# Patient Record
Sex: FEMALE | Race: WHITE | NOT HISPANIC OR LATINO | Employment: OTHER | URBAN - METROPOLITAN AREA
[De-identification: names, ages, dates, MRNs, and addresses within clinical notes are randomized per-mention and may not be internally consistent; named-entity substitution may affect disease eponyms.]

---

## 2017-04-19 ENCOUNTER — ALLSCRIPTS OFFICE VISIT (OUTPATIENT)
Dept: OTHER | Facility: OTHER | Age: 47
End: 2017-04-19

## 2017-04-24 ENCOUNTER — ALLSCRIPTS OFFICE VISIT (OUTPATIENT)
Dept: OTHER | Facility: OTHER | Age: 47
End: 2017-04-24

## 2017-04-24 DIAGNOSIS — I73.00 RAYNAUD'S SYNDROME WITHOUT GANGRENE: ICD-10-CM

## 2017-04-24 DIAGNOSIS — R55 SYNCOPE AND COLLAPSE: ICD-10-CM

## 2017-04-24 DIAGNOSIS — G70.00 MYASTHENIA GRAVIS WITHOUT (ACUTE) EXACERBATION (HCC): ICD-10-CM

## 2017-04-24 DIAGNOSIS — K58.0 IRRITABLE BOWEL SYNDROME WITH DIARRHEA: ICD-10-CM

## 2017-04-25 ENCOUNTER — ALLSCRIPTS OFFICE VISIT (OUTPATIENT)
Dept: OTHER | Facility: OTHER | Age: 47
End: 2017-04-25

## 2017-04-28 ENCOUNTER — APPOINTMENT (OUTPATIENT)
Dept: LAB | Facility: CLINIC | Age: 47
End: 2017-04-28
Payer: OTHER GOVERNMENT

## 2017-04-28 ENCOUNTER — TRANSCRIBE ORDERS (OUTPATIENT)
Dept: MRI IMAGING | Facility: CLINIC | Age: 47
End: 2017-04-28

## 2017-04-28 ENCOUNTER — GENERIC CONVERSION - ENCOUNTER (OUTPATIENT)
Dept: OTHER | Facility: OTHER | Age: 47
End: 2017-04-28

## 2017-04-28 DIAGNOSIS — I73.00 RAYNAUD'S SYNDROME WITHOUT GANGRENE: ICD-10-CM

## 2017-04-28 DIAGNOSIS — I73.00 RAYNAUD'S DISEASE WITHOUT GANGRENE: ICD-10-CM

## 2017-04-28 DIAGNOSIS — K58.0 IRRITABLE BOWEL SYNDROME WITH DIARRHEA: ICD-10-CM

## 2017-04-28 DIAGNOSIS — K58.0 IRRITABLE BOWEL SYNDROME WITH DIARRHEA: Primary | ICD-10-CM

## 2017-04-28 LAB — CORTIS AM PEAK SERPL-MCNC: 9.1 UG/ML (ref 4.2–22.4)

## 2017-04-28 PROCEDURE — 82626 DEHYDROEPIANDROSTERONE: CPT

## 2017-04-28 PROCEDURE — 82024 ASSAY OF ACTH: CPT

## 2017-04-28 PROCEDURE — 82533 TOTAL CORTISOL: CPT

## 2017-04-28 PROCEDURE — 36415 COLL VENOUS BLD VENIPUNCTURE: CPT

## 2017-05-02 LAB
ACTH PLAS-MCNC: 24.5 PG/ML (ref 7.2–63.3)
DHEA SERPL-MCNC: 287 NG/DL (ref 31–701)

## 2017-05-03 ENCOUNTER — TRANSCRIBE ORDERS (OUTPATIENT)
Dept: NON INVASIVE DIAGNOSTICS | Facility: CLINIC | Age: 47
End: 2017-05-03

## 2017-05-03 ENCOUNTER — HOSPITAL ENCOUNTER (OUTPATIENT)
Dept: NON INVASIVE DIAGNOSTICS | Facility: CLINIC | Age: 47
Discharge: HOME | End: 2017-05-03
Payer: OTHER GOVERNMENT

## 2017-05-03 DIAGNOSIS — R55 SYNCOPE AND COLLAPSE: ICD-10-CM

## 2017-05-03 DIAGNOSIS — I73.00 RAYNAUD'S DISEASE: ICD-10-CM

## 2017-05-03 DIAGNOSIS — G70.00 MYASTHENIA GRAVIS (HCC): Primary | ICD-10-CM

## 2017-05-04 ENCOUNTER — GENERIC CONVERSION - ENCOUNTER (OUTPATIENT)
Dept: OTHER | Facility: OTHER | Age: 47
End: 2017-05-04

## 2017-05-18 ENCOUNTER — HOSPITAL ENCOUNTER (OUTPATIENT)
Dept: INFUSION CENTER | Facility: CLINIC | Age: 47
Discharge: HOME | End: 2017-05-18
Payer: OTHER GOVERNMENT

## 2017-05-18 VITALS
HEART RATE: 68 BPM | DIASTOLIC BLOOD PRESSURE: 78 MMHG | SYSTOLIC BLOOD PRESSURE: 128 MMHG | HEIGHT: 65 IN | WEIGHT: 165 LBS | BODY MASS INDEX: 27.49 KG/M2 | RESPIRATION RATE: 20 BRPM | TEMPERATURE: 98 F | OXYGEN SATURATION: 99 %

## 2017-05-19 ENCOUNTER — HOSPITAL ENCOUNTER (OUTPATIENT)
Dept: NON INVASIVE DIAGNOSTICS | Facility: CLINIC | Age: 47
Discharge: HOME | End: 2017-05-19
Payer: OTHER GOVERNMENT

## 2017-05-19 DIAGNOSIS — R55 SYNCOPE AND COLLAPSE: ICD-10-CM

## 2017-05-19 PROCEDURE — 93350 STRESS TTE ONLY: CPT

## 2017-05-21 ENCOUNTER — GENERIC CONVERSION - ENCOUNTER (OUTPATIENT)
Dept: OTHER | Facility: OTHER | Age: 47
End: 2017-05-21

## 2017-05-22 LAB
ARRHY DURING EX: NORMAL
CHEST PAIN STATEMENT: NORMAL
MAX DIASTOLIC BP: 76 MMHG
MAX HEART RATE: 151 BPM
MAX PREDICTED HEART RATE: 172 BPM
MAX. SYSTOLIC BP: 158 MMHG
PROTOCOL NAME: NORMAL
REASON FOR TERMINATION: NORMAL
TARGET HR FORMULA: NORMAL
TIME IN EXERCISE PHASE: 420 S

## 2017-05-25 ENCOUNTER — ALLSCRIPTS OFFICE VISIT (OUTPATIENT)
Dept: OTHER | Facility: OTHER | Age: 47
End: 2017-05-25

## 2017-05-25 DIAGNOSIS — R42 DIZZINESS AND GIDDINESS: ICD-10-CM

## 2017-06-14 ENCOUNTER — APPOINTMENT (OUTPATIENT)
Dept: LAB | Facility: CLINIC | Age: 47
End: 2017-06-14
Payer: OTHER GOVERNMENT

## 2017-06-14 ENCOUNTER — TRANSCRIBE ORDERS (OUTPATIENT)
Dept: MRI IMAGING | Facility: CLINIC | Age: 47
End: 2017-06-14

## 2017-06-14 DIAGNOSIS — M35.00 SICCA SYNDROME (HCC): ICD-10-CM

## 2017-06-14 DIAGNOSIS — M35.00 SICCA SYNDROME (HCC): Primary | ICD-10-CM

## 2017-06-14 LAB
ALBUMIN SERPL BCP-MCNC: 3.7 G/DL (ref 3.5–5)
ALP SERPL-CCNC: 61 U/L (ref 46–116)
ALT SERPL W P-5'-P-CCNC: 32 U/L (ref 12–78)
ANION GAP SERPL CALCULATED.3IONS-SCNC: 5 MMOL/L (ref 4–13)
AST SERPL W P-5'-P-CCNC: 23 U/L (ref 5–45)
BASOPHILS # BLD AUTO: 0.02 THOUSANDS/ΜL (ref 0–0.1)
BASOPHILS NFR BLD AUTO: 1 % (ref 0–1)
BILIRUB SERPL-MCNC: 0.73 MG/DL (ref 0.2–1)
BUN SERPL-MCNC: 13 MG/DL (ref 5–25)
CALCIUM SERPL-MCNC: 8.8 MG/DL (ref 8.3–10.1)
CHLORIDE SERPL-SCNC: 106 MMOL/L (ref 100–108)
CO2 SERPL-SCNC: 29 MMOL/L (ref 21–32)
CREAT SERPL-MCNC: 0.72 MG/DL (ref 0.6–1.3)
CRP SERPL QL: <3 MG/L
EOSINOPHIL # BLD AUTO: 0.15 THOUSAND/ΜL (ref 0–0.61)
EOSINOPHIL NFR BLD AUTO: 4 % (ref 0–6)
ERYTHROCYTE [DISTWIDTH] IN BLOOD BY AUTOMATED COUNT: 14.8 % (ref 11.6–15.1)
ERYTHROCYTE [SEDIMENTATION RATE] IN BLOOD: 5 MM/HOUR (ref 0–20)
GFR SERPL CREATININE-BSD FRML MDRD: >60 ML/MIN/1.73SQ M
GLUCOSE P FAST SERPL-MCNC: 76 MG/DL (ref 65–99)
HCT VFR BLD AUTO: 39 % (ref 34.8–46.1)
HGB BLD-MCNC: 12.9 G/DL (ref 11.5–15.4)
LYMPHOCYTES # BLD AUTO: 1.44 THOUSANDS/ΜL (ref 0.6–4.47)
LYMPHOCYTES NFR BLD AUTO: 39 % (ref 14–44)
MCH RBC QN AUTO: 29.3 PG (ref 26.8–34.3)
MCHC RBC AUTO-ENTMCNC: 33.1 G/DL (ref 31.4–37.4)
MCV RBC AUTO: 89 FL (ref 82–98)
MONOCYTES # BLD AUTO: 0.31 THOUSAND/ΜL (ref 0.17–1.22)
MONOCYTES NFR BLD AUTO: 8 % (ref 4–12)
NEUTROPHILS # BLD AUTO: 1.75 THOUSANDS/ΜL (ref 1.85–7.62)
NEUTS SEG NFR BLD AUTO: 48 % (ref 43–75)
NRBC BLD AUTO-RTO: 0 /100 WBCS
PLATELET # BLD AUTO: 196 THOUSANDS/UL (ref 149–390)
PMV BLD AUTO: 9 FL (ref 8.9–12.7)
POTASSIUM SERPL-SCNC: 4.4 MMOL/L (ref 3.5–5.3)
PROT SERPL-MCNC: 6.8 G/DL (ref 6.4–8.2)
RBC # BLD AUTO: 4.4 MILLION/UL (ref 3.81–5.12)
SODIUM SERPL-SCNC: 140 MMOL/L (ref 136–145)
WBC # BLD AUTO: 3.68 THOUSAND/UL (ref 4.31–10.16)

## 2017-06-14 PROCEDURE — 84165 PROTEIN E-PHORESIS SERUM: CPT

## 2017-06-14 PROCEDURE — 36415 COLL VENOUS BLD VENIPUNCTURE: CPT

## 2017-06-14 PROCEDURE — 86140 C-REACTIVE PROTEIN: CPT

## 2017-06-14 PROCEDURE — 86235 NUCLEAR ANTIGEN ANTIBODY: CPT

## 2017-06-14 PROCEDURE — 85025 COMPLETE CBC W/AUTO DIFF WBC: CPT

## 2017-06-14 PROCEDURE — 82232 ASSAY OF BETA-2 PROTEIN: CPT

## 2017-06-14 PROCEDURE — 80053 COMPREHEN METABOLIC PANEL: CPT

## 2017-06-14 PROCEDURE — 85652 RBC SED RATE AUTOMATED: CPT

## 2017-06-15 LAB
B2 MICROGLOB SERPL-MCNC: 2.5 MG/L (ref 0.6–2.4)
ENA SS-A AB SER-ACNC: 3.8 AI (ref 0–0.9)
ENA SS-B AB SER-ACNC: <0.2 AI (ref 0–0.9)

## 2017-06-16 LAB
ALBUMIN SERPL ELPH-MCNC: 4.11 G/DL (ref 3.5–5)
ALBUMIN SERPL ELPH-MCNC: 61.3 % (ref 52–65)
ALPHA1 GLOB SERPL ELPH-MCNC: 0.25 G/DL (ref 0.1–0.4)
ALPHA1 GLOB SERPL ELPH-MCNC: 3.8 % (ref 2.5–5)
ALPHA2 GLOB SERPL ELPH-MCNC: 0.62 G/DL (ref 0.4–1.2)
ALPHA2 GLOB SERPL ELPH-MCNC: 9.3 % (ref 7–13)
BETA GLOB ABNORMAL SERPL ELPH-MCNC: 0.48 G/DL (ref 0.4–0.8)
BETA1 GLOB SERPL ELPH-MCNC: 7.1 % (ref 5–13)
BETA2 GLOB SERPL ELPH-MCNC: 4 % (ref 2–8)
BETA2+GAMMA GLOB SERPL ELPH-MCNC: 0.27 G/DL (ref 0.2–0.5)
GAMMA GLOB ABNORMAL SERPL ELPH-MCNC: 0.97 G/DL (ref 0.5–1.6)
GAMMA GLOB SERPL ELPH-MCNC: 14.5 % (ref 12–22)
IGG/ALB SER: 1.58 {RATIO} (ref 1.1–1.8)
PROT PATTERN SERPL ELPH-IMP: NORMAL
PROT SERPL-MCNC: 6.7 G/DL (ref 6.4–8.2)

## 2017-06-20 ENCOUNTER — HOSPITAL ENCOUNTER (OUTPATIENT)
Dept: NON INVASIVE DIAGNOSTICS | Facility: CLINIC | Age: 47
Discharge: HOME | End: 2017-06-20
Payer: OTHER GOVERNMENT

## 2017-06-20 DIAGNOSIS — R55 SYNCOPE AND COLLAPSE: ICD-10-CM

## 2017-06-20 DIAGNOSIS — R42 DIZZINESS AND GIDDINESS: ICD-10-CM

## 2017-06-20 PROCEDURE — 93880 EXTRACRANIAL BILAT STUDY: CPT

## 2017-06-26 ENCOUNTER — GENERIC CONVERSION - ENCOUNTER (OUTPATIENT)
Dept: OTHER | Facility: OTHER | Age: 47
End: 2017-06-26

## 2017-06-28 ENCOUNTER — ALLSCRIPTS OFFICE VISIT (OUTPATIENT)
Dept: OTHER | Facility: OTHER | Age: 47
End: 2017-06-28

## 2017-06-28 DIAGNOSIS — I65.29 OCCLUSION AND STENOSIS OF UNSPECIFIED CAROTID ARTERY: ICD-10-CM

## 2017-07-06 ENCOUNTER — TRANSCRIBE ORDERS (OUTPATIENT)
Dept: ADMINISTRATIVE | Facility: HOSPITAL | Age: 47
End: 2017-07-06

## 2017-07-06 DIAGNOSIS — R55 SYNCOPE AND COLLAPSE: Primary | ICD-10-CM

## 2017-08-09 ENCOUNTER — HOSPITAL ENCOUNTER (OUTPATIENT)
Dept: NEUROLOGY | Facility: HOSPITAL | Age: 47
Discharge: HOME | End: 2017-08-09
Attending: PSYCHIATRY & NEUROLOGY
Payer: OTHER GOVERNMENT

## 2017-08-09 DIAGNOSIS — R55 SYNCOPE AND COLLAPSE: ICD-10-CM

## 2017-08-09 PROCEDURE — 95819 EEG AWAKE AND ASLEEP: CPT

## 2017-10-18 ENCOUNTER — TRANSCRIBE ORDERS (OUTPATIENT)
Dept: RADIOLOGY | Facility: CLINIC | Age: 47
End: 2017-10-18

## 2017-10-18 ENCOUNTER — APPOINTMENT (OUTPATIENT)
Dept: LAB | Facility: CLINIC | Age: 47
End: 2017-10-18
Payer: OTHER GOVERNMENT

## 2017-10-18 DIAGNOSIS — M35.00 SICCA SYNDROME (HCC): ICD-10-CM

## 2017-10-18 DIAGNOSIS — M35.00 SICCA SYNDROME (HCC): Primary | ICD-10-CM

## 2017-10-18 LAB
ALBUMIN SERPL BCP-MCNC: 3.9 G/DL (ref 3.5–5)
ALP SERPL-CCNC: 58 U/L (ref 46–116)
ALT SERPL W P-5'-P-CCNC: 28 U/L (ref 12–78)
ANION GAP SERPL CALCULATED.3IONS-SCNC: 4 MMOL/L (ref 4–13)
AST SERPL W P-5'-P-CCNC: 21 U/L (ref 5–45)
BASOPHILS # BLD AUTO: 0.02 THOUSANDS/ΜL (ref 0–0.1)
BASOPHILS NFR BLD AUTO: 1 % (ref 0–1)
BILIRUB SERPL-MCNC: 0.56 MG/DL (ref 0.2–1)
BUN SERPL-MCNC: 11 MG/DL (ref 5–25)
CALCIUM SERPL-MCNC: 8.8 MG/DL (ref 8.3–10.1)
CHLORIDE SERPL-SCNC: 109 MMOL/L (ref 100–108)
CO2 SERPL-SCNC: 27 MMOL/L (ref 21–32)
CREAT SERPL-MCNC: 0.84 MG/DL (ref 0.6–1.3)
CRP SERPL QL: <3 MG/L
EOSINOPHIL # BLD AUTO: 0.1 THOUSAND/ΜL (ref 0–0.61)
EOSINOPHIL NFR BLD AUTO: 3 % (ref 0–6)
ERYTHROCYTE [DISTWIDTH] IN BLOOD BY AUTOMATED COUNT: 13.6 % (ref 11.6–15.1)
ERYTHROCYTE [SEDIMENTATION RATE] IN BLOOD: 7 MM/HOUR (ref 0–20)
GFR SERPL CREATININE-BSD FRML MDRD: 82 ML/MIN/1.73SQ M
GLUCOSE SERPL-MCNC: 83 MG/DL (ref 65–140)
HCT VFR BLD AUTO: 39.3 % (ref 34.8–46.1)
HGB BLD-MCNC: 13.2 G/DL (ref 11.5–15.4)
LYMPHOCYTES # BLD AUTO: 1.02 THOUSANDS/ΜL (ref 0.6–4.47)
LYMPHOCYTES NFR BLD AUTO: 29 % (ref 14–44)
MCH RBC QN AUTO: 29.1 PG (ref 26.8–34.3)
MCHC RBC AUTO-ENTMCNC: 33.6 G/DL (ref 31.4–37.4)
MCV RBC AUTO: 87 FL (ref 82–98)
MONOCYTES # BLD AUTO: 0.29 THOUSAND/ΜL (ref 0.17–1.22)
MONOCYTES NFR BLD AUTO: 8 % (ref 4–12)
NEUTROPHILS # BLD AUTO: 2.12 THOUSANDS/ΜL (ref 1.85–7.62)
NEUTS SEG NFR BLD AUTO: 59 % (ref 43–75)
NRBC BLD AUTO-RTO: 0 /100 WBCS
PLATELET # BLD AUTO: 252 THOUSANDS/UL (ref 149–390)
PMV BLD AUTO: 9.2 FL (ref 8.9–12.7)
POTASSIUM SERPL-SCNC: 4.1 MMOL/L (ref 3.5–5.3)
PROT SERPL-MCNC: 7.4 G/DL (ref 6.4–8.2)
RBC # BLD AUTO: 4.54 MILLION/UL (ref 3.81–5.12)
SODIUM SERPL-SCNC: 140 MMOL/L (ref 136–145)
WBC # BLD AUTO: 3.56 THOUSAND/UL (ref 4.31–10.16)

## 2017-10-18 PROCEDURE — 80053 COMPREHEN METABOLIC PANEL: CPT

## 2017-10-18 PROCEDURE — 36415 COLL VENOUS BLD VENIPUNCTURE: CPT

## 2017-10-18 PROCEDURE — 85652 RBC SED RATE AUTOMATED: CPT

## 2017-10-18 PROCEDURE — 86140 C-REACTIVE PROTEIN: CPT

## 2017-10-18 PROCEDURE — 85025 COMPLETE CBC W/AUTO DIFF WBC: CPT

## 2017-10-26 ENCOUNTER — ALLSCRIPTS OFFICE VISIT (OUTPATIENT)
Dept: OTHER | Facility: OTHER | Age: 47
End: 2017-10-26

## 2017-10-27 NOTE — PROGRESS NOTES
Assessment  1  Myasthenia gravis (358 00) (G70 00)   2  Sjoegren syndrome (710 2) (M35 00)    Plan  Sjoegren syndrome    · Continue with our present treatment plan ; Status:Complete;   Done: 12OWK5191   Ordered; For:Sjoegren syndrome; Ordered By:Govind Jaramillo;   · Follow-up visit in 6 months Evaluation and Treatment  Follow-up  Status: Complete   Done: 26Oct2017   Ordered; For: Sjoegren syndrome; Ordered By: La Nena Ceballos Performed:  Due: 54XES2870; Last Updated By: Edgardo Batista; 10/26/2017 9:11:19 AM    Discussion/Summary  Discussion Summary:   Patient with a history of myasthenia gravis, is advised to continue Mestinon 60 mg every 4 hours  She is considering a prednisone taper, we will also discuss with her rheumatologist, and is advised to reduce the dose of prednisone to 2 5 mg every 2 days and if she does well within the next 1 month she can discontinue the medication  She will return back to see me in 6 months  Chief Complaint  Chief Complaint Free Text Note Form: The Patient returns today following up on her history of Myasthenia Gravis and Sjogren syndrome  History of Present Illness  HPI: Patient is here for a follow-up visit with a history of myasthenia gravis, under control with the help of prednisone which she has now reduced to 5 mg every 2 days, and Mestinon 60 mg every 4 hours  She has been doing well, and recently also had suffered a syncopal event was experiencing lightheadedness, flashes, saw her gynecologist and has been started on hormonal therapy with significant improvement of symptoms  Patient denies any new neurological symptoms  Review of Systems  Neurological ROS:   Constitutional: fatigue  HEENT: no eye pain,-- no tinnitus-- and-- no dysphagia  Cardiovascular: swelling in the arms or legs-- and-- swelling in her hands, but-- no chest pain or pressure,-- no palpitations present-- and-- the heart rate was not rapid or irregular     Respiratory: no shortness of breath with or without exertion  Gastrointestinal: diarrhea, but-- no nausea  Genitourinary:  no incontinence, no feelings of urinary urgency, no increase in frequency, no urinary hesitancy, no dysuria, no hematuria  Musculoskeletal: arthralgias-- and-- hand and knee pain, but-- no head/neck/back pain  Integumentary no rash:-- and-- no skin lesions  Psychiatric: sleep problems  Endocrine no excessive thirst-- and-- no hair loss or gain  Hematologic/Lymphatic: no unusual bleeding  Neurological General: trouble falling asleep, but-- no headache,-- no lightheadedness-- and-- no awakening at night  Neurological Mental Status: no mood swings-- and-- no memory problems  Neurological Cranial Nerves: no blurry or double vision,-- no facial numbness or weakness-- and-- no vertigo or dizziness  Neurological Motor findings include: cramping(pre/post exercise)  Neurological Coordination: no vertigo or dizziness  Neurological Sensory: numbness-- and-- tingling  Neurological Gait: no difficulty walking-- and-- has not had falls  ROS Reviewed:   ROS reviewed  Active Problems  1  Abdominal pain, generalized (789 07) (R10 84)   2  Carotid stenosis, asymptomatic (433 10) (I65 29)   3  Colon cancer screening (V76 51) (Z12 11)   4  Dizziness (780 4) (R42)   5  Irritable bowel syndrome with diarrhea (564 1) (K58 0)   6  Myasthenia gravis (358 00) (G70 00)   7  Numbness (782 0) (R20 0)   8  Polyneuropathy (356 9) (G62 9)   9  Sjoegren syndrome (710 2) (M35 00)   10  Syncope and collapse (780 2) (R55)    Past Medical History  1  History of Diplopia (368 2) (H53 2)   2  History of Sjogren's disease (V13 59) (Z87 39)   3  Myasthenia gravis (358 00) (G70 00)   4  History of Raynauds disease (443 0) (I73 00)    Surgical History  1  History of Cholecystectomy   2  History of Thymectomy    Family History  Mother    1  Family history of Arthritis (716 90) (M19 90)  Father    2   Family history of Diabetes (250 00) (E11 9)   3  Family history of Hypertension (401 9) (I10)  Family History    4  Family history of CAD (coronary artery disease)    Social History   · Denied: Drug use (305 90) (F19 90)   · Former smoker (W37 62) (B83 320)   · Full-time employment   ·    · No alcohol use  Social History Reviewed: The social history was reviewed and updated today  Current Meds   1  Estradiol 1 MG Oral Tablet; TAKE TABLET  Estradiol-Norethindrone 1 mg/0 5 mg once a   day; Therapy: (Recorded:26Oct2017) to Recorded   2  Folic Acid 1 MG Oral Tablet; Take 1 tablet daily; Therapy: (Recorded:68Wkh8133) to Recorded   3  Ibuprofen 600 MG Oral Tablet; TAKE 1 TABLET Twice daily PRN; Therapy: (Recorded:12Umx7679) to Recorded   4  Iron 65 MG TABS; TAKE 1 TABLET Weekly; Therapy: (Recorded:26Oct2017) to Recorded   5  Methotrexate 2 5 MG Oral Tablet; TAKE 6 TABLET Weekly; Therapy: (Recorded:26Oct2017) to Recorded   6  Omeprazole 20 MG Oral Capsule Delayed Release; TAKE ONE CAPSULE BY MOUTH   EVERY DAY; Therapy: 35AGK3242 to (Evaluate:50Jvf8214)  Requested for: 09FBS0507; Last   Rx:17Nov2016 Ordered   7  PredniSONE 5 MG Oral Tablet; TAKE TABLET  1 tab every two days; Therapy: (Recorded:26Oct2017) to Recorded   8  Pyridostigmine Bromide 60 MG Oral Tablet; Take 1 tablet 4 times daily; Therapy: 15YVN3711 to ([phone])  Requested for: 81ZML9587 Recorded   9  Vitamin C 500 MG Oral Tablet; TAKE 1 TABLET DAILY; Therapy: (Recorded:19Oct2016) to Recorded   10  Vitamin D3 2000 UNIT Oral Tablet; Take 1 tablet daily; Therapy: (Recorded:19Oct2016) to Recorded   11  Zolpidem Tartrate 10 MG Oral Tablet; TAKE 1 TABLET DAILY AT BEDTIME; Therapy: (Recorded:13Jan2016) to Recorded  Medication List Reviewed: The medication list was reviewed and updated today  Allergies  1  No Known Drug Allergies  2  No Known Environmental Allergies   3   No Known Food Allergies    Vitals  Signs   Recorded: 28CHD9115 08:59AM   Heart Rate: 66, L Radial  Pulse Quality: Regular, L Radial  Respiration: 16  Systolic: 998, RUE, Sitting  Diastolic: 82, RUE, Sitting  Height: 5 ft 4 5 in  Weight: 166 lb   BMI Calculated: 28 05  BSA Calculated: 1 82    Physical Exam    Constitutional   General appearance: No acute distress, well appearing and well nourished  Musculoskeletal   Gait and station: Normal gait, stance and balance  Muscle strength: Normal strength throughout  Muscle tone: No atrophy, abnormal movements, flaccidity, cogwheeling or spasticity  Neurologic   Orientation to person, place, and time: Normal     Language: Names objects, able to repeat phrases and speaks spontaneously  2nd cranial nerve: Normal     3rd, 4th, and 6th cranial nerve: Abnormal  -- Extraocular movements are intact, there is no evidence of any ptosis  7th cranial nerve: Normal     Sensation: Normal     Reflexes: Normal     Coordination: Normal        Signatures   Electronically signed by :  Deanna Chang MD; Oct 26 2017  9:12AM EST                       (Author)

## 2018-01-09 NOTE — PROCEDURES
Results/Data    Procedure: Electromyogram and Nerve Conduction Study  Indication: Left Lower Extremity   Referred by Dr Malorie Davenport  The procedure's were discussed with the patient  Written consent was obtained prior to the procedure and is detailed in the patient's record  Prior to the start of the procedure a time out was taken and the identity of the patient was confirmed via name and date of birth with the patient  The correct site and the procedure to be performed were confirmed  The correct side was confirmed if applicable  The positioning of the patient was verified  The availability of the correct equipment was verified  Procedure Start Time: 9:00    Technique: A sterile concentric needle electrode was used  The patient tolerated the procedure well  There were no complications  Results EMG LEFT LOWER EXTREMITY    Motor and sensory conduction studies were performed on the left peroneal, tibial and sural nerves  The distal motor latencies were normal  The motor action potential amplitudes were normal  Conduction velocities were normal including conduction of the peroneal nerve across the fibular head  Left peroneal and tibial F waves were normal     The left sural distal sensory latency was normal with normal sensory action potential amplitudes  H  reflexes were not obtainable    Concentric needle EMG was performed in various distal and proximal muscles of the left lower extremity including EDB, tibialis anterior, gastrocnemius medius, vastus lateralis, biceps femoralis short head and the low lumbar paraspinal region    There is no evidence of spontaneous activity seen  The compound motor unit potentials were of normal configuration and interference patterns were full or full for effort  IMPRESSION: This is a normal EMG of the left lower extremity  A small fiber peripheral neuropathy cannot be excluded  LISA Gabriel        Signatures   Electronically signed by : Chico Craft MD; Jul 21 2016  9:59AM EST                       (Author)    Electronically signed by : Chico Craft MD; Jul 21 2016 10:00AM EST                       (Author)

## 2018-01-12 VITALS
WEIGHT: 167.25 LBS | BODY MASS INDEX: 27.86 KG/M2 | HEIGHT: 65 IN | DIASTOLIC BLOOD PRESSURE: 80 MMHG | HEART RATE: 76 BPM | SYSTOLIC BLOOD PRESSURE: 130 MMHG

## 2018-01-13 VITALS
HEART RATE: 74 BPM | HEIGHT: 65 IN | DIASTOLIC BLOOD PRESSURE: 82 MMHG | BODY MASS INDEX: 26.99 KG/M2 | WEIGHT: 162 LBS | SYSTOLIC BLOOD PRESSURE: 124 MMHG

## 2018-01-13 VITALS
SYSTOLIC BLOOD PRESSURE: 134 MMHG | DIASTOLIC BLOOD PRESSURE: 72 MMHG | WEIGHT: 165.25 LBS | HEART RATE: 60 BPM | HEIGHT: 65 IN | BODY MASS INDEX: 27.53 KG/M2

## 2018-01-13 VITALS
HEIGHT: 65 IN | DIASTOLIC BLOOD PRESSURE: 74 MMHG | HEART RATE: 68 BPM | WEIGHT: 169.25 LBS | BODY MASS INDEX: 28.2 KG/M2 | SYSTOLIC BLOOD PRESSURE: 108 MMHG | OXYGEN SATURATION: 100 %

## 2018-01-14 VITALS
RESPIRATION RATE: 16 BRPM | SYSTOLIC BLOOD PRESSURE: 120 MMHG | WEIGHT: 166 LBS | DIASTOLIC BLOOD PRESSURE: 82 MMHG | HEIGHT: 65 IN | BODY MASS INDEX: 27.66 KG/M2 | HEART RATE: 66 BPM

## 2018-01-14 VITALS
WEIGHT: 167 LBS | HEIGHT: 65 IN | BODY MASS INDEX: 27.82 KG/M2 | HEART RATE: 64 BPM | DIASTOLIC BLOOD PRESSURE: 82 MMHG | OXYGEN SATURATION: 99 % | SYSTOLIC BLOOD PRESSURE: 132 MMHG

## 2018-01-14 NOTE — RESULT NOTES
Verified Results  VAS CAROTID COMPLETE STUDY 20Jun2017 02:55PM Pamela Perkins Order Number: [id number]    - Patient Instructions: To schedule this appointment, please contact Central Scheduling at [phone]  Test Name Result Flag Reference   VAS CAROTID COMPLETE STUDY (Report)     [facility] REPORT   CLINICAL:   Indications: Syncope [R55]  Patient c/o of a single syncopal episode x 3 months ago accompanied with   abdominal pain  Reports feeling intermittent dizziness after episode  Risk Factors   The patient has history of previous smoking (quit >10yrs ago)  Clinical   Right Pressure: 118/ mm Hg, Left Pressure: 122/82 mm Hg  FINDINGS:      Right    Impression PSV EDV (cm/s) Direction of Flow Ratio    Dist  ICA         85     35           1 14    Mid  ICA         81     37           1 10    Prox  ICA  Normal    97     30           1 31    Dist CCA         67     23                 Mid CCA          74     21           0 82    Prox CCA         90     19           0 75    Ext Carotid        83     16           1 12    Prox Vert         56     21 Antegrade            Subclavian        102     14                 Innominate        120     23                    Left     Impression PSV EDV (cm/s) Direction of Flow Ratio    Dist  ICA         71     32           0 83    Mid  ICA         96     39           1 11    Prox  ICA  1 - 49%   76     29           0 89    Dist CCA         74     23                 Mid CCA          86     23           0 93    Prox CCA         92     21                 Ext Carotid        71     16           0 83    Prox Vert         47     21 Antegrade            Subclavian        116     12                          CONCLUSION:   Impression   RIGHT:   There is no evidence of significant stenosis noted  Vertebral artery flow is antegrade  There is no significant subclavian artery   disease  LEFT:   There is <50% stenosis noted in the internal carotid artery  Plaque is   heterogenous and smooth  Vertebral artery flow is antegrade  There is no significant subclavian artery   disease  Internal carotid artery stenosis determination by consensus criteria from:   Ramírez Jensen et al  Carotid Artery Stenosis: Gray-Scale and Doppler US Diagnosis   - Society of Radiologists in [address] [place] [address], Radiology 2003;   [phone]        SIGNATURE:   Electronically Signed Lionel Del Castillo on 2017-06-22 10:48:28 PM

## 2018-01-14 NOTE — MISCELLANEOUS
Message  Discussed with patient's oral surgeon, patient is seeing him for wisdom tooth removal, given her history of myasthenia probably patient would need to be on some stress dose of Steroids, he does not feel that this is an emergency to have the tooth removed, she is going to call Dr Ayo Talavera next week and discuss with him about this prior to her tooth removal  Oral surgeon is okay with the plan        Signatures   Electronically signed by : Gianfranco Luque MD; Stu  3 2016  9:28AM EST                       (Author)

## 2018-01-16 NOTE — RESULT NOTES
Verified Results  ECHO STRESS TEST W CONTRAST IF INDICATED [id number] 03:23PM Olita Stairs     Test Name Result Flag Reference   ECHO STRESS TEST W CONTRAST IF INDICATED (Report)     [address]   [phone]     Exercise Stress Echocardiography     Study date: 19-May-2017     Patient: Allyson Aguila   MR number: [id number]   Account number: [de-identified]   : 1970   Age: 48 years   Gender: Female   Study date: 19-May-2017   Status: Outpatient   Location: [facility]   Height: 64 in   Weight: 167 lb   BP: 130// 80 mmHg     Indications: SYNCOPE, MYASTHENIA GRAVIS     Diagnosis: R55  - Syncope and collapse     Sonographer: Alcazar RCS   Interpreting Physician: Daniel Maharaj MD   Primary Physician: Rocael Tejeda   Referring Physician: Daniel Maharaj MD   Group: [facility]   Other: Lacey Zaragoza MS, CCT     IMPRESSIONS:   Normal study  SUMMARY     STRESS RESULTS:   Duration of exercise was 7 min  Maximal work rate was 7 8 METs  Maximal heart rate during stress was 146 bpm ( 87 % of maximal predicted heart rate)  Target heart rate was achieved  There was no chest pain during stress  BASELINE:   There were no regional wall motion abnormalities  Estimated left ventricular ejection fraction was 55 %   HISTORY: The patient is a 48year old  female  Chest pain status: no chest pain  Other symptoms: syncope with collapse  Coronary artery disease risk factors: family history of coronary artery disease  Cardiovascular history: none   significant  Medications: no cardiac drugs  REST ECG: Normal sinus rhythm  PROCEDURE: The study was performed in the stress lab  The study was performed in the [address] [place] [address]  Treadmill exercise testing was performed, using the Dionisio protocol   Stress and rest echocardiographic evaluation was   performed from multiple acoustic windows for evaluation of ventricular function  ARGENIS PROTOCOL:   HR bpm SBP mmHg DBP mmHg Symptoms Rhythm/conduct   Baseline 71 130 80 none --   Stage 1 131 130 82 -- --   Stage 2 141 152 78 -- occasional PAC's   Stage 3 146 -- -- mild dyspnea, mild fatigue --   Immediate 150 -- -- -- SVT   Recovery 1 102 -- -- -- occasional PAC's   Recovery 2 81 158 76 -- --   Recovery 3 75 -- -- -- --   Recovery 5 82 130 78 -- --     MEDICATIONS GIVEN: No medications or fluids given  STRESS RESULTS: Duration of exercise was 7 min  The patient exercised to protocol stage 3  Maximal work rate was 7 8 METs  Functional capacity was normal  Maximal heart rate during stress was 146 bpm ( 87 % of maximal predicted heart   rate)  Target heart rate was achieved  The heart rate response to stress was normal  Maximal systolic blood pressure during stress was 152 mmHg  There was normal resting blood pressure with an appropriate response to stress  The   rate-pressure product for the peak heart rate and blood pressure was 71563  There was no chest pain during stress  The stress test was terminated due to achievement of target heart rate, mild dyspnea, and mild fatigue  ECG CONCLUSIONS: There were no stress arrhythmias or conduction abnormalities  STRESS 2D ECHO RESULTS:     BASELINE: There were no regional wall motion abnormalities  Left ventricular size was normal  Overall left ventricular systolic function was normal  Estimated left ventricular ejection fraction was 55 %   PEAK STRESS: There was an appropriate reduction in left ventricular size  There was an appropriate augmentation in LV function  The image quality was good       Prepared and electronically signed by     Jocelyn Guerrero MD   Signed [id number] 17:05:23

## 2018-01-17 NOTE — RESULT NOTES
Verified Results  (1) CORTISOL AM SPECIMEN 22Fti2003 08:31AM Mandy Hernandez Order Number: [id number]     Test Name Result Flag Reference   CORTISO AM SPEC 9 1 ug/mL  4 2-22 4   Reference ranges established for specimens drawn between 7 and 9 am  Results may be inaccurate if timing is not correct

## 2018-02-13 ENCOUNTER — OFFICE VISIT (OUTPATIENT)
Dept: URGENT CARE | Facility: CLINIC | Age: 48
End: 2018-02-13
Payer: OTHER GOVERNMENT

## 2018-02-13 VITALS
TEMPERATURE: 98.2 F | WEIGHT: 164 LBS | OXYGEN SATURATION: 98 % | HEIGHT: 66 IN | BODY MASS INDEX: 26.36 KG/M2 | DIASTOLIC BLOOD PRESSURE: 81 MMHG | HEART RATE: 62 BPM | SYSTOLIC BLOOD PRESSURE: 148 MMHG | RESPIRATION RATE: 18 BRPM

## 2018-02-13 DIAGNOSIS — R59.1 LYMPHADENOPATHY: ICD-10-CM

## 2018-02-13 DIAGNOSIS — M26.609 TMJ (TEMPOROMANDIBULAR JOINT DISORDER): Primary | ICD-10-CM

## 2018-02-13 LAB — S PYO AG THROAT QL: NEGATIVE

## 2018-02-13 PROCEDURE — 87070 CULTURE OTHR SPECIMN AEROBIC: CPT | Performed by: PHYSICIAN ASSISTANT

## 2018-02-13 PROCEDURE — G0382 LEV 3 HOSP TYPE B ED VISIT: HCPCS | Performed by: PHYSICIAN ASSISTANT

## 2018-02-13 NOTE — PROGRESS NOTES
[facility]        NAME: Renee Ramsey is a 50 y o  female  : 1970    MRN: [id number]  DATE: 2018  TIME: 6:39 PM    Assessment and Plan   TMJ (temporomandibular joint disorder) [M26 609]  1  TMJ (temporomandibular joint disorder)     2  Lymphadenopathy  POCT rapid strepA    Throat culture         Patient Instructions       Continue NSAIDs for TM joint  This may be viral pharyngitis with reactive lymphadenopathy or strep  We will check a strep culture and the rapid strep was negative here in the office  She may benefit from some blood work with Rheumatology her family physician  Continue   May also benefit from Medical dentistry  Follow up with PCP in 3-5 days  Proceed to  ER if symptoms worsen  Chief Complaint     Chief Complaint   Patient presents with    Earache     x2 wks bliateral    Fatigue    Generalized Body Aches         History of Present Illness   Renee Ramsey presents to the clinic c/o      41-year-old female presents complaining of bilateral ear pain for 2 weeks  She says she has had this problem off and on for 2 years  She was seen an ENT doctor who told her was TMJ and that was not  She has been treated with prednisone and antibiotics with minimal help  She says she now has some sore throat on both sides worse on the right and a swollen lymph node on the right side  No fever or chills  No cough runny nose  No sinus pain or pressure  She does have pain when she swallows          Review of Systems   Review of Systems      Current Medications     Long-Term Prescriptions   Medication Sig Dispense Refill    fluticasone (FLONASE) 50 mcg/act nasal spray 2 sprays into each nostril daily for 30 days 1 Bottle 0    ibuprofen (MOTRIN) 600 mg tablet Take by mouth      meclizine (ANTIVERT) 25 mg tablet Take 1 tablet by mouth 3 (three) times a day as needed for dizziness for up to 30 days 30 tablet 0    methotrexate (RHEUMATREX) 2 5 MG tablet Take by mouth      omeprazole (PriLOSEC) 20 mg delayed release capsule Take by mouth      pyridostigmine (MESTINON) 60 mg tablet Take by mouth      zolpidem (AMBIEN) 10 mg tablet Take by mouth         Current Allergies     Allergies as of 02/13/2018    (No Known Allergies)            The following portions of the patient's history were reviewed and updated as appropriate: allergies, current medications, past family history, past medical history, past social history, past surgical history and problem list     Objective   /81   Pulse 62   Temp 98 2 °F (36 8 °C) (Tympanic)   Resp 18   Ht 5' 6" (1 676 m)   Wt 74 4 kg (164 lb)   SpO2 98%   BMI 26 47 kg/m²        Physical Exam     Physical Exam   Constitutional: She appears well-developed and well-nourished  No distress  HENT:   Right Ear: Tympanic membrane, external ear and ear canal normal    Left Ear: Tympanic membrane, external ear and ear canal normal    Nose: Nose normal  Right sinus exhibits no maxillary sinus tenderness and no frontal sinus tenderness  Left sinus exhibits no maxillary sinus tenderness and no frontal sinus tenderness  Mouth/Throat: Oropharynx is clear and moist  No oropharyngeal exudate or posterior oropharyngeal erythema  Eyes: Conjunctivae and EOM are normal  Pupils are equal, round, and reactive to light  No scleral icterus  Neck: Normal range of motion  Neck supple  Right anterior cervical lymphadenopathy tender mobile rubbery  Cardiovascular: Normal rate, regular rhythm and normal heart sounds  Pulmonary/Chest: Effort normal and breath sounds normal  No respiratory distress  She has no wheezes  She has no rales  Abdominal: Soft  Bowel sounds are normal  She exhibits no distension and no mass  There is no tenderness  There is no rebound and no guarding  Musculoskeletal:     Bilateral TM joints tender and subluxation palpable withdrawal range of motion  Lymphadenopathy:     She has cervical adenopathy     Skin: Skin is warm and dry  No rash noted

## 2018-02-13 NOTE — PATIENT INSTRUCTIONS
Continue NSAIDs for TM joint  This may be viral pharyngitis with reactive lymphadenopathy or strep  We will check a strep culture and the rapid strep was negative here in the office  She may benefit from some blood work with Rheumatology her family physician  Continue   May also benefit from Medical dentistry  Follow up with PCP in 3-5 days  Proceed to  ER if symptoms worsen

## 2018-02-16 LAB — BACTERIA THROAT CULT: NORMAL

## 2018-05-08 ENCOUNTER — APPOINTMENT (OUTPATIENT)
Dept: LAB | Facility: CLINIC | Age: 48
End: 2018-05-08
Payer: OTHER GOVERNMENT

## 2018-05-08 ENCOUNTER — TRANSCRIBE ORDERS (OUTPATIENT)
Dept: RADIOLOGY | Facility: CLINIC | Age: 48
End: 2018-05-08

## 2018-05-08 DIAGNOSIS — M35.00 SICCA SYNDROME (HCC): ICD-10-CM

## 2018-05-08 DIAGNOSIS — M35.00 SICCA SYNDROME (HCC): Primary | ICD-10-CM

## 2018-05-08 LAB
ALBUMIN SERPL BCP-MCNC: 4.2 G/DL (ref 3.5–5)
ALP SERPL-CCNC: 54 U/L (ref 46–116)
ALT SERPL W P-5'-P-CCNC: 25 U/L (ref 12–78)
ANION GAP SERPL CALCULATED.3IONS-SCNC: 6 MMOL/L (ref 4–13)
AST SERPL W P-5'-P-CCNC: 21 U/L (ref 5–45)
BASOPHILS # BLD AUTO: 0.02 THOUSANDS/ΜL (ref 0–0.1)
BASOPHILS NFR BLD AUTO: 1 % (ref 0–1)
BILIRUB SERPL-MCNC: 0.58 MG/DL (ref 0.2–1)
BILIRUB UR QL STRIP: NEGATIVE
BUN SERPL-MCNC: 13 MG/DL (ref 5–25)
CALCIUM SERPL-MCNC: 8.9 MG/DL (ref 8.3–10.1)
CHLORIDE SERPL-SCNC: 109 MMOL/L (ref 100–108)
CLARITY UR: CLEAR
CO2 SERPL-SCNC: 26 MMOL/L (ref 21–32)
COLOR UR: YELLOW
CREAT SERPL-MCNC: 0.86 MG/DL (ref 0.6–1.3)
CRP SERPL QL: <3 MG/L
EOSINOPHIL # BLD AUTO: 0.07 THOUSAND/ΜL (ref 0–0.61)
EOSINOPHIL NFR BLD AUTO: 2 % (ref 0–6)
ERYTHROCYTE [DISTWIDTH] IN BLOOD BY AUTOMATED COUNT: 13.7 % (ref 11.6–15.1)
ERYTHROCYTE [SEDIMENTATION RATE] IN BLOOD: 10 MM/HOUR (ref 0–20)
GFR SERPL CREATININE-BSD FRML MDRD: 80 ML/MIN/1.73SQ M
GLUCOSE P FAST SERPL-MCNC: 85 MG/DL (ref 65–99)
GLUCOSE UR STRIP-MCNC: NEGATIVE MG/DL
HCT VFR BLD AUTO: 42.2 % (ref 34.8–46.1)
HGB BLD-MCNC: 14.2 G/DL (ref 11.5–15.4)
HGB UR QL STRIP.AUTO: NEGATIVE
KETONES UR STRIP-MCNC: NEGATIVE MG/DL
LEUKOCYTE ESTERASE UR QL STRIP: NEGATIVE
LYMPHOCYTES # BLD AUTO: 1.34 THOUSANDS/ΜL (ref 0.6–4.47)
LYMPHOCYTES NFR BLD AUTO: 40 % (ref 14–44)
MCH RBC QN AUTO: 29.8 PG (ref 26.8–34.3)
MCHC RBC AUTO-ENTMCNC: 33.6 G/DL (ref 31.4–37.4)
MCV RBC AUTO: 89 FL (ref 82–98)
MONOCYTES # BLD AUTO: 0.31 THOUSAND/ΜL (ref 0.17–1.22)
MONOCYTES NFR BLD AUTO: 9 % (ref 4–12)
NEUTROPHILS # BLD AUTO: 1.6 THOUSANDS/ΜL (ref 1.85–7.62)
NEUTS SEG NFR BLD AUTO: 48 % (ref 43–75)
NITRITE UR QL STRIP: NEGATIVE
NRBC BLD AUTO-RTO: 0 /100 WBCS
PH UR STRIP.AUTO: 6.5 [PH] (ref 4.5–8)
PLATELET # BLD AUTO: 228 THOUSANDS/UL (ref 149–390)
PMV BLD AUTO: 9.2 FL (ref 8.9–12.7)
POTASSIUM SERPL-SCNC: 3.7 MMOL/L (ref 3.5–5.3)
PROT SERPL-MCNC: 8 G/DL (ref 6.4–8.2)
PROT UR STRIP-MCNC: NEGATIVE MG/DL
RBC # BLD AUTO: 4.77 MILLION/UL (ref 3.81–5.12)
SODIUM SERPL-SCNC: 141 MMOL/L (ref 136–145)
SP GR UR STRIP.AUTO: 1.01 (ref 1–1.03)
UROBILINOGEN UR QL STRIP.AUTO: 0.2 E.U./DL
WBC # BLD AUTO: 3.34 THOUSAND/UL (ref 4.31–10.16)

## 2018-05-08 PROCEDURE — 84165 PROTEIN E-PHORESIS SERUM: CPT | Performed by: PATHOLOGY

## 2018-05-08 PROCEDURE — 85025 COMPLETE CBC W/AUTO DIFF WBC: CPT

## 2018-05-08 PROCEDURE — 36415 COLL VENOUS BLD VENIPUNCTURE: CPT

## 2018-05-08 PROCEDURE — 86140 C-REACTIVE PROTEIN: CPT

## 2018-05-08 PROCEDURE — 81003 URINALYSIS AUTO W/O SCOPE: CPT | Performed by: INTERNAL MEDICINE

## 2018-05-08 PROCEDURE — 85652 RBC SED RATE AUTOMATED: CPT

## 2018-05-08 PROCEDURE — 80053 COMPREHEN METABOLIC PANEL: CPT

## 2018-05-08 PROCEDURE — 84165 PROTEIN E-PHORESIS SERUM: CPT

## 2018-05-09 LAB
ALBUMIN SERPL ELPH-MCNC: 4.8 G/DL (ref 3.5–5)
ALBUMIN SERPL ELPH-MCNC: 61.6 % (ref 52–65)
ALPHA1 GLOB SERPL ELPH-MCNC: 0.32 G/DL (ref 0.1–0.4)
ALPHA1 GLOB SERPL ELPH-MCNC: 4.1 % (ref 2.5–5)
ALPHA2 GLOB SERPL ELPH-MCNC: 0.7 G/DL (ref 0.4–1.2)
ALPHA2 GLOB SERPL ELPH-MCNC: 9 % (ref 7–13)
BETA GLOB ABNORMAL SERPL ELPH-MCNC: 0.49 G/DL (ref 0.4–0.8)
BETA1 GLOB SERPL ELPH-MCNC: 6.3 % (ref 5–13)
BETA2 GLOB SERPL ELPH-MCNC: 4.1 % (ref 2–8)
BETA2+GAMMA GLOB SERPL ELPH-MCNC: 0.32 G/DL (ref 0.2–0.5)
GAMMA GLOB ABNORMAL SERPL ELPH-MCNC: 1.16 G/DL (ref 0.5–1.6)
GAMMA GLOB SERPL ELPH-MCNC: 14.9 % (ref 12–22)
IGG/ALB SER: 1.6 {RATIO} (ref 1.1–1.8)
PROT PATTERN SERPL ELPH-IMP: NORMAL
PROT SERPL-MCNC: 7.8 G/DL (ref 6.4–8.2)

## 2018-06-18 ENCOUNTER — OFFICE VISIT (OUTPATIENT)
Dept: CARDIOLOGY CLINIC | Facility: CLINIC | Age: 48
End: 2018-06-18
Payer: OTHER GOVERNMENT

## 2018-06-18 VITALS
OXYGEN SATURATION: 98 % | WEIGHT: 158 LBS | HEIGHT: 66 IN | DIASTOLIC BLOOD PRESSURE: 64 MMHG | HEART RATE: 70 BPM | SYSTOLIC BLOOD PRESSURE: 116 MMHG | BODY MASS INDEX: 25.39 KG/M2

## 2018-06-18 DIAGNOSIS — I65.21 ASYMPTOMATIC STENOSIS OF RIGHT CAROTID ARTERY: Primary | ICD-10-CM

## 2018-06-18 DIAGNOSIS — Z82.49 FAMILY HISTORY OF PREMATURE CAD: ICD-10-CM

## 2018-06-18 PROCEDURE — 99213 OFFICE O/P EST LOW 20 MIN: CPT | Performed by: INTERNAL MEDICINE

## 2018-06-18 RX ORDER — ASPIRIN 81 MG/1
81 TABLET, CHEWABLE ORAL DAILY
Qty: 30 TABLET | Refills: 0
Start: 2018-06-18 | End: 2018-06-29

## 2018-06-18 NOTE — PROGRESS NOTES
[facility]  [address] [address]  Cardiology Follow Up    Ottis Hashimoto  1970  [id number]    1  Asymptomatic stenosis of right carotid artery  aspirin 81 mg chewable tablet    Lipid panel   2  Family history of premature CAD         Chief Complaint   Patient presents with    Follow-up     one year - Carotid stenosis       Interval History:  Patient presents for follow-up visit with history of asymptomatic carotid artery stenosis of the right side  Patient states she is feeling well, denies any chest pain, chest pressure, chest heaviness, shortness of breath  She denies any dizziness, syncope, palpitations  Patient had full cardiac testing within the last year including carotid ultrasound, stress test   At patient's last visit she did have dizziness which she now attributes to hormone imbalance as it has resolved being on hormone replacement  Pt has family history of cad and carotid stenosis  There is no problem list on file for this patient  Past Medical History:   Diagnosis Date    Carotid stenosis, asymptomatic     Myasthenia gravis (HCC)     Raynaud disease     Sjoegren syndrome ([address] )     Syncope      Social History     Social History    Marital status: /Civil Union     Spouse name: N/A    Number of children: N/A    Years of education: N/A     Occupational History    Not on file  Social History Main Topics    Smoking status: Never Smoker    Smokeless tobacco: Never Used    Alcohol use No    Drug use: No    Sexual activity: Not on file     Other Topics Concern    Not on file     Social History Narrative    No narrative on file      No family history on file    Past Surgical History:   Procedure Laterality Date    CHOLECYSTECTOMY      THYMECTOMY         Current Outpatient Prescriptions:     ibuprofen (MOTRIN) 600 mg tablet, Take by mouth, Disp: , Rfl:     methotrexate (RHEUMATREX) 2 5 MG tablet, Take by mouth, Disp: , Rfl:    omeprazole (PriLOSEC) 20 mg delayed release capsule, Take by mouth, Disp: , Rfl:     predniSONE 5 mg tablet, Take 2 5 mg by mouth daily  , Disp: , Rfl:     pyridostigmine (MESTINON) 60 mg tablet, Take by mouth, Disp: , Rfl:     zolpidem (AMBIEN) 10 mg tablet, Take by mouth, Disp: , Rfl:     aspirin 81 mg chewable tablet, Chew 1 tablet (81 mg total) daily, Disp: 30 tablet, Rfl: 0  No Known Allergies    Labs  Imaging: No results found  Review of Systems:  Review of Systems   Constitutional: Negative  HENT: Negative  Respiratory: Negative  Cardiovascular: Negative  All other systems reviewed and are negative  /64   Pulse 70   Ht 5' 6" (1 676 m)   Wt 71 7 kg (158 lb)   SpO2 98%   BMI 25 50 kg/m²     Physical Exam:  Physical Exam   Constitutional: She is oriented to person, place, and time  She appears well-developed and well-nourished  HENT:   Head: Normocephalic and atraumatic  Neck: Neck supple  Cardiovascular: Normal rate, regular rhythm and normal heart sounds  Pulmonary/Chest: Effort normal and breath sounds normal    Neurological: She is alert and oriented to person, place, and time  Skin: Skin is warm and dry  Psychiatric: She has a normal mood and affect  Her behavior is normal  Judgment and thought content normal    Nursing note and vitals reviewed  Discussion/Summary:  1  Asymptomatic carotid artery stenosis--add baby aspirin  Continue with surveillance  Check lipid profile    2  Premature family history of CAD--await lipid profile will likely start statins dose depending on LDL    Continue with risk factor modifications    Return in 12months

## 2018-06-25 RX ORDER — MULTIVIT-MIN/IRON/FOLIC ACID/K 18-600-40
500 CAPSULE ORAL DAILY
COMMUNITY
End: 2019-10-07

## 2018-06-25 RX ORDER — FOLIC ACID 1 MG/1
1 TABLET ORAL DAILY
COMMUNITY
Start: 2018-04-08

## 2018-06-25 RX ORDER — PNV NO.95/FERROUS FUM/FOLIC AC 28MG-0.8MG
1 TABLET ORAL WEEKLY
COMMUNITY
End: 2021-09-08

## 2018-06-25 RX ORDER — ACETAMINOPHEN 160 MG
2000 TABLET,DISINTEGRATING ORAL DAILY
COMMUNITY
End: 2021-09-08

## 2018-06-25 RX ORDER — ESTRADIOL 1 MG/1
TABLET ORAL
COMMUNITY
End: 2019-07-25 | Stop reason: CLARIF

## 2018-06-29 ENCOUNTER — OFFICE VISIT (OUTPATIENT)
Dept: NEUROLOGY | Facility: CLINIC | Age: 48
End: 2018-06-29
Payer: OTHER GOVERNMENT

## 2018-06-29 VITALS
DIASTOLIC BLOOD PRESSURE: 72 MMHG | HEART RATE: 81 BPM | WEIGHT: 158 LBS | HEIGHT: 67 IN | BODY MASS INDEX: 24.8 KG/M2 | SYSTOLIC BLOOD PRESSURE: 136 MMHG

## 2018-06-29 DIAGNOSIS — F51.04 CHRONIC INSOMNIA: ICD-10-CM

## 2018-06-29 DIAGNOSIS — G70.00 MYASTHENIA GRAVIS (HCC): Primary | ICD-10-CM

## 2018-06-29 PROCEDURE — 99214 OFFICE O/P EST MOD 30 MIN: CPT | Performed by: PSYCHIATRY & NEUROLOGY

## 2018-06-29 RX ORDER — CLONAZEPAM 0.5 MG/1
0.5 TABLET ORAL
Qty: 90 TABLET | Refills: 1 | Status: SHIPPED | OUTPATIENT
Start: 2018-06-29 | End: 2018-08-19

## 2018-06-29 NOTE — PROGRESS NOTES
Progress Note - Neurology   Elissa Potter 50 y o  female MRN: [id number]  Unit/Bed#:  Encounter: [id number]      Subjective:   Patient is here for a follow-up visit with a history of ocular myasthenia, has been doing well on Mestinon 60 mg 4 times a day and at the present time on prednisone 2 5 mg daily  She is also followed up with Rheumatology for a Sjogren's syndrome and remains on methotrexate  Patient was recently seen by a cardiologist and had a carotid ultrasound done last year which showed mild plaquing in the left internal carotid with less than 50% stenosis and is to have a lipid profile done  Recently she also has been started on hormonal therapy for menopausal symptoms  Patient has been having difficulty with word-finding, and has been using Ambien for a prolonged period of time  ROS:   Review of Systems   Constitutional: Positive for fatigue  HENT: Negative  Eyes: Negative  Respiratory: Negative  Cardiovascular: Negative  Gastrointestinal: Negative  Endocrine: Positive for cold intolerance  Genitourinary: Negative  Musculoskeletal: Negative  Skin: Negative  Allergic/Immunologic: Negative  Neurological: Positive for speech difficulty, weakness and numbness  Hematological: Bruises/bleeds easily  Psychiatric/Behavioral: Positive for agitation and confusion  Vitals: There were no vitals filed for this visit  ,Body mass index is 25 12 kg/m²      MEDS:      Current Outpatient Prescriptions:     Ascorbic Acid (VITAMIN C) 500 MG CAPS, Take by mouth, Disp: , Rfl:     aspirin 81 mg chewable tablet, Chew 1 tablet (81 mg total) daily, Disp: 30 tablet, Rfl: 0    Cholecalciferol (VITAMIN D3) 2000 units capsule, Take 2,000 Units by mouth, Disp: , Rfl:     estradiol (ESTRACE) 1 mg tablet, Take by mouth, Disp: , Rfl:     Ferrous Sulfate (IRON) 325 (65 Fe) MG TABS, Take 1 tablet by mouth once a week, Disp: , Rfl:     folic acid (FOLVITE) 1 mg tablet, , Disp: , Rfl:    ibuprofen (MOTRIN) 600 mg tablet, Take by mouth, Disp: , Rfl:     methotrexate (RHEUMATREX) 2 5 MG tablet, Take by mouth, Disp: , Rfl:     omeprazole (PriLOSEC) 20 mg delayed release capsule, Take by mouth, Disp: , Rfl:     predniSONE 5 mg tablet, Take 2 5 mg by mouth daily  , Disp: , Rfl:     pyridostigmine (MESTINON) 60 mg tablet, Take by mouth, Disp: , Rfl:     zolpidem (AMBIEN) 10 mg tablet, Take by mouth, Disp: , Rfl:   :    Physical Exam:  General appearance: alert, appears stated age and cooperative  Head: Normocephalic, without obvious abnormality, atraumatic    Neurologic:  Patient is alert awake oriented, high functions are intact, speech is fluent  No evidence of any aphasia or dysarthria  Cranial nerve examination reveals visual fields are full to threat, pupils equal and reactive, extraocular movements intact, fundi showed sharp disc margins, sensation in the V1 V2 V3 distribution is symmetric, no obvious facial asymmetry noted,Hearing is preserved, tongue is midline and gag is adequate, shoulder shrug is symmetric bilaterally  Motor examination reveals normal tone and bulk, no evidence of any drift to the outstretched extremities, strength is 5/5 preserved bilaterally in both upper and lower extremities, deep tendon reflexes are intact, toes are downgoing  Sensory examination to pinprick light touch proprioception and vibration is preserved bilaterally, patient does not extinguish double simultaneous stimuli  Coordination no evidence of any finger-to-nose dysmetria  Gait is normal based Romberg sign is negative  Lab Results: I have personally reviewed pertinent reports  Imaging Studies: I have personally reviewed pertinent reports  Assessment:  1  Myasthenia gravis  2  Chronic insomnia  Plan:  Patient has been experiencing cognitive symptoms in the recent past which could be related to the use of Ambien    She is advised to discontinue the same, and will try her on clonazepam 0 5 mg at bedtime  In the meanwhile she is also advised to continue prednisone, Mestinon, and will advised aspirin 81 mg daily since she also is on hormonal supplements at this age     She is to have a lipid profile done in the near future  Patient is advised to return back to see me in 6 months       6/29/2018,12:51 PM    Dictation voice to text software has been used in the creation of this document  Please consider this in light of any contextual or grammatical errors

## 2018-07-09 ENCOUNTER — APPOINTMENT (OUTPATIENT)
Dept: LAB | Facility: CLINIC | Age: 48
End: 2018-07-09
Payer: OTHER GOVERNMENT

## 2018-07-09 LAB
CHOLEST SERPL-MCNC: 178 MG/DL (ref 50–200)
HDLC SERPL-MCNC: 62 MG/DL (ref 40–60)
LDLC SERPL CALC-MCNC: 99 MG/DL (ref 0–100)
NONHDLC SERPL-MCNC: 116 MG/DL
TRIGL SERPL-MCNC: 85 MG/DL

## 2018-07-09 PROCEDURE — 80061 LIPID PANEL: CPT | Performed by: INTERNAL MEDICINE

## 2018-07-09 PROCEDURE — 36415 COLL VENOUS BLD VENIPUNCTURE: CPT | Performed by: INTERNAL MEDICINE

## 2018-08-19 ENCOUNTER — OFFICE VISIT (OUTPATIENT)
Dept: URGENT CARE | Facility: CLINIC | Age: 48
End: 2018-08-19
Payer: OTHER GOVERNMENT

## 2018-08-19 VITALS
OXYGEN SATURATION: 98 % | RESPIRATION RATE: 18 BRPM | DIASTOLIC BLOOD PRESSURE: 86 MMHG | TEMPERATURE: 97.5 F | WEIGHT: 156.2 LBS | BODY MASS INDEX: 25.1 KG/M2 | HEIGHT: 66 IN | SYSTOLIC BLOOD PRESSURE: 124 MMHG | HEART RATE: 76 BPM

## 2018-08-19 DIAGNOSIS — J20.9 ACUTE BRONCHITIS, UNSPECIFIED ORGANISM: Primary | ICD-10-CM

## 2018-08-19 PROCEDURE — G0382 LEV 3 HOSP TYPE B ED VISIT: HCPCS | Performed by: PHYSICIAN ASSISTANT

## 2018-08-19 RX ORDER — ZOLPIDEM TARTRATE 10 MG/1
10 TABLET ORAL
COMMUNITY

## 2018-08-19 RX ORDER — DOXYCYCLINE 100 MG/1
100 TABLET ORAL 2 TIMES DAILY
Qty: 20 TABLET | Refills: 0 | Status: SHIPPED | OUTPATIENT
Start: 2018-08-19 | End: 2018-08-29

## 2018-08-19 RX ORDER — ALBUTEROL SULFATE 90 UG/1
2 AEROSOL, METERED RESPIRATORY (INHALATION) EVERY 6 HOURS PRN
Qty: 1 INHALER | Refills: 0 | Status: SHIPPED | OUTPATIENT
Start: 2018-08-19 | End: 2018-08-26

## 2018-08-19 RX ORDER — BENZONATATE 100 MG/1
100 CAPSULE ORAL 3 TIMES DAILY PRN
Qty: 20 CAPSULE | Refills: 0 | Status: SHIPPED | OUTPATIENT
Start: 2018-08-19 | End: 2019-01-03 | Stop reason: ALTCHOICE

## 2018-08-19 NOTE — PATIENT INSTRUCTIONS
1  Bronchitis  -Take antibiotic as directed with food  -Take tessalon perles as directed  -Use inhaler or nebulizer every 4-6 hours as needed  -Tylenol/Motrin  -Increase fluids  -Follow-up with PCP within 3-5 days    Go to ER with worsening symptoms, fever, chest pain, shortness of breath, or any signs of distress    Acute Bronchitis   AMBULATORY CARE:   Acute bronchitis  is swelling and irritation in the air passages of your lungs  This irritation may cause you to cough or have other breathing problems  Acute bronchitis often starts because of another illness, such as a cold or the flu  The illness spreads from your nose and throat to your windpipe and airways  Bronchitis is often called a chest cold  Acute bronchitis lasts about 3 to 6 weeks and is usually not a serious illness  Your cough can last for several weeks  You may have any of the following symptoms:   · A cough with sputum that may be clear, yellow, or green    · Feeling more tired than usual, and body aches    · A fever and chills    · Wheezing when you breathe    · A tight chest or pain when you breathe or cough  Seek care immediately if:   · You cough up blood  · Your lips or fingernails turn blue  · You feel like you are not getting enough air when you breathe  Contact your healthcare provider if:   · You have a fever  · Your breathing problems do not go away or get worse  · Your cough does not get better within 4 weeks  · You have questions or concerns about your condition or care  Self-care:   · Get more rest   Rest helps your body to heal  Slowly start to do more each day  Rest when you feel it is needed  · Avoid irritants in the air  Avoid chemicals, fumes, and dust  Wear a face mask if you must work around dust or fumes  Stay inside on days when air pollution levels are high  If you have allergies, stay inside when pollen counts are high   Do not use aerosol products, such as spray-on deodorant, bug spray, and hair spray     · Do not smoke or be around others who smoke  Nicotine and other chemicals in cigarettes and cigars damages the cilia that move mucus out of your lungs  Ask your healthcare provider for information if you currently smoke and need help to quit  E-cigarettes or smokeless tobacco still contain nicotine  Talk to your healthcare provider before you use these products  · Drink liquids as directed  Liquids help keep your air passages moist and help you cough up mucus  You may need to drink more liquids when you have acute bronchitis  Ask how much liquid to drink each day and which liquids are best for you  · Use a humidifier or vaporizer  Use a cool mist humidifier or a vaporizer to increase air moisture in your home  This may make it easier for you to breathe and help decrease your cough  Prevent acute bronchitis by doing the following:   · Get the vaccinations you need  Ask your healthcare provider if you should get vaccinated against the flu or pneumonia  · Prevent the spread of germs  You can decrease your risk of acute bronchitis and other illnesses by doing the following:     [facility] your hands often with soap and water  Carry germ-killing hand lotion or gel with you  You can use the lotion or gel to clean your hands when soap and water are not available  ¨ Do not touch your eyes, nose, or mouth unless you have washed your hands first     ¨ Always cover your mouth when you cough to prevent the spread of germs  It is best to cough into a tissue or your shirt sleeve instead of into your hand  Ask those around you cover their mouths when they cough  ¨ Try to avoid people who have a cold or the flu  If you are sick, stay away from others as much as possible  Medicines: Your healthcare provider may  give you any of the following:  · Ibuprofen or acetaminophen  are medicines that help lower your fever  They are available without a doctor's order   Ask your healthcare provider which medicine is right for you  Ask how much to take and how often to take it  Follow directions  These medicines can cause stomach bleeding if not taken correctly  Ibuprofen can cause kidney damage  Do not take ibuprofen if you have kidney disease, an ulcer, or allergies to aspirin  Acetaminophen can cause liver damage  Do not take more than 4,000 milligrams in 24 hours  · Decongestants  help loosen mucus in your lungs and make it easier to cough up  This can help you breathe easier  · Cough suppressants  decrease your urge to cough  If your cough produces mucus, do not take a cough suppressant unless your healthcare provider tells you to  Your healthcare provider may suggest that you take a cough suppressant at night so you can rest     · Inhalers  may be given  Your healthcare provider may give you one or more inhalers to help you breathe easier and cough less  An inhaler gives your medicine to open your airways  Ask your healthcare provider to show you how to use your inhaler correctly  Follow up with your healthcare provider as directed:  Write down questions you have so you will remember to ask them during your follow-up visits  © 2017 [facility] Information is for End User's use only and may not be sold, redistributed or otherwise used for commercial purposes  All illustrations and images included in CareNotes® are the copyrighted property of A D [employer] M [employer] Inc  or [employer]  The above information is an  only  It is not intended as medical advice for individual conditions or treatments  Talk to your doctor, nurse or pharmacist before following any medical regimen to see if it is safe and effective for you

## 2018-08-19 NOTE — PROGRESS NOTES
[facility]        NAME: Ottis Hashimoto is a 50 y o  female  : 1970    MRN: [id number]  DATE: 2018  TIME: 10:47 AM    Assessment and Plan   Acute bronchitis, unspecified organism [J20 9]  1  Acute bronchitis, unspecified organism  doxycycline (ADOXA) 100 MG tablet    benzonatate (TESSALON PERLES) 100 mg capsule    albuterol (PROVENTIL HFA,VENTOLIN HFA) 90 mcg/act inhaler         Patient Instructions     1  Bronchitis  -Take antibiotic as directed with food  -Take tessalon perles as directed  -Use inhaler or nebulizer every 4-6 hours as needed  -Tylenol/Motrin  -Increase fluids  -Follow-up with PCP within 3-5 days    Go to ER with worsening symptoms, fever, chest pain, shortness of breath, or any signs of distress    Chief Complaint     Chief Complaint   Patient presents with    Cold Like Symptoms     x 1 week  started as what she thought was allergies, but continues with chest and nasal congestion   Cough     moist productive cough for clear sputum  History of Present Illness       Patient is a 47-year-old female the past medical history of myasthenia gravis and Sjrogren's syndrome who presents today for evaluation of cold symptoms that have been present for greater than 1 week  Patient states that she started with some nasal congestion which she thought was allergy related however her symptoms continue and have now moved down into her chest   Patient states that she now has a moist cough which is productive with sputum  Patient denies any sick contacts that she is aware of however she states that she was recently on vacation and drove to [place] and then took an airplane home  Review of Systems   Review of Systems   Constitutional: Negative for chills and fever  HENT: Positive for congestion  Negative for ear pain, sinus pressure, sneezing and sore throat  Respiratory: Positive for cough  Negative for shortness of breath      Cardiovascular: Negative for chest pain    Gastrointestinal: Negative for abdominal pain  Musculoskeletal: Negative for arthralgias  Neurological: Negative for headaches           Current Medications       Current Outpatient Prescriptions:     Ascorbic Acid (VITAMIN C) 500 MG CAPS, Take 500 mg by mouth daily  , Disp: , Rfl:     Cholecalciferol (VITAMIN D3) 2000 units capsule, Take 2,000 Units by mouth daily  , Disp: , Rfl:     Cyanocobalamin (B-12 PO), Take 1 tablet by mouth daily, Disp: , Rfl:     estradiol (ESTRACE) 1 mg tablet, Take by mouth, Disp: , Rfl:     Ferrous Sulfate (IRON) 325 (65 Fe) MG TABS, Take 1 tablet by mouth once a week  , Disp: , Rfl:     folic acid (FOLVITE) 1 mg tablet, Take 1 mg by mouth daily  , Disp: , Rfl:     ibuprofen (MOTRIN) 600 mg tablet, Take 600 mg by mouth 2 (two) times a day  , Disp: , Rfl:     methotrexate (RHEUMATREX) 2 5 MG tablet, Take 2 5 mg by mouth once a week  , Disp: , Rfl:     omeprazole (PriLOSEC) 20 mg delayed release capsule, Take by mouth, Disp: , Rfl:     predniSONE 5 mg tablet, Take 2 5 mg by mouth daily  , Disp: , Rfl:     pyridostigmine (MESTINON) 60 mg tablet, Take 60 mg by mouth 4 (four) times a day  , Disp: , Rfl:     zolpidem (AMBIEN) 10 mg tablet, Take 10 mg by mouth daily at bedtime as needed for sleep, Disp: , Rfl:     albuterol (PROVENTIL HFA,VENTOLIN HFA) 90 mcg/act inhaler, Inhale 2 puffs every 6 (six) hours as needed for wheezing for up to 7 days, Disp: 1 Inhaler, Rfl: 0    benzonatate (TESSALON PERLES) 100 mg capsule, Take 1 capsule (100 mg total) by mouth 3 (three) times a day as needed for cough, Disp: 20 capsule, Rfl: 0    doxycycline (ADOXA) 100 MG tablet, Take 1 tablet (100 mg total) by mouth 2 (two) times a day for 10 days, Disp: 20 tablet, Rfl: 0    Current Allergies     Allergies as of 08/19/2018    (No Known Allergies)            The following portions of the patient's history were reviewed and updated as appropriate: allergies, current medications, past family history, past medical history, past social history, past surgical history and problem list      Past Medical History:   Diagnosis Date    Carotid stenosis, asymptomatic     Dizziness     IBS (irritable bowel syndrome)     [facility] [address] [facility]     [facility]henia [facility]is [facility] [address] [facility]     Numbness     Polyneuropathy     Raynaud disease     Sjoegren syndrome ([facility] [address] [facility]     Syncope        Past Surgical History:   Procedure Laterality Date    CHOLECYSTECTOMY      THYMECTOMY         Family History   Problem Relation Age of Onset    Arthritis Mother     Diabetes Father     Hypertension Father     Coronary artery disease Family          Medications have been verified  Objective   /86 (BP Location: Left arm, Patient Position: Sitting)   Pulse 76   Temp 97 5 °F (36 4 °C) (Tympanic)   Resp 18   Ht 5' 6" (1 676 m)   Wt 70 9 kg (156 lb 3 2 oz)   SpO2 98%   BMI 25 21 kg/m²        Physical Exam     Physical Exam   Constitutional: She is oriented to person, place, and time  She appears well-developed and well-nourished  No distress  HENT:   Right Ear: Tympanic membrane and external ear normal    Left Ear: External ear normal    Nose: Nose normal  Right sinus exhibits no maxillary sinus tenderness and no frontal sinus tenderness  Left sinus exhibits no maxillary sinus tenderness and no frontal sinus tenderness  Mouth/Throat: Uvula is midline, oropharynx is clear and moist and mucous membranes are normal    Eyes: Conjunctivae and EOM are normal  Pupils are equal, round, and reactive to light  Neck: Normal range of motion  Neck supple  Cardiovascular: Normal rate, regular rhythm and normal heart sounds  Pulmonary/Chest: Effort normal  She has rhonchi in the right lower field and the left lower field  Lymphadenopathy:     She has no cervical adenopathy  Neurological: She is alert and oriented to person, place, and time  Skin: Skin is warm and dry     Psychiatric: She has a normal mood and affect  Nursing note and vitals reviewed

## 2018-11-02 ENCOUNTER — APPOINTMENT (OUTPATIENT)
Dept: LAB | Facility: CLINIC | Age: 48
End: 2018-11-02
Payer: OTHER GOVERNMENT

## 2018-11-02 ENCOUNTER — TRANSCRIBE ORDERS (OUTPATIENT)
Dept: RADIOLOGY | Facility: CLINIC | Age: 48
End: 2018-11-02

## 2018-11-02 DIAGNOSIS — M35.00 SICCA SYNDROME (HCC): Primary | ICD-10-CM

## 2018-11-02 DIAGNOSIS — M35.00 SICCA SYNDROME (HCC): ICD-10-CM

## 2018-11-02 LAB
ALBUMIN SERPL BCP-MCNC: 3.7 G/DL (ref 3.5–5)
ALP SERPL-CCNC: 46 U/L (ref 46–116)
ALT SERPL W P-5'-P-CCNC: 23 U/L (ref 12–78)
ANION GAP SERPL CALCULATED.3IONS-SCNC: 4 MMOL/L (ref 4–13)
AST SERPL W P-5'-P-CCNC: 17 U/L (ref 5–45)
BASOPHILS # BLD AUTO: 0.04 THOUSANDS/ΜL (ref 0–0.1)
BASOPHILS NFR BLD AUTO: 1 % (ref 0–1)
BILIRUB SERPL-MCNC: 0.51 MG/DL (ref 0.2–1)
BILIRUB UR QL STRIP: NEGATIVE
BUN SERPL-MCNC: 13 MG/DL (ref 5–25)
CALCIUM SERPL-MCNC: 8.7 MG/DL (ref 8.3–10.1)
CHLORIDE SERPL-SCNC: 108 MMOL/L (ref 100–108)
CLARITY UR: NORMAL
CO2 SERPL-SCNC: 26 MMOL/L (ref 21–32)
COLOR UR: YELLOW
CREAT SERPL-MCNC: 0.86 MG/DL (ref 0.6–1.3)
CRP SERPL QL: <3 MG/L
EOSINOPHIL # BLD AUTO: 0.12 THOUSAND/ΜL (ref 0–0.61)
EOSINOPHIL NFR BLD AUTO: 3 % (ref 0–6)
ERYTHROCYTE [DISTWIDTH] IN BLOOD BY AUTOMATED COUNT: 12.8 % (ref 11.6–15.1)
ERYTHROCYTE [SEDIMENTATION RATE] IN BLOOD: 9 MM/HOUR (ref 0–20)
GFR SERPL CREATININE-BSD FRML MDRD: 80 ML/MIN/1.73SQ M
GLUCOSE P FAST SERPL-MCNC: 89 MG/DL (ref 65–99)
GLUCOSE UR STRIP-MCNC: NEGATIVE MG/DL
HCT VFR BLD AUTO: 39.8 % (ref 34.8–46.1)
HGB BLD-MCNC: 13.4 G/DL (ref 11.5–15.4)
HGB UR QL STRIP.AUTO: NEGATIVE
IMM GRANULOCYTES # BLD AUTO: 0 THOUSAND/UL (ref 0–0.2)
IMM GRANULOCYTES NFR BLD AUTO: 0 % (ref 0–2)
KETONES UR STRIP-MCNC: NEGATIVE MG/DL
LEUKOCYTE ESTERASE UR QL STRIP: NEGATIVE
LYMPHOCYTES # BLD AUTO: 1.2 THOUSANDS/ΜL (ref 0.6–4.47)
LYMPHOCYTES NFR BLD AUTO: 31 % (ref 14–44)
MCH RBC QN AUTO: 30.9 PG (ref 26.8–34.3)
MCHC RBC AUTO-ENTMCNC: 33.7 G/DL (ref 31.4–37.4)
MCV RBC AUTO: 92 FL (ref 82–98)
MONOCYTES # BLD AUTO: 0.41 THOUSAND/ΜL (ref 0.17–1.22)
MONOCYTES NFR BLD AUTO: 11 % (ref 4–12)
NEUTROPHILS # BLD AUTO: 2.1 THOUSANDS/ΜL (ref 1.85–7.62)
NEUTS SEG NFR BLD AUTO: 54 % (ref 43–75)
NITRITE UR QL STRIP: NEGATIVE
NRBC BLD AUTO-RTO: 0 /100 WBCS
PH UR STRIP.AUTO: 6 [PH] (ref 4.5–8)
PLATELET # BLD AUTO: 204 THOUSANDS/UL (ref 149–390)
PMV BLD AUTO: 9.2 FL (ref 8.9–12.7)
POTASSIUM SERPL-SCNC: 4 MMOL/L (ref 3.5–5.3)
PROT SERPL-MCNC: 6.9 G/DL (ref 6.4–8.2)
PROT UR STRIP-MCNC: NEGATIVE MG/DL
RBC # BLD AUTO: 4.33 MILLION/UL (ref 3.81–5.12)
SODIUM SERPL-SCNC: 138 MMOL/L (ref 136–145)
SP GR UR STRIP.AUTO: 1.02 (ref 1–1.03)
UROBILINOGEN UR QL STRIP.AUTO: 0.2 E.U./DL
WBC # BLD AUTO: 3.87 THOUSAND/UL (ref 4.31–10.16)

## 2018-11-02 PROCEDURE — 84165 PROTEIN E-PHORESIS SERUM: CPT

## 2018-11-02 PROCEDURE — 80053 COMPREHEN METABOLIC PANEL: CPT

## 2018-11-02 PROCEDURE — 81003 URINALYSIS AUTO W/O SCOPE: CPT | Performed by: INTERNAL MEDICINE

## 2018-11-02 PROCEDURE — 85025 COMPLETE CBC W/AUTO DIFF WBC: CPT

## 2018-11-02 PROCEDURE — 84165 PROTEIN E-PHORESIS SERUM: CPT | Performed by: PATHOLOGY

## 2018-11-02 PROCEDURE — 86140 C-REACTIVE PROTEIN: CPT

## 2018-11-02 PROCEDURE — 85652 RBC SED RATE AUTOMATED: CPT

## 2018-11-02 PROCEDURE — 36415 COLL VENOUS BLD VENIPUNCTURE: CPT

## 2018-11-05 LAB
ALBUMIN SERPL ELPH-MCNC: 3.9 G/DL (ref 3.5–5)
ALBUMIN SERPL ELPH-MCNC: 61 % (ref 52–65)
ALPHA1 GLOB SERPL ELPH-MCNC: 0.28 G/DL (ref 0.1–0.4)
ALPHA1 GLOB SERPL ELPH-MCNC: 4.4 % (ref 2.5–5)
ALPHA2 GLOB SERPL ELPH-MCNC: 0.6 G/DL (ref 0.4–1.2)
ALPHA2 GLOB SERPL ELPH-MCNC: 9.3 % (ref 7–13)
BETA GLOB ABNORMAL SERPL ELPH-MCNC: 0.43 G/DL (ref 0.4–0.8)
BETA1 GLOB SERPL ELPH-MCNC: 6.7 % (ref 5–13)
BETA2 GLOB SERPL ELPH-MCNC: 4 % (ref 2–8)
BETA2+GAMMA GLOB SERPL ELPH-MCNC: 0.26 G/DL (ref 0.2–0.5)
GAMMA GLOB ABNORMAL SERPL ELPH-MCNC: 0.93 G/DL (ref 0.5–1.6)
GAMMA GLOB SERPL ELPH-MCNC: 14.6 % (ref 12–22)
IGG/ALB SER: 1.56 {RATIO} (ref 1.1–1.8)
PROT PATTERN SERPL ELPH-IMP: NORMAL
PROT SERPL-MCNC: 6.4 G/DL (ref 6.4–8.2)

## 2018-11-14 ENCOUNTER — TRANSCRIBE ORDERS (OUTPATIENT)
Dept: ADMINISTRATIVE | Facility: HOSPITAL | Age: 48
End: 2018-11-14

## 2018-11-14 DIAGNOSIS — M48.02 SPINAL STENOSIS IN CERVICAL REGION: Primary | ICD-10-CM

## 2019-01-03 ENCOUNTER — OFFICE VISIT (OUTPATIENT)
Dept: NEUROLOGY | Facility: CLINIC | Age: 49
End: 2019-01-03
Payer: OTHER GOVERNMENT

## 2019-01-03 VITALS
HEIGHT: 65 IN | SYSTOLIC BLOOD PRESSURE: 112 MMHG | BODY MASS INDEX: 26.66 KG/M2 | WEIGHT: 160 LBS | DIASTOLIC BLOOD PRESSURE: 80 MMHG | HEART RATE: 64 BPM

## 2019-01-03 DIAGNOSIS — G70.00 MG (MYASTHENIA GRAVIS) (HCC): Primary | ICD-10-CM

## 2019-01-03 PROCEDURE — 99213 OFFICE O/P EST LOW 20 MIN: CPT | Performed by: PSYCHIATRY & NEUROLOGY

## 2019-01-03 RX ORDER — OMEPRAZOLE 20 MG/1
20 CAPSULE, DELAYED RELEASE ORAL DAILY
Qty: 90 CAPSULE | Refills: 3 | Status: SHIPPED | OUTPATIENT
Start: 2019-01-03 | End: 2019-12-09 | Stop reason: SDUPTHER

## 2019-01-03 RX ORDER — LIFITEGRAST 50 MG/ML
SOLUTION/ DROPS OPHTHALMIC
COMMUNITY
Start: 2018-12-05 | End: 2019-01-03 | Stop reason: ALTCHOICE

## 2019-01-03 RX ORDER — PYRIDOSTIGMINE BROMIDE 60 MG/1
60 TABLET ORAL 4 TIMES DAILY
Qty: 360 TABLET | Refills: 3 | Status: SHIPPED | OUTPATIENT
Start: 2019-01-03 | End: 2020-02-24

## 2019-01-03 RX ORDER — PREDNISONE 2.5 MG
2.5 TABLET ORAL DAILY
Qty: 90 TABLET | Refills: 3 | Status: SHIPPED | OUTPATIENT
Start: 2019-01-03 | End: 2019-12-14 | Stop reason: SDUPTHER

## 2019-01-03 NOTE — PROGRESS NOTES
Progress Note - Neurology   Martin Harris 46 y o  female MRN: [id number]  Unit/Bed#:  Encounter: [id number]      Subjective:   Patient is here for a follow-up visit with a history of ocular myasthenia, under control with the help of Mestinon 60 mg 4 times a day and prednisone 2 5 mg daily  She also was detected to have Sjogren syndrome and has been on methotrexate on a regular basis  Patient has been using ibuprofen for her joint pains and hence prophylactically also prescribed omeprazole 20 mg daily  Denies any new neurological symptoms  ROS:   Review of Systems   Constitutional: Positive for fatigue  Negative for appetite change and fever  HENT: Negative  Negative for ear pain, hearing loss, tinnitus, trouble swallowing and voice change  Eyes: Negative  Negative for photophobia, pain and visual disturbance  Respiratory: Negative  Negative for shortness of breath  Cardiovascular: Negative  Negative for chest pain and palpitations  Gastrointestinal: Negative  Negative for abdominal pain, nausea and vomiting  Endocrine: Negative  Negative for cold intolerance and heat intolerance  Genitourinary: Negative  Negative for dysuria, frequency and urgency  Musculoskeletal: Positive for arthralgias  Negative for back pain, gait problem, myalgias and neck pain  Skin: Negative  Negative for rash  Neurological: Positive for numbness  Negative for dizziness, tremors, seizures, syncope, facial asymmetry, speech difficulty, weakness, light-headedness and headaches  Hematological: Negative  Does not bruise/bleed easily  Psychiatric/Behavioral: Positive for sleep disturbance  Negative for confusion and hallucinations       Vitals:    01/03/19 1436   BP: 112/80   BP Location: Left arm   Patient Position: Sitting   Cuff Size: Adult   Pulse: 64   Weight: 72 6 kg (160 lb)   Height: 5' 5" (1 651 m)     MEDS:      Current Outpatient Prescriptions:     Ascorbic Acid (VITAMIN C) 500 MG CAPS, Take 500 mg by mouth daily  , Disp: , Rfl:     Cholecalciferol (VITAMIN D3) 2000 units capsule, Take 2,000 Units by mouth daily  , Disp: , Rfl:     Cyanocobalamin (B-12 PO), Take 1,000 mcg by mouth daily  , Disp: , Rfl:     estradiol (ESTRACE) 1 mg tablet, Take by mouth, Disp: , Rfl:     Ferrous Sulfate (IRON) 325 (65 Fe) MG TABS, Take 1 tablet by mouth once a week  , Disp: , Rfl:     folic acid (FOLVITE) 1 mg tablet, Take 1 mg by mouth daily  , Disp: , Rfl:     ibuprofen (MOTRIN) 600 mg tablet, Take 600 mg by mouth 2 (two) times a day  , Disp: , Rfl:     methotrexate (RHEUMATREX) 2 5 MG tablet, Take 2 5 mg by mouth once a week 6 tabs once a week , Disp: , Rfl:     omeprazole (PriLOSEC) 20 mg delayed release capsule, Take by mouth, Disp: , Rfl:     predniSONE 5 mg tablet, Take 2 5 mg by mouth daily  , Disp: , Rfl:     pyridostigmine (MESTINON) 60 mg tablet, Take 60 mg by mouth 4 (four) times a day  , Disp: , Rfl:     zolpidem (AMBIEN) 10 mg tablet, Take 10 mg by mouth daily at bedtime  , Disp: , Rfl:   :    Physical Exam:  General appearance: alert, appears stated age and cooperative  Head: Normocephalic, without obvious abnormality, atraumatic    Neurologic:  On examination there is no evidence of any extraocular movement weakness, no evidence of any ptosis, and the rest of her neurological examination remains essentially unremarkable  Lab Results: I have personally reviewed pertinent reports  Imaging Studies: I have personally reviewed pertinent reports  Assessment:  1  Myasthenia gravis  Plan:  Continue prednisone 2 5 mg daily in the past tapering her off the prednisone as resulted in recurrent symptoms and she will also continue with Mestinon at the same dose  Patient will return back to see me in 6 months  1/3/2019,2:40 PM    Dictation voice to text software has been used in the creation of this document  Please consider this in light of any contextual or grammatical errors

## 2019-03-05 ENCOUNTER — TRANSCRIBE ORDERS (OUTPATIENT)
Dept: RADIOLOGY | Facility: CLINIC | Age: 49
End: 2019-03-05

## 2019-03-05 ENCOUNTER — APPOINTMENT (OUTPATIENT)
Dept: LAB | Facility: CLINIC | Age: 49
End: 2019-03-05
Payer: OTHER GOVERNMENT

## 2019-03-05 DIAGNOSIS — M35.00 SICCA SYNDROME (HCC): Primary | ICD-10-CM

## 2019-03-05 DIAGNOSIS — M35.00 SICCA SYNDROME (HCC): ICD-10-CM

## 2019-03-05 LAB
ALBUMIN SERPL BCP-MCNC: 3.9 G/DL (ref 3.5–5)
ALP SERPL-CCNC: 53 U/L (ref 46–116)
ALT SERPL W P-5'-P-CCNC: 30 U/L (ref 12–78)
ANION GAP SERPL CALCULATED.3IONS-SCNC: 5 MMOL/L (ref 4–13)
AST SERPL W P-5'-P-CCNC: 23 U/L (ref 5–45)
BASOPHILS # BLD AUTO: 0.03 THOUSANDS/ΜL (ref 0–0.1)
BASOPHILS NFR BLD AUTO: 1 % (ref 0–1)
BILIRUB SERPL-MCNC: 0.48 MG/DL (ref 0.2–1)
BILIRUB UR QL STRIP: NEGATIVE
BUN SERPL-MCNC: 13 MG/DL (ref 5–25)
CALCIUM SERPL-MCNC: 8.7 MG/DL (ref 8.3–10.1)
CHLORIDE SERPL-SCNC: 105 MMOL/L (ref 100–108)
CLARITY UR: NORMAL
CO2 SERPL-SCNC: 29 MMOL/L (ref 21–32)
COLOR UR: YELLOW
CREAT SERPL-MCNC: 0.81 MG/DL (ref 0.6–1.3)
CRP SERPL QL: <3 MG/L
EOSINOPHIL # BLD AUTO: 0.13 THOUSAND/ΜL (ref 0–0.61)
EOSINOPHIL NFR BLD AUTO: 4 % (ref 0–6)
ERYTHROCYTE [DISTWIDTH] IN BLOOD BY AUTOMATED COUNT: 13.1 % (ref 11.6–15.1)
ERYTHROCYTE [SEDIMENTATION RATE] IN BLOOD: 6 MM/HOUR (ref 0–20)
GFR SERPL CREATININE-BSD FRML MDRD: 85 ML/MIN/1.73SQ M
GLUCOSE P FAST SERPL-MCNC: 79 MG/DL (ref 65–99)
GLUCOSE UR STRIP-MCNC: NEGATIVE MG/DL
HCT VFR BLD AUTO: 40.8 % (ref 34.8–46.1)
HGB BLD-MCNC: 13.2 G/DL (ref 11.5–15.4)
HGB UR QL STRIP.AUTO: NEGATIVE
IMM GRANULOCYTES # BLD AUTO: 0.01 THOUSAND/UL (ref 0–0.2)
IMM GRANULOCYTES NFR BLD AUTO: 0 % (ref 0–2)
KETONES UR STRIP-MCNC: NEGATIVE MG/DL
LEUKOCYTE ESTERASE UR QL STRIP: NEGATIVE
LYMPHOCYTES # BLD AUTO: 1.19 THOUSANDS/ΜL (ref 0.6–4.47)
LYMPHOCYTES NFR BLD AUTO: 38 % (ref 14–44)
MCH RBC QN AUTO: 29.8 PG (ref 26.8–34.3)
MCHC RBC AUTO-ENTMCNC: 32.4 G/DL (ref 31.4–37.4)
MCV RBC AUTO: 92 FL (ref 82–98)
MONOCYTES # BLD AUTO: 0.42 THOUSAND/ΜL (ref 0.17–1.22)
MONOCYTES NFR BLD AUTO: 13 % (ref 4–12)
NEUTROPHILS # BLD AUTO: 1.36 THOUSANDS/ΜL (ref 1.85–7.62)
NEUTS SEG NFR BLD AUTO: 44 % (ref 43–75)
NITRITE UR QL STRIP: NEGATIVE
NRBC BLD AUTO-RTO: 0 /100 WBCS
PH UR STRIP.AUTO: 7 [PH] (ref 4.5–8)
PLATELET # BLD AUTO: 184 THOUSANDS/UL (ref 149–390)
PMV BLD AUTO: 8.7 FL (ref 8.9–12.7)
POTASSIUM SERPL-SCNC: 4.2 MMOL/L (ref 3.5–5.3)
PROT SERPL-MCNC: 7.1 G/DL (ref 6.4–8.2)
PROT UR STRIP-MCNC: NEGATIVE MG/DL
RBC # BLD AUTO: 4.43 MILLION/UL (ref 3.81–5.12)
SODIUM SERPL-SCNC: 139 MMOL/L (ref 136–145)
SP GR UR STRIP.AUTO: 1.01 (ref 1–1.03)
UROBILINOGEN UR QL STRIP.AUTO: 0.2 E.U./DL
WBC # BLD AUTO: 3.14 THOUSAND/UL (ref 4.31–10.16)

## 2019-03-05 PROCEDURE — 81003 URINALYSIS AUTO W/O SCOPE: CPT | Performed by: INTERNAL MEDICINE

## 2019-03-05 PROCEDURE — 82232 ASSAY OF BETA-2 PROTEIN: CPT

## 2019-03-05 PROCEDURE — 84165 PROTEIN E-PHORESIS SERUM: CPT

## 2019-03-05 PROCEDURE — 85025 COMPLETE CBC W/AUTO DIFF WBC: CPT

## 2019-03-05 PROCEDURE — 86140 C-REACTIVE PROTEIN: CPT

## 2019-03-05 PROCEDURE — 80053 COMPREHEN METABOLIC PANEL: CPT

## 2019-03-05 PROCEDURE — 36415 COLL VENOUS BLD VENIPUNCTURE: CPT

## 2019-03-05 PROCEDURE — 85652 RBC SED RATE AUTOMATED: CPT

## 2019-03-05 PROCEDURE — 84165 PROTEIN E-PHORESIS SERUM: CPT | Performed by: PATHOLOGY

## 2019-03-06 LAB — B2 MICROGLOB SERPL-MCNC: 2.2 MG/L (ref 0.6–2.4)

## 2019-03-07 LAB
ALBUMIN SERPL ELPH-MCNC: 4.16 G/DL (ref 3.5–5)
ALBUMIN SERPL ELPH-MCNC: 62.1 % (ref 52–65)
ALPHA1 GLOB SERPL ELPH-MCNC: 0.27 G/DL (ref 0.1–0.4)
ALPHA1 GLOB SERPL ELPH-MCNC: 4.1 % (ref 2.5–5)
ALPHA2 GLOB SERPL ELPH-MCNC: 0.63 G/DL (ref 0.4–1.2)
ALPHA2 GLOB SERPL ELPH-MCNC: 9.4 % (ref 7–13)
BETA GLOB ABNORMAL SERPL ELPH-MCNC: 0.44 G/DL (ref 0.4–0.8)
BETA1 GLOB SERPL ELPH-MCNC: 6.5 % (ref 5–13)
BETA2 GLOB SERPL ELPH-MCNC: 3.8 % (ref 2–8)
BETA2+GAMMA GLOB SERPL ELPH-MCNC: 0.25 G/DL (ref 0.2–0.5)
GAMMA GLOB ABNORMAL SERPL ELPH-MCNC: 0.94 G/DL (ref 0.5–1.6)
GAMMA GLOB SERPL ELPH-MCNC: 14.1 % (ref 12–22)
IGG/ALB SER: 1.64 {RATIO} (ref 1.1–1.8)
PROT PATTERN SERPL ELPH-IMP: NORMAL
PROT SERPL-MCNC: 6.7 G/DL (ref 6.4–8.2)

## 2019-05-08 ENCOUNTER — OFFICE VISIT (OUTPATIENT)
Dept: URGENT CARE | Facility: CLINIC | Age: 49
End: 2019-05-08
Payer: OTHER GOVERNMENT

## 2019-05-08 VITALS
WEIGHT: 164 LBS | OXYGEN SATURATION: 98 % | HEART RATE: 86 BPM | DIASTOLIC BLOOD PRESSURE: 80 MMHG | SYSTOLIC BLOOD PRESSURE: 120 MMHG | HEIGHT: 66 IN | RESPIRATION RATE: 18 BRPM | BODY MASS INDEX: 26.36 KG/M2 | TEMPERATURE: 97.9 F
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
## 2019-05-08 DIAGNOSIS — S61.451A CAT BITE OF RIGHT HAND, INITIAL ENCOUNTER: Primary | ICD-10-CM

## 2019-05-08 PROCEDURE — 99213 OFFICE O/P EST LOW 20 MIN: CPT | Performed by: PHYSICIAN ASSISTANT

## 2019-05-08 RX ORDER — SENNA PLUS 8.6 MG/1
1 TABLET ORAL DAILY
COMMUNITY
End: 2019-08-12

## 2019-05-08 RX ORDER — AMOXICILLIN AND CLAVULANATE POTASSIUM 875; 125 MG/1; MG/1
1 TABLET, FILM COATED ORAL EVERY 12 HOURS SCHEDULED
Qty: 20 TABLET | Refills: 0 | Status: SHIPPED | OUTPATIENT
Start: 2019-05-08 | End: 2019-05-18

## 2019-05-09 ENCOUNTER — CLINICAL SUPPORT (OUTPATIENT)
Dept: URGENT CARE | Facility: CLINIC | Age: 49
End: 2019-05-09
Payer: OTHER GOVERNMENT

## 2019-05-09 DIAGNOSIS — Z23 NEED FOR TDAP VACCINATION: Primary | ICD-10-CM

## 2019-05-09 PROCEDURE — 90715 TDAP VACCINE 7 YRS/> IM: CPT

## 2019-05-09 PROCEDURE — 90471 IMMUNIZATION ADMIN: CPT

## 2019-07-25 ENCOUNTER — OFFICE VISIT (OUTPATIENT)
Dept: CARDIOLOGY CLINIC | Facility: CLINIC | Age: 49
End: 2019-07-25
Payer: OTHER GOVERNMENT

## 2019-07-25 VITALS
SYSTOLIC BLOOD PRESSURE: 120 MMHG | OXYGEN SATURATION: 99 % | HEART RATE: 68 BPM | BODY MASS INDEX: 27 KG/M2 | DIASTOLIC BLOOD PRESSURE: 80 MMHG | HEIGHT: 66 IN | WEIGHT: 168 LBS

## 2019-07-25 DIAGNOSIS — Z82.49 FAMILY HISTORY OF PREMATURE CAD: ICD-10-CM

## 2019-07-25 DIAGNOSIS — I65.21 ASYMPTOMATIC STENOSIS OF RIGHT CAROTID ARTERY: Primary | ICD-10-CM

## 2019-07-25 PROCEDURE — 99213 OFFICE O/P EST LOW 20 MIN: CPT | Performed by: INTERNAL MEDICINE

## 2019-07-25 RX ORDER — CONJUGATED ESTROGENS/BAZEDOXIFENE .45; 2 MG/1; MG/1
1 TABLET, FILM COATED ORAL DAILY
COMMUNITY
Start: 2019-05-12 | End: 2021-01-21 | Stop reason: SDDI

## 2019-07-25 RX ORDER — CELECOXIB 200 MG/1
200 CAPSULE ORAL 2 TIMES DAILY
COMMUNITY
Start: 2019-06-12

## 2019-07-25 NOTE — PROGRESS NOTES
Cardiology Consultation     Leonidas Guidry  [id number]  1970  [facility] [place]  [phone] [place] [id number]    Interval History:   patient is here for follow-up for asymptomatic left carotid artery stenosis and family history of premature coronary disease patient  Patient is doing well from cardiac standpoint and denies having any dizziness or syncope  She denies having any exertional chest pain or shortness of breath  She denies having any symptoms history of stroke including weakness, paresthesia or any cranial nerve deficits  Last lipid profile showed a well controlled LDL cholesterol      Patient Active Problem List   Diagnosis    Asymptomatic stenosis of right carotid artery    Family history of premature CAD    MG (myasthenia gravis) ([facility] [address] )     Past Medical History:   Diagnosis Date    Carotid stenosis, asymptomatic     Chemical exposure     [address]    Dizziness     IBS (irritable bowel syndrome)     [facility]     Myasthenia gravis ([facility] [address] )     Numbness     Polyneuropathy     Raynaud disease     Sjoegren syndrome     Syncope      Social History     Socioeconomic History    Marital status: /Civil Union     Spouse name: Not on file    Number of children: Not on file    Years of education: Not on file    Highest education level: Not on file   Occupational History    Not on file   Social Needs    Financial resource strain: Not on file    Food insecurity:     Worry: Not on file     Inability: Not on file    Transportation needs:     Medical: Not on file     Non-medical: Not on file   Tobacco Use    Smoking status: Never Smoker    Smokeless tobacco: Never Used   Substance and Sexual Activity    Alcohol use: No    Drug use: No    Sexual activity: Not on file   Lifestyle    Physical activity:     Days per week: Not on file     Minutes per session: Not on file  Stress: Not on file   Relationships    Social connections:     Talks on phone: Not on file     Gets together: Not on file     Attends Christian service: Not on file     Active member of club or organization: Not on file     Attends meetings of clubs or organizations: Not on file     Relationship status: Not on file    Intimate partner violence:     Fear of current or ex partner: Not on file     Emotionally abused: Not on file     Physically abused: Not on file     Forced sexual activity: Not on file   Other Topics Concern    Not on file   Social History Narrative    Not on file      Family History   Problem Relation Age of Onset    Arthritis Mother     Diabetes Father     Hypertension Father     Coronary artery disease Family      Past Surgical History:   Procedure Laterality Date    CHOLECYSTECTOMY      THYMECTOMY         Current Outpatient Medications:     Ascorbic Acid (VITAMIN C) 500 MG CAPS, Take 500 mg by mouth daily  , Disp: , Rfl:     celecoxib (CeleBREX) 200 mg capsule, , Disp: , Rfl:     Cholecalciferol (VITAMIN D3) 2000 units capsule, Take 2,000 Units by mouth daily  , Disp: , Rfl:     Cyanocobalamin (B-12 PO), Take 1,000 mcg by mouth daily  , Disp: , Rfl:     DUAVEE 0 45-20 MG TABS, , Disp: , Rfl:     Ferrous Sulfate (IRON) 325 (65 Fe) MG TABS, Take 1 tablet by mouth once a week  , Disp: , Rfl:     folic acid (FOLVITE) 1 mg tablet, Take 1 mg by mouth daily  , Disp: , Rfl:     methotrexate (RHEUMATREX) 2 5 MG tablet, Take 2 5 mg by mouth once a week 6 tabs once a week , Disp: , Rfl:     omeprazole (PriLOSEC) 20 mg delayed release capsule, Take 1 capsule (20 mg total) by mouth daily, Disp: 90 capsule, Rfl: 3    predniSONE 2 5 mg tablet, Take 1 tablet (2 5 mg total) by mouth daily, Disp: 90 tablet, Rfl: 3    pyridostigmine (MESTINON) 60 mg tablet, Take 1 tablet (60 mg total) by mouth 4 (four) times a day, Disp: 360 tablet, Rfl: 3    zolpidem (AMBIEN) 10 mg tablet, Take 10 mg by mouth daily at bedtime  , Disp: , Rfl:     ibuprofen (MOTRIN) 600 mg tablet, Take 600 mg by mouth 2 (two) times a day  , Disp: , Rfl:     senna (SENOKOT) 8 6 MG tablet, Take 1 tablet by mouth daily, Disp: , Rfl:   No Known Allergies  Vitals:    07/25/19 0847   BP: 120/80   BP Location: Left arm   Patient Position: Sitting   Cuff Size: Adult   Pulse: 68   SpO2: 99%   Weight: 76 2 kg (168 lb)   Height: 5' 6" (1 676 m)       Labs:  No visits with results within 2 Month(s) from this visit  Latest known visit with results is:   Appointment on 03/05/2019   Component Date Value    A/G Ratio 03/05/2019 1 64     Albumin Electrophoresis 03/05/2019 62 1     Albumin CONC 03/05/2019 4 16     Alpha 1 03/05/2019 4 1     ALPHA 1 CONC 03/05/2019 0 27     Alpha 2 03/05/2019 9 4     ALPHA 2 CONC 03/05/2019 0 63     Beta-1 03/05/2019 6 5     BETA 1 CONC 03/05/2019 0 44     Beta-2 03/05/2019 3 8     BETA 2 CONC 03/05/2019 0 25     Gamma Globulin 03/05/2019 14 1     GAMMA CONC 03/05/2019 0 94     Total Protein 03/05/2019 6 7     SPEP Interpretation 03/05/2019 No monoclonal bands noted   Reviewed by:  Michael Bang MD **Electronic Signature**     Beta-2 Microglobulin 03/05/2019 2 2     Sodium 03/05/2019 139     Potassium 03/05/2019 4 2     Chloride 03/05/2019 105     CO2 03/05/2019 29     ANION GAP 03/05/2019 5     BUN 03/05/2019 13     Creatinine 03/05/2019 0 81     Glucose, Fasting 03/05/2019 79     Calcium 03/05/2019 8 7     AST 03/05/2019 23     ALT 03/05/2019 30     Alkaline Phosphatase 03/05/2019 53     Total Protein 03/05/2019 7 1     Albumin 03/05/2019 3 9     Total Bilirubin 03/05/2019 0 48     eGFR 03/05/2019 85     WBC 03/05/2019 3 14*    RBC 03/05/2019 4 43     Hemoglobin 03/05/2019 13 2     Hematocrit 03/05/2019 40 8     MCV 03/05/2019 92     MCH 03/05/2019 29 8     MCHC 03/05/2019 32 4     RDW 03/05/2019 13 1     MPV 03/05/2019 8 7*    Platelets 08/61/9442 184     nRBC 03/05/2019 0  Neutrophils Relative 03/05/2019 44     Immat GRANS % 03/05/2019 0     Lymphocytes Relative 03/05/2019 38     Monocytes Relative 03/05/2019 13*    Eosinophils Relative 03/05/2019 4     Basophils Relative 03/05/2019 1     Neutrophils Absolute 03/05/2019 1 36*    Immature Grans Absolute 03/05/2019 0 01     Lymphocytes Absolute 03/05/2019 1 19     Monocytes Absolute 03/05/2019 0 42     Eosinophils Absolute 03/05/2019 0 13     Basophils Absolute 03/05/2019 0 03     Sed Rate 03/05/2019 6     CRP 03/05/2019 <3 0      Imaging: No results found  Review of Systems:  Review of Systems   Constitutional: Negative for diaphoresis and fatigue  HENT: Negative for congestion and facial swelling  Eyes: Negative for photophobia and visual disturbance  Respiratory: Negative for chest tightness and shortness of breath  Cardiovascular: Negative for chest pain, palpitations and leg swelling  Gastrointestinal: Negative for abdominal pain and nausea  Endocrine: Negative for cold intolerance and heat intolerance  Musculoskeletal: Negative for arthralgias and myalgias  Skin: Negative for pallor and rash  Neurological: Negative for dizziness and tremors  Psychiatric/Behavioral: Negative for sleep disturbance  The patient is not nervous/anxious  Physical Exam:  Physical Exam   Constitutional: She is oriented to person, place, and time  She appears well-developed and well-nourished  HENT:   Head: Normocephalic and atraumatic  Eyes: Pupils are equal, round, and reactive to light  Conjunctivae and EOM are normal    Neck: Normal range of motion  Neck supple  No JVD present  No thyromegaly present  Cardiovascular: Normal rate, regular rhythm, S1 normal, S2 normal, normal heart sounds and intact distal pulses  Exam reveals no gallop and no friction rub  No murmur heard  Pulses:       Carotid pulses are 2+ on the right side, and 2+ on the left side    Pulmonary/Chest: Effort normal and breath sounds normal  No respiratory distress  She has no wheezes  She has no rales  Abdominal: Soft  Bowel sounds are normal  She exhibits no distension  There is no tenderness  There is no rebound and no guarding  Musculoskeletal: Normal range of motion  She exhibits no edema  Neurological: She is alert and oriented to person, place, and time  She has normal reflexes  No cranial nerve deficit  Skin: Skin is warm and dry  Psychiatric: She has a normal mood and affect  Discussion/Summary:  1  Asymptomatic carotid artery stenosis-  Continue low-dose aspirin  Continue with surveillance        2  Premature family history of CAD--  All the risk factors are well controlled  Lipids at goal   Blood pressure well controlled  Patient is watching the diet and exercise      Follow-up as needed     Return in 12months

## 2019-08-12 ENCOUNTER — CONSULT (OUTPATIENT)
Dept: PAIN MEDICINE | Facility: CLINIC | Age: 49
End: 2019-08-12
Payer: OTHER GOVERNMENT

## 2019-08-12 VITALS
HEIGHT: 66 IN | HEART RATE: 84 BPM | DIASTOLIC BLOOD PRESSURE: 82 MMHG | RESPIRATION RATE: 16 BRPM | BODY MASS INDEX: 27 KG/M2 | SYSTOLIC BLOOD PRESSURE: 124 MMHG | WEIGHT: 168 LBS

## 2019-08-12 DIAGNOSIS — M54.16 LUMBAR RADICULOPATHY: ICD-10-CM

## 2019-08-12 DIAGNOSIS — M48.02 CERVICAL SPINAL STENOSIS: ICD-10-CM

## 2019-08-12 DIAGNOSIS — M70.61 GREATER TROCHANTERIC BURSITIS OF BOTH HIPS: ICD-10-CM

## 2019-08-12 DIAGNOSIS — G89.4 CHRONIC PAIN SYNDROME: Primary | ICD-10-CM

## 2019-08-12 DIAGNOSIS — M70.62 GREATER TROCHANTERIC BURSITIS OF BOTH HIPS: ICD-10-CM

## 2019-08-12 DIAGNOSIS — M25.50 GENERALIZED JOINT PAIN: ICD-10-CM

## 2019-08-12 PROCEDURE — 99204 OFFICE O/P NEW MOD 45 MIN: CPT | Performed by: ANESTHESIOLOGY

## 2019-08-12 RX ORDER — GABAPENTIN 300 MG/1
CAPSULE ORAL
Qty: 90 CAPSULE | Refills: 1 | Status: SHIPPED | OUTPATIENT
Start: 2019-08-12 | End: 2019-10-07

## 2019-08-12 NOTE — PROGRESS NOTES
Assessment  1  Chronic pain syndrome  Ambulatory referral to Physical Therapy   2  Greater trochanteric bursitis of both hips  gabapentin (NEURONTIN) 300 mg capsule    Ambulatory referral to Physical Therapy   3  Lumbar radiculopathy  gabapentin (NEURONTIN) 300 mg capsule    Ambulatory referral to Physical Therapy   4  Generalized joint pain  Ambulatory referral to Physical Therapy   5  Cervical spinal stenosis         Plan  This is a 55-year-old female who presents today with chronic pain syndrome with neck and low back pain associated with radicular symptoms  Patient also presents with multifocal joint pain and aches and peripheral neuropathy  On examination, there is no gross motor deficits  Her pain seems to be diffuse in nature primarily focused on major joints, bilateral knees, wrists, shoulders , ankles  There is also significant neuropathic component of her pain  Majority of her pain seem to be stemming from rheumatoid arthritis/osteoarthritis and chronic pain secondary to myasthenia gravis, Raynaud's, and Sjogren's  She will continue to follow up with Rheumatology at [place] [address]  To help with neuropathic pain, I will initiate gabapentin 300 mg each bedtime for 3 days, then add a m  dose for 3 days, then add afternoon dose for 3 days, and continue to titrate up in this fashion to a goal dose of 1800 mg unless significant pain relief is achieved before that or side effect occurs  Patient should decrease to the previous tolerated dose and contact our office in the event of side effect  I have reviewed with the patient that if they would like to discontinue this medication in the future they would need to wean off of it to decrease risk of seizures  The patient verbalized understanding and agreement  Follow up in 8 weeks  She will engage in water aerobics      My impressions and treatment recommendations were discussed in detail with the patient who verbalized understanding and had no further questions  Discharge instructions were provided  I personally saw and examined the patient and I agree with the above discussed plan of care  History of Present Illness    Edhenri Carrillo is a 46 y o  female who presents today for initial consultation for management of neck, low back pain and bilateral hand pain  Of note, patient reports moderate pain which she rates 7/10 on the pain scale  Her pain is nearly constant, with no typical pattern  Patient further describes pain as cramping, shooting, numbness and dull/achy in nature  She reports pain across her hips bilaterally and bilateral knee pain  She reports pain which is aggravated with prolonged standing, bending, sitting and exercise  Patient is currently on Celebrex 200 mg 1-2 tabs daily  She reports prior physical therapy for her knee pain without much relief of pain  She uses heat and ice treatment with moderate pain relief  She has had chiropractic manipulation in the past   Patient has other chronic conditions which includes rheumatoid arthritis, osteoarthritis, surgeons disease, my standing gravis, Raynaud's disease and polyneuropathy  She takes oral steroids daily  She has multiple skeletal pain, joint pain, swelling  She states that she is on tapering dose of prednisone  I have personally reviewed and/or updated the patient's past medical history, past surgical history, family history, social history, current medications, allergies, and vital signs today  Review of Systems   Constitutional: Negative for fever and unexpected weight change  HENT: Negative for trouble swallowing  Eyes: Positive for pain  Negative for visual disturbance  Respiratory: Negative for shortness of breath and wheezing  Cardiovascular: Negative for chest pain and palpitations  Gastrointestinal: Negative for constipation, diarrhea, nausea and vomiting  Endocrine: Negative for cold intolerance, heat intolerance and polydipsia  Genitourinary: Negative for difficulty urinating and frequency  Musculoskeletal: Positive for arthralgias, back pain, joint swelling, myalgias, neck pain and neck stiffness  Negative for gait problem  Skin: Negative for rash  Neurological: Positive for dizziness and numbness  Negative for seizures, syncope, weakness and headaches  Hematological: Does not bruise/bleed easily  Psychiatric/Behavioral: Negative for dysphoric mood  All other systems reviewed and are negative        Patient Active Problem List   Diagnosis    Asymptomatic stenosis of right carotid artery    Family history of premature CAD    MG (myasthenia gravis) ([facility] [address] )       Past Medical History:   Diagnosis Date    Carotid stenosis, asymptomatic     Chemical exposure     [address]    Dizziness     IBS (irritable bowel syndrome)     Myasthenia gravis (HCC)     Myasthenia gravis (HCC)     Numbness     Polyneuropathy     Raynaud disease     Sjoegren syndrome     Syncope        Past Surgical History:   Procedure Laterality Date    CHOLECYSTECTOMY      THYMECTOMY         Family History   Problem Relation Age of Onset    Arthritis Mother     Diabetes Father     Hypertension Father     Coronary artery disease Family        Social History     Occupational History    Not on file   Tobacco Use    Smoking status: Never Smoker    Smokeless tobacco: Never Used   Substance and Sexual Activity    Alcohol use: No    Drug use: No    Sexual activity: Not on file       Current Outpatient Medications on File Prior to Visit   Medication Sig    Ascorbic Acid (VITAMIN C) 500 MG CAPS Take 500 mg by mouth daily      celecoxib (CeleBREX) 200 mg capsule     Cholecalciferol (VITAMIN D3) 2000 units capsule Take 2,000 Units by mouth daily      Cyanocobalamin (B-12 PO) Take 1,000 mcg by mouth daily      DUAVEE 0 45-20 MG TABS     Ferrous Sulfate (IRON) 325 (65 Fe) MG TABS Take 1 tablet by mouth once a week      folic acid (FOLVITE) 1 mg tablet Take 1 mg by mouth daily      methotrexate (RHEUMATREX) 2 5 MG tablet Take 2 5 mg by mouth once a week 6 tabs once a week     omeprazole (PriLOSEC) 20 mg delayed release capsule Take 1 capsule (20 mg total) by mouth daily    predniSONE 2 5 mg tablet Take 1 tablet (2 5 mg total) by mouth daily    pyridostigmine (MESTINON) 60 mg tablet Take 1 tablet (60 mg total) by mouth 4 (four) times a day    zolpidem (AMBIEN) 10 mg tablet Take 10 mg by mouth daily at bedtime      [DISCONTINUED] ibuprofen (MOTRIN) 600 mg tablet Take 600 mg by mouth 2 (two) times a day      [DISCONTINUED] senna (SENOKOT) 8 6 MG tablet Take 1 tablet by mouth daily     No current facility-administered medications on file prior to visit  No Known Allergies    Physical Exam    /82   Pulse 84   Resp 16   Ht 5' 6" (1 676 m)   Wt 76 2 kg (168 lb)   BMI 27 12 kg/m²     Constitutional: normal, well developed, well nourished, alert, in no distress and non-toxic and no overt pain behavior    Eyes: anicteric  HEENT: grossly intact  Neck: supple, symmetric, trachea midline and no masses   Pulmonary:even and unlabored  Cardiovascular:No edema or pitting edema present  Skin:Normal without rashes or lesions and well hydrated  Psychiatric:Mood and affect appropriate  Neurologic:Cranial Nerves II-XII grossly intact  Musculoskeletal:normal    Lumbar Spine Exam    Appearance:  Normal lordosis  Palpation/Tenderness:  left lumbar paraspinal tenderness  right lumbar paraspinal tenderness  Sensory:  no sensory deficits noted  Range of Motion:  Extension:  Minimally limited  with pain  Motor Strength:  Left foot dorsiflexion:  5/5  Left foot plantar flexion:  5/5  Right foot dorsiflexion:  5/5  Right foot plantar flexion:  5/5  Reflexes:  Left Patellar:  2+   Right Patellar:  2+     Cervical Spine Exam    Appearance:  Normal lordosis  Palpation/Tenderness:  left cervical paraspinal tenderness  right cervical paraspinal tenderness  Sensory:  no sensory deficits noted  Range of Motion:  Extension:  Minimally limited  with pain  Motor Strength:  Left    5/5  Right   5/5  Reflexes:  Left Triceps:  2+   Right Triceps:  2+         Imaging

## 2019-08-14 ENCOUNTER — TRANSCRIBE ORDERS (OUTPATIENT)
Dept: NEUROLOGY | Facility: CLINIC | Age: 49
End: 2019-08-14

## 2019-08-14 DIAGNOSIS — R68.89 OTHER GENERAL SYMPTOMS AND SIGNS: Primary | ICD-10-CM

## 2019-08-15 ENCOUNTER — OFFICE VISIT (OUTPATIENT)
Dept: NEUROLOGY | Facility: CLINIC | Age: 49
End: 2019-08-15
Payer: OTHER GOVERNMENT

## 2019-08-15 VITALS
HEIGHT: 66 IN | HEART RATE: 76 BPM | SYSTOLIC BLOOD PRESSURE: 110 MMHG | BODY MASS INDEX: 26.2 KG/M2 | DIASTOLIC BLOOD PRESSURE: 80 MMHG | WEIGHT: 163 LBS

## 2019-08-15 DIAGNOSIS — G70.00 MYASTHENIA GRAVIS (HCC): ICD-10-CM

## 2019-08-15 PROCEDURE — 99213 OFFICE O/P EST LOW 20 MIN: CPT | Performed by: PSYCHIATRY & NEUROLOGY

## 2019-08-15 NOTE — PROGRESS NOTES
Progress Note - Neurology   Leonidas Guidry 46 y o  female MRN: [id number]  Unit/Bed#:  Encounter: [id number]      Subjective:   Patient is here for a follow-up visit with a history of rheumatoid arthritis, Sjogren syndrome, ocular myasthenia gravis, and remains on Mestinon 60 mg 4 times a day and prednisone 2 5 mg daily  She has been doing well with no evidence of any diplopia or any other symptoms except for chronic pain in multiple joints, is followed up with her rheumatologist also recently saw pain management and has been started on gabapentin 600 mg 3 times a day  Patient has not started the medication yet and was concerned  She denies any other neurological symptoms  ROS:   Review of Systems   Constitutional: Negative  Negative for appetite change and fever  HENT: Negative  Negative for hearing loss, tinnitus, trouble swallowing and voice change  Eyes: Negative  Negative for photophobia and pain  Respiratory: Negative  Negative for shortness of breath  Cardiovascular: Negative  Negative for palpitations  Gastrointestinal: Negative  Negative for nausea and vomiting  Endocrine: Negative  Negative for cold intolerance and heat intolerance  Genitourinary: Negative  Negative for dysuria, frequency and urgency  Musculoskeletal: Positive for arthralgias, back pain and myalgias  Negative for gait problem and neck pain  Skin: Negative  Negative for rash  Neurological: Positive for numbness (Left foot)  Negative for dizziness, tremors, seizures, syncope, facial asymmetry, speech difficulty, weakness, light-headedness and headaches  Hematological: Negative  Does not bruise/bleed easily  Psychiatric/Behavioral: Positive for sleep disturbance  Negative for confusion and hallucinations         Vitals:   Vitals:    08/15/19 1506   BP: 110/80   BP Location: Left arm   Patient Position: Sitting   Cuff Size: Adult   Pulse: 76   Weight: 73 9 kg (163 lb)   Height: 5' 6" (1 676 m)   ,Body mass index is 26 31 kg/m²  MEDS:      Current Outpatient Medications:     Ascorbic Acid (VITAMIN C) 500 MG CAPS, Take 500 mg by mouth daily  , Disp: , Rfl:     celecoxib (CeleBREX) 200 mg capsule, Take 200 mg by mouth 2 (two) times a day , Disp: , Rfl:     Cholecalciferol (VITAMIN D3) 2000 units capsule, Take 2,000 Units by mouth daily  , Disp: , Rfl:     Cyanocobalamin (B-12 PO), Take 1,000 mcg by mouth daily  , Disp: , Rfl:     DUAVEE 0 45-20 MG TABS, daily , Disp: , Rfl:     Ferrous Sulfate (IRON) 325 (65 Fe) MG TABS, Take 1 tablet by mouth once a week  , Disp: , Rfl:     folic acid (FOLVITE) 1 mg tablet, Take 1 mg by mouth daily  , Disp: , Rfl:     methotrexate (RHEUMATREX) 2 5 MG tablet, Take 2 5 mg by mouth once a week 6 tabs once a week , Disp: , Rfl:     omeprazole (PriLOSEC) 20 mg delayed release capsule, Take 1 capsule (20 mg total) by mouth daily, Disp: 90 capsule, Rfl: 3    predniSONE 2 5 mg tablet, Take 1 tablet (2 5 mg total) by mouth daily, Disp: 90 tablet, Rfl: 3    pyridostigmine (MESTINON) 60 mg tablet, Take 1 tablet (60 mg total) by mouth 4 (four) times a day, Disp: 360 tablet, Rfl: 3    zolpidem (AMBIEN) 10 mg tablet, Take 10 mg by mouth daily at bedtime  , Disp: , Rfl:     gabapentin (NEURONTIN) 300 mg capsule, One capsule p o  Q h s  Day 1-3, 1 capsule p o  B i d  day 4-6, 1 capsule p o  T i d  daily 7 and onward (Patient not taking: Reported on 8/15/2019), Disp: 90 capsule, Rfl: 1  :    Physical Exam:  General appearance: alert, appears stated age and cooperative  Head: Normocephalic, without obvious abnormality, atraumatic    On examination there is no evidence of any cranial nerve, motor or sensory deficits in the upper or lower extremities, no evidence of any dysmetria and her gait is normal based  Lab Results: I have personally reviewed pertinent reports  Imaging Studies: I have personally reviewed pertinent reports  Assessment:  1   Myasthenia gravis, ocular    Plan:  Patient will continue with present medications, follow up with Rheumatology, and from the neurological standpoint has been stable and will return back to see me in 6 months  8/15/2019,3:15 PM    Dictation voice to text software has been used in the creation of this document  Please consider this in light of any contextual or grammatical errors

## 2019-08-19 ENCOUNTER — EVALUATION (OUTPATIENT)
Dept: PHYSICAL THERAPY | Age: 49
End: 2019-08-19
Payer: OTHER GOVERNMENT

## 2019-08-19 DIAGNOSIS — M25.50 PAIN IN JOINT, MULTIPLE SITES: ICD-10-CM

## 2019-08-19 DIAGNOSIS — M54.16 RADICULOPATHY, LUMBAR REGION: ICD-10-CM

## 2019-08-19 DIAGNOSIS — G89.4 CHRONIC PAIN SYNDROME: Primary | ICD-10-CM

## 2019-08-19 PROCEDURE — 97162 PT EVAL MOD COMPLEX 30 MIN: CPT | Performed by: PHYSICAL THERAPIST

## 2019-08-19 NOTE — PROGRESS NOTES
PT Evaluation     Today's date: 2019  Patient name: Ekta Peter  : 1970  MRN: [id number]  Referring provider: Danielle Tidwell MD  Dx:   Encounter Diagnosis     ICD-10-CM    1  Chronic pain syndrome G89 4    2  Pain in joint, multiple sites M25 50    3  Radiculopathy, lumbar region M54 16        Start Time: 0900  Stop Time: 0945  Total time in clinic (min): 45 minutes    Assessment  Assessment details: Ekta Peter is a 46 y o  female who presents with painin multiple areas including the low back and knees, decreased gross motor LE strength and core strength, and decreased AROM of the trunk  Due to these impairments, Patient has difficulty performing a/iadls, recreational activities and engaging in social activities  Patient's clinical presentation is consistent with the referring diagnoses of chronic pain syndrome, pain in multiple areas, and lumbar radiculopathy  Patient would benefit from skilled physical therapy to address her aforementioned impairments, improve her level of function and to improve her overall quality of life  Impairments: abnormal or restricted ROM, activity intolerance, impaired physical strength, lacks appropriate home exercise program and pain with function  Functional limitations: pain with prolonged sit/stand, pain with stairsUnderstanding of Dx/Px/POC: fair   Prognosis: fair    Goals  ST-3 WEEKS  1  Decrease pain to <5/10 at its worst on VAS  2   Increase trunk AROM by 5-10° in extension and SB  3   Increase core activation with less verbal/tacile cuing  LT WEEKS  1  Pt able to tolerate 30-45 minutes of aquatic exercise     2  Pt able to perform Independent aquatic fitness program by d/c  Plan  Plan details: Pt has $30 00 co-pay per visit  Visits will depend on affordability    Patient would benefit from: skilled physical therapy  Planned therapy interventions: aquatic therapy, home exercise program and therapeutic activities  Frequency: 2x week  Duration in weeks: 6  Plan of Care beginning date: 2019  Plan of Care expiration date: 2019  Treatment plan discussed with: patient        Subjective Evaluation    History of Present Illness  Mechanism of injury: Pt has a history of chronic pain in knees and low back  She was seen by pain management and referred for aquatic therapy  Recurrent probem    Pain  Current pain ratin  At best pain ratin  At worst pain ratin  Location: low back, LEs  Quality: dull ache, sharp and radiating  Aggravating factors: sitting, standing and stair climbing    Social Support  Steps to enter house: yes  Stairs in house: yes   Lives in: multiple-level home    Hand dominance: right    Treatments  Previous treatment: injection treatment (right knee)  Patient Goals  Patient goals for therapy: decreased pain and increased strength          Objective     Static Posture     Head  Forward  Shoulders  Rounded      Comments  Prominence of C-T junction    Neurological Testing     Sensation     Lumbar   Left   Intact: light touch    Right   Intact: light touch    Additional Neurological Details  Numbness left foot    Active Range of Motion     Lumbar   Flexion:  WFL  Extension: 15 degrees  with pain  Left lateral flexion: 20 degrees    with pain  Right lateral flexion: 20 degrees  with pain    Strength/Myotome Testing     Left Hip   Planes of Motion   Flexion: 4+  Abduction: 4+  Adduction: 5    Right Hip   Planes of Motion   Flexion: 4+  Abduction: 4+  Adduction: 5    Left Knee   Flexion: 5  Extension: 4+    Right Knee   Flexion: 5  Extension: 4    Left Ankle/Foot   Dorsiflexion: 4+  Plantar flexion: 5    Right Ankle/Foot   Dorsiflexion: 5  Plantar flexion: 5    General Comments:    Upper quarter screen   Shoulder: unremarkable  Hand/wrist: unremarkable    Knee Comments  Significant coarse crepitus B knees      Flowsheet Rows      Most Recent Value   PT/OT G-Codes   Current Score  67 [low back]   Projected Score  71 FOTO information reviewed  Yes   Assessment Type  Evaluation           Precautions: myasthenia gravis, *watch fatigue*    Manual                                                                                   Exercise Diary                                                                                                                                                                                                                                                                                      Modalities

## 2019-08-22 ENCOUNTER — OFFICE VISIT (OUTPATIENT)
Dept: PHYSICAL THERAPY | Age: 49
End: 2019-08-22
Payer: OTHER GOVERNMENT

## 2019-08-22 DIAGNOSIS — G89.4 CHRONIC PAIN SYNDROME: Primary | ICD-10-CM

## 2019-08-22 DIAGNOSIS — M25.50 PAIN IN JOINT, MULTIPLE SITES: ICD-10-CM

## 2019-08-22 DIAGNOSIS — M54.16 RADICULOPATHY, LUMBAR REGION: ICD-10-CM

## 2019-08-22 PROCEDURE — 97113 AQUATIC THERAPY/EXERCISES: CPT

## 2019-08-22 NOTE — PROGRESS NOTES
Daily Note     Today's date: 2019  Patient name: Diallo Ahumada  : 1970  MRN: [id number]  Referring provider: Guy Sorensen MD  Dx:   Encounter Diagnosis     ICD-10-CM    1  Chronic pain syndrome G89 4    2  Pain in joint, multiple sites M25 50    3  Radiculopathy, lumbar region M54 16        Start Time: 1345  Stop Time: 1445  Total time in clinic (min): 60 minutes    Subjective: pt notes pain today 4/10  Pt did c/o B knee pain w/ ex's in the water  Objective: See treatment diary below      Assessment: Tolerated treatment well  Pt was able to tolerate initial ex's with a slow and steady pace  Pt was asked frequently about feeling fatigued throughout the session  Slow progression as pt tolerates  Patient would benefit from continued PT      Plan: Continue per plan of care        Precautions: myasthenia gravis, *watch fatigue*          Daily Treatment Diary       Exercise Diary              Water walking 10            Postural training 5            Gait training             Home exercise pgm/patient education 5 pool ed/ rules            Wall: t/h raises 1            Hip abd/add 2            Marching 1            squats 1            Knee flex/ext 1            Step-ups (fwd/bkwd/ss)             SLS (eyes open/closed)             SLS w UE mvmt  AROM/ball toss             Weight shifting             UE Noodle work x 4  4            UE AROM             Resistive UE work (paddles, bells, TB)             Core work ball press 2            Sit on noodle with movement             Seated on pool bench w proper posture             Ankle df/pf             marching             Hip Ab/add             Knee flex/ext             Deep water mvmt             Deep water tx/stretching 10            Specific self - stretches wall/steps 2                Modalities              whirlpool 10

## 2019-08-23 ENCOUNTER — OFFICE VISIT (OUTPATIENT)
Dept: PHYSICAL THERAPY | Age: 49
End: 2019-08-23
Payer: OTHER GOVERNMENT

## 2019-08-23 DIAGNOSIS — M54.16 RADICULOPATHY, LUMBAR REGION: ICD-10-CM

## 2019-08-23 DIAGNOSIS — M25.50 PAIN IN JOINT, MULTIPLE SITES: ICD-10-CM

## 2019-08-23 DIAGNOSIS — G89.4 CHRONIC PAIN SYNDROME: Primary | ICD-10-CM

## 2019-08-23 PROCEDURE — 97113 AQUATIC THERAPY/EXERCISES: CPT

## 2019-08-23 NOTE — PROGRESS NOTES
Daily Note     Today's date: 2019  Patient name: Amparo Haddad  : 1970  MRN: [id number]  Referring provider: Sadie Bess MD  Dx:   Encounter Diagnosis     ICD-10-CM    1  Chronic pain syndrome G89 4    2  Pain in joint, multiple sites M25 50    3  Radiculopathy, lumbar region M54 16        Start Time: 1371  Stop Time: 0845  Total time in clinic (min): 55 minutes    Subjective: Reports she has pain everywhere today, sore from yesterday's session  States her pain is a 5 5/10 in her LB and B knees  Requests to decrease time in whirlpool to 5' due to it triggering hot flashes  Objective: See treatment diary below      Assessment: Tolerated treatment fair  Some fatigue and soreness at knees noted post session  Modified standing knee flexion and marching due to knee pain  Tolerated deep water movement well  Pain at LB decreased to a 4/10 post session, no change in knee pain  Patient would benefit from continued PT      Plan: Continue per plan of care        Precautions: myasthenia gravis, *watch fatigue*          Daily Treatment Diary       Exercise Diary             Water walking 10 10'           Postural training 5 4'           Gait training             Home exercise pgm/patient education 5 pool ed/ rules 5'           Wall: t/h raises 1 1'           Hip abd/add 2 2'           Marching 1 1'           squats 1 1'           Knee flex/ext 1 1'           Step-ups (fwd/bkwd/ss)             SLS (eyes open/closed)             SLS w UE mvmt  AROM/ball toss             Weight shifting             UE Noodle work x 4  4 4'           UE AROM             Resistive UE work (paddles, bells, TB)             Core work ball press 2 2'           Sit on noodle with movement             Seated on pool bench w proper posture             Ankle df/pf             marching             Hip Ab/add             Knee flex/ext             Deep water mvmt:   bicycle, AB/AD, scissor x5'            Deep water tx/stretching 10 10'           Specific self - stretches wall/steps 2 3'               Modalities  8/22 8/23           whirwardool 10 5' pt request to dec time

## 2019-08-26 ENCOUNTER — APPOINTMENT (OUTPATIENT)
Dept: PHYSICAL THERAPY | Age: 49
End: 2019-08-26
Payer: OTHER GOVERNMENT

## 2019-08-30 ENCOUNTER — OFFICE VISIT (OUTPATIENT)
Dept: PHYSICAL THERAPY | Age: 49
End: 2019-08-30
Payer: OTHER GOVERNMENT

## 2019-08-30 DIAGNOSIS — M54.16 RADICULOPATHY, LUMBAR REGION: ICD-10-CM

## 2019-08-30 DIAGNOSIS — G89.4 CHRONIC PAIN SYNDROME: Primary | ICD-10-CM

## 2019-08-30 DIAGNOSIS — M25.50 PAIN IN JOINT, MULTIPLE SITES: ICD-10-CM

## 2019-08-30 PROCEDURE — 97113 AQUATIC THERAPY/EXERCISES: CPT

## 2019-08-30 NOTE — PROGRESS NOTES
Daily Note     Today's date: 2019  Patient name: Eliz Burgos  : 1970  MRN: [id number]  Referring provider: Georgie Osorio MD  Dx:   Encounter Diagnosis     ICD-10-CM    1  Chronic pain syndrome G89 4    2  Pain in joint, multiple sites M25 50    3  Radiculopathy, lumbar region M54 16        Start Time: 0800  Stop Time: 0900  Total time in clinic (min): 60 minutes  Subjective: pt notes pain today -7/10  She states last session was too much she was exhausted for the whole weekend  Objective: See treatment diary below      Assessment: Tolerated treatment well  Modified program  Today to help w/ fatigue  Added seated ex's today and held the deep water ex's  Patient would benefit from continued PT      Plan: Continue per plan of care        Precautions: myasthenia gravis, *watch fatigue*          Daily Treatment Diary       Exercise Diary            Water walking 10 10' 10          Postural training 5 4' 5          Gait training             Home exercise pgm/patient education 5 pool ed/ rules 5' 5          Wall: t/h raises 1 1' 1          Hip abd/add 2 2' 2          Marching 1 1' 1          squats 1 1' 1          Knee flex/ext 1 1' 1          Step-ups (fwd/bkwd/ss)             SLS (eyes open/closed)             SLS w UE mvmt  AROM/ball toss             Weight shifting             UE Noodle work x 4  4 4' 4          UE AROM             Resistive UE work (paddles, bells, TB)             Core work ball press 2 2' 2          Sit on noodle with movement             Seated on pool bench w proper posture   2          Ankle df/pf   2             2          Hip Ab/add   2          Knee flex/ext   2          Deep water mvmt:   bicycle, AB/AD, scissor x5'  NT          Deep water tx/stretching 10 10' 10          Specific self - stretches wall/steps 2 3' 3              Modalities            whirlpool 10 5' pt request to dec time 5

## 2019-09-03 ENCOUNTER — OFFICE VISIT (OUTPATIENT)
Dept: PHYSICAL THERAPY | Age: 49
End: 2019-09-03
Payer: OTHER GOVERNMENT

## 2019-09-03 DIAGNOSIS — G89.4 CHRONIC PAIN SYNDROME: Primary | ICD-10-CM

## 2019-09-03 DIAGNOSIS — M54.16 RADICULOPATHY, LUMBAR REGION: ICD-10-CM

## 2019-09-03 DIAGNOSIS — M25.50 PAIN IN JOINT, MULTIPLE SITES: ICD-10-CM

## 2019-09-03 PROCEDURE — 97113 AQUATIC THERAPY/EXERCISES: CPT

## 2019-09-03 NOTE — PROGRESS NOTES
Daily Note     Today's date: 9/3/2019  Patient name: Kurt West  : 1970  MRN: [id number]  Referring provider: Alex Etienne MD  Dx:   Encounter Diagnosis     ICD-10-CM    1  Chronic pain syndrome G89 4    2  Pain in joint, multiple sites M25 50    3  Radiculopathy, lumbar region M54 16        Start Time: 0800  Stop Time: 0900  Total time in clinic (min): 60 minutes    Subjective: "It's not bad today " Reports 4/10 pain  Objective: See treatment diary below      Assessment: Tolerated treatment well  Added QL and trunk extension stretches today  L SB is limited > R SB, instructed to perform only to tolerance  Decreased pain reported post session  Patient would benefit from continued PT  Plan: Continue per plan of care        Precautions: myasthenia gravis, *watch fatigue*    Daily Treatment Diary   Exercise Diary   9/3         Water walking 10 10' 10 10         Postural training 5 4' 5          Gait training             Home exercise pgm/patient education 5 pool ed/ rules 5' 5          Wall: t/h raises 1 1' 1 1         Hip abd/add 2 2' 2 2         Marching 1 1' 1 1         squats 1 1' 1 1         Knee flex/ext 1 1' 1 1         Step-ups (fwd/bkwd/ss)             SLS (eyes open/closed)             SLS w UE mvmt  AROM/ball toss             Weight shifting             UE Noodle work x 4  4 4' 4 4         UE AROM             Resistive UE work (paddles, bells, TB)             Core work ball press 2 2' 2 2         Sit on noodle with movement             Seated on pool bench w proper posture   2          Ankle df/pf   2 2            2 2         Hip Ab/add   2 2         Knee flex/ext   2 2         Trunk ext over beach ball, QL stretch in standing     2          Deep water mvmt:   bicycle, AB/AD, scissor x5'  NT 5         Deep water tx/stretching 10 10' 10 10         Specific self - stretches wall/steps 2 3' 3 3           Modalities   9/3         whirlpool 10 5' pt request to dec time 5 5

## 2019-09-04 ENCOUNTER — TRANSCRIBE ORDERS (OUTPATIENT)
Dept: ADMINISTRATIVE | Facility: HOSPITAL | Age: 49
End: 2019-09-04

## 2019-09-04 ENCOUNTER — APPOINTMENT (OUTPATIENT)
Dept: LAB | Facility: CLINIC | Age: 49
End: 2019-09-04
Payer: OTHER GOVERNMENT

## 2019-09-04 DIAGNOSIS — M35.00 SICCA SYNDROME (HCC): Primary | ICD-10-CM

## 2019-09-04 DIAGNOSIS — M35.00 SICCA SYNDROME (HCC): ICD-10-CM

## 2019-09-04 LAB
ALBUMIN SERPL BCP-MCNC: 4.3 G/DL (ref 3.5–5)
ALP SERPL-CCNC: 82 U/L (ref 46–116)
ALT SERPL W P-5'-P-CCNC: 63 U/L (ref 12–78)
ANION GAP SERPL CALCULATED.3IONS-SCNC: 4 MMOL/L (ref 4–13)
AST SERPL W P-5'-P-CCNC: 38 U/L (ref 5–45)
BASOPHILS # BLD AUTO: 0.03 THOUSANDS/ΜL (ref 0–0.1)
BASOPHILS NFR BLD AUTO: 1 % (ref 0–1)
BILIRUB SERPL-MCNC: 0.62 MG/DL (ref 0.2–1)
BILIRUB UR QL STRIP: NEGATIVE
BUN SERPL-MCNC: 15 MG/DL (ref 5–25)
CALCIUM SERPL-MCNC: 9.3 MG/DL (ref 8.3–10.1)
CHLORIDE SERPL-SCNC: 108 MMOL/L (ref 100–108)
CLARITY UR: NORMAL
CO2 SERPL-SCNC: 28 MMOL/L (ref 21–32)
COLOR UR: YELLOW
CREAT SERPL-MCNC: 0.85 MG/DL (ref 0.6–1.3)
EOSINOPHIL # BLD AUTO: 0.12 THOUSAND/ΜL (ref 0–0.61)
EOSINOPHIL NFR BLD AUTO: 3 % (ref 0–6)
ERYTHROCYTE [DISTWIDTH] IN BLOOD BY AUTOMATED COUNT: 13.2 % (ref 11.6–15.1)
GFR SERPL CREATININE-BSD FRML MDRD: 80 ML/MIN/1.73SQ M
GLUCOSE P FAST SERPL-MCNC: 85 MG/DL (ref 65–99)
GLUCOSE UR STRIP-MCNC: NEGATIVE MG/DL
HCT VFR BLD AUTO: 42.3 % (ref 34.8–46.1)
HGB BLD-MCNC: 13.7 G/DL (ref 11.5–15.4)
HGB UR QL STRIP.AUTO: NEGATIVE
IMM GRANULOCYTES # BLD AUTO: 0.01 THOUSAND/UL (ref 0–0.2)
IMM GRANULOCYTES NFR BLD AUTO: 0 % (ref 0–2)
KETONES UR STRIP-MCNC: NEGATIVE MG/DL
LEUKOCYTE ESTERASE UR QL STRIP: NEGATIVE
LYMPHOCYTES # BLD AUTO: 1.18 THOUSANDS/ΜL (ref 0.6–4.47)
LYMPHOCYTES NFR BLD AUTO: 31 % (ref 14–44)
MCH RBC QN AUTO: 29.8 PG (ref 26.8–34.3)
MCHC RBC AUTO-ENTMCNC: 32.4 G/DL (ref 31.4–37.4)
MCV RBC AUTO: 92 FL (ref 82–98)
MONOCYTES # BLD AUTO: 0.41 THOUSAND/ΜL (ref 0.17–1.22)
MONOCYTES NFR BLD AUTO: 11 % (ref 4–12)
NEUTROPHILS # BLD AUTO: 2.05 THOUSANDS/ΜL (ref 1.85–7.62)
NEUTS SEG NFR BLD AUTO: 54 % (ref 43–75)
NITRITE UR QL STRIP: NEGATIVE
NRBC BLD AUTO-RTO: 0 /100 WBCS
PH UR STRIP.AUTO: 6.5 [PH]
PLATELET # BLD AUTO: 212 THOUSANDS/UL (ref 149–390)
PMV BLD AUTO: 8.9 FL (ref 8.9–12.7)
POTASSIUM SERPL-SCNC: 4.5 MMOL/L (ref 3.5–5.3)
PROT SERPL-MCNC: 7.8 G/DL (ref 6.4–8.2)
PROT UR STRIP-MCNC: NEGATIVE MG/DL
RBC # BLD AUTO: 4.59 MILLION/UL (ref 3.81–5.12)
SODIUM SERPL-SCNC: 140 MMOL/L (ref 136–145)
SP GR UR STRIP.AUTO: 1.01 (ref 1–1.03)
UROBILINOGEN UR QL STRIP.AUTO: 0.2 E.U./DL
WBC # BLD AUTO: 3.8 THOUSAND/UL (ref 4.31–10.16)

## 2019-09-04 PROCEDURE — 85025 COMPLETE CBC W/AUTO DIFF WBC: CPT

## 2019-09-04 PROCEDURE — 84165 PROTEIN E-PHORESIS SERUM: CPT | Performed by: PATHOLOGY

## 2019-09-04 PROCEDURE — 84165 PROTEIN E-PHORESIS SERUM: CPT

## 2019-09-04 PROCEDURE — 82232 ASSAY OF BETA-2 PROTEIN: CPT

## 2019-09-04 PROCEDURE — 80053 COMPREHEN METABOLIC PANEL: CPT

## 2019-09-04 PROCEDURE — 36415 COLL VENOUS BLD VENIPUNCTURE: CPT

## 2019-09-04 PROCEDURE — 81003 URINALYSIS AUTO W/O SCOPE: CPT | Performed by: INTERNAL MEDICINE

## 2019-09-05 LAB
ALBUMIN SERPL ELPH-MCNC: 4.45 G/DL (ref 3.5–5)
ALBUMIN SERPL ELPH-MCNC: 60.2 % (ref 52–65)
ALPHA1 GLOB SERPL ELPH-MCNC: 0.33 G/DL (ref 0.1–0.4)
ALPHA1 GLOB SERPL ELPH-MCNC: 4.4 % (ref 2.5–5)
ALPHA2 GLOB SERPL ELPH-MCNC: 0.71 G/DL (ref 0.4–1.2)
ALPHA2 GLOB SERPL ELPH-MCNC: 9.6 % (ref 7–13)
B2 MICROGLOB SERPL-MCNC: 1.9 MG/L (ref 0.6–2.4)
BETA GLOB ABNORMAL SERPL ELPH-MCNC: 0.5 G/DL (ref 0.4–0.8)
BETA1 GLOB SERPL ELPH-MCNC: 6.8 % (ref 5–13)
BETA2 GLOB SERPL ELPH-MCNC: 4.5 % (ref 2–8)
BETA2+GAMMA GLOB SERPL ELPH-MCNC: 0.33 G/DL (ref 0.2–0.5)
GAMMA GLOB ABNORMAL SERPL ELPH-MCNC: 1.07 G/DL (ref 0.5–1.6)
GAMMA GLOB SERPL ELPH-MCNC: 14.5 % (ref 12–22)
IGG/ALB SER: 1.51 {RATIO} (ref 1.1–1.8)
PROT PATTERN SERPL ELPH-IMP: NORMAL
PROT SERPL-MCNC: 7.4 G/DL (ref 6.4–8.2)

## 2019-09-06 ENCOUNTER — APPOINTMENT (OUTPATIENT)
Dept: PHYSICAL THERAPY | Age: 49
End: 2019-09-06
Payer: OTHER GOVERNMENT

## 2019-09-27 ENCOUNTER — TELEPHONE (OUTPATIENT)
Dept: OBGYN CLINIC | Facility: CLINIC | Age: 49
End: 2019-09-27

## 2019-09-30 NOTE — PROGRESS NOTES
Pt has been unable to attend PT sessions due to having family issues  She reports she will be unable to attend until the issues resolve and was unable to give a time table for return  Pt will be D/C at this time and was instructed that she may need to obtain a new script in order to return

## 2019-10-07 ENCOUNTER — OFFICE VISIT (OUTPATIENT)
Dept: PAIN MEDICINE | Facility: CLINIC | Age: 49
End: 2019-10-07
Payer: OTHER GOVERNMENT

## 2019-10-07 ENCOUNTER — TELEPHONE (OUTPATIENT)
Dept: PAIN MEDICINE | Facility: CLINIC | Age: 49
End: 2019-10-07

## 2019-10-07 VITALS
RESPIRATION RATE: 18 BRPM | BODY MASS INDEX: 27.71 KG/M2 | HEIGHT: 66 IN | WEIGHT: 172.4 LBS | DIASTOLIC BLOOD PRESSURE: 70 MMHG | SYSTOLIC BLOOD PRESSURE: 128 MMHG | HEART RATE: 72 BPM

## 2019-10-07 DIAGNOSIS — G89.4 CHRONIC PAIN SYNDROME: ICD-10-CM

## 2019-10-07 DIAGNOSIS — M48.02 CERVICAL SPINAL STENOSIS: ICD-10-CM

## 2019-10-07 DIAGNOSIS — M19.042 PRIMARY OSTEOARTHRITIS OF BOTH HANDS: ICD-10-CM

## 2019-10-07 DIAGNOSIS — M54.16 LUMBAR RADICULOPATHY: Primary | ICD-10-CM

## 2019-10-07 DIAGNOSIS — M25.50 GENERALIZED JOINT PAIN: Primary | ICD-10-CM

## 2019-10-07 DIAGNOSIS — M19.041 PRIMARY OSTEOARTHRITIS OF BOTH HANDS: ICD-10-CM

## 2019-10-07 PROCEDURE — 99214 OFFICE O/P EST MOD 30 MIN: CPT | Performed by: ANESTHESIOLOGY

## 2019-10-07 RX ORDER — GABAPENTIN 300 MG/1
300 CAPSULE ORAL 3 TIMES DAILY
Qty: 270 CAPSULE | Refills: 0 | Status: SHIPPED | OUTPATIENT
Start: 2019-10-07 | End: 2020-03-03 | Stop reason: SDUPTHER

## 2019-10-07 NOTE — PROGRESS NOTES
Assessment:  1  Lumbar radiculopathy  gabapentin (NEURONTIN) 300 mg capsule   2  Cervical spinal stenosis  Ambulatory referral to Physical Therapy    gabapentin (NEURONTIN) 300 mg capsule   3  Chronic pain syndrome         Plan: This is a 47-year-old female who presents today for follow-up office visit regarding multifocal joint pain due to osteoarthritis/rheumatoid arthritis  Patient also has chronic pain due to myasthenia gravis Raynaud's and Sjogren's disease  Patient reports feeling a lot better  Titrated dose of gabapentin has helped with her symptoms  I will renew her referral for water therapy  Renew gabapentin 300mg po TID  90 day supply sent to her pharmacy  The patient will follow-up in 12 weeks for medication prescription refill and reevaluation  The patient was advised to contact the office should their symptoms worsen in the interim  The patient was agreeable and verbalized an understanding  History of Present Illness: The patient is a 46 y o  female last seen on August 12, 2019  At that time, she was sent to physical therapy  Patient reports that physical therapy has improved her pain by 50%  She reports 0/10 pain today  She reports occasional back pain which is dull/achy in nature  However, she reports feeling a lot better  I started her on gabapentin 300 mg titrated up to t i d   This has helped  She also had knee injection which states helped  She states that water therapy has helped as well  She requests for water therapy for her hands and neck pain  I have personally reviewed and/or updated the patient's past medical history, past surgical history, family history, social history, current medications, allergies, and vital signs today         Review of Systems:    Review of Systems      Past Medical History:   Diagnosis Date    Carotid stenosis, asymptomatic     Chemical exposure     [address]    Dizziness     IBS (irritable bowel syndrome)     [facility] [address]     [facility]ia [facility] [address]     Numbness     Polyneuropathy     Raynaud disease     Sjoegren syndrome     Syncope        Past Surgical History:   Procedure Laterality Date    CHOLECYSTECTOMY      THYMECTOMY         Family History   Problem Relation Age of Onset    Arthritis Mother     Diabetes Father     Hypertension Father     Coronary artery disease Family        Social History     Occupational History    Not on file   Tobacco Use    Smoking status: Never Smoker    Smokeless tobacco: Never Used   Substance and Sexual Activity    Alcohol use: No    Drug use: No    Sexual activity: Not on file         Current Outpatient Medications:     ascorbic acid (VITAMIN C) 250 MG CHEW, Chew 500 mg, Disp: , Rfl:     celecoxib (CeleBREX) 200 mg capsule, Take 200 mg by mouth 2 (two) times a day , Disp: , Rfl:     Cholecalciferol (VITAMIN D3) 2000 units capsule, Take 2,000 Units by mouth daily  , Disp: , Rfl:     Cyanocobalamin (B-12 PO), Take 1,000 mcg by mouth daily  , Disp: , Rfl:     DUAVEE 0 45-20 MG TABS, daily , Disp: , Rfl:     Ferrous Sulfate (IRON) 325 (65 Fe) MG TABS, Take 1 tablet by mouth once a week  , Disp: , Rfl:     folic acid (FOLVITE) 1 mg tablet, Take 1 mg by mouth daily  , Disp: , Rfl:     gabapentin (NEURONTIN) 300 mg capsule, One capsule p o  Q h s   Day 1-3, 1 capsule p o  B i d  day 4-6, 1 capsule p o  T i d  daily 7 and onward, Disp: 90 capsule, Rfl: 1    methotrexate (RHEUMATREX) 2 5 MG tablet, Take 2 5 mg by mouth once a week 6 tabs once a week , Disp: , Rfl:     omeprazole (PriLOSEC) 20 mg delayed release capsule, Take 1 capsule (20 mg total) by mouth daily, Disp: 90 capsule, Rfl: 3    predniSONE 2 5 mg tablet, Take 1 tablet (2 5 mg total) by mouth daily, Disp: 90 tablet, Rfl: 3    pyridostigmine (MESTINON) 60 mg tablet, Take 1 tablet (60 mg total) by mouth 4 (four) times a day, Disp: 360 tablet, Rfl: 3    zolpidem (AMBIEN) 10 mg tablet, Take 10 mg by mouth daily at bedtime  , Disp: , Rfl:     No Known Allergies    Physical Exam:    /70   Pulse 72   Resp 18   Ht 5' 6" (1 676 m)   Wt 78 2 kg (172 lb 6 4 oz)   BMI 27 83 kg/m²     Constitutional:normal, well developed, well nourished, alert, in no distress and non-toxic and no overt pain behavior  Eyes:anicteric  HEENT:grossly intact  Neck:supple, symmetric, trachea midline and no masses   Pulmonary:even and unlabored  Cardiovascular:No edema or pitting edema present  Skin:Normal without rashes or lesions and well hydrated  Psychiatric:Mood and affect appropriate  Neurologic:Cranial Nerves II-XII grossly intact  Musculoskeletal:normal      Imaging  No orders to display         No orders of the defined types were placed in this encounter

## 2019-10-08 NOTE — TELEPHONE ENCOUNTER
Left detailed MOM informing pt that the orders for PT and OT have been placed, informed pt that if she is going to  no order needed  Gave call back # if going outside of  for us to mail her the orders

## 2019-12-09 DIAGNOSIS — G70.00 MG (MYASTHENIA GRAVIS) (HCC): ICD-10-CM

## 2019-12-09 RX ORDER — OMEPRAZOLE 20 MG/1
CAPSULE, DELAYED RELEASE ORAL
Qty: 90 CAPSULE | Refills: 4 | Status: SHIPPED | OUTPATIENT
Start: 2019-12-09 | End: 2020-12-16 | Stop reason: SDUPTHER

## 2019-12-14 DIAGNOSIS — G70.00 MG (MYASTHENIA GRAVIS) (HCC): ICD-10-CM

## 2019-12-16 RX ORDER — PREDNISONE 2.5 MG
TABLET ORAL
Qty: 90 TABLET | Refills: 4 | Status: SHIPPED | OUTPATIENT
Start: 2019-12-16 | End: 2021-06-08

## 2019-12-27 ENCOUNTER — TELEPHONE (OUTPATIENT)
Dept: PAIN MEDICINE | Facility: CLINIC | Age: 49
End: 2019-12-27

## 2019-12-27 NOTE — TELEPHONE ENCOUNTER
Patient is calling back I stating that she did not get an MRI yet   She said that he only gave her an order physical therapy the last time she saw him, not an MRI

## 2019-12-27 NOTE — TELEPHONE ENCOUNTER
Call to this patient no answer left a message for her to contact us regarding the MRI that was ordered at her last appointment, The results are not in [facility] [address]  I ask that she contact us with where she had the MRI done so the results can be obtained for her appointment

## 2019-12-30 ENCOUNTER — OFFICE VISIT (OUTPATIENT)
Dept: PAIN MEDICINE | Facility: CLINIC | Age: 49
End: 2019-12-30
Payer: OTHER GOVERNMENT

## 2019-12-30 VITALS
DIASTOLIC BLOOD PRESSURE: 80 MMHG | WEIGHT: 172 LBS | HEART RATE: 80 BPM | BODY MASS INDEX: 27.64 KG/M2 | SYSTOLIC BLOOD PRESSURE: 119 MMHG | HEIGHT: 66 IN

## 2019-12-30 DIAGNOSIS — M54.16 LUMBAR RADICULOPATHY: ICD-10-CM

## 2019-12-30 DIAGNOSIS — M48.02 CERVICAL SPINAL STENOSIS: ICD-10-CM

## 2019-12-30 DIAGNOSIS — G89.4 CHRONIC PAIN SYNDROME: Primary | ICD-10-CM

## 2019-12-30 PROCEDURE — 99213 OFFICE O/P EST LOW 20 MIN: CPT | Performed by: ANESTHESIOLOGY

## 2019-12-30 NOTE — PROGRESS NOTES
Assessment:  1  Chronic pain syndrome     2  Lumbar radiculopathy     3  Cervical spinal stenosis         Plan: This is a 58-year-old female with chronic pain syndrome  Patient pain was adequately managed with gabapentin 300 mg p o  T i d  However, pain has gotten worse over the past few weeks  At this time, I will increase the dosage of gabapentin to 300mg po up to 4x/day  She will give us a call and let us know if its helping  Follow up 3 months  My impressions and treatment recommendations were discussed in detail with the patient who verbalized understanding and had no further questions  Discharge instructions were provided  I personally saw and examined the patient and I agree with the above discussed plan of care  History of Present Illness:  Fletcher Leblanc is a 44 y o  female who presents for a follow up office visit in regards to Neck Pain; Back Pain; and Hip Pain (Bilateral)  The patients current symptoms include constant, burning, dull achy, throbbing, shooting pain  Patient reports 8/10 pain  She was doing well on gabapentin 300 mg p o  T i d  She states that 2 weeks ago, back pain has gotten worse  No recent changes in health  I have personally reviewed and/or updated the patient's past medical history, past surgical history, family history, social history, current medications, allergies, and vital signs today  Review of Systems   Respiratory: Negative for shortness of breath  Cardiovascular: Negative for chest pain  Gastrointestinal: Negative for constipation, diarrhea, nausea and vomiting  Musculoskeletal: Positive for gait problem  Negative for arthralgias, joint swelling and myalgias  Skin: Negative for rash  Neurological: Negative for dizziness, seizures and weakness  All other systems reviewed and are negative        Patient Active Problem List   Diagnosis    Asymptomatic stenosis of right carotid artery    Family history of premature CAD    [facility]) [facility] [address]    Chronic pain syndrome    Greater trochanteric bursitis of both hips    Lumbar radiculopathy    Generalized joint pain    Cervical spinal stenosis       Past Medical History:   Diagnosis Date    Carotid stenosis, asymptomatic     Chemical exposure     [address]    Dizziness     IBS (irritable bowel syndrome)     Myasthenia gravis (HCC)     Myasthenia gravis (HCC)     Numbness     Polyneuropathy     Raynaud disease     Sjoegren syndrome     Syncope        Past Surgical History:   Procedure Laterality Date    CHOLECYSTECTOMY      THYMECTOMY         Family History   Problem Relation Age of Onset    Arthritis Mother     Diabetes Father     Hypertension Father     Coronary artery disease Family        Social History     Occupational History    Not on file   Tobacco Use    Smoking status: Never Smoker    Smokeless tobacco: Never Used   Substance and Sexual Activity    Alcohol use: No    Drug use: No    Sexual activity: Not on file       Current Outpatient Medications on File Prior to Visit   Medication Sig    ascorbic acid (VITAMIN C) 250 MG CHEW Chew 500 mg    celecoxib (CeleBREX) 200 mg capsule Take 200 mg by mouth 2 (two) times a day     Cholecalciferol (VITAMIN D3) 2000 units capsule Take 2,000 Units by mouth daily      Cyanocobalamin (B-12 PO) Take 1,000 mcg by mouth daily      DUAVEE 0 45-20 MG TABS daily     Ferrous Sulfate (IRON) 325 (65 Fe) MG TABS Take 1 tablet by mouth once a week      folic acid (FOLVITE) 1 mg tablet Take 1 mg by mouth daily      gabapentin (NEURONTIN) 300 mg capsule Take 1 capsule (300 mg total) by mouth 3 (three) times a day    methotrexate (RHEUMATREX) 2 5 MG tablet Take 2 5 mg by mouth once a week 6 tabs once a week     omeprazole (PriLOSEC) 20 mg delayed release capsule TAKE 1 CAPSULE DAILY    predniSONE 2 5 mg tablet TAKE 1 TABLET DAILY    pyridostigmine (MESTINON) 60 mg tablet Take 1 tablet (60 mg total) by mouth 4 (four) times a day    zolpidem (AMBIEN) 10 mg tablet Take 10 mg by mouth daily at bedtime       No current facility-administered medications on file prior to visit  No Known Allergies    Physical Exam:    Ht 5' 6" (1 676 m)   Wt 78 kg (172 lb)   BMI 27 76 kg/m²     Constitutional:normal, well developed, well nourished, alert, in no distress and non-toxic and no overt pain behavior    Eyes:anicteric  HEENT:grossly intact  Neck:supple, symmetric, trachea midline and no masses   Pulmonary:even and unlabored  Cardiovascular:No edema or pitting edema present  Skin:Normal without rashes or lesions and well hydrated  Psychiatric:Mood and affect appropriate  Neurologic:Cranial Nerves II-XII grossly intact  Musculoskeletal:normal    Imaging

## 2020-02-07 ENCOUNTER — OFFICE VISIT (OUTPATIENT)
Dept: URGENT CARE | Facility: CLINIC | Age: 50
End: 2020-02-07
Payer: OTHER GOVERNMENT

## 2020-02-07 VITALS
TEMPERATURE: 98.3 F | HEIGHT: 66 IN | RESPIRATION RATE: 18 BRPM | WEIGHT: 170 LBS | SYSTOLIC BLOOD PRESSURE: 100 MMHG | DIASTOLIC BLOOD PRESSURE: 82 MMHG | OXYGEN SATURATION: 98 % | HEART RATE: 67 BPM | BODY MASS INDEX: 27.32 KG/M2

## 2020-02-07 DIAGNOSIS — J20.8 ACUTE BACTERIAL BRONCHITIS: Primary | ICD-10-CM

## 2020-02-07 DIAGNOSIS — B96.89 ACUTE BACTERIAL BRONCHITIS: Primary | ICD-10-CM

## 2020-02-07 PROCEDURE — 99213 OFFICE O/P EST LOW 20 MIN: CPT | Performed by: PHYSICIAN ASSISTANT

## 2020-02-07 RX ORDER — PREDNISONE 20 MG/1
40 TABLET ORAL DAILY
Qty: 10 TABLET | Refills: 0 | Status: SHIPPED | OUTPATIENT
Start: 2020-02-07 | End: 2020-02-12

## 2020-02-07 RX ORDER — ALBUTEROL SULFATE 90 UG/1
2 AEROSOL, METERED RESPIRATORY (INHALATION) EVERY 6 HOURS PRN
Qty: 18 G | Refills: 0 | Status: SHIPPED | OUTPATIENT
Start: 2020-02-07 | End: 2021-09-08

## 2020-02-07 RX ORDER — AZITHROMYCIN 250 MG/1
TABLET, FILM COATED ORAL
Qty: 6 TABLET | Refills: 0 | Status: SHIPPED | OUTPATIENT
Start: 2020-02-07 | End: 2020-02-11

## 2020-02-07 RX ORDER — BENZONATATE 200 MG/1
200 CAPSULE ORAL 3 TIMES DAILY PRN
Qty: 20 CAPSULE | Refills: 0 | Status: SHIPPED | OUTPATIENT
Start: 2020-02-07 | End: 2021-09-08

## 2020-02-07 NOTE — PROGRESS NOTES
[facility]        NAME: Rosalinda Diaz is a 44 y o  female  : 1970    MRN: [id number]  DATE: 2020  TIME: 8:45 AM    Assessment and Plan   Acute bacterial bronchitis [J20 8, B96 89]  1  Acute bacterial bronchitis  azithromycin (ZITHROMAX) 250 mg tablet    predniSONE 20 mg tablet    albuterol (VENTOLIN HFA) 90 mcg/act inhaler    benzonatate (TESSALON) 200 MG capsule         Patient Instructions     Follow up with PCP in 3-5 days  Proceed to  ER if symptoms worsen  Chief Complaint     Chief Complaint   Patient presents with    Cough     x 2 5 weeks  moist np cough and chest congestion  History of Present Illness       23-year-old female presents for evaluation of cough and chest congestion  Symptoms ongoing for 2 weeks  Patient was of dry cough  She denies recent travel  Denies shortness of breath  Denies fever  Review of Systems   Review of Systems   Constitutional: Negative for chills, fatigue and fever  HENT: Positive for congestion  Negative for ear pain, sinus pain, sore throat and trouble swallowing  Eyes: Negative for pain, discharge and redness  Respiratory: Positive for cough  Negative for chest tightness, shortness of breath and wheezing  Cardiovascular: Negative for chest pain, palpitations and leg swelling  Gastrointestinal: Negative for abdominal pain, diarrhea, nausea and vomiting  Musculoskeletal: Negative for arthralgias, joint swelling and myalgias  Skin: Negative for rash  Neurological: Negative for dizziness, weakness, numbness and headaches           Current Medications       Current Outpatient Medications:     ascorbic acid (VITAMIN C) 250 MG CHEW, Chew 500 mg, Disp: , Rfl:     celecoxib (CeleBREX) 200 mg capsule, Take 200 mg by mouth 2 (two) times a day , Disp: , Rfl:     Cholecalciferol (VITAMIN D3) 2000 units capsule, Take 2,000 Units by mouth daily  , Disp: , Rfl:     Cyanocobalamin (B-12 PO), Take 1,000 mcg by mouth daily , Disp: , Rfl:     DUAVEE 0 45-20 MG TABS, daily , Disp: , Rfl:     Ferrous Sulfate (IRON) 325 (65 Fe) MG TABS, Take 1 tablet by mouth once a week  , Disp: , Rfl:     folic acid (FOLVITE) 1 mg tablet, Take 1 mg by mouth daily  , Disp: , Rfl:     methotrexate (RHEUMATREX) 2 5 MG tablet, Take 2 5 mg by mouth once a week 6 tabs once a week , Disp: , Rfl:     omeprazole (PriLOSEC) 20 mg delayed release capsule, TAKE 1 CAPSULE DAILY, Disp: 90 capsule, Rfl: 4    predniSONE 2 5 mg tablet, TAKE 1 TABLET DAILY, Disp: 90 tablet, Rfl: 4    pyridostigmine (MESTINON) 60 mg tablet, Take 1 tablet (60 mg total) by mouth 4 (four) times a day, Disp: 360 tablet, Rfl: 3    zolpidem (AMBIEN) 10 mg tablet, Take 10 mg by mouth daily at bedtime  , Disp: , Rfl:     albuterol (VENTOLIN HFA) 90 mcg/act inhaler, Inhale 2 puffs every 6 (six) hours as needed for wheezing, Disp: 18 g, Rfl: 0    azithromycin (ZITHROMAX) 250 mg tablet, Take 2 tablets today then 1 tablet daily x 4 days, Disp: 6 tablet, Rfl: 0    benzonatate (TESSALON) 200 MG capsule, Take 1 capsule (200 mg total) by mouth 3 (three) times a day as needed for cough, Disp: 20 capsule, Rfl: 0    gabapentin (NEURONTIN) 300 mg capsule, Take 1 capsule (300 mg total) by mouth 3 (three) times a day, Disp: 270 capsule, Rfl: 0    predniSONE 20 mg tablet, Take 2 tablets (40 mg total) by mouth daily for 5 days, Disp: 10 tablet, Rfl: 0    Current Allergies     Allergies as of 02/07/2020    (No Known Allergies)            The following portions of the patient's history were reviewed and updated as appropriate: allergies, current medications, past family history, past medical history, past social history, past surgical history and problem list      Past Medical History:   Diagnosis Date    Carotid stenosis, asymptomatic     Chemical exposure     [address]    Dizziness     IBS (irritable bowel syndrome)     [facility]     Myasthenia gravis ([facility] [address] [facility]     Numbness     Polyneuropathy     Raynaud disease     Sjoegren syndrome     Syncope        Past Surgical History:   Procedure Laterality Date    CHOLECYSTECTOMY      THYMECTOMY         Family History   Problem Relation Age of Onset    Arthritis Mother     Diabetes Father     Hypertension Father     Coronary artery disease Family          Medications have been verified  Objective   /82 (BP Location: Left arm, Patient Position: Sitting)   Pulse 67   Temp 98 3 °F (36 8 °C) (Oral)   Resp 18   Ht 5' 6" (1 676 m)   Wt 77 1 kg (170 lb)   SpO2 98%   BMI 27 44 kg/m²        Physical Exam     Physical Exam   Constitutional: She appears well-developed and well-nourished  HENT:   Head: Normocephalic  Right Ear: Hearing and tympanic membrane normal    Left Ear: Hearing and tympanic membrane normal    Nose: No mucosal edema  Mouth/Throat: Uvula is midline and mucous membranes are normal  Posterior oropharyngeal erythema present  Cardiovascular: Normal rate and regular rhythm  Pulmonary/Chest: Effort normal  She has wheezes in the right lower field  Nursing note and vitals reviewed

## 2020-02-14 ENCOUNTER — TELEPHONE (OUTPATIENT)
Dept: PAIN MEDICINE | Facility: CLINIC | Age: 50
End: 2020-02-14

## 2020-02-17 ENCOUNTER — OFFICE VISIT (OUTPATIENT)
Dept: NEUROLOGY | Facility: CLINIC | Age: 50
End: 2020-02-17
Payer: OTHER GOVERNMENT

## 2020-02-17 VITALS
HEIGHT: 65 IN | BODY MASS INDEX: 28.16 KG/M2 | WEIGHT: 169 LBS | SYSTOLIC BLOOD PRESSURE: 122 MMHG | DIASTOLIC BLOOD PRESSURE: 80 MMHG | HEART RATE: 67 BPM

## 2020-02-17 DIAGNOSIS — G70.00 MG (MYASTHENIA GRAVIS) (HCC): Primary | ICD-10-CM

## 2020-02-17 PROCEDURE — 99213 OFFICE O/P EST LOW 20 MIN: CPT | Performed by: PSYCHIATRY & NEUROLOGY

## 2020-02-17 NOTE — PROGRESS NOTES
Progress Note - Neurology   Taurus Coto 44 y o  female MRN: [id number]  Unit/Bed#:  Encounter: [id number]      Subjective:   Patient is here for a follow-up visit for myasthenia gravis, remains on Mestinon 60 mg 4 times a day, and generally feels her symptoms are under control except for generalized fatigue as the day progresses  She denies any central neurological symptoms or any motor or sensory symptoms in the upper lower extremities, is also followed up with Rheumatology as well as pain management for chronic pain syndrome  Patient denies any side effects from the Mestinon  Her medications were all reviewed  ROS:   Review of Systems   Constitutional: Positive for fatigue  Negative for appetite change and fever  HENT: Negative  Negative for hearing loss, tinnitus, trouble swallowing and voice change  Eyes: Negative  Negative for photophobia and pain  Respiratory: Positive for cough  Negative for shortness of breath  Cardiovascular: Negative  Negative for palpitations  Gastrointestinal: Negative  Negative for nausea and vomiting  Endocrine: Negative  Negative for cold intolerance and heat intolerance  Genitourinary: Negative  Negative for dysuria, frequency and urgency  Musculoskeletal: Positive for arthralgias  Negative for myalgias and neck pain  Skin: Negative  Negative for rash  Neurological: Positive for numbness  Negative for dizziness, tremors, seizures, syncope, facial asymmetry, speech difficulty, weakness, light-headedness and headaches  Hematological: Negative  Does not bruise/bleed easily  Psychiatric/Behavioral: Positive for sleep disturbance  Negative for confusion and hallucinations  Vitals:   Vitals:    02/17/20 0843   BP: 122/80   BP Location: Left arm   Patient Position: Sitting   Cuff Size: Adult   Pulse: 67   Weight: 76 7 kg (169 lb)   Height: 5' 4 5" (1 638 m)   ,Body mass index is 28 56 kg/m²      MEDS:      Current Outpatient Medications:    albuterol (VENTOLIN HFA) 90 mcg/act inhaler, Inhale 2 puffs every 6 (six) hours as needed for wheezing, Disp: 18 g, Rfl: 0    celecoxib (CeleBREX) 200 mg capsule, Take 200 mg by mouth 2 (two) times a day , Disp: , Rfl:     DUAVEE 0 45-20 MG TABS, 1 tablet daily , Disp: , Rfl:     folic acid (FOLVITE) 1 mg tablet, Take 1 mg by mouth daily  , Disp: , Rfl:     gabapentin (NEURONTIN) 300 mg capsule, Take 1 capsule (300 mg total) by mouth 3 (three) times a day (Patient taking differently: Take 300 mg by mouth 2 (two) times a day ), Disp: 270 capsule, Rfl: 0    methotrexate (RHEUMATREX) 2 5 MG tablet, Take 2 5 mg by mouth once a week 6 tabs once a week every Saturday, Disp: , Rfl:     omeprazole (PriLOSEC) 20 mg delayed release capsule, TAKE 1 CAPSULE DAILY, Disp: 90 capsule, Rfl: 4    predniSONE 2 5 mg tablet, TAKE 1 TABLET DAILY, Disp: 90 tablet, Rfl: 4    pyridostigmine (MESTINON) 60 mg tablet, Take 1 tablet (60 mg total) by mouth 4 (four) times a day, Disp: 360 tablet, Rfl: 3    zolpidem (AMBIEN) 10 mg tablet, Take 10 mg by mouth daily at bedtime  , Disp: , Rfl:     ascorbic acid (VITAMIN C) 250 MG CHEW, Chew 500 mg, Disp: , Rfl:     benzonatate (TESSALON) 200 MG capsule, Take 1 capsule (200 mg total) by mouth 3 (three) times a day as needed for cough (Patient not taking: Reported on 2/17/2020), Disp: 20 capsule, Rfl: 0    Cholecalciferol (VITAMIN D3) 2000 units capsule, Take 2,000 Units by mouth daily  , Disp: , Rfl:     Cyanocobalamin (B-12 PO), Take 1,000 mcg by mouth daily  , Disp: , Rfl:     Ferrous Sulfate (IRON) 325 (65 Fe) MG TABS, Take 1 tablet by mouth once a week  , Disp: , Rfl:   :    Physical Exam:  General appearance: alert, appears stated age and cooperative  Head: Normocephalic, without obvious abnormality, atraumatic    On examination she has normal extraocular movements with no evidence of any ptosis no proximal neck flexor or extensor weakness or proximal upper extremity or lower extremity weakness noted  Rest of her cranial nerve, motor and sensory exam remains unremarkable  Her gait is normal based  Lab Results: I have personally reviewed pertinent reports  Imaging Studies: I have personally reviewed pertinent reports  Assessment:  1  Myasthenia gravis  Plan: Will continue present medications, home exercise program, follow up with Rheumatology and pain management and will return back to see me in 6 months  Her most recent labs were also reviewed  2/17/2020,8:48 AM    Dictation voice to text software has been used in the creation of this document  Please consider this in light of any contextual or grammatical errors

## 2020-02-24 DIAGNOSIS — G70.00 MG (MYASTHENIA GRAVIS) (HCC): ICD-10-CM

## 2020-02-24 RX ORDER — PYRIDOSTIGMINE BROMIDE 60 MG/1
TABLET ORAL
Qty: 360 TABLET | Refills: 4 | Status: SHIPPED | OUTPATIENT
Start: 2020-02-24 | End: 2021-06-03

## 2020-03-03 ENCOUNTER — TELEPHONE (OUTPATIENT)
Dept: PAIN MEDICINE | Facility: CLINIC | Age: 50
End: 2020-03-03

## 2020-03-03 DIAGNOSIS — M54.16 LUMBAR RADICULOPATHY: ICD-10-CM

## 2020-03-03 DIAGNOSIS — M48.02 CERVICAL SPINAL STENOSIS: ICD-10-CM

## 2020-03-03 RX ORDER — GABAPENTIN 300 MG/1
300 CAPSULE ORAL 3 TIMES DAILY
Qty: 270 CAPSULE | Refills: 0 | Status: SHIPPED | OUTPATIENT
Start: 2020-03-03 | End: 2020-03-05 | Stop reason: SDUPTHER

## 2020-03-04 ENCOUNTER — TRANSCRIBE ORDERS (OUTPATIENT)
Dept: ADMINISTRATIVE | Facility: HOSPITAL | Age: 50
End: 2020-03-04

## 2020-03-04 ENCOUNTER — APPOINTMENT (OUTPATIENT)
Dept: LAB | Facility: CLINIC | Age: 50
End: 2020-03-04
Payer: OTHER GOVERNMENT

## 2020-03-04 DIAGNOSIS — M35.00 SICCA SYNDROME (HCC): ICD-10-CM

## 2020-03-04 DIAGNOSIS — M35.00 SICCA SYNDROME (HCC): Primary | ICD-10-CM

## 2020-03-04 LAB
ALBUMIN SERPL BCP-MCNC: 3.8 G/DL (ref 3.5–5)
ALP SERPL-CCNC: 74 U/L (ref 46–116)
ALT SERPL W P-5'-P-CCNC: 58 U/L (ref 12–78)
ANION GAP SERPL CALCULATED.3IONS-SCNC: 2 MMOL/L (ref 4–13)
AST SERPL W P-5'-P-CCNC: 33 U/L (ref 5–45)
BACTERIA UR QL AUTO: NORMAL /HPF
BASOPHILS # BLD AUTO: 0.04 THOUSANDS/ΜL (ref 0–0.1)
BASOPHILS NFR BLD AUTO: 1 % (ref 0–1)
BILIRUB SERPL-MCNC: 0.71 MG/DL (ref 0.2–1)
BILIRUB UR QL STRIP: NEGATIVE
BUN SERPL-MCNC: 12 MG/DL (ref 5–25)
CALCIUM SERPL-MCNC: 9.2 MG/DL (ref 8.3–10.1)
CHLORIDE SERPL-SCNC: 110 MMOL/L (ref 100–108)
CLARITY UR: CLEAR
CO2 SERPL-SCNC: 28 MMOL/L (ref 21–32)
COLOR UR: YELLOW
CREAT SERPL-MCNC: 0.89 MG/DL (ref 0.6–1.3)
CRP SERPL QL: <3 MG/L
EOSINOPHIL # BLD AUTO: 0.08 THOUSAND/ΜL (ref 0–0.61)
EOSINOPHIL NFR BLD AUTO: 3 % (ref 0–6)
ERYTHROCYTE [DISTWIDTH] IN BLOOD BY AUTOMATED COUNT: 13 % (ref 11.6–15.1)
ERYTHROCYTE [SEDIMENTATION RATE] IN BLOOD: 5 MM/HOUR (ref 0–20)
GFR SERPL CREATININE-BSD FRML MDRD: 75 ML/MIN/1.73SQ M
GLUCOSE P FAST SERPL-MCNC: 85 MG/DL (ref 65–99)
GLUCOSE UR STRIP-MCNC: NEGATIVE MG/DL
HCT VFR BLD AUTO: 42.6 % (ref 34.8–46.1)
HGB BLD-MCNC: 13.8 G/DL (ref 11.5–15.4)
HGB UR QL STRIP.AUTO: NEGATIVE
HYALINE CASTS #/AREA URNS LPF: NORMAL /LPF
IMM GRANULOCYTES # BLD AUTO: 0.01 THOUSAND/UL (ref 0–0.2)
IMM GRANULOCYTES NFR BLD AUTO: 0 % (ref 0–2)
KETONES UR STRIP-MCNC: NEGATIVE MG/DL
LEUKOCYTE ESTERASE UR QL STRIP: NEGATIVE
LYMPHOCYTES # BLD AUTO: 1.16 THOUSANDS/ΜL (ref 0.6–4.47)
LYMPHOCYTES NFR BLD AUTO: 38 % (ref 14–44)
MCH RBC QN AUTO: 29.6 PG (ref 26.8–34.3)
MCHC RBC AUTO-ENTMCNC: 32.4 G/DL (ref 31.4–37.4)
MCV RBC AUTO: 91 FL (ref 82–98)
MONOCYTES # BLD AUTO: 0.38 THOUSAND/ΜL (ref 0.17–1.22)
MONOCYTES NFR BLD AUTO: 12 % (ref 4–12)
NEUTROPHILS # BLD AUTO: 1.4 THOUSANDS/ΜL (ref 1.85–7.62)
NEUTS SEG NFR BLD AUTO: 46 % (ref 43–75)
NITRITE UR QL STRIP: NEGATIVE
NON-SQ EPI CELLS URNS QL MICRO: NORMAL /HPF
NRBC BLD AUTO-RTO: 0 /100 WBCS
PH UR STRIP.AUTO: 6 [PH]
PLATELET # BLD AUTO: 188 THOUSANDS/UL (ref 149–390)
PMV BLD AUTO: 8.9 FL (ref 8.9–12.7)
POTASSIUM SERPL-SCNC: 4 MMOL/L (ref 3.5–5.3)
PROT SERPL-MCNC: 7.2 G/DL (ref 6.4–8.2)
PROT UR STRIP-MCNC: NEGATIVE MG/DL
RBC # BLD AUTO: 4.66 MILLION/UL (ref 3.81–5.12)
RBC #/AREA URNS AUTO: NORMAL /HPF
SODIUM SERPL-SCNC: 140 MMOL/L (ref 136–145)
SP GR UR STRIP.AUTO: 1.01 (ref 1–1.03)
UROBILINOGEN UR QL STRIP.AUTO: 0.2 E.U./DL
WBC # BLD AUTO: 3.07 THOUSAND/UL (ref 4.31–10.16)
WBC #/AREA URNS AUTO: NORMAL /HPF

## 2020-03-04 PROCEDURE — 85025 COMPLETE CBC W/AUTO DIFF WBC: CPT

## 2020-03-04 PROCEDURE — 82232 ASSAY OF BETA-2 PROTEIN: CPT

## 2020-03-04 PROCEDURE — 84165 PROTEIN E-PHORESIS SERUM: CPT | Performed by: PATHOLOGY

## 2020-03-04 PROCEDURE — 36415 COLL VENOUS BLD VENIPUNCTURE: CPT

## 2020-03-04 PROCEDURE — 84165 PROTEIN E-PHORESIS SERUM: CPT

## 2020-03-04 PROCEDURE — 85652 RBC SED RATE AUTOMATED: CPT

## 2020-03-04 PROCEDURE — 80053 COMPREHEN METABOLIC PANEL: CPT

## 2020-03-04 PROCEDURE — 86140 C-REACTIVE PROTEIN: CPT

## 2020-03-04 PROCEDURE — 81001 URINALYSIS AUTO W/SCOPE: CPT | Performed by: INTERNAL MEDICINE

## 2020-03-05 DIAGNOSIS — M48.02 CERVICAL SPINAL STENOSIS: ICD-10-CM

## 2020-03-05 DIAGNOSIS — M54.16 LUMBAR RADICULOPATHY: ICD-10-CM

## 2020-03-05 LAB
ALBUMIN SERPL ELPH-MCNC: 4.4 G/DL (ref 3.5–5)
ALBUMIN SERPL ELPH-MCNC: 62.9 % (ref 52–65)
ALPHA1 GLOB SERPL ELPH-MCNC: 0.27 G/DL (ref 0.1–0.4)
ALPHA1 GLOB SERPL ELPH-MCNC: 3.9 % (ref 2.5–5)
ALPHA2 GLOB SERPL ELPH-MCNC: 0.62 G/DL (ref 0.4–1.2)
ALPHA2 GLOB SERPL ELPH-MCNC: 8.8 % (ref 7–13)
BETA GLOB ABNORMAL SERPL ELPH-MCNC: 0.47 G/DL (ref 0.4–0.8)
BETA1 GLOB SERPL ELPH-MCNC: 6.7 % (ref 5–13)
BETA2 GLOB SERPL ELPH-MCNC: 4.1 % (ref 2–8)
BETA2+GAMMA GLOB SERPL ELPH-MCNC: 0.29 G/DL (ref 0.2–0.5)
GAMMA GLOB ABNORMAL SERPL ELPH-MCNC: 0.95 G/DL (ref 0.5–1.6)
GAMMA GLOB SERPL ELPH-MCNC: 13.6 % (ref 12–22)
IGG/ALB SER: 1.7 {RATIO} (ref 1.1–1.8)
PROT PATTERN SERPL ELPH-IMP: NORMAL
PROT SERPL-MCNC: 7 G/DL (ref 6.4–8.2)

## 2020-03-05 RX ORDER — GABAPENTIN 300 MG/1
300 CAPSULE ORAL 3 TIMES DAILY
Qty: 270 CAPSULE | Refills: 0 | Status: SHIPPED | OUTPATIENT
Start: 2020-03-05 | End: 2020-11-02 | Stop reason: SDUPTHER

## 2020-03-05 NOTE — TELEPHONE ENCOUNTER
S/w pt  90 day supply went to [facility]  Pt needs it to go to [facility] on file    Please resend  Pt will f/u 3/18 as scheduled

## 2020-03-06 LAB — B2 MICROGLOB SERPL-MCNC: 1.8 MG/L (ref 0.6–2.4)

## 2020-03-10 ENCOUNTER — APPOINTMENT (OUTPATIENT)
Dept: LAB | Facility: CLINIC | Age: 50
End: 2020-03-10
Payer: OTHER GOVERNMENT

## 2020-03-10 ENCOUNTER — TRANSCRIBE ORDERS (OUTPATIENT)
Dept: ADMINISTRATIVE | Facility: HOSPITAL | Age: 50
End: 2020-03-10

## 2020-03-10 DIAGNOSIS — M35.00 SJOGREN'S SYNDROME, WITH UNSPECIFIED ORGAN INVOLVEMENT (HCC): Primary | ICD-10-CM

## 2020-03-10 DIAGNOSIS — G70.00 MYASTHENIA GRAVIS (HCC): ICD-10-CM

## 2020-03-10 DIAGNOSIS — M35.00 SJOGREN'S SYNDROME, WITH UNSPECIFIED ORGAN INVOLVEMENT (HCC): ICD-10-CM

## 2020-03-10 LAB
25(OH)D3 SERPL-MCNC: 34.6 NG/ML (ref 30–100)
ALBUMIN SERPL BCP-MCNC: 4 G/DL (ref 3.5–5)
ALP SERPL-CCNC: 68 U/L (ref 46–116)
ALT SERPL W P-5'-P-CCNC: 30 U/L (ref 12–78)
ANION GAP SERPL CALCULATED.3IONS-SCNC: 3 MMOL/L (ref 4–13)
AST SERPL W P-5'-P-CCNC: 23 U/L (ref 5–45)
BASOPHILS # BLD AUTO: 0.02 THOUSANDS/ΜL (ref 0–0.1)
BASOPHILS NFR BLD AUTO: 1 % (ref 0–1)
BILIRUB SERPL-MCNC: 0.63 MG/DL (ref 0.2–1)
BUN SERPL-MCNC: 12 MG/DL (ref 5–25)
CALCIUM SERPL-MCNC: 9.1 MG/DL (ref 8.3–10.1)
CHLORIDE SERPL-SCNC: 111 MMOL/L (ref 100–108)
CK SERPL-CCNC: 65 U/L (ref 26–192)
CO2 SERPL-SCNC: 27 MMOL/L (ref 21–32)
CREAT SERPL-MCNC: 0.79 MG/DL (ref 0.6–1.3)
CRP SERPL QL: <3 MG/L
EOSINOPHIL # BLD AUTO: 0.08 THOUSAND/ΜL (ref 0–0.61)
EOSINOPHIL NFR BLD AUTO: 3 % (ref 0–6)
ERYTHROCYTE [DISTWIDTH] IN BLOOD BY AUTOMATED COUNT: 12.9 % (ref 11.6–15.1)
ERYTHROCYTE [SEDIMENTATION RATE] IN BLOOD: 7 MM/HOUR (ref 0–20)
GFR SERPL CREATININE-BSD FRML MDRD: 87 ML/MIN/1.73SQ M
GLUCOSE P FAST SERPL-MCNC: 84 MG/DL (ref 65–99)
HCT VFR BLD AUTO: 42.7 % (ref 34.8–46.1)
HGB BLD-MCNC: 13.8 G/DL (ref 11.5–15.4)
IMM GRANULOCYTES # BLD AUTO: 0.01 THOUSAND/UL (ref 0–0.2)
IMM GRANULOCYTES NFR BLD AUTO: 0 % (ref 0–2)
IRON SERPL-MCNC: 145 UG/DL (ref 50–170)
LYMPHOCYTES # BLD AUTO: 0.99 THOUSANDS/ΜL (ref 0.6–4.47)
LYMPHOCYTES NFR BLD AUTO: 34 % (ref 14–44)
MCH RBC QN AUTO: 29.4 PG (ref 26.8–34.3)
MCHC RBC AUTO-ENTMCNC: 32.3 G/DL (ref 31.4–37.4)
MCV RBC AUTO: 91 FL (ref 82–98)
MONOCYTES # BLD AUTO: 0.34 THOUSAND/ΜL (ref 0.17–1.22)
MONOCYTES NFR BLD AUTO: 12 % (ref 4–12)
NEUTROPHILS # BLD AUTO: 1.49 THOUSANDS/ΜL (ref 1.85–7.62)
NEUTS SEG NFR BLD AUTO: 50 % (ref 43–75)
NRBC BLD AUTO-RTO: 0 /100 WBCS
PLATELET # BLD AUTO: 201 THOUSANDS/UL (ref 149–390)
PMV BLD AUTO: 8.8 FL (ref 8.9–12.7)
POTASSIUM SERPL-SCNC: 3.7 MMOL/L (ref 3.5–5.3)
PROT SERPL-MCNC: 7.4 G/DL (ref 6.4–8.2)
RBC # BLD AUTO: 4.7 MILLION/UL (ref 3.81–5.12)
SODIUM SERPL-SCNC: 141 MMOL/L (ref 136–145)
T4 FREE SERPL-MCNC: 0.95 NG/DL (ref 0.76–1.46)
TSH SERPL DL<=0.05 MIU/L-ACNC: 1.44 UIU/ML (ref 0.36–3.74)
VIT B12 SERPL-MCNC: 566 PG/ML (ref 100–900)
WBC # BLD AUTO: 2.93 THOUSAND/UL (ref 4.31–10.16)

## 2020-03-10 PROCEDURE — 36415 COLL VENOUS BLD VENIPUNCTURE: CPT

## 2020-03-10 PROCEDURE — 84439 ASSAY OF FREE THYROXINE: CPT

## 2020-03-10 PROCEDURE — 86140 C-REACTIVE PROTEIN: CPT

## 2020-03-10 PROCEDURE — 84443 ASSAY THYROID STIM HORMONE: CPT

## 2020-03-10 PROCEDURE — 82550 ASSAY OF CK (CPK): CPT

## 2020-03-10 PROCEDURE — 82232 ASSAY OF BETA-2 PROTEIN: CPT

## 2020-03-10 PROCEDURE — 82085 ASSAY OF ALDOLASE: CPT

## 2020-03-10 PROCEDURE — 82607 VITAMIN B-12: CPT

## 2020-03-10 PROCEDURE — 83516 IMMUNOASSAY NONANTIBODY: CPT

## 2020-03-10 PROCEDURE — 85025 COMPLETE CBC W/AUTO DIFF WBC: CPT

## 2020-03-10 PROCEDURE — 80053 COMPREHEN METABOLIC PANEL: CPT

## 2020-03-10 PROCEDURE — 85652 RBC SED RATE AUTOMATED: CPT

## 2020-03-10 PROCEDURE — 83540 ASSAY OF IRON: CPT

## 2020-03-10 PROCEDURE — 82306 VITAMIN D 25 HYDROXY: CPT

## 2020-03-11 LAB
ALDOLASE SERPL-CCNC: 3.5 U/L (ref 3.3–10.3)
GLIADIN PEPTIDE IGA SER-ACNC: 3 UNITS (ref 0–19)
GLIADIN PEPTIDE IGG SER-ACNC: 3 UNITS (ref 0–19)
TTG IGA SER-ACNC: <2 U/ML (ref 0–3)
TTG IGG SER-ACNC: 3 U/ML (ref 0–5)

## 2020-03-12 LAB — B2 MICROGLOB SERPL-MCNC: 2 MG/L (ref 0.6–2.4)

## 2020-03-18 ENCOUNTER — OFFICE VISIT (OUTPATIENT)
Dept: PAIN MEDICINE | Facility: CLINIC | Age: 50
End: 2020-03-18
Payer: OTHER GOVERNMENT

## 2020-03-18 VITALS
BODY MASS INDEX: 28.92 KG/M2 | RESPIRATION RATE: 18 BRPM | HEIGHT: 65 IN | HEART RATE: 73 BPM | DIASTOLIC BLOOD PRESSURE: 76 MMHG | SYSTOLIC BLOOD PRESSURE: 114 MMHG | WEIGHT: 173.6 LBS

## 2020-03-18 DIAGNOSIS — M25.50 GENERALIZED JOINT PAIN: ICD-10-CM

## 2020-03-18 DIAGNOSIS — M70.61 GREATER TROCHANTERIC BURSITIS OF BOTH HIPS: ICD-10-CM

## 2020-03-18 DIAGNOSIS — G89.4 CHRONIC PAIN SYNDROME: Primary | ICD-10-CM

## 2020-03-18 DIAGNOSIS — M48.02 CERVICAL SPINAL STENOSIS: ICD-10-CM

## 2020-03-18 DIAGNOSIS — M70.62 GREATER TROCHANTERIC BURSITIS OF BOTH HIPS: ICD-10-CM

## 2020-03-18 DIAGNOSIS — M54.16 LUMBAR RADICULOPATHY: ICD-10-CM

## 2020-03-18 PROCEDURE — 99213 OFFICE O/P EST LOW 20 MIN: CPT | Performed by: ANESTHESIOLOGY

## 2020-03-18 NOTE — PROGRESS NOTES
Assessment:  1  Chronic pain syndrome     2  Greater trochanteric bursitis of both hips     3  Lumbar radiculopathy     4  Cervical spinal stenosis     5  Generalized joint pain         Plan: This is a 25-year-old female with chronic pain syndrome  Patient pain was adequately managed with gabapentin 300 mg p o  Q i d  Pain control is adequate  She will follow up prn  She will continue with HER  My impressions and treatment recommendations were discussed in detail with the patient who verbalized understanding and had no further questions  Discharge instructions were provided  I personally saw and examined the patient and I agree with the above discussed plan of care  History of Present Illness:  Luis Durand is a 44 y o  female who presents for a follow up office visit in regards to Back Pain; Neck Pain; Hip Pain (bilateral); and Hand Pain (bilateral)  The patients current symptoms include shooting nerve like pain  Patient states that the increase in neurontin dose to 400mg po 4x/day has been helping  Pain is rated 2/10  I have personally reviewed and/or updated the patient's past medical history, past surgical history, family history, social history, current medications, allergies, and vital signs today  Review of Systems   Musculoskeletal:        Muscle weakness, joint stiffness and swelling         Patient Active Problem List   Diagnosis    Asymptomatic stenosis of right carotid artery    Family history of premature CAD    MG (myasthenia gravis) ([address] )    Chronic pain syndrome    Greater trochanteric bursitis of both hips    Lumbar radiculopathy    Generalized joint pain    Cervical spinal stenosis       Past Medical History:   Diagnosis Date    Carotid stenosis, asymptomatic     Chemical exposure     [address]    Dizziness     IBS (irritable bowel syndrome)     Myasthenia gravis (HCC)     Myasthenia gravis (HCC)     Numbness     Polyneuropathy     Raynaud disease  Sjoegren syndrome     Syncope        Past Surgical History:   Procedure Laterality Date    CHOLECYSTECTOMY      THYMECTOMY         Family History   Problem Relation Age of Onset    Arthritis Mother     Diabetes Father     Hypertension Father     Coronary artery disease Family     Breast cancer Half-Sister        Social History     Occupational History    Not on file   Tobacco Use    Smoking status: Never Smoker    Smokeless tobacco: Never Used   Substance and Sexual Activity    Alcohol use: No    Drug use: No    Sexual activity: Not on file       Current Outpatient Medications on File Prior to Visit   Medication Sig    albuterol (VENTOLIN HFA) 90 mcg/act inhaler Inhale 2 puffs every 6 (six) hours as needed for wheezing    ascorbic acid (VITAMIN C) 250 MG CHEW Chew 500 mg    benzonatate (TESSALON) 200 MG capsule Take 1 capsule (200 mg total) by mouth 3 (three) times a day as needed for cough    celecoxib (CeleBREX) 200 mg capsule Take 200 mg by mouth 2 (two) times a day     Cholecalciferol (VITAMIN D3) 2000 units capsule Take 2,000 Units by mouth daily      Cyanocobalamin (B-12 PO) Take 1,000 mcg by mouth daily      DUAVEE 0 45-20 MG TABS 1 tablet daily     Ferrous Sulfate (IRON) 325 (65 Fe) MG TABS Take 1 tablet by mouth once a week      folic acid (FOLVITE) 1 mg tablet Take 1 mg by mouth daily      gabapentin (NEURONTIN) 300 mg capsule Take 1 capsule (300 mg total) by mouth 3 (three) times a day    methotrexate (RHEUMATREX) 2 5 MG tablet Take 2 5 mg by mouth once a week 6 tabs once a week every Saturday    omeprazole (PriLOSEC) 20 mg delayed release capsule TAKE 1 CAPSULE DAILY    predniSONE 2 5 mg tablet TAKE 1 TABLET DAILY    pyridostigmine (MESTINON) 60 mg tablet TAKE 1 TABLET FOUR TIMES A DAY    zolpidem (AMBIEN) 10 mg tablet Take 10 mg by mouth daily at bedtime       No current facility-administered medications on file prior to visit          No Known Allergies    Physical Exam:    /76   Pulse 73   Resp 18   Ht 5' 4 5" (1 638 m)   Wt 78 7 kg (173 lb 9 6 oz)   BMI 29 34 kg/m²     Constitutional:normal, well developed, well nourished, alert, in no distress and non-toxic and no overt pain behavior    Eyes:anicteric  HEENT:grossly intact  Neck:supple, symmetric, trachea midline and no masses   Pulmonary:even and unlabored  Cardiovascular:No edema or pitting edema present  Skin:Normal without rashes or lesions and well hydrated  Psychiatric:Mood and affect appropriate  Neurologic:Cranial Nerves II-XII grossly intact  Musculoskeletal:normal    Imaging

## 2020-05-14 ENCOUNTER — OFFICE VISIT (OUTPATIENT)
Dept: URGENT CARE | Facility: CLINIC | Age: 50
End: 2020-05-14
Payer: OTHER GOVERNMENT

## 2020-05-14 ENCOUNTER — HOSPITAL ENCOUNTER (EMERGENCY)
Facility: HOSPITAL | Age: 50
Discharge: HOME | End: 2020-05-14
Attending: EMERGENCY MEDICINE | Admitting: EMERGENCY MEDICINE
Payer: OTHER GOVERNMENT

## 2020-05-14 ENCOUNTER — APPOINTMENT (EMERGENCY)
Dept: CT IMAGING | Facility: HOSPITAL | Age: 50
End: 2020-05-14
Payer: OTHER GOVERNMENT

## 2020-05-14 VITALS
DIASTOLIC BLOOD PRESSURE: 85 MMHG | RESPIRATION RATE: 17 BRPM | HEART RATE: 84 BPM | WEIGHT: 169.97 LBS | OXYGEN SATURATION: 100 % | SYSTOLIC BLOOD PRESSURE: 142 MMHG | TEMPERATURE: 98 F | BODY MASS INDEX: 27.32 KG/M2 | HEIGHT: 66 IN

## 2020-05-14 VITALS
TEMPERATURE: 97.9 F | WEIGHT: 170 LBS | DIASTOLIC BLOOD PRESSURE: 90 MMHG | OXYGEN SATURATION: 99 % | HEIGHT: 66 IN | HEART RATE: 70 BPM | SYSTOLIC BLOOD PRESSURE: 120 MMHG | RESPIRATION RATE: 16 BRPM | BODY MASS INDEX: 27.32 KG/M2

## 2020-05-14 DIAGNOSIS — S09.90XA INJURY OF HEAD, INITIAL ENCOUNTER: Primary | ICD-10-CM

## 2020-05-14 DIAGNOSIS — S06.0X0A CONCUSSION WITHOUT LOSS OF CONSCIOUSNESS, INITIAL ENCOUNTER: Primary | ICD-10-CM

## 2020-05-14 DIAGNOSIS — R11.2 NAUSEA AND VOMITING, INTRACTABILITY OF VOMITING NOT SPECIFIED, UNSPECIFIED VOMITING TYPE: ICD-10-CM

## 2020-05-14 DIAGNOSIS — R42 DIZZINESS AND GIDDINESS: ICD-10-CM

## 2020-05-14 DIAGNOSIS — H81.10 BPPV (BENIGN PAROXYSMAL POSITIONAL VERTIGO): ICD-10-CM

## 2020-05-14 PROCEDURE — 99283 EMERGENCY DEPT VISIT LOW MDM: CPT

## 2020-05-14 PROCEDURE — 99213 OFFICE O/P EST LOW 20 MIN: CPT | Performed by: PHYSICIAN ASSISTANT

## 2020-05-14 PROCEDURE — 99284 EMERGENCY DEPT VISIT MOD MDM: CPT | Performed by: EMERGENCY MEDICINE

## 2020-05-14 PROCEDURE — 70450 CT HEAD/BRAIN W/O DYE: CPT

## 2020-05-14 RX ORDER — DIAZEPAM 5 MG/1
5 TABLET ORAL EVERY 12 HOURS PRN
Qty: 5 TABLET | Refills: 0 | Status: SHIPPED | OUTPATIENT
Start: 2020-05-14 | End: 2021-01-21 | Stop reason: ALTCHOICE

## 2020-05-14 RX ORDER — ONDANSETRON 4 MG/1
4 TABLET, ORALLY DISINTEGRATING ORAL ONCE
Status: COMPLETED | OUTPATIENT
Start: 2020-05-14 | End: 2020-05-14

## 2020-05-14 RX ORDER — MECLIZINE HCL 12.5 MG/1
12.5 TABLET ORAL 3 TIMES DAILY PRN
Qty: 15 TABLET | Refills: 0 | Status: SHIPPED | OUTPATIENT
Start: 2020-05-14 | End: 2021-01-21 | Stop reason: ALTCHOICE

## 2020-05-14 RX ORDER — ONDANSETRON 4 MG/1
4 TABLET, ORALLY DISINTEGRATING ORAL EVERY 8 HOURS PRN
Qty: 12 TABLET | Refills: 0 | Status: SHIPPED | OUTPATIENT
Start: 2020-05-14 | End: 2021-01-21 | Stop reason: ALTCHOICE

## 2020-05-14 RX ORDER — MECLIZINE HCL 12.5 MG/1
12.5 TABLET ORAL ONCE
Status: COMPLETED | OUTPATIENT
Start: 2020-05-14 | End: 2020-05-14

## 2020-05-14 RX ADMIN — MECLIZINE 12.5 MG: 12.5 TABLET ORAL at 16:03

## 2020-05-14 RX ADMIN — ONDANSETRON 4 MG: 4 TABLET, ORALLY DISINTEGRATING ORAL at 16:03

## 2020-08-06 ENCOUNTER — APPOINTMENT (OUTPATIENT)
Dept: LAB | Facility: CLINIC | Age: 50
End: 2020-08-06
Payer: OTHER GOVERNMENT

## 2020-08-06 ENCOUNTER — TRANSCRIBE ORDERS (OUTPATIENT)
Dept: LAB | Facility: CLINIC | Age: 50
End: 2020-08-06

## 2020-08-06 DIAGNOSIS — M35.00 SICCA SYNDROME (HCC): ICD-10-CM

## 2020-08-06 DIAGNOSIS — M35.00 SICCA SYNDROME (HCC): Primary | ICD-10-CM

## 2020-08-06 LAB
ALBUMIN SERPL BCP-MCNC: 3.9 G/DL (ref 3.5–5)
ALP SERPL-CCNC: 57 U/L (ref 46–116)
ALT SERPL W P-5'-P-CCNC: 24 U/L (ref 12–78)
ANION GAP SERPL CALCULATED.3IONS-SCNC: 6 MMOL/L (ref 4–13)
AST SERPL W P-5'-P-CCNC: 20 U/L (ref 5–45)
BASOPHILS # BLD AUTO: 0.03 THOUSANDS/ΜL (ref 0–0.1)
BASOPHILS NFR BLD AUTO: 1 % (ref 0–1)
BILIRUB SERPL-MCNC: 0.43 MG/DL (ref 0.2–1)
BILIRUB UR QL STRIP: NEGATIVE
BUN SERPL-MCNC: 11 MG/DL (ref 5–25)
CALCIUM SERPL-MCNC: 9.8 MG/DL (ref 8.3–10.1)
CHLORIDE SERPL-SCNC: 110 MMOL/L (ref 100–108)
CLARITY UR: CLEAR
CO2 SERPL-SCNC: 27 MMOL/L (ref 21–32)
COLOR UR: YELLOW
CREAT SERPL-MCNC: 0.91 MG/DL (ref 0.6–1.3)
CRP SERPL QL: <3 MG/L
EOSINOPHIL # BLD AUTO: 0.11 THOUSAND/ΜL (ref 0–0.61)
EOSINOPHIL NFR BLD AUTO: 4 % (ref 0–6)
ERYTHROCYTE [DISTWIDTH] IN BLOOD BY AUTOMATED COUNT: 13.3 % (ref 11.6–15.1)
ERYTHROCYTE [SEDIMENTATION RATE] IN BLOOD: 7 MM/HOUR (ref 0–29)
GFR SERPL CREATININE-BSD FRML MDRD: 73 ML/MIN/1.73SQ M
GLUCOSE P FAST SERPL-MCNC: 90 MG/DL (ref 65–99)
GLUCOSE UR STRIP-MCNC: NEGATIVE MG/DL
HCT VFR BLD AUTO: 35.5 % (ref 34.8–46.1)
HGB BLD-MCNC: 11.2 G/DL (ref 11.5–15.4)
HGB UR QL STRIP.AUTO: NEGATIVE
IMM GRANULOCYTES # BLD AUTO: 0.01 THOUSAND/UL (ref 0–0.2)
IMM GRANULOCYTES NFR BLD AUTO: 0 % (ref 0–2)
KETONES UR STRIP-MCNC: NEGATIVE MG/DL
LEUKOCYTE ESTERASE UR QL STRIP: NEGATIVE
LYMPHOCYTES # BLD AUTO: 1.33 THOUSANDS/ΜL (ref 0.6–4.47)
LYMPHOCYTES NFR BLD AUTO: 43 % (ref 14–44)
MCH RBC QN AUTO: 27.7 PG (ref 26.8–34.3)
MCHC RBC AUTO-ENTMCNC: 31.5 G/DL (ref 31.4–37.4)
MCV RBC AUTO: 88 FL (ref 82–98)
MONOCYTES # BLD AUTO: 0.32 THOUSAND/ΜL (ref 0.17–1.22)
MONOCYTES NFR BLD AUTO: 11 % (ref 4–12)
NEUTROPHILS # BLD AUTO: 1.23 THOUSANDS/ΜL (ref 1.85–7.62)
NEUTS SEG NFR BLD AUTO: 41 % (ref 43–75)
NITRITE UR QL STRIP: NEGATIVE
NRBC BLD AUTO-RTO: 0 /100 WBCS
PH UR STRIP.AUTO: 6 [PH]
PLATELET # BLD AUTO: 272 THOUSANDS/UL (ref 149–390)
PMV BLD AUTO: 9.1 FL (ref 8.9–12.7)
POTASSIUM SERPL-SCNC: 3.8 MMOL/L (ref 3.5–5.3)
PROT SERPL-MCNC: 7.4 G/DL (ref 6.4–8.2)
PROT UR STRIP-MCNC: NEGATIVE MG/DL
RBC # BLD AUTO: 4.04 MILLION/UL (ref 3.81–5.12)
SODIUM SERPL-SCNC: 143 MMOL/L (ref 136–145)
SP GR UR STRIP.AUTO: 1.01 (ref 1–1.03)
UROBILINOGEN UR QL STRIP.AUTO: 0.2 E.U./DL
WBC # BLD AUTO: 3.03 THOUSAND/UL (ref 4.31–10.16)

## 2020-08-06 PROCEDURE — 84165 PROTEIN E-PHORESIS SERUM: CPT | Performed by: PATHOLOGY

## 2020-08-06 PROCEDURE — 84166 PROTEIN E-PHORESIS/URINE/CSF: CPT | Performed by: PATHOLOGY

## 2020-08-06 PROCEDURE — 85025 COMPLETE CBC W/AUTO DIFF WBC: CPT

## 2020-08-06 PROCEDURE — 81003 URINALYSIS AUTO W/O SCOPE: CPT | Performed by: INTERNAL MEDICINE

## 2020-08-06 PROCEDURE — 86140 C-REACTIVE PROTEIN: CPT

## 2020-08-06 PROCEDURE — 85652 RBC SED RATE AUTOMATED: CPT | Performed by: INTERNAL MEDICINE

## 2020-08-06 PROCEDURE — 84166 PROTEIN E-PHORESIS/URINE/CSF: CPT

## 2020-08-06 PROCEDURE — 84165 PROTEIN E-PHORESIS SERUM: CPT

## 2020-08-06 PROCEDURE — 36415 COLL VENOUS BLD VENIPUNCTURE: CPT

## 2020-08-06 PROCEDURE — 80053 COMPREHEN METABOLIC PANEL: CPT

## 2020-08-08 LAB
ALBUMIN SERPL ELPH-MCNC: 4.37 G/DL (ref 3.5–5)
ALBUMIN SERPL ELPH-MCNC: 61.6 % (ref 52–65)
ALBUMIN UR ELPH-MCNC: 100 %
ALPHA1 GLOB MFR UR ELPH: 0 %
ALPHA1 GLOB SERPL ELPH-MCNC: 0.28 G/DL (ref 0.1–0.4)
ALPHA1 GLOB SERPL ELPH-MCNC: 4 % (ref 2.5–5)
ALPHA2 GLOB MFR UR ELPH: 0 %
ALPHA2 GLOB SERPL ELPH-MCNC: 0.67 G/DL (ref 0.4–1.2)
ALPHA2 GLOB SERPL ELPH-MCNC: 9.5 % (ref 7–13)
B-GLOBULIN MFR UR ELPH: 0 %
BETA GLOB ABNORMAL SERPL ELPH-MCNC: 0.53 G/DL (ref 0.4–0.8)
BETA1 GLOB SERPL ELPH-MCNC: 7.4 % (ref 5–13)
BETA2 GLOB SERPL ELPH-MCNC: 3.9 % (ref 2–8)
BETA2+GAMMA GLOB SERPL ELPH-MCNC: 0.28 G/DL (ref 0.2–0.5)
GAMMA GLOB ABNORMAL SERPL ELPH-MCNC: 0.97 G/DL (ref 0.5–1.6)
GAMMA GLOB MFR UR ELPH: 0 %
GAMMA GLOB SERPL ELPH-MCNC: 13.6 % (ref 12–22)
IGG/ALB SER: 1.6 {RATIO} (ref 1.1–1.8)
PROT PATTERN SERPL ELPH-IMP: NORMAL
PROT PATTERN UR ELPH-IMP: NORMAL
PROT SERPL-MCNC: 7.1 G/DL (ref 6.4–8.2)
PROT UR-MCNC: 6 MG/DL

## 2020-08-31 ENCOUNTER — TELEPHONE (OUTPATIENT)
Dept: NEUROLOGY | Facility: CLINIC | Age: 50
End: 2020-08-31

## 2020-09-04 NOTE — TELEPHONE ENCOUNTER
Pt called back  She is out of town and cannot call her PCP to get the [facility]  Cx'd the 9/8 appt and r/s for next avail at the end of Jan     Put on [facility] to start on 9/21 - this will allow pt to get Auth and have it ready  Very available, so probably get in this week  Have very detailed notes on the [facility] for when she can be seen - after auth received

## 2020-10-09 ENCOUNTER — LAB (OUTPATIENT)
Dept: LAB | Facility: CLINIC | Age: 50
End: 2020-10-09
Payer: OTHER GOVERNMENT

## 2020-10-09 ENCOUNTER — TRANSCRIBE ORDERS (OUTPATIENT)
Dept: ADMINISTRATIVE | Facility: HOSPITAL | Age: 50
End: 2020-10-09

## 2020-10-09 DIAGNOSIS — M35.00 SICCA SYNDROME (HCC): ICD-10-CM

## 2020-10-09 DIAGNOSIS — M35.00 SICCA SYNDROME (HCC): Primary | ICD-10-CM

## 2020-10-09 LAB
ALBUMIN SERPL BCP-MCNC: 3.9 G/DL (ref 3.5–5)
ALP SERPL-CCNC: 65 U/L (ref 46–116)
ALT SERPL W P-5'-P-CCNC: 27 U/L (ref 12–78)
ANION GAP SERPL CALCULATED.3IONS-SCNC: 4 MMOL/L (ref 4–13)
AST SERPL W P-5'-P-CCNC: 20 U/L (ref 5–45)
BACTERIA UR QL AUTO: ABNORMAL /HPF
BASOPHILS # BLD AUTO: 0.04 THOUSANDS/ΜL (ref 0–0.1)
BASOPHILS NFR BLD AUTO: 1 % (ref 0–1)
BILIRUB SERPL-MCNC: 0.41 MG/DL (ref 0.2–1)
BILIRUB UR QL STRIP: NEGATIVE
BUN SERPL-MCNC: 9 MG/DL (ref 5–25)
CALCIUM SERPL-MCNC: 9.3 MG/DL (ref 8.3–10.1)
CHLORIDE SERPL-SCNC: 109 MMOL/L (ref 100–108)
CLARITY UR: CLEAR
CO2 SERPL-SCNC: 28 MMOL/L (ref 21–32)
COLOR UR: YELLOW
CREAT SERPL-MCNC: 0.83 MG/DL (ref 0.6–1.3)
CRP SERPL QL: <3 MG/L
EOSINOPHIL # BLD AUTO: 0.1 THOUSAND/ΜL (ref 0–0.61)
EOSINOPHIL NFR BLD AUTO: 4 % (ref 0–6)
ERYTHROCYTE [DISTWIDTH] IN BLOOD BY AUTOMATED COUNT: 16 % (ref 11.6–15.1)
ERYTHROCYTE [SEDIMENTATION RATE] IN BLOOD: 10 MM/HOUR (ref 0–29)
GFR SERPL CREATININE-BSD FRML MDRD: 82 ML/MIN/1.73SQ M
GLUCOSE P FAST SERPL-MCNC: 85 MG/DL (ref 65–99)
GLUCOSE UR STRIP-MCNC: NEGATIVE MG/DL
HCT VFR BLD AUTO: 37.3 % (ref 34.8–46.1)
HGB BLD-MCNC: 11.3 G/DL (ref 11.5–15.4)
HGB UR QL STRIP.AUTO: NEGATIVE
IMM GRANULOCYTES # BLD AUTO: 0.01 THOUSAND/UL (ref 0–0.2)
IMM GRANULOCYTES NFR BLD AUTO: 0 % (ref 0–2)
KETONES UR STRIP-MCNC: NEGATIVE MG/DL
LEUKOCYTE ESTERASE UR QL STRIP: NEGATIVE
LYMPHOCYTES # BLD AUTO: 0.81 THOUSANDS/ΜL (ref 0.6–4.47)
LYMPHOCYTES NFR BLD AUTO: 29 % (ref 14–44)
MCH RBC QN AUTO: 24.5 PG (ref 26.8–34.3)
MCHC RBC AUTO-ENTMCNC: 30.3 G/DL (ref 31.4–37.4)
MCV RBC AUTO: 81 FL (ref 82–98)
MONOCYTES # BLD AUTO: 0.29 THOUSAND/ΜL (ref 0.17–1.22)
MONOCYTES NFR BLD AUTO: 10 % (ref 4–12)
NEUTROPHILS # BLD AUTO: 1.53 THOUSANDS/ΜL (ref 1.85–7.62)
NEUTS SEG NFR BLD AUTO: 56 % (ref 43–75)
NITRITE UR QL STRIP: NEGATIVE
NON-SQ EPI CELLS URNS QL MICRO: ABNORMAL /HPF
NRBC BLD AUTO-RTO: 0 /100 WBCS
PH UR STRIP.AUTO: 6.5 [PH]
PLATELET # BLD AUTO: 237 THOUSANDS/UL (ref 149–390)
PMV BLD AUTO: 9.2 FL (ref 8.9–12.7)
POTASSIUM SERPL-SCNC: 4.2 MMOL/L (ref 3.5–5.3)
PROT SERPL-MCNC: 7.2 G/DL (ref 6.4–8.2)
PROT UR STRIP-MCNC: NEGATIVE MG/DL
RBC # BLD AUTO: 4.62 MILLION/UL (ref 3.81–5.12)
RBC #/AREA URNS AUTO: ABNORMAL /HPF
SODIUM SERPL-SCNC: 141 MMOL/L (ref 136–145)
SP GR UR STRIP.AUTO: 1 (ref 1–1.03)
UROBILINOGEN UR QL STRIP.AUTO: 0.2 E.U./DL
WBC # BLD AUTO: 2.78 THOUSAND/UL (ref 4.31–10.16)
WBC #/AREA URNS AUTO: ABNORMAL /HPF

## 2020-10-09 PROCEDURE — 84166 PROTEIN E-PHORESIS/URINE/CSF: CPT | Performed by: PATHOLOGY

## 2020-10-09 PROCEDURE — 86618 LYME DISEASE ANTIBODY: CPT

## 2020-10-09 PROCEDURE — 86140 C-REACTIVE PROTEIN: CPT

## 2020-10-09 PROCEDURE — 84166 PROTEIN E-PHORESIS/URINE/CSF: CPT | Performed by: INTERNAL MEDICINE

## 2020-10-09 PROCEDURE — 36415 COLL VENOUS BLD VENIPUNCTURE: CPT

## 2020-10-09 PROCEDURE — 80053 COMPREHEN METABOLIC PANEL: CPT

## 2020-10-09 PROCEDURE — 84165 PROTEIN E-PHORESIS SERUM: CPT | Performed by: PATHOLOGY

## 2020-10-09 PROCEDURE — 85652 RBC SED RATE AUTOMATED: CPT

## 2020-10-09 PROCEDURE — 81001 URINALYSIS AUTO W/SCOPE: CPT | Performed by: INTERNAL MEDICINE

## 2020-10-09 PROCEDURE — 84165 PROTEIN E-PHORESIS SERUM: CPT

## 2020-10-09 PROCEDURE — 85025 COMPLETE CBC W/AUTO DIFF WBC: CPT

## 2020-10-10 LAB — B BURGDOR IGG+IGM SER-ACNC: <0.91 ISR (ref 0–0.9)

## 2020-10-12 LAB
ALBUMIN SERPL ELPH-MCNC: 4.33 G/DL (ref 3.5–5)
ALBUMIN SERPL ELPH-MCNC: 61 % (ref 52–65)
ALBUMIN UR ELPH-MCNC: 100 %
ALPHA1 GLOB MFR UR ELPH: 0 %
ALPHA1 GLOB SERPL ELPH-MCNC: 0.29 G/DL (ref 0.1–0.4)
ALPHA1 GLOB SERPL ELPH-MCNC: 4.1 % (ref 2.5–5)
ALPHA2 GLOB MFR UR ELPH: 0 %
ALPHA2 GLOB SERPL ELPH-MCNC: 0.7 G/DL (ref 0.4–1.2)
ALPHA2 GLOB SERPL ELPH-MCNC: 9.8 % (ref 7–13)
B-GLOBULIN MFR UR ELPH: 0 %
BETA GLOB ABNORMAL SERPL ELPH-MCNC: 0.52 G/DL (ref 0.4–0.8)
BETA1 GLOB SERPL ELPH-MCNC: 7.3 % (ref 5–13)
BETA2 GLOB SERPL ELPH-MCNC: 3.8 % (ref 2–8)
BETA2+GAMMA GLOB SERPL ELPH-MCNC: 0.27 G/DL (ref 0.2–0.5)
GAMMA GLOB ABNORMAL SERPL ELPH-MCNC: 0.99 G/DL (ref 0.5–1.6)
GAMMA GLOB MFR UR ELPH: 0 %
GAMMA GLOB SERPL ELPH-MCNC: 14 % (ref 12–22)
IGG/ALB SER: 1.56 {RATIO} (ref 1.1–1.8)
PROT PATTERN SERPL ELPH-IMP: NORMAL
PROT PATTERN UR ELPH-IMP: NORMAL
PROT SERPL-MCNC: 7.1 G/DL (ref 6.4–8.2)
PROT UR-MCNC: 6 MG/DL

## 2020-10-27 DIAGNOSIS — M48.02 CERVICAL SPINAL STENOSIS: ICD-10-CM

## 2020-10-27 DIAGNOSIS — M54.16 LUMBAR RADICULOPATHY: ICD-10-CM

## 2020-11-02 ENCOUNTER — TELEPHONE (OUTPATIENT)
Dept: PAIN MEDICINE | Facility: CLINIC | Age: 50
End: 2020-11-02

## 2020-11-02 DIAGNOSIS — M48.02 CERVICAL SPINAL STENOSIS: ICD-10-CM

## 2020-11-02 DIAGNOSIS — M54.16 LUMBAR RADICULOPATHY: ICD-10-CM

## 2020-11-02 RX ORDER — GABAPENTIN 300 MG/1
300 CAPSULE ORAL 3 TIMES DAILY
Qty: 270 CAPSULE | Refills: 0 | Status: SHIPPED | OUTPATIENT
Start: 2020-11-02 | End: 2021-02-25 | Stop reason: SDUPTHER

## 2020-11-02 RX ORDER — GABAPENTIN 300 MG/1
300 CAPSULE ORAL 3 TIMES DAILY
Qty: 270 CAPSULE | Refills: 0 | Status: SHIPPED | OUTPATIENT
Start: 2020-11-02 | End: 2020-11-02

## 2020-11-02 RX ORDER — GABAPENTIN 300 MG/1
CAPSULE ORAL
Qty: 270 CAPSULE | Refills: 0 | Status: SHIPPED | OUTPATIENT
Start: 2020-11-02 | End: 2020-11-02

## 2020-11-10 ENCOUNTER — APPOINTMENT (OUTPATIENT)
Dept: RADIOLOGY | Facility: CLINIC | Age: 50
End: 2020-11-10
Payer: OTHER GOVERNMENT

## 2020-11-10 ENCOUNTER — OFFICE VISIT (OUTPATIENT)
Dept: PAIN MEDICINE | Facility: CLINIC | Age: 50
End: 2020-11-10
Payer: OTHER GOVERNMENT

## 2020-11-10 VITALS
HEART RATE: 77 BPM | DIASTOLIC BLOOD PRESSURE: 71 MMHG | SYSTOLIC BLOOD PRESSURE: 107 MMHG | WEIGHT: 170.4 LBS | BODY MASS INDEX: 27.38 KG/M2 | HEIGHT: 66 IN | TEMPERATURE: 98.2 F | RESPIRATION RATE: 18 BRPM

## 2020-11-10 DIAGNOSIS — M53.3 SACROILIAC PAIN: Primary | ICD-10-CM

## 2020-11-10 DIAGNOSIS — M54.50 CHRONIC BILATERAL LOW BACK PAIN, UNSPECIFIED WHETHER SCIATICA PRESENT: ICD-10-CM

## 2020-11-10 DIAGNOSIS — G89.29 CHRONIC BILATERAL LOW BACK PAIN, UNSPECIFIED WHETHER SCIATICA PRESENT: ICD-10-CM

## 2020-11-10 DIAGNOSIS — M70.62 GREATER TROCHANTERIC BURSITIS OF LEFT HIP: ICD-10-CM

## 2020-11-10 PROCEDURE — 99214 OFFICE O/P EST MOD 30 MIN: CPT | Performed by: STUDENT IN AN ORGANIZED HEALTH CARE EDUCATION/TRAINING PROGRAM

## 2020-11-10 PROCEDURE — 72110 X-RAY EXAM L-2 SPINE 4/>VWS: CPT

## 2020-11-23 ENCOUNTER — TELEPHONE (OUTPATIENT)
Dept: RADIOLOGY | Facility: CLINIC | Age: 50
End: 2020-11-23

## 2020-12-16 DIAGNOSIS — G70.00 MG (MYASTHENIA GRAVIS) (HCC): ICD-10-CM

## 2020-12-16 RX ORDER — OMEPRAZOLE 20 MG/1
20 CAPSULE, DELAYED RELEASE ORAL DAILY
Qty: 90 CAPSULE | Refills: 4 | Status: SHIPPED | OUTPATIENT
Start: 2020-12-16 | End: 2022-01-04

## 2021-01-15 ENCOUNTER — TRANSCRIBE ORDERS (OUTPATIENT)
Dept: NEUROLOGY | Facility: CLINIC | Age: 51
End: 2021-01-15

## 2021-01-15 DIAGNOSIS — G70.00 MYASTHENIA GRAVIS WITHOUT (ACUTE) EXACERBATION (HCC): Primary | ICD-10-CM

## 2021-01-21 ENCOUNTER — OFFICE VISIT (OUTPATIENT)
Dept: NEUROLOGY | Facility: CLINIC | Age: 51
End: 2021-01-21
Payer: OTHER GOVERNMENT

## 2021-01-21 VITALS
BODY MASS INDEX: 27.82 KG/M2 | HEART RATE: 77 BPM | HEIGHT: 65 IN | WEIGHT: 167 LBS | SYSTOLIC BLOOD PRESSURE: 134 MMHG | DIASTOLIC BLOOD PRESSURE: 96 MMHG

## 2021-01-21 DIAGNOSIS — G70.00 MYASTHENIA GRAVIS WITHOUT (ACUTE) EXACERBATION (HCC): Primary | ICD-10-CM

## 2021-01-21 DIAGNOSIS — M25.50 GENERALIZED JOINT PAIN: ICD-10-CM

## 2021-01-21 PROCEDURE — 99213 OFFICE O/P EST LOW 20 MIN: CPT | Performed by: PSYCHIATRY & NEUROLOGY

## 2021-01-21 NOTE — PROGRESS NOTES
Progress Note - Neurology   Raffy Stroud 44 y o  female MRN: [id number]  Unit/Bed#:  Encounter: [id number]      Subjective:   Patient is here for a follow-up visit with ocular myasthenia, generally under control with the help of pyridostigmine 60 mg 4 times a day and also has been maintained on prednisone 2 5 mg daily which helps with the myasthenic symptoms as well as her rheumatoid arthritis  Patient describes pain in the left shoulder, left elbow, left forearm, with difficulty with hyperabduction of the left elbow, and also describes bilateral knee pain and low back pain  She was recently evaluated by pain management and has been maintained on gabapentin, Celebrex, and is scheduled for injections in the near future  Patient describes occasional twitching of the left upper eyelid muscles and denies any diplopia at this time  ROS:   Review of Systems   Constitutional: Positive for fatigue  Negative for appetite change and fever  HENT: Negative for drooling, ear pain, tinnitus, trouble swallowing and voice change  Eyes: Negative for photophobia, pain and visual disturbance  Blinking flurrying left eye   Respiratory: Negative for chest tightness and shortness of breath  Cardiovascular: Negative for chest pain, palpitations and leg swelling  Gastrointestinal: Negative for abdominal pain, constipation, diarrhea, nausea and vomiting  Endocrine: Negative for cold intolerance and heat intolerance  Genitourinary: Negative for difficulty urinating, frequency and urgency  Musculoskeletal: Positive for back pain (Low back) and neck pain  Negative for gait problem, joint swelling and myalgias  Pain on left arm     Skin: Negative for rash  Neurological: Positive for weakness (Left arm )  Negative for dizziness, tremors, seizures, syncope, facial asymmetry, speech difficulty, light-headedness, numbness and headaches  Psychiatric/Behavioral: Positive for sleep disturbance   Negative for agitation, behavioral problems, confusion, decreased concentration and dysphoric mood  The patient is not nervous/anxious and is not hyperactive  MA review of systems was reviewed by myself  Vitals:   Vitals:    01/21/21 0916   BP: 134/96   BP Location: Left arm   Patient Position: Sitting   Cuff Size: Adult   Pulse: 77   Weight: 75 8 kg (167 lb)   Height: 5' 5" (1 651 m)   ,Body mass index is 27 79 kg/m²      MEDS:      Current Outpatient Medications:     albuterol (VENTOLIN HFA) 90 mcg/act inhaler, Inhale 2 puffs every 6 (six) hours as needed for wheezing, Disp: 18 g, Rfl: 0    ascorbic acid (VITAMIN C) 250 MG CHEW, Chew 500 mg, Disp: , Rfl:     benzonatate (TESSALON) 200 MG capsule, Take 1 capsule (200 mg total) by mouth 3 (three) times a day as needed for cough, Disp: 20 capsule, Rfl: 0    celecoxib (CeleBREX) 200 mg capsule, Take 200 mg by mouth 2 (two) times a day , Disp: , Rfl:     Cholecalciferol (VITAMIN D3) 2000 units capsule, Take 2,000 Units by mouth daily  , Disp: , Rfl:     Cyanocobalamin (B-12 PO), Take 1,000 mcg by mouth daily  , Disp: , Rfl:     Ferrous Sulfate (IRON) 325 (65 Fe) MG TABS, Take 1 tablet by mouth once a week  , Disp: , Rfl:     folic acid (FOLVITE) 1 mg tablet, Take 1 mg by mouth daily  , Disp: , Rfl:     gabapentin (NEURONTIN) 300 mg capsule, Take 1 capsule (300 mg total) by mouth 3 (three) times a day, Disp: 270 capsule, Rfl: 0    methotrexate (RHEUMATREX) 2 5 MG tablet, Take 2 5 mg by mouth once a week 6 tabs once a week every Saturday, Disp: , Rfl:     omeprazole (PriLOSEC) 20 mg delayed release capsule, Take 1 capsule (20 mg total) by mouth daily, Disp: 90 capsule, Rfl: 4    predniSONE 2 5 mg tablet, TAKE 1 TABLET DAILY, Disp: 90 tablet, Rfl: 4    pyridostigmine (MESTINON) 60 mg tablet, TAKE 1 TABLET FOUR TIMES A DAY, Disp: 360 tablet, Rfl: 4    zolpidem (AMBIEN) 10 mg tablet, Take 10 mg by mouth daily at bedtime  , Disp: , Rfl:   :    Physical Exam:  General appearance: alert, appears stated age and cooperative  Head: Normocephalic, without obvious abnormality, atraumatic    On neurological examination patient is alert awake oriented, speech is fluent, cranial nerves 2-12 intact with no evidence of any ptosis or any extraocular muscle weakness, and on motor and sensory exam there is no evidence of any drift or any focal weakness in the upper or lower extremities, deep tendon reflexes are 1+, no sensory loss was noted in the upper and lower extremities  Patient however has evidence of tenderness along the left trapezius, tenderness at the superior aspect of the left shoulder as well as medial aspect of the left elbow  Lab Results: I have personally reviewed pertinent reports  Imaging Studies: I have personally reviewed pertinent reports  Assessment:  1  Myasthenia gravis, ocular  2  Rheumatoid arthritis with chronic pain syndrome  Plan:  Patient is advised to continue Mestinon as well as prednisone at the present dosages, she has a follow-up appointment coming up with pain management as well as her rheumatologist, and has seen better relief with the help of Voltaren gel which she uses on a p r n  Basis for the painful joints  Patient will discuss further joint pain management with her rheumatologist in the near future but most of her left upper extremity pain is related to her shoulder and elbow joint inflammation  Patient will return back to see me in 6 months       1/21/2021,9:18 AM    Dictation voice to text software has been used in the creation of this document  Please consider this in light of any contextual or grammatical errors

## 2021-02-02 ENCOUNTER — HOSPITAL ENCOUNTER (OUTPATIENT)
Dept: RADIOLOGY | Facility: CLINIC | Age: 51
Discharge: HOME | End: 2021-02-02
Attending: STUDENT IN AN ORGANIZED HEALTH CARE EDUCATION/TRAINING PROGRAM

## 2021-02-04 ENCOUNTER — LAB (OUTPATIENT)
Dept: LAB | Facility: CLINIC | Age: 51
End: 2021-02-04
Payer: OTHER GOVERNMENT

## 2021-02-04 ENCOUNTER — TRANSCRIBE ORDERS (OUTPATIENT)
Dept: ADMINISTRATIVE | Facility: HOSPITAL | Age: 51
End: 2021-02-04

## 2021-02-04 DIAGNOSIS — M35.01 KERATOCONJUNCTIVITIS SICCA (HCC): ICD-10-CM

## 2021-02-04 DIAGNOSIS — M35.01 KERATOCONJUNCTIVITIS SICCA (HCC): Primary | ICD-10-CM

## 2021-02-04 LAB
ALBUMIN SERPL BCP-MCNC: 4 G/DL (ref 3.5–5)
ALP SERPL-CCNC: 81 U/L (ref 46–116)
ALT SERPL W P-5'-P-CCNC: 45 U/L (ref 12–78)
ANION GAP SERPL CALCULATED.3IONS-SCNC: 5 MMOL/L (ref 4–13)
AST SERPL W P-5'-P-CCNC: 30 U/L (ref 5–45)
BASOPHILS # BLD AUTO: 0.04 THOUSANDS/ΜL (ref 0–0.1)
BASOPHILS NFR BLD AUTO: 1 % (ref 0–1)
BILIRUB SERPL-MCNC: 0.55 MG/DL (ref 0.2–1)
BUN SERPL-MCNC: 15 MG/DL (ref 5–25)
CALCIUM SERPL-MCNC: 9.8 MG/DL (ref 8.3–10.1)
CHLORIDE SERPL-SCNC: 109 MMOL/L (ref 100–108)
CO2 SERPL-SCNC: 28 MMOL/L (ref 21–32)
CREAT SERPL-MCNC: 0.84 MG/DL (ref 0.6–1.3)
CRP SERPL QL: <3 MG/L
EOSINOPHIL # BLD AUTO: 0.14 THOUSAND/ΜL (ref 0–0.61)
EOSINOPHIL NFR BLD AUTO: 4 % (ref 0–6)
ERYTHROCYTE [DISTWIDTH] IN BLOOD BY AUTOMATED COUNT: 16.3 % (ref 11.6–15.1)
ERYTHROCYTE [SEDIMENTATION RATE] IN BLOOD: 14 MM/HOUR (ref 0–29)
GFR SERPL CREATININE-BSD FRML MDRD: 80 ML/MIN/1.73SQ M
GLUCOSE P FAST SERPL-MCNC: 87 MG/DL (ref 65–99)
HCT VFR BLD AUTO: 41.1 % (ref 34.8–46.1)
HGB BLD-MCNC: 12.9 G/DL (ref 11.5–15.4)
IMM GRANULOCYTES # BLD AUTO: 0.01 THOUSAND/UL (ref 0–0.2)
IMM GRANULOCYTES NFR BLD AUTO: 0 % (ref 0–2)
LYMPHOCYTES # BLD AUTO: 1.08 THOUSANDS/ΜL (ref 0.6–4.47)
LYMPHOCYTES NFR BLD AUTO: 34 % (ref 14–44)
MCH RBC QN AUTO: 27 PG (ref 26.8–34.3)
MCHC RBC AUTO-ENTMCNC: 31.4 G/DL (ref 31.4–37.4)
MCV RBC AUTO: 86 FL (ref 82–98)
MONOCYTES # BLD AUTO: 0.35 THOUSAND/ΜL (ref 0.17–1.22)
MONOCYTES NFR BLD AUTO: 11 % (ref 4–12)
NEUTROPHILS # BLD AUTO: 1.6 THOUSANDS/ΜL (ref 1.85–7.62)
NEUTS SEG NFR BLD AUTO: 50 % (ref 43–75)
NRBC BLD AUTO-RTO: 0 /100 WBCS
PLATELET # BLD AUTO: 224 THOUSANDS/UL (ref 149–390)
PMV BLD AUTO: 8.7 FL (ref 8.9–12.7)
POTASSIUM SERPL-SCNC: 4.5 MMOL/L (ref 3.5–5.3)
PROT SERPL-MCNC: 7.5 G/DL (ref 6.4–8.2)
RBC # BLD AUTO: 4.78 MILLION/UL (ref 3.81–5.12)
SODIUM SERPL-SCNC: 142 MMOL/L (ref 136–145)
WBC # BLD AUTO: 3.22 THOUSAND/UL (ref 4.31–10.16)

## 2021-02-04 PROCEDURE — 82232 ASSAY OF BETA-2 PROTEIN: CPT

## 2021-02-04 PROCEDURE — 36415 COLL VENOUS BLD VENIPUNCTURE: CPT

## 2021-02-04 PROCEDURE — 85652 RBC SED RATE AUTOMATED: CPT

## 2021-02-04 PROCEDURE — 80053 COMPREHEN METABOLIC PANEL: CPT

## 2021-02-04 PROCEDURE — 85025 COMPLETE CBC W/AUTO DIFF WBC: CPT

## 2021-02-04 PROCEDURE — 86140 C-REACTIVE PROTEIN: CPT

## 2021-02-04 PROCEDURE — 84165 PROTEIN E-PHORESIS SERUM: CPT

## 2021-02-04 PROCEDURE — 84165 PROTEIN E-PHORESIS SERUM: CPT | Performed by: PATHOLOGY

## 2021-02-06 LAB
ALBUMIN SERPL ELPH-MCNC: 4.36 G/DL (ref 3.5–5)
ALBUMIN SERPL ELPH-MCNC: 60.5 % (ref 52–65)
ALPHA1 GLOB SERPL ELPH-MCNC: 0.3 G/DL (ref 0.1–0.4)
ALPHA1 GLOB SERPL ELPH-MCNC: 4.2 % (ref 2.5–5)
ALPHA2 GLOB SERPL ELPH-MCNC: 0.74 G/DL (ref 0.4–1.2)
ALPHA2 GLOB SERPL ELPH-MCNC: 10.3 % (ref 7–13)
B2 MICROGLOB SERPL-MCNC: 1.9 MG/L (ref 0.6–2.4)
BETA GLOB ABNORMAL SERPL ELPH-MCNC: 0.49 G/DL (ref 0.4–0.8)
BETA1 GLOB SERPL ELPH-MCNC: 6.8 % (ref 5–13)
BETA2 GLOB SERPL ELPH-MCNC: 4.2 % (ref 2–8)
BETA2+GAMMA GLOB SERPL ELPH-MCNC: 0.3 G/DL (ref 0.2–0.5)
GAMMA GLOB ABNORMAL SERPL ELPH-MCNC: 1.01 G/DL (ref 0.5–1.6)
GAMMA GLOB SERPL ELPH-MCNC: 14 % (ref 12–22)
IGG/ALB SER: 1.53 {RATIO} (ref 1.1–1.8)
PROT PATTERN SERPL ELPH-IMP: NORMAL
PROT SERPL-MCNC: 7.2 G/DL (ref 6.4–8.2)

## 2021-02-25 ENCOUNTER — HOSPITAL ENCOUNTER (OUTPATIENT)
Dept: RADIOLOGY | Facility: CLINIC | Age: 51
Discharge: HOME | End: 2021-02-25
Attending: STUDENT IN AN ORGANIZED HEALTH CARE EDUCATION/TRAINING PROGRAM | Admitting: STUDENT IN AN ORGANIZED HEALTH CARE EDUCATION/TRAINING PROGRAM
Payer: OTHER GOVERNMENT

## 2021-02-25 ENCOUNTER — TELEPHONE (OUTPATIENT)
Dept: RADIOLOGY | Facility: CLINIC | Age: 51
End: 2021-02-25

## 2021-02-25 VITALS
SYSTOLIC BLOOD PRESSURE: 122 MMHG | DIASTOLIC BLOOD PRESSURE: 70 MMHG | TEMPERATURE: 98.6 F | OXYGEN SATURATION: 99 % | HEART RATE: 72 BPM | RESPIRATION RATE: 20 BRPM

## 2021-02-25 DIAGNOSIS — M53.3 SACROILIAC PAIN: ICD-10-CM

## 2021-02-25 DIAGNOSIS — M48.02 CERVICAL SPINAL STENOSIS: ICD-10-CM

## 2021-02-25 DIAGNOSIS — M54.16 LUMBAR RADICULOPATHY: ICD-10-CM

## 2021-02-25 PROCEDURE — NC001 PR NO CHARGE: Performed by: STUDENT IN AN ORGANIZED HEALTH CARE EDUCATION/TRAINING PROGRAM

## 2021-02-25 PROCEDURE — 27096 INJECT SACROILIAC JOINT: CPT | Performed by: STUDENT IN AN ORGANIZED HEALTH CARE EDUCATION/TRAINING PROGRAM

## 2021-02-25 RX ORDER — BUPIVACAINE HCL/PF 2.5 MG/ML
3 VIAL (ML) INJECTION ONCE
Status: COMPLETED | OUTPATIENT
Start: 2021-02-25 | End: 2021-02-25

## 2021-02-25 RX ORDER — GABAPENTIN 300 MG/1
300 CAPSULE ORAL 3 TIMES DAILY
Qty: 270 CAPSULE | Refills: 0 | Status: SHIPPED | OUTPATIENT
Start: 2021-02-25 | End: 2021-06-08 | Stop reason: SDUPTHER

## 2021-02-25 RX ORDER — METHYLPREDNISOLONE ACETATE 80 MG/ML
80 INJECTION, SUSPENSION INTRA-ARTICULAR; INTRALESIONAL; INTRAMUSCULAR; PARENTERAL; SOFT TISSUE ONCE
Status: COMPLETED | OUTPATIENT
Start: 2021-02-25 | End: 2021-02-25

## 2021-02-25 RX ORDER — LIDOCAINE HYDROCHLORIDE 10 MG/ML
5 INJECTION, SOLUTION EPIDURAL; INFILTRATION; INTRACAUDAL; PERINEURAL ONCE
Status: COMPLETED | OUTPATIENT
Start: 2021-02-25 | End: 2021-02-25

## 2021-02-25 RX ADMIN — METHYLPREDNISOLONE ACETATE 80 MG: 80 INJECTION, SUSPENSION INTRA-ARTICULAR; INTRALESIONAL; INTRAMUSCULAR; PARENTERAL; SOFT TISSUE at 15:14

## 2021-02-25 RX ADMIN — LIDOCAINE HYDROCHLORIDE 4 ML: 10 INJECTION, SOLUTION EPIDURAL; INFILTRATION; INTRACAUDAL; PERINEURAL at 15:09

## 2021-02-25 RX ADMIN — Medication 3 ML: at 15:14

## 2021-02-25 RX ADMIN — IOHEXOL 2 ML: 300 INJECTION, SOLUTION INTRAVENOUS at 15:11

## 2021-02-25 NOTE — DISCHARGE INSTR - LAB
Steroid Joint Injection   WHAT YOU NEED TO KNOW:   A steroid joint injection is a procedure to inject steroid medicine into a joint  Steroid medicine decreases pain and inflammation  The injection may also contain an anesthetic (numbing medicine) to decrease pain  It may be done to treat conditions such as arthritis, gout, or carpal tunnel syndrome  The injections may be given in your knee, ankle, shoulder, elbow, wrist, ankle or sacroiliac joint  1  Do not apply heat to any area that is numb  If you have discomfort or soreness at the injection site, you may apply ice today, 20 minutes on and 20 minutes off  Tomorrow you may use ice or warm, moist heat  Do not apply ice or heat directly to the skin  2  You may have an increase or change in the discomfort for 36-48 hours after your treatment  Apply ice and continue with any pain medicine you have been prescribed  3  Do not do anything strenuous today  You may shower, but no tub baths or hot tubs today  You may resume your normal activities tomorrow, but do not overdo it  Resume normal activities slowly when you are feeling better  4  If you experience redness, drainage or swelling at the injection site, or if you develop a fever above 100 degrees, please call [facility] at [phone] or go to the Emergency Room  5  Continue to take all routine medicines prescribed by your primary care physician unless otherwise instructed by our staff  Most blood thinners should be started again according to your regularly scheduled dosing  If you have any questions, please give our office a call  If you have a problem specifically related to your procedure, please call our office at [phone]  Problems not related to your procedure should be directed to your primary care physician

## 2021-02-25 NOTE — H&P
History of Present Illness:  The patient is a 44 y o  female who presents with complaints of bilateral low back pain    Patient Active Problem List   Diagnosis    Asymptomatic stenosis of right carotid artery    Family history of premature CAD    MG (myasthenia gravis) ([address] )    Chronic pain syndrome    Greater trochanteric bursitis of both hips    Lumbar radiculopathy    Generalized joint pain    Cervical spinal stenosis       Past Medical History:   Diagnosis Date    Carotid stenosis, asymptomatic     Chemical exposure     [address]    Dizziness     IBS (irritable bowel syndrome)     Myasthenia gravis (HCC)     Myasthenia gravis (HCC)     Numbness     Polyneuropathy     Raynaud disease     Sjoegren syndrome     Syncope        Past Surgical History:   Procedure Laterality Date    CHOLECYSTECTOMY      THYMECTOMY           Current Outpatient Medications:     albuterol (VENTOLIN HFA) 90 mcg/act inhaler, Inhale 2 puffs every 6 (six) hours as needed for wheezing, Disp: 18 g, Rfl: 0    ascorbic acid (VITAMIN C) 250 MG CHEW, Chew 500 mg, Disp: , Rfl:     benzonatate (TESSALON) 200 MG capsule, Take 1 capsule (200 mg total) by mouth 3 (three) times a day as needed for cough, Disp: 20 capsule, Rfl: 0    celecoxib (CeleBREX) 200 mg capsule, Take 200 mg by mouth 2 (two) times a day , Disp: , Rfl:     Cholecalciferol (VITAMIN D3) 2000 units capsule, Take 2,000 Units by mouth daily  , Disp: , Rfl:     Cyanocobalamin (B-12 PO), Take 1,000 mcg by mouth daily  , Disp: , Rfl:     Ferrous Sulfate (IRON) 325 (65 Fe) MG TABS, Take 1 tablet by mouth once a week  , Disp: , Rfl:     folic acid (FOLVITE) 1 mg tablet, Take 1 mg by mouth daily  , Disp: , Rfl:     gabapentin (NEURONTIN) 300 mg capsule, Take 1 capsule (300 mg total) by mouth 3 (three) times a day, Disp: 270 capsule, Rfl: 0    methotrexate (RHEUMATREX) 2 5 MG tablet, Take 2 5 mg by mouth once a week 6 tabs once a week every Saturday, Disp: , Rfl:     omeprazole (PriLOSEC) 20 mg delayed release capsule, Take 1 capsule (20 mg total) by mouth daily, Disp: 90 capsule, Rfl: 4    predniSONE 2 5 mg tablet, TAKE 1 TABLET DAILY, Disp: 90 tablet, Rfl: 4    pyridostigmine (MESTINON) 60 mg tablet, TAKE 1 TABLET FOUR TIMES A DAY, Disp: 360 tablet, Rfl: 4    zolpidem (AMBIEN) 10 mg tablet, Take 10 mg by mouth daily at bedtime  , Disp: , Rfl:     No Known Allergies    Physical Exam:   Vitals:    02/25/21 1459   BP: 124/70   Pulse:    Resp:    Temp:    SpO2:      General: Awake, Alert, Oriented x 3, Mood and affect appropriate  Respiratory: Respirations even and unlabored  Cardiovascular: Peripheral pulses intact; no edema  Musculoskeletal Exam: low back pain    ASA Score: 3    Patient/Chart Verification  Patient ID Verified: Verbal  ID Band Applied: No  Consents Confirmed: To be obtained in the Pre-Procedure area  H&P( within 30 days) Verified: To be obtained in the Pre-Procedure area  Interval H&P(within 24 hr) Complete (required for Outpatients and Surgery Admit only): To be obtained in the Pre-Procedure area  Allergies Reviewed: Yes  Anticoag/NSAID held?: NA  Currently on antibiotics?: No  Pregnancy denied?: NA    Assessment:   1   Sacroiliac pain - Bilateral        Plan: bilateral SI injection

## 2021-02-25 NOTE — TELEPHONE ENCOUNTER
Pt here for procedure and would like 90 day refill of Gabapentin   Taking 3 times per day without issues  Last ordered in November

## 2021-03-04 ENCOUNTER — TELEPHONE (OUTPATIENT)
Dept: PAIN MEDICINE | Facility: CLINIC | Age: 51
End: 2021-03-04

## 2021-03-10 DIAGNOSIS — Z23 ENCOUNTER FOR IMMUNIZATION: ICD-10-CM

## 2021-03-16 ENCOUNTER — IMMUNIZATIONS (OUTPATIENT)
Dept: FAMILY MEDICINE CLINIC | Facility: HOSPITAL | Age: 51
End: 2021-03-16

## 2021-03-16 DIAGNOSIS — Z23 ENCOUNTER FOR IMMUNIZATION: Primary | ICD-10-CM

## 2021-03-16 PROCEDURE — 91300 SARS-COV-2 / COVID-19 MRNA VACCINE (PFIZER-BIONTECH) 30 MCG: CPT

## 2021-03-16 PROCEDURE — 0001A SARS-COV-2 / COVID-19 MRNA VACCINE (PFIZER-BIONTECH) 30 MCG: CPT

## 2021-04-08 ENCOUNTER — IMMUNIZATIONS (OUTPATIENT)
Dept: FAMILY MEDICINE CLINIC | Facility: HOSPITAL | Age: 51
End: 2021-04-08

## 2021-04-08 DIAGNOSIS — Z23 ENCOUNTER FOR IMMUNIZATION: Primary | ICD-10-CM

## 2021-04-08 PROCEDURE — 0002A SARS-COV-2 / COVID-19 MRNA VACCINE (PFIZER-BIONTECH) 30 MCG: CPT

## 2021-04-08 PROCEDURE — 91300 SARS-COV-2 / COVID-19 MRNA VACCINE (PFIZER-BIONTECH) 30 MCG: CPT

## 2021-05-06 ENCOUNTER — APPOINTMENT (OUTPATIENT)
Dept: LAB | Facility: CLINIC | Age: 51
End: 2021-05-06
Payer: OTHER GOVERNMENT

## 2021-05-06 ENCOUNTER — TRANSCRIBE ORDERS (OUTPATIENT)
Dept: ADMINISTRATIVE | Facility: HOSPITAL | Age: 51
End: 2021-05-06

## 2021-05-06 DIAGNOSIS — M35.01 SJOGREN'S SYNDROME WITH KERATOCONJUNCTIVITIS SICCA (HCC): Primary | ICD-10-CM

## 2021-05-06 DIAGNOSIS — M35.01 SJOGREN'S SYNDROME WITH KERATOCONJUNCTIVITIS SICCA (HCC): ICD-10-CM

## 2021-05-06 LAB
ALBUMIN SERPL BCP-MCNC: 4 G/DL (ref 3.5–5)
ALP SERPL-CCNC: 56 U/L (ref 46–116)
ALT SERPL W P-5'-P-CCNC: 21 U/L (ref 12–78)
ANION GAP SERPL CALCULATED.3IONS-SCNC: 4 MMOL/L (ref 4–13)
AST SERPL W P-5'-P-CCNC: 17 U/L (ref 5–45)
BASOPHILS # BLD AUTO: 0.03 THOUSANDS/ΜL (ref 0–0.1)
BASOPHILS NFR BLD AUTO: 1 % (ref 0–1)
BILIRUB SERPL-MCNC: 0.8 MG/DL (ref 0.2–1)
BILIRUB UR QL STRIP: NEGATIVE
BUN SERPL-MCNC: 8 MG/DL (ref 5–25)
CALCIUM SERPL-MCNC: 9.5 MG/DL (ref 8.3–10.1)
CHLORIDE SERPL-SCNC: 107 MMOL/L (ref 100–108)
CLARITY UR: NORMAL
CO2 SERPL-SCNC: 29 MMOL/L (ref 21–32)
COLOR UR: YELLOW
CREAT SERPL-MCNC: 0.86 MG/DL (ref 0.6–1.3)
CRP SERPL QL: <3 MG/L
EOSINOPHIL # BLD AUTO: 0.07 THOUSAND/ΜL (ref 0–0.61)
EOSINOPHIL NFR BLD AUTO: 3 % (ref 0–6)
ERYTHROCYTE [DISTWIDTH] IN BLOOD BY AUTOMATED COUNT: 14 % (ref 11.6–15.1)
ERYTHROCYTE [SEDIMENTATION RATE] IN BLOOD: 6 MM/HOUR (ref 0–29)
GFR SERPL CREATININE-BSD FRML MDRD: 78 ML/MIN/1.73SQ M
GLUCOSE P FAST SERPL-MCNC: 104 MG/DL (ref 65–99)
GLUCOSE UR STRIP-MCNC: NEGATIVE MG/DL
HCT VFR BLD AUTO: 42.8 % (ref 34.8–46.1)
HGB BLD-MCNC: 13.9 G/DL (ref 11.5–15.4)
HGB UR QL STRIP.AUTO: NEGATIVE
IMM GRANULOCYTES # BLD AUTO: 0.01 THOUSAND/UL (ref 0–0.2)
IMM GRANULOCYTES NFR BLD AUTO: 0 % (ref 0–2)
KETONES UR STRIP-MCNC: NEGATIVE MG/DL
LEUKOCYTE ESTERASE UR QL STRIP: NEGATIVE
LYMPHOCYTES # BLD AUTO: 0.92 THOUSANDS/ΜL (ref 0.6–4.47)
LYMPHOCYTES NFR BLD AUTO: 32 % (ref 14–44)
MCH RBC QN AUTO: 28.9 PG (ref 26.8–34.3)
MCHC RBC AUTO-ENTMCNC: 32.5 G/DL (ref 31.4–37.4)
MCV RBC AUTO: 89 FL (ref 82–98)
MONOCYTES # BLD AUTO: 0.32 THOUSAND/ΜL (ref 0.17–1.22)
MONOCYTES NFR BLD AUTO: 11 % (ref 4–12)
NEUTROPHILS # BLD AUTO: 1.49 THOUSANDS/ΜL (ref 1.85–7.62)
NEUTS SEG NFR BLD AUTO: 53 % (ref 43–75)
NITRITE UR QL STRIP: NEGATIVE
NRBC BLD AUTO-RTO: 0 /100 WBCS
PH UR STRIP.AUTO: 6.5 [PH]
PLATELET # BLD AUTO: 215 THOUSANDS/UL (ref 149–390)
PMV BLD AUTO: 9 FL (ref 8.9–12.7)
POTASSIUM SERPL-SCNC: 4 MMOL/L (ref 3.5–5.3)
PROT SERPL-MCNC: 7.3 G/DL (ref 6.4–8.2)
PROT UR STRIP-MCNC: NEGATIVE MG/DL
RBC # BLD AUTO: 4.81 MILLION/UL (ref 3.81–5.12)
SODIUM SERPL-SCNC: 140 MMOL/L (ref 136–145)
SP GR UR STRIP.AUTO: 1.02 (ref 1–1.03)
UROBILINOGEN UR QL STRIP.AUTO: 1 E.U./DL
WBC # BLD AUTO: 2.84 THOUSAND/UL (ref 4.31–10.16)

## 2021-05-06 PROCEDURE — 85652 RBC SED RATE AUTOMATED: CPT

## 2021-05-06 PROCEDURE — 81003 URINALYSIS AUTO W/O SCOPE: CPT | Performed by: INTERNAL MEDICINE

## 2021-05-06 PROCEDURE — 85025 COMPLETE CBC W/AUTO DIFF WBC: CPT

## 2021-05-06 PROCEDURE — 86140 C-REACTIVE PROTEIN: CPT

## 2021-05-06 PROCEDURE — 36415 COLL VENOUS BLD VENIPUNCTURE: CPT

## 2021-05-06 PROCEDURE — 80053 COMPREHEN METABOLIC PANEL: CPT

## 2021-06-03 DIAGNOSIS — G70.00 MG (MYASTHENIA GRAVIS) (HCC): ICD-10-CM

## 2021-06-03 RX ORDER — PYRIDOSTIGMINE BROMIDE 60 MG/1
TABLET ORAL
Qty: 360 TABLET | Refills: 3 | Status: SHIPPED | OUTPATIENT
Start: 2021-06-03 | End: 2021-09-14

## 2021-06-08 DIAGNOSIS — G70.00 MG (MYASTHENIA GRAVIS) (HCC): ICD-10-CM

## 2021-06-08 DIAGNOSIS — M54.16 LUMBAR RADICULOPATHY: ICD-10-CM

## 2021-06-08 DIAGNOSIS — M48.02 CERVICAL SPINAL STENOSIS: ICD-10-CM

## 2021-06-08 RX ORDER — GABAPENTIN 300 MG/1
300 CAPSULE ORAL 3 TIMES DAILY
Qty: 270 CAPSULE | Refills: 1 | Status: SHIPPED | OUTPATIENT
Start: 2021-06-08 | End: 2022-06-07

## 2021-06-08 RX ORDER — PREDNISONE 2.5 MG
TABLET ORAL
Qty: 90 TABLET | Refills: 3 | Status: SHIPPED | OUTPATIENT
Start: 2021-06-08 | End: 2022-05-18

## 2021-08-06 ENCOUNTER — OFFICE VISIT (OUTPATIENT)
Dept: NEUROLOGY | Facility: CLINIC | Age: 51
End: 2021-08-06
Payer: OTHER GOVERNMENT

## 2021-08-06 VITALS
WEIGHT: 153.2 LBS | HEART RATE: 68 BPM | DIASTOLIC BLOOD PRESSURE: 70 MMHG | BODY MASS INDEX: 25.49 KG/M2 | SYSTOLIC BLOOD PRESSURE: 130 MMHG

## 2021-08-06 DIAGNOSIS — G62.9 POLYNEUROPATHY: ICD-10-CM

## 2021-08-06 DIAGNOSIS — G70.00 MG (MYASTHENIA GRAVIS) (HCC): Primary | ICD-10-CM

## 2021-08-06 PROCEDURE — 99213 OFFICE O/P EST LOW 20 MIN: CPT | Performed by: PSYCHIATRY & NEUROLOGY

## 2021-08-06 NOTE — PROGRESS NOTES
Progress Note - Neurology   Rosa Sandoval 44 y o  female MRN: [id number]  Unit/Bed#:  Encounter: [id number]      Subjective:     Patient is here for a follow-up visit for myasthenia gravis, polyneuropathy secondary to Sjogren syndrome, and also suffers from rheumatoid arthritis for which she is followed up with Rheumatology on a regular basis  Patient complains of diffuse aches and pains affecting the muscles and the joints in spite of taking Celebrex, ibuprofen, gabapentin, prednisone currently on 2 5 mg daily and is on Mestinon 60 mg 4 times a day  Her most recent labs are unremarkable except for a low white count secondary to the use of methotrexate and patient denies any central neurological symptoms such as headaches dizziness blurry vision and has persistent sensory symptoms in both legs  ROS:   Review of Systems   Constitutional: Negative  Negative for appetite change and fever  HENT: Negative  Negative for hearing loss, tinnitus, trouble swallowing and voice change  Eyes: Negative  Negative for photophobia and pain  Respiratory: Negative  Negative for shortness of breath  Cardiovascular: Negative  Negative for palpitations  Gastrointestinal: Negative  Negative for nausea and vomiting  Endocrine: Negative  Negative for cold intolerance  Genitourinary: Negative  Negative for dysuria, frequency and urgency  Musculoskeletal: Negative  Negative for myalgias and neck pain         "all over body pain"    Skin: Negative  Negative for rash  Neurological: Negative  Negative for dizziness, tremors, seizures, syncope, facial asymmetry, speech difficulty, weakness, light-headedness, numbness and headaches  Hematological: Negative  Does not bruise/bleed easily  Psychiatric/Behavioral: Negative  Negative for confusion, hallucinations and sleep disturbance  All other systems reviewed and are negative  MA review of systems was reviewed by myself      Vitals:   Vitals:    08/06/21 1417   BP: 130/70   BP Location: Left arm   Patient Position: Sitting   Cuff Size: Adult   Pulse: 68   Weight: 69 5 kg (153 lb 3 2 oz)   ,Body mass index is 25 49 kg/m²      MEDS:      Current Outpatient Medications:     albuterol (VENTOLIN HFA) 90 mcg/act inhaler, Inhale 2 puffs every 6 (six) hours as needed for wheezing, Disp: 18 g, Rfl: 0    ascorbic acid (VITAMIN C) 250 MG CHEW, Chew 500 mg, Disp: , Rfl:     benzonatate (TESSALON) 200 MG capsule, Take 1 capsule (200 mg total) by mouth 3 (three) times a day as needed for cough, Disp: 20 capsule, Rfl: 0    celecoxib (CeleBREX) 200 mg capsule, Take 200 mg by mouth 2 (two) times a day , Disp: , Rfl:     Cholecalciferol (VITAMIN D3) 2000 units capsule, Take 2,000 Units by mouth daily  , Disp: , Rfl:     Cyanocobalamin (B-12 PO), Take 1,000 mcg by mouth daily  , Disp: , Rfl:     Ferrous Sulfate (IRON) 325 (65 Fe) MG TABS, Take 1 tablet by mouth once a week  , Disp: , Rfl:     folic acid (FOLVITE) 1 mg tablet, Take 1 mg by mouth daily  , Disp: , Rfl:     gabapentin (NEURONTIN) 300 mg capsule, Take 1 capsule (300 mg total) by mouth 3 (three) times a day, Disp: 270 capsule, Rfl: 1    methotrexate (RHEUMATREX) 2 5 MG tablet, Take 2 5 mg by mouth once a week 6 tabs once a week every Saturday, Disp: , Rfl:     omeprazole (PriLOSEC) 20 mg delayed release capsule, Take 1 capsule (20 mg total) by mouth daily, Disp: 90 capsule, Rfl: 4    predniSONE 2 5 mg tablet, TAKE 1 TABLET DAILY, Disp: 90 tablet, Rfl: 3    pyridostigmine (MESTINON) 60 mg tablet, TAKE 1 TABLET FOUR TIMES A DAY, Disp: 360 tablet, Rfl: 3    zolpidem (AMBIEN) 10 mg tablet, Take 10 mg by mouth daily at bedtime  , Disp: , Rfl:   :    Physical Exam:  General appearance: alert, appears stated age and cooperative  Head: Normocephalic, without obvious abnormality, atraumatic      On neurological examination patient is alert awake oriented, speech is fluent, cranial nerves 2-12 intact, with no evidence of any diplopia  Or ptosis  On motor and exam patient  Has use tenderness in the proximal  Muscles bilaterally, no evidence of any focal weakness was detected, deep tendon reflexes are 1+, and has sensory loss in both feet to pinprick and light touch  Lab Results: I have personally reviewed pertinent reports  Imaging Studies: I have personally reviewed pertinent reports  Assessment:  1  Myasthenia gravis  2  Polyneuropathy secondary to Sjogren syndrome  Plan:    Patient is advised to reduce the dose of Mestinon to 60 mg 3 times a day at this time may continue with prednisone, and also advised to discuss with her rheumatologist sees no relief with the help of ibuprofen and Celebrex and may benefit from increasing the dose of prednisone  Home exercise program is encouraged, patient will return back to see me in 6 months  8/6/2021,2:19 PM    Dictation voice to text software has been used in the creation of this document  Please consider this in light of any contextual or grammatical errors

## 2021-09-11 PROBLEM — G47.00 PERSISTENT INSOMNIA: Status: ACTIVE | Noted: 2021-09-11

## 2021-09-11 PROBLEM — M51.369 DDD (DEGENERATIVE DISC DISEASE), LUMBAR: Status: ACTIVE | Noted: 2021-09-11

## 2021-09-11 PROBLEM — I73.00 RAYNAUD'S DISEASE: Status: ACTIVE | Noted: 2021-09-11

## 2021-09-11 PROBLEM — M46.1 BILATERAL SACROILIITIS (HCC): Status: ACTIVE | Noted: 2021-09-11

## 2021-09-11 PROBLEM — M43.8X9 ACQUIRED COMPRESSION DEFORMITY OF VERTEBRA: Status: ACTIVE | Noted: 2021-09-11

## 2021-09-11 PROBLEM — Z80.3 FAMILY HISTORY OF BREAST CANCER IN SISTER: Status: ACTIVE | Noted: 2021-09-11

## 2021-09-11 PROBLEM — K21.9 GASTROESOPHAGEAL REFLUX DISEASE: Status: ACTIVE | Noted: 2021-09-11

## 2021-09-11 PROBLEM — M51.36 DDD (DEGENERATIVE DISC DISEASE), LUMBAR: Status: ACTIVE | Noted: 2021-09-11

## 2021-09-11 PROBLEM — N95.1 MENOPAUSAL SYMPTOM: Status: ACTIVE | Noted: 2021-09-11

## 2021-09-11 PROBLEM — M35.01 SJOGREN'S SYNDROME WITH KERATOCONJUNCTIVITIS SICCA (HCC): Status: ACTIVE | Noted: 2021-09-11

## 2021-09-11 PROBLEM — K58.0 IRRITABLE BOWEL SYNDROME WITH DIARRHEA: Status: ACTIVE | Noted: 2017-04-24

## 2021-09-14 ENCOUNTER — OFFICE VISIT (OUTPATIENT)
Dept: FAMILY MEDICINE CLINIC | Facility: CLINIC | Age: 51
End: 2021-09-14
Payer: OTHER GOVERNMENT

## 2021-09-14 VITALS
OXYGEN SATURATION: 98 % | BODY MASS INDEX: 25.43 KG/M2 | HEIGHT: 65 IN | HEART RATE: 95 BPM | WEIGHT: 152.6 LBS | RESPIRATION RATE: 12 BRPM | SYSTOLIC BLOOD PRESSURE: 100 MMHG | DIASTOLIC BLOOD PRESSURE: 72 MMHG | TEMPERATURE: 98.2 F

## 2021-09-14 DIAGNOSIS — Z82.49 FAMILY HISTORY OF PREMATURE CAD: ICD-10-CM

## 2021-09-14 DIAGNOSIS — Z00.00 WELL ADULT EXAM: Primary | ICD-10-CM

## 2021-09-14 DIAGNOSIS — M43.8X9 ACQUIRED COMPRESSION DEFORMITY OF VERTEBRA: ICD-10-CM

## 2021-09-14 DIAGNOSIS — G47.00 PERSISTENT INSOMNIA: ICD-10-CM

## 2021-09-14 DIAGNOSIS — G70.00 MG (MYASTHENIA GRAVIS) (HCC): ICD-10-CM

## 2021-09-14 DIAGNOSIS — M51.36 DDD (DEGENERATIVE DISC DISEASE), LUMBAR: ICD-10-CM

## 2021-09-14 DIAGNOSIS — Z80.0 FAMILY HISTORY OF COLON CANCER: ICD-10-CM

## 2021-09-14 DIAGNOSIS — Z11.4 SCREENING FOR HIV (HUMAN IMMUNODEFICIENCY VIRUS): ICD-10-CM

## 2021-09-14 DIAGNOSIS — R19.7 DIARRHEA, UNSPECIFIED TYPE: ICD-10-CM

## 2021-09-14 DIAGNOSIS — M46.1 BILATERAL SACROILIITIS (HCC): ICD-10-CM

## 2021-09-14 DIAGNOSIS — Z12.31 SCREENING MAMMOGRAM, ENCOUNTER FOR: ICD-10-CM

## 2021-09-14 DIAGNOSIS — Z11.59 NEED FOR HEPATITIS C SCREENING TEST: ICD-10-CM

## 2021-09-14 DIAGNOSIS — R63.4 WEIGHT LOSS: ICD-10-CM

## 2021-09-14 DIAGNOSIS — B97.7 HPV IN FEMALE: ICD-10-CM

## 2021-09-14 DIAGNOSIS — G89.4 CHRONIC PAIN SYNDROME: ICD-10-CM

## 2021-09-14 DIAGNOSIS — I65.22 ASYMPTOMATIC STENOSIS OF LEFT CAROTID ARTERY: ICD-10-CM

## 2021-09-14 DIAGNOSIS — N94.10 DYSPAREUNIA IN FEMALE: ICD-10-CM

## 2021-09-14 DIAGNOSIS — M79.7 FIBROMYALGIA: ICD-10-CM

## 2021-09-14 DIAGNOSIS — Z80.3 FAMILY HISTORY OF BREAST CANCER IN SISTER: ICD-10-CM

## 2021-09-14 PROCEDURE — 99204 OFFICE O/P NEW MOD 45 MIN: CPT | Performed by: FAMILY MEDICINE

## 2021-09-14 PROCEDURE — 99386 PREV VISIT NEW AGE 40-64: CPT | Performed by: FAMILY MEDICINE

## 2021-09-14 RX ORDER — PYRIDOSTIGMINE BROMIDE 60 MG/1
60 TABLET ORAL 3 TIMES DAILY
Start: 2021-09-14 | End: 2022-01-27 | Stop reason: SDUPTHER

## 2021-09-14 NOTE — PROGRESS NOTES
Assessment/Plan:     Problem List Items Addressed This Visit        Unprioritized    Acquired compression deformity of vertebra    Relevant Orders    Ambulatory referral to Physical Therapy    Bilateral sacroiliitis ([facility] [address] )    Relevant Orders    Ambulatory referral to Physical Therapy    Chronic pain syndrome    Relevant Orders    Comprehensive metabolic panel    Lipid panel    Hemoglobin A1C    Vitamin D 25 hydroxy    Ambulatory referral to Physical Therapy    DDD (degenerative disc disease), lumbar    Relevant Orders    Ambulatory referral to Physical Therapy    Family history of premature CAD    Relevant Orders    Comprehensive metabolic panel    Lipid panel    Hemoglobin A1C    Vitamin D 25 hydroxy    MG (myasthenia gravis) ([facility] [address] )    Relevant Medications    pyridostigmine (MESTINON) 60 mg tablet      Other Visit Diagnoses     Well adult exam    -  Primary    Weight loss        Relevant Orders    TSH, 3rd generation with Free T4 reflex    CBC and differential    Diarrhea, unspecified type        Relevant Orders    Ambulatory referral to Gastroenterology    Dyspareunia in female        Relevant Orders    Ambulatory referral to Gynecology    HPV in female        Relevant Orders    Ambulatory referral to Gynecology    Fibromyalgia        Relevant Orders    Comprehensive metabolic panel    Lipid panel    Hemoglobin A1C    Vitamin D 25 hydroxy    Ambulatory referral to Physical Therapy    Screening mammogram, encounter for        Relevant Orders    Mammo screening bilateral w 3d & cad    Need for hepatitis C screening test        Relevant Orders    Hepatitis C antibody    Screening for HIV (human immunodeficiency virus)        Relevant Orders    HIV 1/2 Antigen/Antibody (4th Generation) w Reflex [facility]    Family history of colon cancer        Relevant Orders    Ambulatory referral to Gastroenterology        [email or web address]    Well adult exam  ·         Continue healthy diet   ·         Encourage exercise 4 times a week or more for minimum 30 minutes  ·         Continue to see dentist, wear seatbelt  ·         Health maintenance reviewed and up-to-date - she believes immunizations are up to date - records requested  Update labs  Schedule mammo & updated colonoscopy  Refer to new GYN in this area  Reviewed age appropriate health maintenance screenings and immunizations that are due, risks and benefits of these  Health Maintenance   Topic Date Due    Hepatitis C Screening  Never done    Pneumococcal Vaccine: Pediatrics (0 to 5 Years) and At-Risk Patients (6 to 59 Years) (1 of 4 - PCV13) Never done    HIV Screening  Never done    Annual Physical  Never done    Colorectal Cancer Screening  Never done    Breast Cancer Screening: Mammogram  09/05/2020    COVID-19 Vaccine (3 - Pfizer risk 3-dose series) 05/06/2021    Influenza Vaccine (1) 09/01/2021    Depression Screening  09/14/2022    BMI: Followup Plan  09/14/2022    BMI: Adult  09/14/2022    Cervical Cancer Screening  02/07/2024    DTaP,Tdap,and Td Vaccines (2 - Td or Tdap) 05/09/2029    HIB Vaccine  Aged Out    Hepatitis B Vaccine  Aged Out    IPV Vaccine  Aged Out    Hepatitis A Vaccine  Aged Out    Meningococcal ACWY Vaccine  Aged Out    HPV Vaccine  Aged Out     Return in 2 months (on 11/14/2021) for f/u, shingles vaccine        Subjective:    LAURA Shanks is a 44 y o  female who presents today for a physical      Chief Complaint   Patient presents with    Dyspareunia    Generalized Body Aches     x 1mo worse at night     Back Pain     lower     Weight Loss     without trying     Physical Exam     OVER DUE     Numbness     mouth x 2 weeks     Other     PENDED HM     Medication Refill     NONE NEEDED      PHQ-9 Depression Screening    PHQ-9:   Frequency of the following problems over the past two weeks:      Little interest or pleasure in doing things: 0 - not at all  Feeling down, depressed, or hopeless: 0 - not at all  Trouble falling or staying asleep, or sleeping too much: 3 - nearly every day  Feeling tired or having little energy: 3 - nearly every day  Poor appetite or overeating: 3 - nearly every day  Feeling bad about yourself - or that you are a failure or have let yourself or your family down: 0 - not at all  Trouble concentrating on things, such as reading the newspaper or watching television: 1 - several days  Moving or speaking so slowly that other people could have noticed  Or the opposite - being so fidgety or restless that you have been moving around a lot more than usual: 0 - not at all  Thoughts that you would be better off dead, or of hurting yourself in some way: 0 - not at all  PHQ-2 Score: 0  PHQ-9 Score: 10        ---Above per clinical staff & reviewed   ---  Patient here today for a physical:      new pt - shared records reviewd  last labs available May 2021 glu 104, GFR 78, WBC 2 84   B12, vit D  normal   lipids 2018 normal  Concerns today:  New pt - shared records reviewed   RETIRED IN 2018   Worked in property management   [id number]   Parents moved in in February w/them  Step daughter Bibi - lives in [place]    Seeing pain mgmt   Seeing rheum Chiappetta   Neuro Shagufta Meño   Saw cardio after fainting   sjogrens   Rheum prescribes most meds Mtx, van, mtx, jo ann ambien    on Soldsie since  nightly   Neuro prescribes omeprazole, prednisone    One sister  Brandan colon cancer 61 and breast cancer with  Mets to liver  Another sister breast cancer bilaterally - two different types 71     Denies heartburn w/ meds    Had records sent here   Records were reviewed  Has list of some procedures with her -   Carotid US 2017   Stress test 2017   Heart monitor may 3- 2017   Colonoscopy & EGD 2017   EEG 2017  Pap 2017 - history of abnormals and HPV           The following portions of the patient's history were reviewed and updated as appropriate: allergies, current medications, past family history, past medical history, past social history, past surgical history and problem list      Current Medications:  Current Outpatient Medications   Medication Sig Dispense Refill    celecoxib (CeleBREX) 200 mg capsule Take 200 mg by mouth 2 (two) times a day       folic acid (FOLVITE) 1 mg tablet Take 1 mg by mouth daily        gabapentin (NEURONTIN) 300 mg capsule Take 1 capsule (300 mg total) by mouth 3 (three) times a day 270 capsule 1    ibuprofen (MOTRIN) 600 mg tablet Take 600 mg by mouth as needed      methotrexate (RHEUMATREX) 2 5 MG tablet Take 2 5 mg by mouth once a week 6 tabs once a week every Saturday      multivitamin-minerals (CENTRUM ADULTS) tablet Take 1 tablet by mouth daily      omeprazole (PriLOSEC) 20 mg delayed release capsule Take 1 capsule (20 mg total) by mouth daily 90 capsule 4    predniSONE 2 5 mg tablet TAKE 1 TABLET DAILY 90 tablet 3    pyridostigmine (MESTINON) 60 mg tablet Take 1 tablet (60 mg total) by mouth 3 (three) times a day      zolpidem (AMBIEN) 10 mg tablet Take 10 mg by mouth daily at bedtime         No current facility-administered medications for this visit  Objective:      /72   Pulse 95   Temp 98 2 °F (36 8 °C)   Resp 12   Ht 5' 4 92" (1 649 m)   Wt 69 2 kg (152 lb 9 6 oz)   SpO2 98%   BMI 25 46 kg/m²   BP Readings from Last 3 Encounters:   09/14/21 100/72   08/06/21 130/70   02/25/21 122/70     Wt Readings from Last 3 Encounters:   09/14/21 69 2 kg (152 lb 9 6 oz)   08/06/21 69 5 kg (153 lb 3 2 oz)   01/21/21 75 8 kg (167 lb)       Review of Systems  ROS:  all others negative - no chest pain, SOB, normal urine, + diarrhea  no GERD with meds  sleeping well with nightly meds  mood down  + daily chronic all over pain  Physical Exam   Constitutional: she appears well-developed and well-nourished  HENT: Head: Normocephalic     Right Ear: External ear normal  Tympanic membrane normal    Left Ear: External ear normal  Tympanic membrane normal    Nose: Nose normal  No mucosal edema, No rhinorrhea  Right sinus exhibits no maxillary sinus tenderness  Left sinus exhibits no maxillary sinus tenderness  Mouth/Throat: Oropharynx is clear and moist    Eyes: Normal conjunctiva  No erythema  No discharge  Neck: No pain on exam  Neck supple  Cardiovascular: Normal rate, regular rhythm and normal heart sounds  Pulmonary/Chest: Effort normal and breath sounds normal  No wheezes  No rales  No rhonchi  Abdominal: Soft  Bowel sounds are normal  There is no tenderness  Musculoskeletal: she exhibits no edema  Lymphadenopathy: she has no cervical adenopathy  Neurological: she  is alert and oriented to person, place, and time  Skin: Skin is warm and dry  No rashes  Psychiatric: she  has a normal mood and affect  her behavior is normal  Thought content normal    Vitals reviewed  BMI Counseling: Body mass index is 25 46 kg/m²  The BMI is above normal  Nutrition recommendations include decreasing portion sizes  Exercise recommendations include exercising 3-5 times per week  Rationale for BMI follow-up plan is due to patient being overweight or obese  Depression Screening and Follow-up Plan:   Patient was screened for depression during today's encounter  They screened negative with a PHQ-2 score of 0

## 2021-09-14 NOTE — PATIENT INSTRUCTIONS
Acupuncture:     [facility] - Luella Bloch MS, MAC, LAC, [facility] Diplomate              [address] [facility]              [phone]     [facility] [address] Padmini Arzola               [address] [address] [address], [address]               [address],  Dr Roxanna Brown  [address]  [address]  [phone]

## 2021-09-16 PROBLEM — N94.10 DYSPAREUNIA IN FEMALE: Status: ACTIVE | Noted: 2021-09-16

## 2021-09-16 PROBLEM — M79.7 FIBROMYALGIA: Status: ACTIVE | Noted: 2021-09-16

## 2021-09-16 NOTE — PROGRESS NOTES
Assessment/Plan:     1  Well adult exam  See other note     2  MG (myasthenia gravis) ([facility] [address] )  Following with neuro   - pyridostigmine (MESTINON) 60 mg tablet; Take 1 tablet (60 mg total) by mouth 3 (three) times a day    3  Bilateral sacroiliitis ([facility] [address] )  Following with pain mgmt and rheum   - Ambulatory referral to Physical Therapy; Future    4  Weight loss  Check labs   Follow up after labs and review mood further   - TSH, 3rd generation with Free T4 reflex; Future  - CBC and differential; Future    5  Diarrhea, unspecified type  Likely due to post- sourav but pt due for colonoscopy and likely EGD so will refer to GI for further workup   - Ambulatory referral to Gastroenterology; Future    6  Dyspareunia in female  Refer to GYN to update pap    - Ambulatory referral to Gynecology; Future    7  HPV in female  See above   - Ambulatory referral to Gynecology; Future    8  Acquired compression deformity of vertebra  Recommend add PT/aqua therapy   - Ambulatory referral to Physical Therapy; Future    9  DDD (degenerative disc disease), lumbar  See above   - Ambulatory referral to Physical Therapy; Future    10  Chronic pain syndrome  See above   - Comprehensive metabolic panel; Future  - Lipid panel; Future  - Hemoglobin A1C; Future  - Vitamin D 25 hydroxy; Future  - Ambulatory referral to Physical Therapy; Future    11  Fibromyalgia  See above   - Comprehensive metabolic panel; Future  - Lipid panel; Future  - Hemoglobin A1C; Future  - Vitamin D 25 hydroxy; Future  - Ambulatory referral to Physical Therapy; Future    12  Screening mammogram, encounter for  Update   - Mammo screening bilateral w 3d & cad; Future    13  Family history of premature CAD  Update labs   - Comprehensive metabolic panel; Future  - Lipid panel; Future  - Hemoglobin A1C; Future  - Vitamin D 25 hydroxy; Future    14  Need for hepatitis C screening test  - Hepatitis C antibody; Future    15   Screening for HIV (human immunodeficiency virus)  - HIV 1/2 Antigen/Antibody (4th Generation) w Reflex [facility]; Future    16  Family history of colon cancer  Due for updated colonoscopy   - Ambulatory referral to Gastroenterology; Future    17  Family history of breast cancer in sister  Was BRCA tested - negative  Recommend she bring in genetic counseling paperwork for her chart     25  Asymptomatic stenosis of left carotid artery  Reviewed previous US result with pt     19  Persistent insomnia  Pt reports rheum is refilling ambien, using nightly for years without SE    15 minutes spent on chart prep, 30 minutes spent with patient counseling/educating on their diagnoses, tests completed and any new tests ordered, any referrals placed, treatment options, and documentation of above today  In prescribing new medications, or changing doses, we reviewed the risks and benefits and side effects of these medications along with other treatment options if appropriate  Return in 2 months (on 11/14/2021) for f/u, shingles vaccine        Subjective:   Lana Monte is a 44 y o  female here today for a follow-up on her current medical conditions:  Patient Active Problem List   Diagnosis    Asymptomatic stenosis of left carotid artery    Family history of premature CAD    MG (myasthenia gravis) ([address] )    Chronic pain syndrome    Greater trochanteric bursitis of both hips    Lumbar radiculopathy    Generalized joint pain    Cervical spinal stenosis    Abnormal cervical Papanicolaou smear    Gastroesophageal reflux disease    Irritable bowel syndrome with diarrhea    Menopausal symptom    Polyneuropathy    Raynaud's disease    Sjogren's syndrome with keratoconjunctivitis sicca ([address] )    Family history of breast cancer in sister    Persistent insomnia    Bilateral sacroiliitis ([address] )    DDD (degenerative disc disease), lumbar    Acquired compression deformity of vertebra        Current Medications:  Current Outpatient Medications   Medication Sig Dispense Refill    celecoxib (CeleBREX) 200 mg capsule Take 200 mg by mouth 2 (two) times a day       folic acid (FOLVITE) 1 mg tablet Take 1 mg by mouth daily        gabapentin (NEURONTIN) 300 mg capsule Take 1 capsule (300 mg total) by mouth 3 (three) times a day 270 capsule 1    ibuprofen (MOTRIN) 600 mg tablet Take 600 mg by mouth as needed      methotrexate (RHEUMATREX) 2 5 MG tablet Take 2 5 mg by mouth once a week 6 tabs once a week every Saturday      multivitamin-minerals (CENTRUM ADULTS) tablet Take 1 tablet by mouth daily      omeprazole (PriLOSEC) 20 mg delayed release capsule Take 1 capsule (20 mg total) by mouth daily 90 capsule 4    predniSONE 2 5 mg tablet TAKE 1 TABLET DAILY 90 tablet 3    pyridostigmine (MESTINON) 60 mg tablet Take 1 tablet (60 mg total) by mouth 3 (three) times a day      zolpidem (AMBIEN) 10 mg tablet Take 10 mg by mouth daily at bedtime         No current facility-administered medications for this visit  HPI:  Chief Complaint   Patient presents with    Dyspareunia    Generalized Body Aches     x 1mo worse at night     Back Pain     lower     Weight Loss     without trying     Physical Exam     OVER DUE     Numbness     mouth x 2 weeks     Other     PENDED      Medication Refill     NONE NEEDED      -- Above per clinical staff and reviewed   --    PHQ-9 Depression Screening    PHQ-9:   Frequency of the following problems over the past two weeks:      Little interest or pleasure in doing things: 0 - not at all  Feeling down, depressed, or hopeless: 0 - not at all  Trouble falling or staying asleep, or sleeping too much: 3 - nearly every day  Feeling tired or having little energy: 3 - nearly every day  Poor appetite or overeating: 3 - nearly every day  Feeling bad about yourself - or that you are a failure or have let yourself or your family down: 0 - not at all  Trouble concentrating on things, such as reading the newspaper or watching television: 1 - several days  Moving or speaking so slowly that other people could have noticed  Or the opposite - being so fidgety or restless that you have been moving around a lot more than usual: 0 - not at all  Thoughts that you would be better off dead, or of hurting yourself in some way: 0 - not at all  PHQ-2 Score: 0  PHQ-9 Score: 10       HUNTER-7 Flowsheet Screening      Most Recent Value   Over the last 2 weeks, how often have you been bothered by any of the following problems? Feeling nervous, anxious, or on edge  0   Not being able to stop or control worrying  0   Worrying too much about different things  0   Trouble relaxing  2   Being so restless that it is hard to sit still  0   Becoming easily annoyed or irritable  1   Feeling afraid as if something awful might happen  0   HUNTER-7 Total Score  3           new pt - shared records reviewd  last labs available May 2021 glu 104, GFR 78, WBC 2 84   B12, vit D  normal   lipids 2018 normal  -review old records   Today:  New pt - shared records reviewed   RETIRED IN 2018   Worked in property management   Long commute    to [id number]   Parents moved in in February w/them  Step daughter Bibi - lives in [place]     Seeing pain mgmt   Seeing rheum [place]   Neuro [place]   Saw cardio after fainting   sjogrens   Rheum prescribes most meds Mtx, van, mtx, jo ann ambien    on [place] since  nightly   Neuro prescribes omeprazole, prednisone     One sister  Brandan colon cancer 60 and breast cancer with  Mets to liver  Another sister breast cancer bilaterally - two different types 71      Denies heartburn w/ meds     Had records sent here   Records were reviewed  Has list of some procedures with her -   Carotid US 2017   Stress test 2017   Heart monitor may 3- 2017   Colonoscopy & EGD 2017   EEG 2017  Pap 2017 - history of abnormals and HPV          Says she has all over chronic pain - rheum told her it is likely FM   Wondering what she can do for that  Has not been to PT in years  Diarrhea since GB surgery   Not able to eat much with diarrhea  Last EGD 2017   Complains of significant weight loss without trying in last 6 months        The following portions of the patient's history were reviewed and updated as appropriate: allergies, current medications, past family history, past medical history, past social history, past surgical history and problem list     Objective:  Vitals:  /72   Pulse 95   Temp 98 2 °F (36 8 °C)   Resp 12   Ht 5' 4 92" (1 649 m)   Wt 69 2 kg (152 lb 9 6 oz)   SpO2 98%   BMI 25 46 kg/m²    Wt Readings from Last 3 Encounters:   09/14/21 69 2 kg (152 lb 9 6 oz)   08/06/21 69 5 kg (153 lb 3 2 oz)   01/21/21 75 8 kg (167 lb)      BP Readings from Last 3 Encounters:   09/14/21 100/72   08/06/21 130/70   02/25/21 122/70        Review of Systems   ROS:  all others negative - no chest pain, SOB, normal urine, + diarrhea  no GERD with meds  sleeping well with nightly meds  mood down  + daily chronic all over pain  Physical Exam   Constitutional: she appears well-developed and well-nourished  HENT: Head: Normocephalic  Right Ear: External ear normal  Tympanic membrane normal    Left Ear: External ear normal  Tympanic membrane normal    Nose: Nose normal  No mucosal edema, No rhinorrhea  Right sinus exhibits no maxillary sinus tenderness  Left sinus exhibits no maxillary sinus tenderness  Mouth/Throat: Oropharynx is clear and moist    Eyes: Normal conjunctiva  No erythema  No discharge  Neck: No pain on exam  Neck supple  Cardiovascular: Normal rate, regular rhythm and normal heart sounds  Pulmonary/Chest: Effort normal and breath sounds normal  No wheezes  No rales  No rhonchi  Abdominal: Soft  Bowel sounds are normal  There is no tenderness  Musculoskeletal: she exhibits no edema  Lymphadenopathy: she has no cervical adenopathy  Neurological: she  is alert and oriented to person, place, and time  Skin: Skin is warm and dry  No rashes  Psychiatric: she  has a normal mood and affect  her behavior is normal  Thought content normal    Vitals reviewed

## 2021-09-17 ENCOUNTER — IMMUNIZATIONS (OUTPATIENT)
Dept: FAMILY MEDICINE CLINIC | Facility: HOSPITAL | Age: 51
End: 2021-09-17

## 2021-09-17 DIAGNOSIS — Z23 ENCOUNTER FOR IMMUNIZATION: Primary | ICD-10-CM

## 2021-09-17 PROCEDURE — 91300 SARS-COV-2 / COVID-19 MRNA VACCINE (PFIZER-BIONTECH) 30 MCG: CPT

## 2021-09-17 PROCEDURE — 0001A SARS-COV-2 / COVID-19 MRNA VACCINE (PFIZER-BIONTECH) 30 MCG: CPT

## 2021-09-20 ENCOUNTER — TELEPHONE (OUTPATIENT)
Dept: ADMINISTRATIVE | Facility: OTHER | Age: 51
End: 2021-09-20

## 2021-09-20 NOTE — TELEPHONE ENCOUNTER
----- Message from Marybeth Juarez sent at 9/17/2021  1:30 PM EDT -----  Regarding: CRC screening order- referred to GI  Please update HM- referral to GI made for colonoscopy      Stewart Wade

## 2021-09-20 NOTE — TELEPHONE ENCOUNTER
Upon review of the In Basket request we were able to locate, review, and update the patient chart as requested for CRC: Colonoscopy  Any additional questions or concerns should be emailed to the Practice Liaisons via [email or web address] email, please do not reply via In Basket      Thank you  Casie Hardwick MA

## 2021-10-13 ENCOUNTER — APPOINTMENT (OUTPATIENT)
Dept: LAB | Facility: CLINIC | Age: 51
End: 2021-10-13
Payer: OTHER GOVERNMENT

## 2021-10-13 DIAGNOSIS — M79.7 FIBROMYALGIA: ICD-10-CM

## 2021-10-13 DIAGNOSIS — Z11.4 SCREENING FOR HIV (HUMAN IMMUNODEFICIENCY VIRUS): ICD-10-CM

## 2021-10-13 DIAGNOSIS — Z11.59 NEED FOR HEPATITIS C SCREENING TEST: ICD-10-CM

## 2021-10-13 DIAGNOSIS — R63.4 WEIGHT LOSS: ICD-10-CM

## 2021-10-13 DIAGNOSIS — Z82.49 FAMILY HISTORY OF PREMATURE CAD: ICD-10-CM

## 2021-10-13 DIAGNOSIS — G89.4 CHRONIC PAIN SYNDROME: ICD-10-CM

## 2021-10-13 LAB
25(OH)D3 SERPL-MCNC: 46.2 NG/ML (ref 30–100)
ALBUMIN SERPL BCP-MCNC: 3.6 G/DL (ref 3.5–5)
ALP SERPL-CCNC: 78 U/L (ref 46–116)
ALT SERPL W P-5'-P-CCNC: 35 U/L (ref 12–78)
ANION GAP SERPL CALCULATED.3IONS-SCNC: 3 MMOL/L (ref 4–13)
AST SERPL W P-5'-P-CCNC: 28 U/L (ref 5–45)
BASOPHILS # BLD AUTO: 0.02 THOUSANDS/ΜL (ref 0–0.1)
BASOPHILS NFR BLD AUTO: 1 % (ref 0–1)
BILIRUB SERPL-MCNC: 0.6 MG/DL (ref 0.2–1)
BUN SERPL-MCNC: 12 MG/DL (ref 5–25)
CALCIUM SERPL-MCNC: 9.3 MG/DL (ref 8.3–10.1)
CHLORIDE SERPL-SCNC: 107 MMOL/L (ref 100–108)
CHOLEST SERPL-MCNC: 176 MG/DL (ref 50–200)
CO2 SERPL-SCNC: 28 MMOL/L (ref 21–32)
CREAT SERPL-MCNC: 0.83 MG/DL (ref 0.6–1.3)
EOSINOPHIL # BLD AUTO: 0.1 THOUSAND/ΜL (ref 0–0.61)
EOSINOPHIL NFR BLD AUTO: 3 % (ref 0–6)
ERYTHROCYTE [DISTWIDTH] IN BLOOD BY AUTOMATED COUNT: 12.8 % (ref 11.6–15.1)
EST. AVERAGE GLUCOSE BLD GHB EST-MCNC: 111 MG/DL
GFR SERPL CREATININE-BSD FRML MDRD: 81 ML/MIN/1.73SQ M
GLUCOSE P FAST SERPL-MCNC: 79 MG/DL (ref 65–99)
HBA1C MFR BLD: 5.5 %
HCT VFR BLD AUTO: 43 % (ref 34.8–46.1)
HCV AB SER QL: NORMAL
HDLC SERPL-MCNC: 57 MG/DL
HGB BLD-MCNC: 13.7 G/DL (ref 11.5–15.4)
IMM GRANULOCYTES # BLD AUTO: 0.01 THOUSAND/UL (ref 0–0.2)
IMM GRANULOCYTES NFR BLD AUTO: 0 % (ref 0–2)
LDLC SERPL CALC-MCNC: 100 MG/DL (ref 0–100)
LYMPHOCYTES # BLD AUTO: 1.32 THOUSANDS/ΜL (ref 0.6–4.47)
LYMPHOCYTES NFR BLD AUTO: 37 % (ref 14–44)
MCH RBC QN AUTO: 29.1 PG (ref 26.8–34.3)
MCHC RBC AUTO-ENTMCNC: 31.9 G/DL (ref 31.4–37.4)
MCV RBC AUTO: 92 FL (ref 82–98)
MONOCYTES # BLD AUTO: 0.39 THOUSAND/ΜL (ref 0.17–1.22)
MONOCYTES NFR BLD AUTO: 11 % (ref 4–12)
NEUTROPHILS # BLD AUTO: 1.7 THOUSANDS/ΜL (ref 1.85–7.62)
NEUTS SEG NFR BLD AUTO: 48 % (ref 43–75)
NONHDLC SERPL-MCNC: 119 MG/DL
NRBC BLD AUTO-RTO: 0 /100 WBCS
PLATELET # BLD AUTO: 178 THOUSANDS/UL (ref 149–390)
PMV BLD AUTO: 9.5 FL (ref 8.9–12.7)
POTASSIUM SERPL-SCNC: 3.9 MMOL/L (ref 3.5–5.3)
PROT SERPL-MCNC: 7.3 G/DL (ref 6.4–8.2)
RBC # BLD AUTO: 4.7 MILLION/UL (ref 3.81–5.12)
SODIUM SERPL-SCNC: 138 MMOL/L (ref 136–145)
TRIGL SERPL-MCNC: 94 MG/DL
TSH SERPL DL<=0.05 MIU/L-ACNC: 1.92 UIU/ML (ref 0.36–3.74)
WBC # BLD AUTO: 3.54 THOUSAND/UL (ref 4.31–10.16)

## 2021-10-13 PROCEDURE — 83036 HEMOGLOBIN GLYCOSYLATED A1C: CPT

## 2021-10-13 PROCEDURE — 85025 COMPLETE CBC W/AUTO DIFF WBC: CPT

## 2021-10-13 PROCEDURE — 82306 VITAMIN D 25 HYDROXY: CPT

## 2021-10-13 PROCEDURE — 87389 HIV-1 AG W/HIV-1&-2 AB AG IA: CPT

## 2021-10-13 PROCEDURE — 80053 COMPREHEN METABOLIC PANEL: CPT

## 2021-10-13 PROCEDURE — 86803 HEPATITIS C AB TEST: CPT

## 2021-10-13 PROCEDURE — 84443 ASSAY THYROID STIM HORMONE: CPT

## 2021-10-13 PROCEDURE — 36415 COLL VENOUS BLD VENIPUNCTURE: CPT

## 2021-10-13 PROCEDURE — 80061 LIPID PANEL: CPT

## 2021-10-18 LAB — HIV 1+2 AB+HIV1 P24 AG SERPL QL IA: NORMAL

## 2021-11-15 ENCOUNTER — OFFICE VISIT (OUTPATIENT)
Dept: FAMILY MEDICINE CLINIC | Facility: CLINIC | Age: 51
End: 2021-11-15
Payer: OTHER GOVERNMENT

## 2021-11-15 VITALS
TEMPERATURE: 97.7 F | WEIGHT: 149.2 LBS | SYSTOLIC BLOOD PRESSURE: 120 MMHG | HEIGHT: 65 IN | OXYGEN SATURATION: 99 % | DIASTOLIC BLOOD PRESSURE: 76 MMHG | HEART RATE: 64 BPM | RESPIRATION RATE: 12 BRPM | BODY MASS INDEX: 24.86 KG/M2

## 2021-11-15 DIAGNOSIS — R63.4 UNEXPLAINED WEIGHT LOSS: ICD-10-CM

## 2021-11-15 DIAGNOSIS — Z23 NEED FOR VACCINATION: ICD-10-CM

## 2021-11-15 DIAGNOSIS — F41.9 ANXIETY: ICD-10-CM

## 2021-11-15 DIAGNOSIS — G47.00 PERSISTENT INSOMNIA: Primary | ICD-10-CM

## 2021-11-15 PROCEDURE — 90750 HZV VACC RECOMBINANT IM: CPT

## 2021-11-15 PROCEDURE — 90471 IMMUNIZATION ADMIN: CPT

## 2021-11-15 PROCEDURE — 99214 OFFICE O/P EST MOD 30 MIN: CPT | Performed by: FAMILY MEDICINE

## 2021-11-15 RX ORDER — ESCITALOPRAM OXALATE 10 MG/1
10 TABLET ORAL DAILY
Qty: 30 TABLET | Refills: 1 | Status: SHIPPED | OUTPATIENT
Start: 2021-11-15 | End: 2021-12-21 | Stop reason: SDUPTHER

## 2021-11-22 ENCOUNTER — OFFICE VISIT (OUTPATIENT)
Dept: GASTROENTEROLOGY | Facility: AMBULARY SURGERY CENTER | Age: 51
End: 2021-11-22
Payer: OTHER GOVERNMENT

## 2021-11-22 ENCOUNTER — APPOINTMENT (OUTPATIENT)
Dept: LAB | Facility: AMBULARY SURGERY CENTER | Age: 51
End: 2021-11-22
Attending: INTERNAL MEDICINE
Payer: OTHER GOVERNMENT

## 2021-11-22 VITALS
BODY MASS INDEX: 24.92 KG/M2 | WEIGHT: 146 LBS | SYSTOLIC BLOOD PRESSURE: 102 MMHG | DIASTOLIC BLOOD PRESSURE: 64 MMHG | HEIGHT: 64 IN

## 2021-11-22 DIAGNOSIS — R19.7 DIARRHEA, UNSPECIFIED TYPE: Primary | ICD-10-CM

## 2021-11-22 DIAGNOSIS — Z80.0 FAMILY HISTORY OF COLON CANCER: ICD-10-CM

## 2021-11-22 DIAGNOSIS — R19.7 DIARRHEA, UNSPECIFIED TYPE: ICD-10-CM

## 2021-11-22 LAB — TSH SERPL DL<=0.05 MIU/L-ACNC: 1.07 UIU/ML (ref 0.36–3.74)

## 2021-11-22 PROCEDURE — 84443 ASSAY THYROID STIM HORMONE: CPT

## 2021-11-22 PROCEDURE — 82784 ASSAY IGA/IGD/IGG/IGM EACH: CPT

## 2021-11-22 PROCEDURE — 86255 FLUORESCENT ANTIBODY SCREEN: CPT

## 2021-11-22 PROCEDURE — 99244 OFF/OP CNSLTJ NEW/EST MOD 40: CPT | Performed by: INTERNAL MEDICINE

## 2021-11-22 PROCEDURE — 36415 COLL VENOUS BLD VENIPUNCTURE: CPT

## 2021-11-22 PROCEDURE — 83516 IMMUNOASSAY NONANTIBODY: CPT

## 2021-11-22 RX ORDER — SODIUM PICOSULFATE, MAGNESIUM OXIDE, AND ANHYDROUS CITRIC ACID 10; 3.5; 12 MG/160ML; G/160ML; G/160ML
1 LIQUID ORAL SEE ADMIN INSTRUCTIONS
Qty: 320 ML | Refills: 0 | Status: SHIPPED | OUTPATIENT
Start: 2021-11-22 | End: 2022-02-16 | Stop reason: ALTCHOICE

## 2021-11-23 LAB
ENDOMYSIUM IGA SER QL: NEGATIVE
GLIADIN PEPTIDE IGA SER-ACNC: 5 UNITS (ref 0–19)
GLIADIN PEPTIDE IGG SER-ACNC: 3 UNITS (ref 0–19)
IGA SERPL-MCNC: 209 MG/DL (ref 87–352)
TTG IGA SER-ACNC: <2 U/ML (ref 0–3)
TTG IGG SER-ACNC: 3 U/ML (ref 0–5)

## 2021-11-29 ENCOUNTER — ANNUAL EXAM (OUTPATIENT)
Dept: OBGYN CLINIC | Facility: CLINIC | Age: 51
End: 2021-11-29
Payer: OTHER GOVERNMENT

## 2021-11-29 ENCOUNTER — APPOINTMENT (OUTPATIENT)
Dept: LAB | Facility: CLINIC | Age: 51
End: 2021-11-29
Payer: OTHER GOVERNMENT

## 2021-11-29 VITALS
HEIGHT: 64 IN | DIASTOLIC BLOOD PRESSURE: 62 MMHG | SYSTOLIC BLOOD PRESSURE: 118 MMHG | WEIGHT: 148 LBS | BODY MASS INDEX: 25.27 KG/M2

## 2021-11-29 DIAGNOSIS — Z01.419 WELL FEMALE EXAM WITH ROUTINE GYNECOLOGICAL EXAM: Primary | ICD-10-CM

## 2021-11-29 DIAGNOSIS — R68.82 DECREASED LIBIDO: ICD-10-CM

## 2021-11-29 DIAGNOSIS — Z12.4 ENCOUNTER FOR SCREENING FOR MALIGNANT NEOPLASM OF CERVIX: ICD-10-CM

## 2021-11-29 DIAGNOSIS — A63.0 GENITAL WARTS: ICD-10-CM

## 2021-11-29 DIAGNOSIS — R19.7 DIARRHEA, UNSPECIFIED TYPE: ICD-10-CM

## 2021-11-29 DIAGNOSIS — Z11.51 SCREENING FOR HPV (HUMAN PAPILLOMAVIRUS): ICD-10-CM

## 2021-11-29 DIAGNOSIS — N95.2 POSTMENOPAUSAL ATROPHIC VAGINITIS: ICD-10-CM

## 2021-11-29 PROCEDURE — 82656 EL-1 FECAL QUAL/SEMIQ: CPT

## 2021-11-29 PROCEDURE — G0145 SCR C/V CYTO,THINLAYER,RESCR: HCPCS | Performed by: OBSTETRICS & GYNECOLOGY

## 2021-11-29 PROCEDURE — 87493 C DIFF AMPLIFIED PROBE: CPT

## 2021-11-29 PROCEDURE — 82705 FATS/LIPIDS FECES QUAL: CPT

## 2021-11-29 PROCEDURE — 99386 PREV VISIT NEW AGE 40-64: CPT | Performed by: OBSTETRICS & GYNECOLOGY

## 2021-11-29 PROCEDURE — 87505 NFCT AGENT DETECTION GI: CPT

## 2021-11-29 PROCEDURE — 87177 OVA AND PARASITES SMEARS: CPT

## 2021-11-29 PROCEDURE — 87209 SMEAR COMPLEX STAIN: CPT

## 2021-11-29 PROCEDURE — G0476 HPV COMBO ASSAY CA SCREEN: HCPCS | Performed by: OBSTETRICS & GYNECOLOGY

## 2021-11-30 LAB
C DIFF TOX GENS STL QL NAA+PROBE: NEGATIVE
CAMPYLOBACTER DNA SPEC NAA+PROBE: NORMAL
SALMONELLA DNA SPEC QL NAA+PROBE: NORMAL
SHIGA TOXIN STX GENE SPEC NAA+PROBE: NORMAL
SHIGELLA DNA SPEC QL NAA+PROBE: NORMAL

## 2021-12-01 ENCOUNTER — APPOINTMENT (OUTPATIENT)
Dept: LAB | Facility: CLINIC | Age: 51
End: 2021-12-01
Payer: OTHER GOVERNMENT

## 2021-12-01 DIAGNOSIS — M35.01 KERATOCONJUNCTIVITIS SICCA (HCC): ICD-10-CM

## 2021-12-01 LAB
ALBUMIN SERPL BCP-MCNC: 3.8 G/DL (ref 3.5–5)
ALP SERPL-CCNC: 65 U/L (ref 46–116)
ALT SERPL W P-5'-P-CCNC: 20 U/L (ref 12–78)
ANION GAP SERPL CALCULATED.3IONS-SCNC: 4 MMOL/L (ref 4–13)
AST SERPL W P-5'-P-CCNC: 20 U/L (ref 5–45)
BASOPHILS # BLD AUTO: 0.02 THOUSANDS/ΜL (ref 0–0.1)
BASOPHILS NFR BLD AUTO: 1 % (ref 0–1)
BILIRUB SERPL-MCNC: 0.82 MG/DL (ref 0.2–1)
BUN SERPL-MCNC: 9 MG/DL (ref 5–25)
CALCIUM SERPL-MCNC: 9.6 MG/DL (ref 8.3–10.1)
CHLORIDE SERPL-SCNC: 108 MMOL/L (ref 100–108)
CO2 SERPL-SCNC: 27 MMOL/L (ref 21–32)
CREAT SERPL-MCNC: 1.01 MG/DL (ref 0.6–1.3)
CRP SERPL QL: <3 MG/L
EOSINOPHIL # BLD AUTO: 0.09 THOUSAND/ΜL (ref 0–0.61)
EOSINOPHIL NFR BLD AUTO: 2 % (ref 0–6)
ERYTHROCYTE [DISTWIDTH] IN BLOOD BY AUTOMATED COUNT: 12.5 % (ref 11.6–15.1)
ERYTHROCYTE [SEDIMENTATION RATE] IN BLOOD: 7 MM/HOUR (ref 0–29)
GFR SERPL CREATININE-BSD FRML MDRD: 64 ML/MIN/1.73SQ M
GLUCOSE P FAST SERPL-MCNC: 82 MG/DL (ref 65–99)
HCT VFR BLD AUTO: 37.6 % (ref 34.8–46.1)
HGB BLD-MCNC: 12.1 G/DL (ref 11.5–15.4)
IMM GRANULOCYTES # BLD AUTO: 0.01 THOUSAND/UL (ref 0–0.2)
IMM GRANULOCYTES NFR BLD AUTO: 0 % (ref 0–2)
LYMPHOCYTES # BLD AUTO: 1.76 THOUSANDS/ΜL (ref 0.6–4.47)
LYMPHOCYTES NFR BLD AUTO: 47 % (ref 14–44)
MCH RBC QN AUTO: 29.2 PG (ref 26.8–34.3)
MCHC RBC AUTO-ENTMCNC: 32.2 G/DL (ref 31.4–37.4)
MCV RBC AUTO: 91 FL (ref 82–98)
MONOCYTES # BLD AUTO: 0.3 THOUSAND/ΜL (ref 0.17–1.22)
MONOCYTES NFR BLD AUTO: 8 % (ref 4–12)
NEUTROPHILS # BLD AUTO: 1.59 THOUSANDS/ΜL (ref 1.85–7.62)
NEUTS SEG NFR BLD AUTO: 42 % (ref 43–75)
NRBC BLD AUTO-RTO: 0 /100 WBCS
PLATELET # BLD AUTO: 187 THOUSANDS/UL (ref 149–390)
PMV BLD AUTO: 9.2 FL (ref 8.9–12.7)
POTASSIUM SERPL-SCNC: 4.3 MMOL/L (ref 3.5–5.3)
PROT SERPL-MCNC: 7.4 G/DL (ref 6.4–8.2)
RBC # BLD AUTO: 4.15 MILLION/UL (ref 3.81–5.12)
SODIUM SERPL-SCNC: 139 MMOL/L (ref 136–145)
WBC # BLD AUTO: 3.77 THOUSAND/UL (ref 4.31–10.16)

## 2021-12-01 PROCEDURE — 80053 COMPREHEN METABOLIC PANEL: CPT

## 2021-12-01 PROCEDURE — 36415 COLL VENOUS BLD VENIPUNCTURE: CPT

## 2021-12-01 PROCEDURE — 84165 PROTEIN E-PHORESIS SERUM: CPT

## 2021-12-01 PROCEDURE — 85025 COMPLETE CBC W/AUTO DIFF WBC: CPT

## 2021-12-01 PROCEDURE — 84165 PROTEIN E-PHORESIS SERUM: CPT | Performed by: PATHOLOGY

## 2021-12-01 PROCEDURE — 82232 ASSAY OF BETA-2 PROTEIN: CPT

## 2021-12-01 PROCEDURE — 85652 RBC SED RATE AUTOMATED: CPT

## 2021-12-01 PROCEDURE — 86140 C-REACTIVE PROTEIN: CPT

## 2021-12-02 LAB
ALBUMIN SERPL ELPH-MCNC: 4.35 G/DL (ref 3.5–5)
ALBUMIN SERPL ELPH-MCNC: 61.2 % (ref 52–65)
ALPHA1 GLOB SERPL ELPH-MCNC: 0.28 G/DL (ref 0.1–0.4)
ALPHA1 GLOB SERPL ELPH-MCNC: 3.9 % (ref 2.5–5)
ALPHA2 GLOB SERPL ELPH-MCNC: 0.62 G/DL (ref 0.4–1.2)
ALPHA2 GLOB SERPL ELPH-MCNC: 8.7 % (ref 7–13)
BETA GLOB ABNORMAL SERPL ELPH-MCNC: 0.43 G/DL (ref 0.4–0.8)
BETA1 GLOB SERPL ELPH-MCNC: 6 % (ref 5–13)
BETA2 GLOB SERPL ELPH-MCNC: 3.8 % (ref 2–8)
BETA2+GAMMA GLOB SERPL ELPH-MCNC: 0.27 G/DL (ref 0.2–0.5)
ELASTASE PANC STL-MCNT: >500 UG ELAST./G
FAT STL QL: NORMAL
GAMMA GLOB ABNORMAL SERPL ELPH-MCNC: 1.16 G/DL (ref 0.5–1.6)
GAMMA GLOB SERPL ELPH-MCNC: 16.4 % (ref 12–22)
HPV HR 12 DNA CVX QL NAA+PROBE: NEGATIVE
HPV16 DNA CVX QL NAA+PROBE: NEGATIVE
HPV18 DNA CVX QL NAA+PROBE: NEGATIVE
IGG/ALB SER: 1.58 {RATIO} (ref 1.1–1.8)
NEUTRAL FAT STL QL: NORMAL
O+P STL CONC: NORMAL
PROT PATTERN SERPL ELPH-IMP: NORMAL
PROT SERPL-MCNC: 7.1 G/DL (ref 6.4–8.2)

## 2021-12-03 LAB — B2 MICROGLOB SERPL-MCNC: 2.1 MG/L (ref 0.6–2.4)

## 2021-12-07 LAB
LAB AP GYN PRIMARY INTERPRETATION: NORMAL
Lab: NORMAL

## 2021-12-19 PROBLEM — F41.9 ANXIETY: Status: ACTIVE | Noted: 2021-12-19

## 2021-12-21 ENCOUNTER — CLINICAL SUPPORT (OUTPATIENT)
Dept: NUTRITION | Facility: HOSPITAL | Age: 51
End: 2021-12-21
Payer: OTHER GOVERNMENT

## 2021-12-21 ENCOUNTER — TELEMEDICINE (OUTPATIENT)
Dept: FAMILY MEDICINE CLINIC | Facility: CLINIC | Age: 51
End: 2021-12-21
Payer: OTHER GOVERNMENT

## 2021-12-21 DIAGNOSIS — G47.00 PERSISTENT INSOMNIA: ICD-10-CM

## 2021-12-21 DIAGNOSIS — R63.4 UNEXPLAINED WEIGHT LOSS: ICD-10-CM

## 2021-12-21 DIAGNOSIS — F41.9 ANXIETY: Primary | ICD-10-CM

## 2021-12-21 PROCEDURE — 99213 OFFICE O/P EST LOW 20 MIN: CPT | Performed by: FAMILY MEDICINE

## 2021-12-21 RX ORDER — ESCITALOPRAM OXALATE 10 MG/1
10 TABLET ORAL DAILY
Qty: 90 TABLET | Refills: 1 | Status: SHIPPED | OUTPATIENT
Start: 2021-12-21 | End: 2022-08-01 | Stop reason: SDUPTHER

## 2021-12-26 VITALS
HEIGHT: 64 IN | WEIGHT: 145.8 LBS | BODY MASS INDEX: 24.89 KG/M2 | DIASTOLIC BLOOD PRESSURE: 69 MMHG | HEART RATE: 57 BPM | TEMPERATURE: 98 F | SYSTOLIC BLOOD PRESSURE: 129 MMHG

## 2022-01-03 ENCOUNTER — HOSPITAL ENCOUNTER (OUTPATIENT)
Dept: MAMMOGRAPHY | Facility: CLINIC | Age: 52
Discharge: HOME | End: 2022-01-03
Payer: OTHER GOVERNMENT

## 2022-01-03 VITALS — BODY MASS INDEX: 24.75 KG/M2 | WEIGHT: 145 LBS | HEIGHT: 64 IN

## 2022-01-03 DIAGNOSIS — G70.00 MG (MYASTHENIA GRAVIS) (HCC): ICD-10-CM

## 2022-01-03 DIAGNOSIS — Z12.31 SCREENING MAMMOGRAM, ENCOUNTER FOR: ICD-10-CM

## 2022-01-03 PROCEDURE — 77067 SCR MAMMO BI INCL CAD: CPT

## 2022-01-03 PROCEDURE — 77063 BREAST TOMOSYNTHESIS BI: CPT

## 2022-01-04 RX ORDER — OMEPRAZOLE 20 MG/1
CAPSULE, DELAYED RELEASE ORAL
Qty: 90 CAPSULE | Refills: 3 | Status: SHIPPED | OUTPATIENT
Start: 2022-01-04

## 2022-01-12 NOTE — RESULT ENCOUNTER NOTE
Garett Ovaller,  Your mammogram is normal  We recommend you schedule your next mammogram in one year     Have a good day,   Dr Liza Scott

## 2022-01-13 ENCOUNTER — TELEPHONE (OUTPATIENT)
Dept: FAMILY MEDICINE CLINIC | Facility: CLINIC | Age: 52
End: 2022-01-13

## 2022-01-13 NOTE — TELEPHONE ENCOUNTER
Pt called and left message to speak to provider  She left message complaining of swollen gums  She said she's not sure if she is having a flare up

## 2022-01-14 ENCOUNTER — OFFICE VISIT (OUTPATIENT)
Dept: FAMILY MEDICINE CLINIC | Facility: CLINIC | Age: 52
End: 2022-01-14
Payer: OTHER GOVERNMENT

## 2022-01-14 VITALS
RESPIRATION RATE: 16 BRPM | BODY MASS INDEX: 25.37 KG/M2 | OXYGEN SATURATION: 97 % | TEMPERATURE: 97.5 F | HEIGHT: 64 IN | DIASTOLIC BLOOD PRESSURE: 82 MMHG | HEART RATE: 79 BPM | WEIGHT: 148.6 LBS | SYSTOLIC BLOOD PRESSURE: 129 MMHG

## 2022-01-14 DIAGNOSIS — Z23 ENCOUNTER FOR IMMUNIZATION: ICD-10-CM

## 2022-01-14 DIAGNOSIS — K05.10 GINGIVITIS: Primary | ICD-10-CM

## 2022-01-14 DIAGNOSIS — M35.01 SJOGREN'S SYNDROME WITH KERATOCONJUNCTIVITIS SICCA (HCC): ICD-10-CM

## 2022-01-14 PROCEDURE — 90750 HZV VACC RECOMBINANT IM: CPT

## 2022-01-14 PROCEDURE — 90471 IMMUNIZATION ADMIN: CPT

## 2022-01-14 PROCEDURE — 99214 OFFICE O/P EST MOD 30 MIN: CPT | Performed by: FAMILY MEDICINE

## 2022-01-14 NOTE — TELEPHONE ENCOUNTER
Redness, soreness, dry mouth, upper lip feels somewhat numb - when puckering lips it feels weird  When eating her gums hurt  Denies SOB, no bleeding  No other symptoms  Triaged mouth problems pg [id number] -  Denies swelling of throat, able to swallow saliva, pain is 8/10, no fevers, pain with opening mouth - feels like she has a cut on outer mouth corners, has red/swollen tender gums  Per [place]ell pt to seek medical care  Scheduled OV

## 2022-01-14 NOTE — PROGRESS NOTES
Assessment/Plan:   Toney Esteban was seen today for oral swelling, medication refill, labs only and hm  Diagnoses and all orders for this visit:    Gingivitis  Differential dx reviewed  May be secondary to change in toothpaste  Try Toms natural toothpaste  Contact dentist  Check labs  -     C-reactive protein; Future  -     CBC and differential; Future  -     Vitamin B12; Future  -     Sedimentation rate, automated; Future  -     Sjogren's Antibodies; Future  -     Ferritin; Future    Sjogren's syndrome with keratoconjunctivitis sicca (HCC)  -     C-reactive protein; Future  -     CBC and differential; Future  -     Vitamin B12; Future  -     Sedimentation rate, automated; Future  -     Sjogren's Antibodies; Future  -     Ferritin; Future    Encounter for immunization  #2 given today  -     Zoster Vaccine Recombinant IM        Patient Instructions   Man's natural toothpaste  No follow-ups on file  Subjective:    HPI  Toney Esteban is a 46 y o  female who presents with:  Chief Complaint     Oral Swelling; Medication Refill; Labs Only; HM        HPI     Oral Swelling      Additional comments: swelling/redness/sensitivity/pain - numbness of lips               Medication Refill      Additional comments: none              Labs Only      Additional comments: no labs              HM      Additional comments: none          Last edited by Manuel Medley LPN on 4/17/2960  6:79 PM  (History)        ---Above per clinical staff & reviewed  ---        Today:  Gums started getting red and inflamed, sensitive to the touch  Getting worse since starting  Last time at the dentist peridontix with whitening  Lips feel numb  Dry, cannot feel them like she should  Inside of mouth burns  Cotton mouth  Biotin often       The following portions of the patient's history were reviewed and updated as appropriate: allergies, current medications, past family history, past medical history, past social history, past surgical history and problem list   Review of Systems  ROS:  all others negative - no chest pain, SOB, normal urine and bowels  no GERD  sleeping well  mood good  Objective:    /82   Pulse 79   Temp 97 5 °F (36 4 °C)   Resp 16   Ht 5' 4" (1 626 m)   Wt 67 4 kg (148 lb 9 6 oz)   LMP 01/01/2016 (Approximate)   SpO2 97%   BMI 25 51 kg/m²   Wt Readings from Last 3 Encounters:   01/14/22 67 4 kg (148 lb 9 6 oz)   01/03/22 65 8 kg (145 lb)   12/21/21 66 1 kg (145 lb 12 8 oz)     BP Readings from Last 3 Encounters:   01/14/22 129/82   12/21/21 129/69   11/29/21 118/62       Current Medications:  Current Outpatient Medications   Medication Sig Dispense Refill    celecoxib (CeleBREX) 200 mg capsule Take 200 mg by mouth 2 (two) times a day       escitalopram (Lexapro) 10 mg tablet Take 1 tablet (10 mg total) by mouth daily 90 tablet 1    folic acid (FOLVITE) 1 mg tablet Take 1 mg by mouth daily        gabapentin (NEURONTIN) 300 mg capsule Take 1 capsule (300 mg total) by mouth 3 (three) times a day 270 capsule 1    ibuprofen (MOTRIN) 600 mg tablet Take 600 mg by mouth as needed      multivitamin-minerals (CENTRUM ADULTS) tablet Take 1 tablet by mouth daily      omeprazole (PriLOSEC) 20 mg delayed release capsule TAKE 1 CAPSULE DAILY 90 capsule 3    predniSONE 2 5 mg tablet TAKE 1 TABLET DAILY 90 tablet 3    pyridostigmine (MESTINON) 60 mg tablet Take 1 tablet (60 mg total) by mouth 3 (three) times a day      Sod Picosulfate-Mag Ox-Cit Acd (Clenpiq) 10-3 5-12 MG-GM -GM/160ML SOLN Take 1 Package by mouth see administration instructions 320 mL 0    zolpidem (AMBIEN) 10 mg tablet Take 10 mg by mouth daily at bedtime         No current facility-administered medications for this visit  Physical Exam   Constitutional: she appears well-developed and well-nourished  HENT: Head: Normocephalic     Right Ear: External ear normal  Tympanic membrane normal    Left Ear: External ear normal  Tympanic membrane normal    Nose: Nose normal  No mucosal edema, No rhinorrhea  Right sinus exhibits no maxillary sinus tenderness  Left sinus exhibits no maxillary sinus tenderness  Mouth/Throat: Oropharynx is clear and moist    Gums swelling & erythema  No oral lesions  No dental lesions  Tonsils & pharnyx normal  Lips dry  Eyes: Normal conjunctiva  No erythema  No discharge  Neck: No pain on exam  Neck supple  Cardiovascular: Normal rate, regular rhythm and normal heart sounds  Pulmonary/Chest: Effort normal and breath sounds normal    Abdominal: Soft  Bowel sounds are normal  There is no tenderness  Lymphadenopathy: she has no cervical adenopathy  Neurological: she is alert and oriented to person, place, and time  Skin: Skin is warm and dry  Psychiatric: she has a normal mood and affect   her behavior is normal

## 2022-01-21 ENCOUNTER — APPOINTMENT (OUTPATIENT)
Dept: LAB | Facility: CLINIC | Age: 52
End: 2022-01-21
Payer: OTHER GOVERNMENT

## 2022-01-21 DIAGNOSIS — K05.10 GINGIVITIS: ICD-10-CM

## 2022-01-21 DIAGNOSIS — M35.01 SJOGREN'S SYNDROME WITH KERATOCONJUNCTIVITIS SICCA (HCC): ICD-10-CM

## 2022-01-21 LAB
BASOPHILS # BLD AUTO: 0.03 THOUSANDS/ΜL (ref 0–0.1)
BASOPHILS NFR BLD AUTO: 1 % (ref 0–1)
CRP SERPL QL: <3 MG/L
EOSINOPHIL # BLD AUTO: 0.06 THOUSAND/ΜL (ref 0–0.61)
EOSINOPHIL NFR BLD AUTO: 2 % (ref 0–6)
ERYTHROCYTE [DISTWIDTH] IN BLOOD BY AUTOMATED COUNT: 12.2 % (ref 11.6–15.1)
ERYTHROCYTE [SEDIMENTATION RATE] IN BLOOD: 12 MM/HOUR (ref 0–29)
FERRITIN SERPL-MCNC: 13 NG/ML (ref 8–388)
HCT VFR BLD AUTO: 42.6 % (ref 34.8–46.1)
HGB BLD-MCNC: 13.7 G/DL (ref 11.5–15.4)
IMM GRANULOCYTES # BLD AUTO: 0 THOUSAND/UL (ref 0–0.2)
IMM GRANULOCYTES NFR BLD AUTO: 0 % (ref 0–2)
LYMPHOCYTES # BLD AUTO: 1.53 THOUSANDS/ΜL (ref 0.6–4.47)
LYMPHOCYTES NFR BLD AUTO: 43 % (ref 14–44)
MCH RBC QN AUTO: 27.7 PG (ref 26.8–34.3)
MCHC RBC AUTO-ENTMCNC: 32.2 G/DL (ref 31.4–37.4)
MCV RBC AUTO: 86 FL (ref 82–98)
MONOCYTES # BLD AUTO: 0.33 THOUSAND/ΜL (ref 0.17–1.22)
MONOCYTES NFR BLD AUTO: 9 % (ref 4–12)
NEUTROPHILS # BLD AUTO: 1.58 THOUSANDS/ΜL (ref 1.85–7.62)
NEUTS SEG NFR BLD AUTO: 45 % (ref 43–75)
NRBC BLD AUTO-RTO: 0 /100 WBCS
PLATELET # BLD AUTO: 198 THOUSANDS/UL (ref 149–390)
PMV BLD AUTO: 9.1 FL (ref 8.9–12.7)
RBC # BLD AUTO: 4.95 MILLION/UL (ref 3.81–5.12)
VIT B12 SERPL-MCNC: 504 PG/ML (ref 100–900)
WBC # BLD AUTO: 3.53 THOUSAND/UL (ref 4.31–10.16)

## 2022-01-21 PROCEDURE — 82607 VITAMIN B-12: CPT

## 2022-01-21 PROCEDURE — 85025 COMPLETE CBC W/AUTO DIFF WBC: CPT

## 2022-01-21 PROCEDURE — 82728 ASSAY OF FERRITIN: CPT

## 2022-01-21 PROCEDURE — 86140 C-REACTIVE PROTEIN: CPT

## 2022-01-21 PROCEDURE — 36415 COLL VENOUS BLD VENIPUNCTURE: CPT

## 2022-01-21 PROCEDURE — 85652 RBC SED RATE AUTOMATED: CPT

## 2022-01-21 PROCEDURE — 86235 NUCLEAR ANTIGEN ANTIBODY: CPT

## 2022-01-22 LAB
ENA SS-A AB SER-ACNC: 2.3 AI (ref 0–0.9)
ENA SS-B AB SER-ACNC: <0.2 AI (ref 0–0.9)

## 2022-01-27 DIAGNOSIS — G70.00 MG (MYASTHENIA GRAVIS) (HCC): ICD-10-CM

## 2022-01-27 RX ORDER — PYRIDOSTIGMINE BROMIDE 60 MG/1
60 TABLET ORAL 3 TIMES DAILY
Qty: 90 TABLET | Refills: 5 | Status: SHIPPED | OUTPATIENT
Start: 2022-01-27 | End: 2022-02-08 | Stop reason: SDUPTHER

## 2022-01-27 NOTE — TELEPHONE ENCOUNTER
Pt calling to report that [facility] not able to fill Mestinon until June  Pt requesting bridge supply (6 months) until mail order received  States she was unable to connect with representative to figure things out, is running out of medication, needs refill  Script to be sent to [facility] [phone], ok to leave detailed message  Dr Malorie Davenport - Rx entered  Please review and sign if in agreement

## 2022-02-08 ENCOUNTER — OFFICE VISIT (OUTPATIENT)
Dept: NEUROLOGY | Facility: CLINIC | Age: 52
End: 2022-02-08
Payer: OTHER GOVERNMENT

## 2022-02-08 VITALS
DIASTOLIC BLOOD PRESSURE: 68 MMHG | OXYGEN SATURATION: 100 % | WEIGHT: 145.4 LBS | HEIGHT: 64 IN | BODY MASS INDEX: 24.82 KG/M2 | SYSTOLIC BLOOD PRESSURE: 118 MMHG | HEART RATE: 67 BPM

## 2022-02-08 DIAGNOSIS — G70.00 MG (MYASTHENIA GRAVIS) (HCC): ICD-10-CM

## 2022-02-08 PROCEDURE — 99214 OFFICE O/P EST MOD 30 MIN: CPT | Performed by: PSYCHIATRY & NEUROLOGY

## 2022-02-08 RX ORDER — PYRIDOSTIGMINE BROMIDE 60 MG/1
60 TABLET ORAL 4 TIMES DAILY
Qty: 360 TABLET | Refills: 3 | Status: SHIPPED | OUTPATIENT
Start: 2022-02-08

## 2022-02-08 RX ORDER — DIPHENOXYLATE HYDROCHLORIDE AND ATROPINE SULFATE 2.5; .025 MG/1; MG/1
1 TABLET ORAL 2 TIMES DAILY PRN
Qty: 60 TABLET | Refills: 2 | Status: SHIPPED | OUTPATIENT
Start: 2022-02-08

## 2022-02-15 ENCOUNTER — TELEPHONE (OUTPATIENT)
Dept: GASTROENTEROLOGY | Facility: AMBULARY SURGERY CENTER | Age: 52
End: 2022-02-15

## 2022-02-16 ENCOUNTER — ANESTHESIA (OUTPATIENT)
Dept: GASTROENTEROLOGY | Facility: AMBULARY SURGERY CENTER | Age: 52
End: 2022-02-16

## 2022-02-16 ENCOUNTER — ANESTHESIA EVENT (OUTPATIENT)
Dept: GASTROENTEROLOGY | Facility: AMBULARY SURGERY CENTER | Age: 52
End: 2022-02-16

## 2022-02-16 ENCOUNTER — HOSPITAL ENCOUNTER (OUTPATIENT)
Dept: GASTROENTEROLOGY | Facility: AMBULARY SURGERY CENTER | Age: 52
Setting detail: OUTPATIENT SURGERY
Discharge: HOME | End: 2022-02-16
Attending: INTERNAL MEDICINE
Payer: OTHER GOVERNMENT

## 2022-02-16 VITALS
BODY MASS INDEX: 24.07 KG/M2 | TEMPERATURE: 97.9 F | SYSTOLIC BLOOD PRESSURE: 100 MMHG | RESPIRATION RATE: 15 BRPM | DIASTOLIC BLOOD PRESSURE: 56 MMHG | OXYGEN SATURATION: 99 % | HEART RATE: 59 BPM | HEIGHT: 64 IN | WEIGHT: 141 LBS

## 2022-02-16 DIAGNOSIS — R19.7 DIARRHEA, UNSPECIFIED TYPE: ICD-10-CM

## 2022-02-16 DIAGNOSIS — Z80.0 FAMILY HISTORY OF COLON CANCER: ICD-10-CM

## 2022-02-16 PROCEDURE — 88305 TISSUE EXAM BY PATHOLOGIST: CPT | Performed by: PATHOLOGY

## 2022-02-16 PROCEDURE — 43239 EGD BIOPSY SINGLE/MULTIPLE: CPT | Performed by: INTERNAL MEDICINE

## 2022-02-16 PROCEDURE — 45380 COLONOSCOPY AND BIOPSY: CPT | Performed by: INTERNAL MEDICINE

## 2022-02-16 RX ORDER — LIDOCAINE HYDROCHLORIDE 10 MG/ML
INJECTION, SOLUTION EPIDURAL; INFILTRATION; INTRACAUDAL; PERINEURAL AS NEEDED
Status: DISCONTINUED | OUTPATIENT
Start: 2022-02-16 | End: 2022-02-16

## 2022-02-16 RX ORDER — PROPOFOL 10 MG/ML
INJECTION, EMULSION INTRAVENOUS CONTINUOUS PRN
Status: DISCONTINUED | OUTPATIENT
Start: 2022-02-16 | End: 2022-02-16

## 2022-02-16 RX ORDER — PROPOFOL 10 MG/ML
INJECTION, EMULSION INTRAVENOUS AS NEEDED
Status: DISCONTINUED | OUTPATIENT
Start: 2022-02-16 | End: 2022-02-16

## 2022-02-16 RX ORDER — CHOLESTYRAMINE 4 G/9G
1 POWDER, FOR SUSPENSION ORAL 3 TIMES DAILY PRN
Qty: 60 PACKET | Refills: 1 | Status: SHIPPED | OUTPATIENT
Start: 2022-02-16 | End: 2022-05-12

## 2022-02-16 RX ORDER — SIMETHICONE 20 MG/.3ML
EMULSION ORAL CODE/TRAUMA/SEDATION MEDICATION
Status: COMPLETED | OUTPATIENT
Start: 2022-02-16 | End: 2022-02-16

## 2022-02-16 RX ORDER — SODIUM CHLORIDE, SODIUM LACTATE, POTASSIUM CHLORIDE, CALCIUM CHLORIDE 600; 310; 30; 20 MG/100ML; MG/100ML; MG/100ML; MG/100ML
INJECTION, SOLUTION INTRAVENOUS CONTINUOUS PRN
Status: DISCONTINUED | OUTPATIENT
Start: 2022-02-16 | End: 2022-02-16

## 2022-02-16 RX ADMIN — SODIUM CHLORIDE, SODIUM LACTATE, POTASSIUM CHLORIDE, AND CALCIUM CHLORIDE: .6; .31; .03; .02 INJECTION, SOLUTION INTRAVENOUS at 09:08

## 2022-02-16 RX ADMIN — Medication 40 MG: at 09:28

## 2022-02-16 RX ADMIN — PROPOFOL 160 MG: 10 INJECTION, EMULSION INTRAVENOUS at 09:16

## 2022-02-16 RX ADMIN — LIDOCAINE HYDROCHLORIDE 50 MG: 10 INJECTION, SOLUTION EPIDURAL; INFILTRATION; INTRACAUDAL at 09:16

## 2022-02-16 RX ADMIN — PROPOFOL 150 MCG/KG/MIN: 10 INJECTION, EMULSION INTRAVENOUS at 09:16

## 2022-02-16 NOTE — DISCHARGE INSTRUCTIONS
Upper Endoscopy   WHAT YOU NEED TO KNOW:   An upper endoscopy is also called an upper gastrointestinal (GI) endoscopy, or an esophagogastroduodenoscopy (EGD)  You may feel bloated, gassy, or have some abdominal discomfort after your procedure  Your throat may be sore for 24 to 36 hours  You may burp or pass gas from air that is still inside your body  DISCHARGE INSTRUCTIONS:   Call 911 if:   · You have sudden chest pain or trouble breathing  Seek care immediately if:   · You feel dizzy or faint  · You have trouble swallowing  · You have severe throat pain  · Your bowel movements are very dark or black  · Your abdomen is hard and firm and you have severe pain  · You vomit blood  Contact your healthcare provider if:   · You feel full or bloated and cannot burp or pass gas  · You have not had a bowel movement for 3 days after your procedure  · You have neck pain  · You have a fever or chills  · You have nausea or are vomiting  · You have a rash or hives  · You have questions or concerns about your endoscopy  Relieve a sore throat:  Suck on throat lozenges or crushed ice  Gargle with a small amount of warm salt water  Mix 1 teaspoon of salt and 1 cup of warm water to make salt water  Relieve gas and discomfort from bloating:  Lie on your right side with a heating pad on your abdomen  Take short walks to help pass gas  Eat small meals until bloating is relieved  Rest after your procedure:  Do not drive or make important decisions until the day after your procedure  Return to your normal activity as directed  You can usually return to work the day after your procedure  Follow up with your healthcare provider as directed:  Write down your questions so you remember to ask them during your visits  © Copyright [employer] 2021 Information is for End User's use only and may not be sold, redistributed or otherwise used for commercial purposes   All illustrations and images included in CareNotes® are the copyrighted property of A D A M , Inc  or Marybeth Collado   The above information is an  only  It is not intended as medical advice for individual conditions or treatments  Talk to your doctor, nurse or pharmacist before following any medical regimen to see if it is safe and effective for you  Colonoscopy   WHAT YOU NEED TO KNOW:   A colonoscopy is a procedure to examine the inside of your colon (intestine) with a scope  Polyps or tissue growths may have been removed during your colonoscopy  It is normal to feel bloated and to have some abdominal discomfort  You should be passing gas  If you have hemorrhoids or you had polyps removed, you may have a small amount of bleeding  DISCHARGE INSTRUCTIONS:   Seek care immediately if:   · You have a large amount of bright red blood in your bowel movements  · Your abdomen is hard and firm and you have severe pain  · You have sudden trouble breathing  Call your doctor if:   · You develop a rash or hives  · You have a fever within 24 hours of your procedure  · You have nausea and vomiting  · You feel anesthesia effects greater than 24 hours  · You have not had a bowel movement for 3 days after your procedure  · You have questions or concerns about your condition or care  After your colonoscopy:   · Do not lift, strain, or run  until your healthcare provider says it is okay  · Rest as much as possible  You have been given medicine to relax you  Do not  drive or make important decisions for at least 24 hours  Return to your normal activity as directed  · Relieve gas and discomfort from bloating  by lying on your left side with a heating pad on your abdomen  You may need to take short walks to help the gas move out  Eat small meals until bloating is relieved  If you had polyps removed: For 7 days after your procedure:  · Do not  take aspirin      · Do not  go on long car rides  Help prevent constipation:   · Eat a variety of healthy foods  Healthy foods include fruit, vegetables, whole-grain breads, low-fat dairy products, beans, lean meat, and fish  Ask if you need to be on a special diet  Your healthcare provider may recommend that you eat high-fiber foods such as cooked beans  Fiber helps you have regular bowel movements  · Drink liquids as directed  Adults should drink between 9 and 13 eight-ounce cups of liquid every day  Ask what amount is best for you  For most people, good liquids to drink are water, juice, and milk  · Exercise as directed  Talk to your healthcare provider about the best exercise plan for you  Exercise can help prevent constipation, decrease your blood pressure and improve your health  Follow up with your doctor as directed:  Write down your questions so you remember to ask them during your visits  © Copyright [employer] 2021 Information is for End User's use only and may not be sold, redistributed or otherwise used for commercial purposes  All illustrations and images included in CareNotes® are the copyrighted property of A D A M , Inc  or [facility]   The above information is an  only  It is not intended as medical advice for individual conditions or treatments  Talk to your doctor, nurse or pharmacist before following any medical regimen to see if it is safe and effective for you  Diverticulosis   WHAT YOU NEED TO KNOW:   Diverticulosis is a condition that causes small pockets called diverticula to form in your intestine  These pockets make it difficult for bowel movements to pass through your digestive system  DISCHARGE INSTRUCTIONS:   Seek care immediately if:   · You have severe pain on the left side of your lower abdomen  · Your bowel movements are bright or dark red  Call your doctor if:   · You have a fever and chills  · You feel dizzy or lightheaded      · You have nausea, or you are vomiting  · You have a change in your bowel movements  · You have questions or concerns about your condition or care  Medicines:   · Medicines  to soften your bowel movements may be given  You may also need medicines to treat symptoms such as bloating and pain  · Take your medicine as directed  Contact your healthcare provider if you think your medicine is not helping or if you have side effects  Tell him or her if you are allergic to any medicine  Keep a list of the medicines, vitamins, and herbs you take  Include the amounts, and when and why you take them  Bring the list or the pill bottles to follow-up visits  Carry your medicine list with you in case of an emergency  Self-care: The goal of treatment is to manage any symptoms you have and prevent other problems such as diverticulitis  Diverticulitis is swelling or infection of the diverticula  Your healthcare provider may recommend any of the following:  · Eat a variety of high-fiber foods  High-fiber foods help you have regular bowel movements  High-fiber foods include cooked beans, fruits, vegetables, and some cereals  Most adults need 25 to 35 grams of fiber each day  Your healthcare provider may recommend that you have more  Ask your healthcare provider how much fiber you need  Increase fiber slowly  You may have abdominal discomfort, bloating, and gas if you add fiber to your diet too quickly  You may need to take a fiber supplement if you are not getting enough fiber from food  · Drink liquids as directed  You may need to drink 2 to 3 liters (8 to 12 cups) of liquids every day  Ask your healthcare provider how much liquid to drink each day and which liquids are best for you  · Apply heat  on your abdomen for 20 to 30 minutes every 2 hours for as many days as directed  Heat helps decrease pain and muscle spasms  Help prevent diverticulitis or other symptoms:   The following may help decrease your risk for diverticulitis or symptoms, such as bleeding  Talk to your provider about these or other things you can do to prevent problems that may occur with diverticulosis  · Exercise regularly  Ask your healthcare provider about the best exercise plan for you  Exercise can help you have regular bowel movements  Get 30 minutes of exercise on most days of the week  · Maintain a healthy weight  Ask your healthcare provider what a healthy weight is for you  Ask him or her to help you create a weight loss plan if you are overweight  · Do not smoke  Nicotine and other chemicals in cigarettes increase your risk for diverticulitis  Ask your healthcare provider for information if you currently smoke and need help to quit  E-cigarettes or smokeless tobacco still contain nicotine  Talk to your healthcare provider before you use these products  · Ask your healthcare provider if it is safe to take NSAIDs  NSAIDs may increase your risk of diverticulitis  Follow up with your doctor as directed:  Write down your questions so you remember to ask them during your visits  © [employer] 2021 Information is for End User's use only and may not be sold, redistributed or otherwise used for commercial purposes  All illustrations and images included in CareNotes® are the copyrighted property of A D A M , Inc  or [address] [place] [address]  The above information is an  only  It is not intended as medical advice for individual conditions or treatments  Talk to your doctor, nurse or pharmacist before following any medical regimen to see if it is safe and effective for you  Hemorrhoids   WHAT YOU NEED TO KNOW:   Hemorrhoids are swollen blood vessels inside your rectum (internal hemorrhoids) or on your anus (external hemorrhoids)  Sometimes a hemorrhoid may prolapse  This means it extends out of your anus    DISCHARGE INSTRUCTIONS:   Seek care immediately if:   · You have severe pain in your rectum or around your anus  · You have severe pain in your abdomen and you are vomiting  · You have bleeding from your anus that soaks through your underwear  Contact your healthcare provider if:   · You have frequent and painful bowel movements  · Your hemorrhoid looks or feels more swollen than usual      · You do not have a bowel movement for 2 days or more  · You see or feel tissue coming through your anus  · You have questions or concerns about your condition or care  Medicines: You may  need any of the following:  · Medicine  may be given to decrease pain, swelling, and itching  The medicine may come as a pad, cream, or ointment  · Stool softeners  help treat or prevent constipation  · NSAIDs , such as ibuprofen, help decrease swelling, pain, and fever  NSAIDs can cause stomach bleeding or kidney problems in certain people  If you take blood thinner medicine, always ask your healthcare provider if NSAIDs are safe for you  Always read the medicine label and follow directions  · Take your medicine as directed  Contact your healthcare provider if you think your medicine is not helping or if you have side effects  Tell him or her if you are allergic to any medicine  Keep a list of the medicines, vitamins, and herbs you take  Include the amounts, and when and why you take them  Bring the list or the pill bottles to follow-up visits  Carry your medicine list with you in case of an emergency  Manage your symptoms:   · Apply ice on your anus for 15 to 20 minutes every hour or as directed  Use an ice pack, or put crushed ice in a plastic bag  Cover it with a towel before you apply it to your anus  Ice helps prevent tissue damage and decreases swelling and pain  · Take a sitz bath  Fill a bathtub with 4 to 6 inches of warm water  You may also use a sitz bath pan that fits inside a toilet bowl  Sit in the sitz bath for 15 minutes  Do this 3 times a day, and after each bowel movement   The warm water can help decrease pain and swelling  · Keep your anal area clean  Gently wash the area with warm water daily  Soap may irritate the area  After a bowel movement, wipe with moist towelettes or wet toilet paper  Dry toilet paper can irritate the area  Prevent hemorrhoids:   · Do not strain to have a bowel movement  Do not sit on the toilet too long  These actions can increase pressure on the tissues in your rectum and anus  · Drink plenty of liquids  Liquids can help prevent constipation  Ask how much liquid to drink each day and which liquids are best for you  · Eat a variety of high-fiber foods  Examples include fruits, vegetables, and whole grains  Ask your healthcare provider how much fiber you need each day  You may need to take a fiber supplement  · Exercise as directed  Exercise, such as walking, may make it easier to have a bowel movement  Ask your healthcare provider to help you create an exercise plan  · Do not have anal sex  Anal sex can weaken the skin around your rectum and anus  · Avoid heavy lifting  This can cause straining and increase your risk for another hemorrhoid  Follow up with your doctor as directed:  Write down your questions so you remember to ask them during your visits  © Copyright [employer] 2021 Information is for End User's use only and may not be sold, redistributed or otherwise used for commercial purposes  All illustrations and images included in CareNotes® are the copyrighted property of A [employer]  or Marybeth Nieto  The above information is an  only  It is not intended as medical advice for individual conditions or treatments  Talk to your doctor, nurse or pharmacist before following any medical regimen to see if it is safe and effective for you

## 2022-02-16 NOTE — ANESTHESIA PREPROCEDURE EVALUATION
Procedure:  COLONOSCOPY  EGD    Relevant Problems   ANESTHESIA (within normal limits)      CARDIO   (+) Asymptomatic stenosis of left carotid artery   (+) Raynaud's disease   (-) HTN (hypertension)   (-) Hyperlipidemia      ENDO   (-) Diabetes mellitus, type 2 (HCC)   (-) Hyperthyroidism   (-) Hypothyroidism      GI/HEPATIC   (+) Gastroesophageal reflux disease      /RENAL (within normal limits)      HEMATOLOGY (within normal limits)      MUSCULOSKELETAL   (+) Bilateral sacroiliitis (HCC)   (+) DDD (degenerative disc disease), lumbar   (+) Fibromyalgia   (+) MG (myasthenia gravis) (HCC)      NEURO/PSYCH   (+) Anxiety   (+) Chronic pain syndrome   (+) Fibromyalgia      PULMONARY   (-) Asthma   (-) Sleep apnea      Other   (+) Sjogren's syndrome with keratoconjunctivitis sicca (HCC)        Physical Exam    Airway    Mallampati score: III  TM Distance: >3 FB  Neck ROM: full     Dental   No notable dental hx     Cardiovascular  Rhythm: regular, Rate: normal, No murmur,     Pulmonary  Breath sounds clear to auscultation,     Other Findings        Anesthesia Plan  ASA Score- 3     Anesthesia Type- IV sedation with anesthesia with ASA Monitors  Additional Monitors:   Airway Plan:           Plan Factors-Exercise tolerance (METS): >4 METS  Chart reviewed  Patient summary reviewed  Patient is not a current smoker  Induction- intravenous  Postoperative Plan-     Informed Consent- Anesthetic plan and risks discussed with patient  I personally reviewed this patient with the CRNA  Discussed and agreed on the Anesthesia Plan with the CRNA  Wendy Fair

## 2022-02-16 NOTE — H&P
History and Physical -  [facility]  Rachid Ferrer 46 y o  female MRN: [id number]        HPI: 17-year-old female with history of myasthenia gravis, Sjogren syndrome, Raynaud's disease, fibromyalgia reports having problems with chronic diarrhea for many years  She had colonoscopy about 5 years ago    Patient's sister had colon cancer    Historical Information   Past Medical History:   Diagnosis Date    Abnormal Pap smear of cervix     Arthritis     RA    Carotid stenosis, asymptomatic     Chemical exposure     Saritha Scruggs 67 - worked [address]    Dizziness     HPV in female     IBS (irritable bowel syndrome)     Myasthenia gravis ([address] )     dx 2014    Myasthenia gravis ([address]     Neuropathy     left foot    Polyneuropathy     Raynaud disease     dx     Sjoegren syndrome     dx     Syncope     Venereal wart 2015     Past Surgical History:   Procedure Laterality Date    CHOLECYSTECTOMY  2016    COLONOSCOPY      THYMECTOMY  2016    UPPER GASTROINTESTINAL ENDOSCOPY       Social History   Social History     Substance and Sexual Activity   Alcohol Use Not Currently     Social History     Substance and Sexual Activity   Drug Use No     Social History     Tobacco Use   Smoking Status Former Smoker    Packs/day: 0 50    Years: 8 00    Pack years: 4 00    Types: Cigarettes    Start date: 2001   [place] Quit date: 10/10/2010    Years since quittin 3   Smokeless Tobacco Never Used     Family History   Problem Relation Age of Onset    Arthritis Mother     Dementia Mother     Diabetes Father     Hypertension Father     Stroke Father     Breast cancer Sister 71    Arthritis Sister     Heart disease Sister     Diabetes Sister     Cirrhosis Sister         non-alcoholic    Diverticulitis Sister     Coronary artery disease Sister     Other Sister         carotid artery disease    Colon cancer Sister     Breast cancer Sister         63's    Liver cancer Sister     Ross syndrome Sister     Thyroid disease Sister     Stroke Sister     Urolithiasis Sister     Diverticulitis Brother     Dementia Maternal Grandmother     Heart attack Maternal Grandfather     Stroke Paternal Grandmother     Stroke Paternal Grandfather     No Known Problems Maternal Aunt     No Known Problems Paternal Aunt     No Known Problems Paternal Aunt        Meds/Allergies     (Not in a hospital admission)      No Known Allergies    Objective     Blood pressure 130/74, pulse 64, temperature 97 5 °F (36 4 °C), temperature source Temporal, resp  rate 18, height 5' 4" (1 626 m), weight 64 kg (141 lb), last menstrual period 01/01/2016, SpO2 100 %, not currently breastfeeding      PHYSICAL EXAM:    Gen: NAD  CV: S1 & S2 normal, RRR  CHEST: Clear to auscultate  ABD: soft, NT/ND, good bowel sounds  EXT: no edema    ASSESSMENT:     Diarrhea, family history of colon cancer    PLAN:    EGD and colonoscopy

## 2022-02-16 NOTE — ANESTHESIA POSTPROCEDURE EVALUATION
Post-Op Assessment Note    CV Status:  Stable  Pain Score: 0    Pain management: adequate     Mental Status:  Alert and awake   Hydration Status:  Euvolemic   PONV Controlled:  Controlled   Airway Patency:  Patent      Post Op Vitals Reviewed: Yes      Staff: CRNA         No complications documented      BP   98/57   Temp   97   Pulse 69   Resp 12   SpO2 99

## 2022-04-13 ENCOUNTER — OFFICE VISIT (OUTPATIENT)
Dept: URGENT CARE | Facility: CLINIC | Age: 52
End: 2022-04-13
Payer: OTHER GOVERNMENT

## 2022-04-13 ENCOUNTER — APPOINTMENT (OUTPATIENT)
Dept: RADIOLOGY | Facility: CLINIC | Age: 52
End: 2022-04-13
Payer: OTHER GOVERNMENT

## 2022-04-13 VITALS
DIASTOLIC BLOOD PRESSURE: 74 MMHG | OXYGEN SATURATION: 98 % | SYSTOLIC BLOOD PRESSURE: 110 MMHG | RESPIRATION RATE: 14 BRPM | HEART RATE: 76 BPM | TEMPERATURE: 98.6 F

## 2022-04-13 DIAGNOSIS — S63.501A SPRAIN OF RIGHT WRIST, INITIAL ENCOUNTER: ICD-10-CM

## 2022-04-13 DIAGNOSIS — S63.501A SPRAIN OF RIGHT WRIST, INITIAL ENCOUNTER: Primary | ICD-10-CM

## 2022-04-13 DIAGNOSIS — S52.591A OTHER CLOSED FRACTURE OF DISTAL END OF RIGHT RADIUS, INITIAL ENCOUNTER: ICD-10-CM

## 2022-04-13 PROCEDURE — G0382 LEV 3 HOSP TYPE B ED VISIT: HCPCS | Performed by: PHYSICIAN ASSISTANT

## 2022-04-13 PROCEDURE — 73110 X-RAY EXAM OF WRIST: CPT

## 2022-04-13 PROCEDURE — 29125 APPL SHORT ARM SPLINT STATIC: CPT | Performed by: PHYSICIAN ASSISTANT

## 2022-04-13 NOTE — PATIENT INSTRUCTIONS
Wrist Fracture in Adults   WHAT YOU NEED TO KNOW:   A wrist fracture is a break in one or more of the bones in your wrist         DISCHARGE INSTRUCTIONS:   Return to the emergency department if:   · Your pain gets worse or does not get better after you take pain medicine  · Your cast or splint breaks, gets wet, or is damaged  · Your hand or fingers feel numb or cold  · Your hand or fingers turn white or blue  · Your splint or cast feels too tight  · You have more pain or swelling after the cast or splint is put on  Call your doctor if:   · You have a fever  · There is a foul smell or blood coming from under the cast     · You have questions or concerns about your condition or care  Medicines: You may need any of the following:  · Prescription pain medicine  may be given  Ask your healthcare provider how to take this medicine safely  Some prescription pain medicines contain acetaminophen  Do not take other medicines that contain acetaminophen without talking to your healthcare provider  Too much acetaminophen may cause liver damage  Prescription pain medicine may cause constipation  Ask your healthcare provider how to prevent or treat constipation  · NSAIDs , such as ibuprofen, help decrease swelling, pain, and fever  NSAIDs can cause stomach bleeding or kidney problems in certain people  If you take blood thinner medicine, always ask your healthcare provider if NSAIDs are safe for you  Always read the medicine label and follow directions  · Acetaminophen  decreases pain and fever  It is available without a doctor's order  Ask how much to take and how often to take it  Follow directions  Read the labels of all other medicines you are using to see if they also contain acetaminophen, or ask your doctor or pharmacist  Acetaminophen can cause liver damage if not taken correctly  Do not use more than 4 grams (4,000 milligrams) total of acetaminophen in one day       · Take your medicine as directed  Contact your healthcare provider if you think your medicine is not helping or if you have side effects  Tell him or her if you are allergic to any medicine  Keep a list of the medicines, vitamins, and herbs you take  Include the amounts, and when and why you take them  Bring the list or the pill bottles to follow-up visits  Carry your medicine list with you in case of an emergency  Self-care:   · Rest  as much as possible  Do not play contact sports until the healthcare provider says it is okay  · Apply ice  on your wrist for 15 to 20 minutes every hour or as directed  Use an ice pack, or put crushed ice in a plastic bag  Cover it with a towel before you place it on your skin  Ice helps prevent tissue damage and decreases swelling and pain  · Elevate  your wrist above the level of your heart as often as possible  This will help decrease swelling and pain  Prop your wrist on pillows or blankets to keep it elevated comfortably  Cast or splint care:   · You may take a bath or shower as directed  Do not let your cast or splint get wet  Before bathing, cover the cast or splint with 2 plastic trash bags  Tape the bags to your skin above the cast or splint to seal out the water  Keep your arm out of the water in case the bag breaks  If a plaster cast gets wet and soft, call your healthcare provider  · Check the skin around the cast or splint every day  You may put lotion on any red or sore areas  · Do not push down or lean on the cast or brace because it may break  · Do not  scratch the skin under the cast by putting a sharp or pointed object inside the cast     Go to physical therapy as directed: You may need physical therapy after your wrist heals and the cast is removed  A physical therapist can teach you exercises to help improve movement and strength and to decrease pain  Follow up with your doctor or bone specialist as directed: You may need to return to have your cast removed  You may also need an x-ray to check how well the bone has healed  Write down your questions so you remember to ask them during your visits  © Copyright [employer] 2022 Information is for End User's use only and may not be sold, redistributed or otherwise used for commercial purposes  All illustrations and images included in CareNotes® are the copyrighted property of A D A M [employer] Inc  or Marybeth Collado   The above information is an  only  It is not intended as medical advice for individual conditions or treatments  Talk to your doctor, nurse or pharmacist before following any medical regimen to see if it is safe and effective for you

## 2022-04-13 NOTE — PROGRESS NOTES
[address]        NAME: Ronda Parham is a 46 y o  female  : 1970    MRN: [id number]  DATE: 2022  TIME: 3:25 PM    Assessment and Plan   Sprain of right wrist, initial encounter [V08 501A]  1  Sprain of right wrist, initial encounter  XR wrist 3+ vw right   2  Other closed fracture of distal end of right radius, initial encounter  Ambulatory referral to Orthopedic Surgery         Patient Instructions   Patient Instructions     Wrist Fracture in Adults   WHAT YOU NEED TO KNOW:   A wrist fracture is a break in one or more of the bones in your wrist         DISCHARGE INSTRUCTIONS:   Return to the emergency department if:   · Your pain gets worse or does not get better after you take pain medicine  · Your cast or splint breaks, gets wet, or is damaged  · Your hand or fingers feel numb or cold  · Your hand or fingers turn white or blue  · Your splint or cast feels too tight  · You have more pain or swelling after the cast or splint is put on  Call your doctor if:   · You have a fever  · There is a foul smell or blood coming from under the cast     · You have questions or concerns about your condition or care  Medicines: You may need any of the following:  · Prescription pain medicine  may be given  Ask your healthcare provider how to take this medicine safely  Some prescription pain medicines contain acetaminophen  Do not take other medicines that contain acetaminophen without talking to your healthcare provider  Too much acetaminophen may cause liver damage  Prescription pain medicine may cause constipation  Ask your healthcare provider how to prevent or treat constipation  · NSAIDs , such as ibuprofen, help decrease swelling, pain, and fever  NSAIDs can cause stomach bleeding or kidney problems in certain people  If you take blood thinner medicine, always ask your healthcare provider if NSAIDs are safe for you   Always read the medicine label and follow directions  · Acetaminophen  decreases pain and fever  It is available without a doctor's order  Ask how much to take and how often to take it  Follow directions  Read the labels of all other medicines you are using to see if they also contain acetaminophen, or ask your doctor or pharmacist  Acetaminophen can cause liver damage if not taken correctly  Do not use more than 4 grams (4,000 milligrams) total of acetaminophen in one day  · Take your medicine as directed  Contact your healthcare provider if you think your medicine is not helping or if you have side effects  Tell him or her if you are allergic to any medicine  Keep a list of the medicines, vitamins, and herbs you take  Include the amounts, and when and why you take them  Bring the list or the pill bottles to follow-up visits  Carry your medicine list with you in case of an emergency  Self-care:   · Rest  as much as possible  Do not play contact sports until the healthcare provider says it is okay  · Apply ice  on your wrist for 15 to 20 minutes every hour or as directed  Use an ice pack, or put crushed ice in a plastic bag  Cover it with a towel before you place it on your skin  Ice helps prevent tissue damage and decreases swelling and pain  · Elevate  your wrist above the level of your heart as often as possible  This will help decrease swelling and pain  Prop your wrist on pillows or blankets to keep it elevated comfortably  Cast or splint care:   · You may take a bath or shower as directed  Do not let your cast or splint get wet  Before bathing, cover the cast or splint with 2 plastic trash bags  Tape the bags to your skin above the cast or splint to seal out the water  Keep your arm out of the water in case the bag breaks  If a plaster cast gets wet and soft, call your healthcare provider  · Check the skin around the cast or splint every day  You may put lotion on any red or sore areas      · Do not push down or lean on the cast or brace because it may break  · Do not  scratch the skin under the cast by putting a sharp or pointed object inside the cast     Go to physical therapy as directed: You may need physical therapy after your wrist heals and the cast is removed  A physical therapist can teach you exercises to help improve movement and strength and to decrease pain  Follow up with your doctor or bone specialist as directed: You may need to return to have your cast removed  You may also need an x-ray to check how well the bone has healed  Write down your questions so you remember to ask them during your visits  © Copyright [employer] 2022 Information is for End User's use only and may not be sold, redistributed or otherwise used for commercial purposes  All illustrations and images included in CareNotes® are the copyrighted property of A D A M , Inc  or Marybeth Collado   The above information is an  only  It is not intended as medical advice for individual conditions or treatments  Talk to your doctor, nurse or pharmacist before following any medical regimen to see if it is safe and effective for you  Follow up with PCP in 3-5 days  Proceed to  ER if symptoms worsen  Chief Complaint     Chief Complaint   Patient presents with    Wrist Pain     fell last month on ice  Right wrist pain with swelling  Ibuprofen taking with no effectiveness  Applying pressure or movement makes pain worse, Pain is occ sharp and radiates to forearm  Denies fingers tingling  History of Present Illness       Patient is a 69-year-old female presenting with right wrist pain for the past month after a slip and fall on outstretched hand on ice  Patient states she slipped fell on both hands has had pain on the ulnar and radial side of the wrist since then  States she also has had some bruising swelling  Extension of the wrist causes pain but otherwise no problems with wrist movements    Denies numbness/tingling, weakness  Review of Systems   Review of Systems   Musculoskeletal: Positive for arthralgias and joint swelling  Negative for myalgias  Neurological: Negative for weakness and numbness           Current Medications       Current Outpatient Medications:     celecoxib (CeleBREX) 200 mg capsule, Take 200 mg by mouth 2 (two) times a day , Disp: , Rfl:     cholestyramine (QUESTRAN) 4 g packet, Take 1 packet (4 g total) by mouth 3 (three) times a day as needed (DIARRHEA), Disp: 60 packet, Rfl: 1    diphenoxylate-atropine (LOMOTIL) 2 5-0 025 mg per tablet, Take 1 tablet by mouth 2 (two) times a day as needed for diarrhea, Disp: 60 tablet, Rfl: 2    escitalopram (Lexapro) 10 mg tablet, Take 1 tablet (10 mg total) by mouth daily, Disp: 90 tablet, Rfl: 1    folic acid (FOLVITE) 1 mg tablet, Take 1 mg by mouth daily  , Disp: , Rfl:     gabapentin (NEURONTIN) 300 mg capsule, Take 1 capsule (300 mg total) by mouth 3 (three) times a day, Disp: 270 capsule, Rfl: 1    ibuprofen (MOTRIN) 600 mg tablet, Take 600 mg by mouth as needed, Disp: , Rfl:     multivitamin-minerals (CENTRUM ADULTS) tablet, Take 1 tablet by mouth daily, Disp: , Rfl:     omeprazole (PriLOSEC) 20 mg delayed release capsule, TAKE 1 CAPSULE DAILY, Disp: 90 capsule, Rfl: 3    predniSONE 2 5 mg tablet, TAKE 1 TABLET DAILY, Disp: 90 tablet, Rfl: 3    pyridostigmine (MESTINON) 60 mg tablet, Take 1 tablet (60 mg total) by mouth 4 (four) times a day, Disp: 360 tablet, Rfl: 3    zolpidem (AMBIEN) 10 mg tablet, Take 10 mg by mouth daily at bedtime  , Disp: , Rfl:     Current Allergies     Allergies as of 04/13/2022    (No Known Allergies)            The following portions of the patient's history were reviewed and updated as appropriate: allergies, current medications, past family history, past medical history, past social history, past surgical history and problem list      Past Medical History:   Diagnosis Date    Abnormal Pap smear of cervix     Arthritis     RA    Carotid stenosis, asymptomatic     Chemical exposure     Saritha Scruggs 67 - worked [address]    Dizziness     HPV in female     IBS (irritable bowel syndrome)     Myasthenia gravis ([address] )     dx 2014    Myasthenia gravis ([address]     Neuropathy     left foot    Polyneuropathy     Raynaud disease     dx 2016    Sjoegren syndrome     dx 2010    Syncope     Venereal wart 04/13/2015       Past Surgical History:   Procedure Laterality Date    CHOLECYSTECTOMY  2016    COLONOSCOPY      THYMECTOMY  2016    UPPER GASTROINTESTINAL ENDOSCOPY         Family History   Problem Relation Age of Onset    Arthritis Mother     Dementia Mother     Diabetes Father     Hypertension Father     Stroke Father     Breast cancer Sister 71    Arthritis Sister     Heart disease Sister     Diabetes Sister     Cirrhosis Sister         non-alcoholic    Diverticulitis Sister     Coronary artery disease Sister     Other Sister         carotid artery disease    Colon cancer Sister     Breast cancer Sister         63's    Liver cancer Sister     Ross syndrome Sister     Thyroid disease Sister     Stroke Sister     Urolithiasis Sister     Diverticulitis Brother     Dementia Maternal Grandmother     Heart attack Maternal Grandfather     Stroke Paternal Grandmother     Stroke Paternal Grandfather     No Known Problems Maternal Aunt     No Known Problems Paternal Aunt     No Known Problems Paternal Aunt          Medications have been verified  Objective   /74   Pulse 76   Temp 98 6 °F (37 °C)   Resp 14   LMP 01/01/2016 (Approximate)   SpO2 98%        Physical Exam     Physical Exam  Constitutional:       Appearance: Normal appearance  Cardiovascular:      Pulses: Normal pulses  Musculoskeletal:      Comments: Tenderness to palpation over the radial and ulnar lateral sides of the right wrist   Patient does have snuffbox tenderness    Very mild tenderness over the dorsum of the wrist   No tenderness to palpation over the palmar side  Full active range of motion 5/5 muscle strength of the elbow/wrist/hand  Neurovascularly intact distally  No significant swelling or ecchymosis noted  Neurological:      Mental Status: She is alert     Psychiatric:         Mood and Affect: Mood normal          Behavior: Behavior normal          Orthopedic injury treatment    Date/Time: 4/13/2022 3:24 PM  Performed by: Anastacio Murry PA-C  Authorized by: Anastacio Murry PA-C     Patient Location:  Clinic  Other Assisting Provider: No    Verbal consent obtained?: Yes    Risks and benefits: Risks, benefits and alternatives were discussed    Consent given by:  Patient  Patient states understanding of procedure being performed: Yes    Patient's understanding of procedure matches consent: Yes    Procedure consent matches procedure scheduled: Yes    Patient identity confirmed:  Verbally with patient  Injury location:  Wrist  Location details:  Right wrist  Injury type:  Fracture  Fracture type: distal radius    Fracture type: distal radius    Neurovascular status: Neurovascularly intact    Distal perfusion: normal    Neurological function: normal    Range of motion: normal    Local anesthesia used?: No    Manipulation performed?: No    Immobilization:  Splint  Splint type:  Thumb spica  Supplies used:  Aluminum splint  Neurovascular status: Neurovascularly intact    Distal perfusion: normal    Neurological function: normal    Range of motion: normal    Patient tolerance:  Patient tolerated the procedure well with no immediate complications

## 2022-04-14 ENCOUNTER — TELEPHONE (OUTPATIENT)
Dept: FAMILY MEDICINE CLINIC | Facility: CLINIC | Age: 52
End: 2022-04-14

## 2022-04-14 ENCOUNTER — TELEPHONE (OUTPATIENT)
Dept: OBGYN CLINIC | Facility: CLINIC | Age: 52
End: 2022-04-14

## 2022-04-14 DIAGNOSIS — S52.591A OTHER CLOSED FRACTURE OF DISTAL END OF RIGHT RADIUS, INITIAL ENCOUNTER: Primary | ICD-10-CM

## 2022-04-14 NOTE — TELEPHONE ENCOUNTER
PUNEETOVM to reschedule since she was scheduled at the wrong time and she also needs a referral from her PCP

## 2022-04-14 NOTE — TELEPHONE ENCOUNTER
Patient seen at urgent care for wrist pain upon xray review  patient has Other closed fracture of distal end of right radius   Patient has  and requires pcp referral - referral pended

## 2022-04-18 ENCOUNTER — APPOINTMENT (OUTPATIENT)
Dept: RADIOLOGY | Facility: CLINIC | Age: 52
End: 2022-04-18
Payer: OTHER GOVERNMENT

## 2022-04-18 ENCOUNTER — OFFICE VISIT (OUTPATIENT)
Dept: OBGYN CLINIC | Facility: CLINIC | Age: 52
End: 2022-04-18
Payer: OTHER GOVERNMENT

## 2022-04-18 VITALS
HEART RATE: 54 BPM | SYSTOLIC BLOOD PRESSURE: 139 MMHG | WEIGHT: 154.8 LBS | HEIGHT: 65 IN | DIASTOLIC BLOOD PRESSURE: 88 MMHG | BODY MASS INDEX: 25.79 KG/M2

## 2022-04-18 DIAGNOSIS — S52.591A OTHER CLOSED FRACTURE OF DISTAL END OF RIGHT RADIUS, INITIAL ENCOUNTER: ICD-10-CM

## 2022-04-18 DIAGNOSIS — S69.92XA LEFT WRIST INJURY, INITIAL ENCOUNTER: ICD-10-CM

## 2022-04-18 DIAGNOSIS — S69.91XA RIGHT WRIST INJURY, INITIAL ENCOUNTER: Primary | ICD-10-CM

## 2022-04-18 PROCEDURE — 73110 X-RAY EXAM OF WRIST: CPT

## 2022-04-18 PROCEDURE — 99204 OFFICE O/P NEW MOD 45 MIN: CPT | Performed by: FAMILY MEDICINE

## 2022-04-18 RX ORDER — LIFITEGRAST 50 MG/ML
1 SOLUTION/ DROPS OPHTHALMIC 2 TIMES DAILY
COMMUNITY
Start: 2022-03-23

## 2022-04-18 NOTE — PROGRESS NOTES
Assessment/Plan:  Assessment/Plan   Diagnoses and all orders for this visit:    Right wrist injury, initial encounter  -     MRI wrist right wo contrast; Future    Left wrist injury, initial encounter  -     XR wrist 3+ vw left; Future    Other closed fracture of distal end of right radius, initial encounter  -     Ambulatory Referral to Orthopedic Surgery    Other orders  -     lifitegrnirali Herman) 5 % op solution      64-year-old right-hand-dominant female with bilateral wrist pain, right worse than left, following [address] injury 1 month ago  Discussed with patient physical exam, radiographs, impression and plan  X-rays right wrist noted for healing nondisplaced distal radius fracture  X-rays of the left wrist unremarkable for acute osseous abnormality  Physical exam of the right Wrist noted for moderate tenderness at the snuffbox  She has mild tenderness at the distal radius  She has intact range of motion of the wrist and digits the right hand  There is mild pain with axial load and mild pain with DRUJ stress  She is intact neurovascularly  Left wrist noted for mild tenderness at the distal radius and radiocarpal joint  She has intact range of motion of the wrist and fingers of the hand  She is intact neurovascularly  Regards to her left wrist she likely sustained contusion and sprain  In regards to her right wrist there is healing of distal radius fracture, and so there is no need for surgical intervention this regard, however there is clinical concern for occult scaphoid fracture due to snuffbox tenderness  At this time I will refer for MRI of the right wrist to evaluate for scaphoid fracture as surgical intervention may be warranted  She is to continue wearing thumb spica brace  She will follow up after getting MRI done  Subjective:   Patient ID: Adams Boxer is a 46 y o  female    Chief Complaint   Patient presents with    Left Wrist - Pain    Right Wrist - Pain       46year-old right-hand-dominant female presents for evaluation of bilateral wrist pain, right worse than left, following [address] injury 1 month ago  She slipped on ice, falling on her outstretched hands  She had pain described as sudden in onset, generalized to the wrist, achy and burning and throbbing and sometimes sharp, radiating proximally along the forearm to elbows, worse with movement, and improved with resting  She started self treating with ibuprofen 600 mg twice daily  She also apply ice  Symptoms did not improve and she presented to Urgent Care  X-ray evaluation was noted for fracture of distal radius  She was provided with thumb spica brace and referred to orthopedic care  She states that pain and right wrist still bothersome but pain in left wrist is less intense  Wrist Pain  This is a new problem  The current episode started more than 1 month ago  The problem occurs daily  The problem has been unchanged  Associated symptoms include arthralgias and numbness  Pertinent negatives include no abdominal pain, chest pain, chills, fever, joint swelling, rash, sore throat or weakness  Exacerbated by: Wrist use  She has tried rest, immobilization and NSAIDs for the symptoms  The treatment provided mild relief  The following portions of the patient's history were reviewed and updated as appropriate: She  has a past medical history of Abnormal Pap smear of cervix, Arthritis, Carotid stenosis, asymptomatic, Chemical exposure, Dizziness, HPV in female, IBS (irritable bowel syndrome), Myasthenia gravis ([address] ), Myasthenia gravis ([address] ), Neuropathy, Polyneuropathy, Raynaud disease, Sjoegren syndrome, Syncope, and Venereal wart (04/13/2015)  She  has a past surgical history that includes Cholecystectomy (2016); Thymectomy (2016); Colonoscopy; and Upper gastrointestinal endoscopy    Her family history includes Arthritis in her mother and sister; Breast cancer in her sister; Breast cancer (age of onset: 71) in her sister; Cirrhosis in her sister; Colon cancer in her sister; Coronary artery disease in her sister; Dementia in her maternal grandmother and mother; Diabetes in her father and sister; Diverticulitis in her brother and sister; Heart attack in her maternal grandfather; Heart disease in her sister; Hypertension in her father; Liver cancer in her sister; No Known Problems in her maternal aunt, paternal aunt, and paternal aunt; Other in her sister; Stroke in her father, paternal grandfather, paternal grandmother, and sister; Thyroid disease in her sister; Ross syndrome in her sister; Urolithiasis in her sister  She  reports that she quit smoking about 11 years ago  Her smoking use included cigarettes  She started smoking about 20 years ago  She has a 4 00 pack-year smoking history  She has never used smokeless tobacco  She reports previous alcohol use  She reports that she does not use drugs  She has No Known Allergies       Review of Systems   Constitutional: Negative for chills and fever  HENT: Negative for sore throat  Eyes: Negative for visual disturbance  Respiratory: Negative for shortness of breath  Cardiovascular: Negative for chest pain  Gastrointestinal: Negative for abdominal pain  Genitourinary: Negative for flank pain  Musculoskeletal: Positive for arthralgias  Negative for joint swelling  Skin: Negative for rash and wound  Neurological: Positive for numbness  Negative for weakness  Hematological: Does not bruise/bleed easily  Psychiatric/Behavioral: Negative for self-injury  Objective:  Vitals:    04/18/22 0852   BP: 139/88   Pulse: (!) 54   Weight: 70 2 kg (154 lb 12 8 oz)   Height: 5' 5" (1 651 m)     Right Hand Exam     Muscle Strength   The patient has normal right wrist strength      Tests   Tinel's sign (median nerve): negative  Finkelstein's test: negative    Other   Sensation: normal  Pulse: present      Left Hand Exam     Muscle Strength   The patient has normal left wrist strength  Tests   Tinel's sign (median nerve): negative  Finkelstein's test: negative    Other   Sensation: normal  Pulse: present          Observations     Left Wrist/Hand   Negative for deformity  Right Wrist/Hand   Negative for deformity  Tenderness     Left Wrist/Hand   No tenderness in the triangular fibrocartilage complex , scaphoid and lunate  Right Wrist/Hand   Tenderness in the carpometacarpal joint, triangular fibrocartilage complex , distal radioulnar joint and scaphoid  No tenderness in the first dorsal compartment, second dorsal compartment, fifth dorsal compartment, sixth dorsal compartment and lunate  Additional Tenderness Details  Right  -distal radius    Left  -radiocarpal joint    Active Range of Motion     Left Wrist   Normal active range of motion  Wrist flexion: with pain  Wrist extension: with pain    Right Wrist   Normal active range of motion  Wrist flexion: with pain  Wrist extension: with pain    Additional Active Range of Motion Details  Normal range of motion of fingers of both hands    Strength/Myotome Testing     Left Wrist/Hand   Normal wrist strength    Right Wrist/Hand   Normal wrist strength    Tests     Left Wrist/Hand   Negative AIN OK sign, distal radial-ulnar joint stress, Finkelstein's, TFCC load and Tinel's sign (medial nerve)  Right Wrist/Hand   Negative AIN OK sign, distal radial-ulnar joint stress, Finkelstein's, TFCC load and Tinel's sign (medial nerve)  Physical Exam  Vitals and nursing note reviewed  Constitutional:       General: She is not in acute distress  Appearance: She is well-developed  She is not ill-appearing or diaphoretic  HENT:      Head: Normocephalic  Right Ear: External ear normal       Left Ear: External ear normal    Eyes:      Conjunctiva/sclera: Conjunctivae normal    Neck:      Trachea: No tracheal deviation     Cardiovascular:      Comments: Bradycardic  Pulmonary:      Effort: Pulmonary effort is normal  No respiratory distress  Abdominal:      General: There is no distension  Musculoskeletal:         General: Tenderness present  No swelling, deformity or signs of injury  Right hand: No deformity  Left hand: No deformity  Skin:     General: Skin is warm and dry  Coloration: Skin is not jaundiced or pale  Neurological:      Mental Status: She is alert and oriented to person, place, and time  Psychiatric:         Mood and Affect: Mood normal          Behavior: Behavior normal          Thought Content: Thought content normal          Judgment: Judgment normal          I have personally reviewed pertinent films in PACS and my interpretation is Healing nondisplaced distal radius fracture

## 2022-04-23 ENCOUNTER — HOSPITAL ENCOUNTER (OUTPATIENT)
Dept: MRI IMAGING | Facility: HOSPITAL | Age: 52
Discharge: HOME | End: 2022-04-23
Attending: FAMILY MEDICINE
Payer: OTHER GOVERNMENT

## 2022-04-23 DIAGNOSIS — S69.91XA RIGHT WRIST INJURY, INITIAL ENCOUNTER: ICD-10-CM

## 2022-04-23 PROCEDURE — 73221 MRI JOINT UPR EXTREM W/O DYE: CPT

## 2022-04-23 PROCEDURE — G1004 CDSM NDSC: HCPCS

## 2022-04-27 ENCOUNTER — OFFICE VISIT (OUTPATIENT)
Dept: OBGYN CLINIC | Facility: CLINIC | Age: 52
End: 2022-04-27
Payer: OTHER GOVERNMENT

## 2022-04-27 VITALS
SYSTOLIC BLOOD PRESSURE: 129 MMHG | HEIGHT: 65 IN | HEART RATE: 61 BPM | WEIGHT: 154 LBS | BODY MASS INDEX: 25.66 KG/M2 | DIASTOLIC BLOOD PRESSURE: 84 MMHG

## 2022-04-27 DIAGNOSIS — S52.591A OTHER CLOSED FRACTURE OF DISTAL END OF RIGHT RADIUS, INITIAL ENCOUNTER: Primary | ICD-10-CM

## 2022-04-27 DIAGNOSIS — S63.501D SPRAIN OF RIGHT WRIST, SUBSEQUENT ENCOUNTER: ICD-10-CM

## 2022-04-27 DIAGNOSIS — M65.831 EXTENSOR TENOSYNOVITIS OF RIGHT WRIST: ICD-10-CM

## 2022-04-27 DIAGNOSIS — S63.502D SPRAIN OF LEFT WRIST, SUBSEQUENT ENCOUNTER: ICD-10-CM

## 2022-04-27 PROCEDURE — 99213 OFFICE O/P EST LOW 20 MIN: CPT | Performed by: FAMILY MEDICINE

## 2022-04-27 NOTE — PROGRESS NOTES
Assessment/Plan:  Assessment/Plan   Diagnoses and all orders for this visit:    Other closed fracture of distal end of right radius, initial encounter    Sprain of right wrist, subsequent encounter    Extensor tenosynovitis of right wrist    Sprain of left wrist, subsequent encounter      77-year-old right-hand-dominant female with bilateral wrist pain, right worse than left, following [address] injury 5 weeks ago  Discussed with patient MRI results, impression and plan  MRI of the right wrist noted for bone marrow edema seen within distal radius corresponding to nondisplaced distal radius fracture without articular surface involvement, no bone marrow edema seen within the scaphoid, arthritic changes of the 1st [facility] joint, tendinosis, tenosynovitis of the 1st extensor compartment with partial tearing of the extensor pollicis brevis  Physical exam is noted for mild tenderness dorsum and radial aspect distal radius  She has mild tenderness at the 1st dorsal compartment  She has intact range of motion of the wrist however pain with extension and flexion  There is mild pain with Finkelstein's maneuver  She is intact neurovascularly  Clinical impression is that she is improving, but not fully recovered from her injury  I advised that surgery is not warranted  She may benefit from steroid injection to the 1st dorsal compartment, however due to concurrent fracture we will defer steroid injection at this time  She may continue with thumb spica brace when outside the home, but may start to wean out of brace inside the home  She may continue applying topical diclofenac gel  She will follow up in 4 weeks at which point she will be re-evaluated  Subjective:   Patient ID: Demetrius Allen is a 46 y o  female    Chief Complaint   Patient presents with    Right Wrist - Follow-up, Pain       77-year-old right-hand-dominant female following up for bilateral wrist pain, right worse than left, following [address] injury more than 5 weeks ago  She was last seen by me 9 days ago at which point she was referred for MRI of the right wrist due to concern for scaphoid involvement  She was advised to continue wearing thumb spica brace  She reports feeling better since her last visit, but has pain that is still bothersome  She has pain described as generalized to the wrist but worse at the radial aspect, achy and throbbing and sometimes sharp, sometimes radiating proximally along the radial aspect the forearm, worse with twisting, associated with numbness, and improved with resting  She continues with thumb spica brace and has been applying topical diclofenac 1 to 2 times a day  Wrist Pain  This is a new problem  The current episode started more than 1 month ago  The problem occurs daily  The problem has been gradually improving  Associated symptoms include arthralgias and numbness  Pertinent negatives include no joint swelling or weakness  The symptoms are aggravated by twisting  She has tried rest, immobilization and NSAIDs for the symptoms  The treatment provided moderate relief  Review of Systems   Musculoskeletal: Positive for arthralgias  Negative for joint swelling  Neurological: Positive for numbness  Negative for weakness  Objective:  Vitals:    04/27/22 0749   BP: 129/84   Pulse: 61   Weight: 69 9 kg (154 lb)   Height: 5' 5" (1 651 m)     Right Hand Exam     Muscle Strength   The patient has normal right wrist strength  Tests   Finkelstein's test: positive (Mild)    Other   Sensation: normal  Pulse: present          Observations     Right Wrist/Hand   Negative for deformity  Tenderness     Right Wrist/Hand   Tenderness in the first dorsal compartment       Additional Tenderness Details  Right  -distal radius    Active Range of Motion     Right Wrist   Normal active range of motion    Additional Active Range of Motion Details  Normal range of motion of fingers of right hand    Strength/Myotome Testing Right Wrist/Hand   Normal wrist strength    Tests     Right Wrist/Hand   Positive Finkelstein's (Mild)  Negative AIN OK sign  Physical Exam  Vitals and nursing note reviewed  Constitutional:       General: She is not in acute distress  Appearance: She is well-developed  She is not ill-appearing or diaphoretic  HENT:      Head: Normocephalic  Right Ear: External ear normal       Left Ear: External ear normal    Eyes:      Conjunctiva/sclera: Conjunctivae normal    Neck:      Trachea: No tracheal deviation  Cardiovascular:      Rate and Rhythm: Normal rate  Pulmonary:      Effort: Pulmonary effort is normal  No respiratory distress  Abdominal:      General: There is no distension  Musculoskeletal:         General: Tenderness present  No swelling, deformity or signs of injury  Right hand: No deformity  Skin:     General: Skin is warm and dry  Coloration: Skin is not jaundiced or pale  Neurological:      Mental Status: She is alert and oriented to person, place, and time  Psychiatric:         Mood and Affect: Mood normal          Behavior: Behavior normal          Thought Content: Thought content normal          Judgment: Judgment normal          I have personally reviewed pertinent films in PACS and my interpretation is Edema of distal radius

## 2022-05-03 ENCOUNTER — APPOINTMENT (OUTPATIENT)
Dept: LAB | Facility: CLINIC | Age: 52
End: 2022-05-03
Payer: OTHER GOVERNMENT

## 2022-05-03 DIAGNOSIS — M35.01 SJOGREN'S SYNDROME WITH KERATOCONJUNCTIVITIS SICCA (HCC): ICD-10-CM

## 2022-05-03 LAB
ALBUMIN SERPL BCP-MCNC: 3.7 G/DL (ref 3.5–5)
ALP SERPL-CCNC: 73 U/L (ref 46–116)
ALT SERPL W P-5'-P-CCNC: 44 U/L (ref 12–78)
ANION GAP SERPL CALCULATED.3IONS-SCNC: 4 MMOL/L (ref 4–13)
AST SERPL W P-5'-P-CCNC: 37 U/L (ref 5–45)
BASOPHILS # BLD AUTO: 0.03 THOUSANDS/ΜL (ref 0–0.1)
BASOPHILS NFR BLD AUTO: 1 % (ref 0–1)
BILIRUB SERPL-MCNC: 0.44 MG/DL (ref 0.2–1)
BUN SERPL-MCNC: 13 MG/DL (ref 5–25)
CALCIUM SERPL-MCNC: 9.2 MG/DL (ref 8.3–10.1)
CHLORIDE SERPL-SCNC: 109 MMOL/L (ref 100–108)
CO2 SERPL-SCNC: 26 MMOL/L (ref 21–32)
CREAT SERPL-MCNC: 0.91 MG/DL (ref 0.6–1.3)
CRP SERPL QL: <3 MG/L
EOSINOPHIL # BLD AUTO: 0.17 THOUSAND/ΜL (ref 0–0.61)
EOSINOPHIL NFR BLD AUTO: 5 % (ref 0–6)
ERYTHROCYTE [DISTWIDTH] IN BLOOD BY AUTOMATED COUNT: 13.7 % (ref 11.6–15.1)
GFR SERPL CREATININE-BSD FRML MDRD: 72 ML/MIN/1.73SQ M
GLUCOSE P FAST SERPL-MCNC: 91 MG/DL (ref 65–99)
HCT VFR BLD AUTO: 39.6 % (ref 34.8–46.1)
HGB BLD-MCNC: 12.8 G/DL (ref 11.5–15.4)
IMM GRANULOCYTES # BLD AUTO: 0.01 THOUSAND/UL (ref 0–0.2)
IMM GRANULOCYTES NFR BLD AUTO: 0 % (ref 0–2)
LYMPHOCYTES # BLD AUTO: 1.44 THOUSANDS/ΜL (ref 0.6–4.47)
LYMPHOCYTES NFR BLD AUTO: 40 % (ref 14–44)
MCH RBC QN AUTO: 28.6 PG (ref 26.8–34.3)
MCHC RBC AUTO-ENTMCNC: 32.3 G/DL (ref 31.4–37.4)
MCV RBC AUTO: 88 FL (ref 82–98)
MONOCYTES # BLD AUTO: 0.37 THOUSAND/ΜL (ref 0.17–1.22)
MONOCYTES NFR BLD AUTO: 10 % (ref 4–12)
NEUTROPHILS # BLD AUTO: 1.63 THOUSANDS/ΜL (ref 1.85–7.62)
NEUTS SEG NFR BLD AUTO: 44 % (ref 43–75)
NRBC BLD AUTO-RTO: 0 /100 WBCS
PLATELET # BLD AUTO: 187 THOUSANDS/UL (ref 149–390)
PMV BLD AUTO: 8.9 FL (ref 8.9–12.7)
POTASSIUM SERPL-SCNC: 4.3 MMOL/L (ref 3.5–5.3)
PROT SERPL-MCNC: 7.1 G/DL (ref 6.4–8.2)
RBC # BLD AUTO: 4.48 MILLION/UL (ref 3.81–5.12)
SODIUM SERPL-SCNC: 139 MMOL/L (ref 136–145)
WBC # BLD AUTO: 3.65 THOUSAND/UL (ref 4.31–10.16)

## 2022-05-03 PROCEDURE — 80053 COMPREHEN METABOLIC PANEL: CPT

## 2022-05-03 PROCEDURE — 85025 COMPLETE CBC W/AUTO DIFF WBC: CPT

## 2022-05-03 PROCEDURE — 86140 C-REACTIVE PROTEIN: CPT

## 2022-05-03 PROCEDURE — 36415 COLL VENOUS BLD VENIPUNCTURE: CPT

## 2022-05-05 ENCOUNTER — TELEPHONE (OUTPATIENT)
Dept: FAMILY MEDICINE CLINIC | Facility: CLINIC | Age: 52
End: 2022-05-05

## 2022-05-05 DIAGNOSIS — M46.1 BILATERAL SACROILIITIS (HCC): Primary | ICD-10-CM

## 2022-05-05 NOTE — TELEPHONE ENCOUNTER
PATIENT SAY HAS INFORMATION TO DR Nicole De Paz - FAX # [phone] FAX -  NPI [id number] -  DX CODE [id number] - NEEDS REFERRAL

## 2022-05-11 NOTE — PROGRESS NOTES
Assessment/Plan:     1  Anxiety  Improved on lexapro  Continue at 10 mg daily     2  Persistent insomnia  Doing well on ambien 10 mg as needed    Denies any side effects  3  Neck pain  New  Refer to PT    - Ambulatory Referral to Physical Therapy; Future    4  Closed fracture of distal end of right radius, unspecified fracture morphology, initial encounter  Radius fracture, menopausal and high risk long term medication (prednisone) - recommend dexa and f/u with her rheum for treatment if osteoporosis  With gerd would not be candidate for bisphosphonate  - DXA bone density spine hip and pelvis; Future    5  Post-menopause  See above   - DXA bone density spine hip and pelvis; Future    6  Encounter for long-term current use of medication  See above   - DXA bone density spine hip and pelvis; Future    7  Acquired compression deformity of vertebra  See above       Return in about 4 months (around 9/14/2022) for Annual physical     Subjective:   Joseph Miranda is a 46 y o  female here today for a follow-up on her current medical conditions:  Patient Active Problem List   Diagnosis    Asymptomatic stenosis of left carotid artery    Family history of premature CAD    MG (myasthenia gravis) ([address]    Chronic pain syndrome    Greater trochanteric bursitis of both hips    Lumbar radiculopathy    Generalized joint pain    Cervical spinal stenosis    Abnormal cervical Papanicolaou smear    Gastroesophageal reflux disease    Irritable bowel syndrome with diarrhea    Menopausal symptom    Polyneuropathy    Raynaud's disease    Sjogren's syndrome with keratoconjunctivitis sicca ([address]    Family history of breast cancer in sister    Persistent insomnia    Bilateral sacroiliitis ([address] )    DDD (degenerative disc disease), lumbar    Acquired compression deformity of vertebra    Fibromyalgia    Dyspareunia in female    Diarrhea    Family history of colon cancer    Anxiety        Current Medications:  Current Outpatient Medications   Medication Sig Dispense Refill    celecoxib (CeleBREX) 200 mg capsule Take 200 mg by mouth 2 (two) times a day       diphenoxylate-atropine (LOMOTIL) 2 5-0 025 mg per tablet Take 1 tablet by mouth 2 (two) times a day as needed for diarrhea 60 tablet 2    escitalopram (Lexapro) 10 mg tablet Take 1 tablet (10 mg total) by mouth daily 90 tablet 1    folic acid (FOLVITE) 1 mg tablet Take 1 mg by mouth daily        gabapentin (NEURONTIN) 300 mg capsule Take 1 capsule (300 mg total) by mouth 3 (three) times a day 270 capsule 1    lifitegrast (Xiidra) 5 % op solution       multivitamin-minerals (CENTRUM ADULTS) tablet Take 1 tablet by mouth daily      omeprazole (PriLOSEC) 20 mg delayed release capsule TAKE 1 CAPSULE DAILY 90 capsule 3    pediatric multivitamin-iron (POLY-VI-SOL with IRON) 15 MG chewable tablet Chew 1 tablet  in the morning   predniSONE 2 5 mg tablet TAKE 1 TABLET DAILY 90 tablet 3    pyridostigmine (MESTINON) 60 mg tablet Take 1 tablet (60 mg total) by mouth 4 (four) times a day 360 tablet 3    traMADol (ULTRAM) 50 mg tablet       zolpidem (AMBIEN) 10 mg tablet Take 10 mg by mouth daily at bedtime       No current facility-administered medications for this visit  HPI:  Chief Complaint   Patient presents with    Follow-up     PHQ/HUNTER v- NO CONCERNS     Labs Only      LABS DONE     -- Above per clinical staff and reviewed   --    PHQ-2/9 Depression Screening    Little interest or pleasure in doing things: 0 - not at all  Feeling down, depressed, or hopeless: 0 - not at all  Trouble falling or staying asleep, or sleeping too much: 0 - not at all  Feeling tired or having little energy: 0 - not at all  Poor appetite or overeatin - not at all  Feeling bad about yourself - or that you are a failure or have let yourself or your family down: 0 - not at all  Trouble concentrating on things, such as reading the newspaper or watching television: 0 - not at all  Moving or speaking so slowly that other people could have noticed  Or the opposite - being so fidgety or restless that you have been moving around a lot more than usual: 0 - not at all  Thoughts that you would be better off dead, or of hurting yourself in some way: 0 - not at all  PHQ-2 Score: 0  PHQ-2 Interpretation: Negative depression screen  PHQ-9 Score: 0   PHQ-9 Interpretation: No or Minimal depression        HUNTER-7 Flowsheet Screening    Flowsheet Row Most Recent Value   Over the last 2 weeks, how often have you been bothered by any of the following problems? Feeling nervous, anxious, or on edge 0   Not being able to stop or control worrying 0   Worrying too much about different things 0   Trouble relaxing 1   Being so restless that it is hard to sit still 0   Becoming easily annoyed or irritable 0   Feeling afraid as if something awful might happen 0   HUNTER-7 Total Score 1             mood check (PHQ was positive = 5 - tired, sleeping too much, poor appetite, GAD7 = 3 ) chronic pain, FM   stopped MTX and was feeling better  losing weight, referred to nutrition  feels like anxiety cause  to see GI  started Lexapro 10 mg     follows with neuro, rheum, pain mgmt, referred to GI, PT/aqua therapy  dec labs for rheum   oct 2021 labs done - vit D 46, hep C negative, HbA1C 5 5, WBC stable 3 54, TSH 1 92, glu 79, GFR 81  Cr 0 83, chol 176, TG 94, HDL 57,  , HIV neg    Dr Beatris Carvajal recommended d/c ibuprofen, use celebrex 200 mg twice a day x 4 weeks   Can use tylenol arhtritis  Prescription tramadol at bedtime as needed  Continue hydroxychloroquine   Needed referral from us to get injections in knees for OA   [facility]     Last mood check in December   Was doing well on Lexapro - not constanly hungry, sleeping better, less menopausal symptoms/hot flahses     recommend taking this 6 - 12 months   Today:  BROKE RIGHT WRIST IN MARCH WHEN FELL ON MARCH   Doing better with mood   Takes Lexapro at 4 pm and feels much better  Calmer   Able to deal with mom's episodes     Neck pain   For few months   With certain movements worse   In evening worse   Even into her shoulders  6/10   Tried Tramadol given a week ago from rheum   Did not tried heat or ice         The following portions of the patient's history were reviewed and updated as appropriate: allergies, current medications, past family history, past medical history, past social history, past surgical history and problem list     Objective:  Vitals:  /62   Pulse 70   Temp 97 7 °F (36 5 °C)   Resp 12   Ht 5' 4 53" (1 639 m)   Wt 70 5 kg (155 lb 6 4 oz)   LMP 01/01/2016 (Approximate)   SpO2 98%   BMI 26 24 kg/m²    Wt Readings from Last 3 Encounters:   05/12/22 70 5 kg (155 lb 6 4 oz)   04/27/22 69 9 kg (154 lb)   04/18/22 70 2 kg (154 lb 12 8 oz)      BP Readings from Last 3 Encounters:   05/12/22 110/62   04/27/22 129/84   04/18/22 139/88        Review of Systems   She has no other concerns  No unexpected weight changes  No chest pain, SOB, or palpitations  No GERD  No changes in bowels or bladder  Sleeping well  No mood changes  Physical Exam  Musculoskeletal:      Cervical back: Spasms present  No swelling, edema, deformity or bony tenderness  Pain with movement present  Decreased range of motion  Constitutional:  she appears well-developed and well-nourished  HENT: Head: Normocephalic  Neck: Neck supple  Cardiovascular: Normal rate, regular rhythm and normal heart sounds  Pulmonary/Chest: Effort normal and breath sounds normal  No wheezes, rales, or rhonchi  Abdominal: Soft  Bowel sounds are normal  There is no tenderness  No hepatosplenomegaly  Musculoskeletal: she exhibits no edema  Lymphadenopathy: she has no cervical adenopathy  Neurological: she is alert and oriented to person, place, and time  Skin: Skin is warm and dry  Psychiatric: she has a normal mood and affect   her behavior is normal  Thought content normal      BMI Counseling: Body mass index is 26 24 kg/m²  The BMI is above normal  Nutrition recommendations include decreasing portion sizes  Exercise recommendations include exercising 3-5 times per week  Rationale for BMI follow-up plan is due to patient being overweight or obese  Depression Screening and Follow-up Plan: Patient was screened for depression during today's encounter  They screened negative with a PHQ-2 score of 0

## 2022-05-12 ENCOUNTER — OFFICE VISIT (OUTPATIENT)
Dept: FAMILY MEDICINE CLINIC | Facility: CLINIC | Age: 52
End: 2022-05-12
Payer: OTHER GOVERNMENT

## 2022-05-12 VITALS
OXYGEN SATURATION: 98 % | HEIGHT: 65 IN | BODY MASS INDEX: 25.89 KG/M2 | RESPIRATION RATE: 12 BRPM | SYSTOLIC BLOOD PRESSURE: 110 MMHG | DIASTOLIC BLOOD PRESSURE: 62 MMHG | WEIGHT: 155.4 LBS | TEMPERATURE: 97.7 F | HEART RATE: 70 BPM

## 2022-05-12 DIAGNOSIS — S52.501A CLOSED FRACTURE OF DISTAL END OF RIGHT RADIUS, UNSPECIFIED FRACTURE MORPHOLOGY, INITIAL ENCOUNTER: ICD-10-CM

## 2022-05-12 DIAGNOSIS — G47.00 PERSISTENT INSOMNIA: ICD-10-CM

## 2022-05-12 DIAGNOSIS — Z78.0 POST-MENOPAUSE: ICD-10-CM

## 2022-05-12 DIAGNOSIS — Z79.899 ENCOUNTER FOR LONG-TERM CURRENT USE OF MEDICATION: ICD-10-CM

## 2022-05-12 DIAGNOSIS — M43.8X9 ACQUIRED COMPRESSION DEFORMITY OF VERTEBRA: ICD-10-CM

## 2022-05-12 DIAGNOSIS — M54.2 NECK PAIN: ICD-10-CM

## 2022-05-12 DIAGNOSIS — F41.9 ANXIETY: Primary | ICD-10-CM

## 2022-05-12 PROCEDURE — 99214 OFFICE O/P EST MOD 30 MIN: CPT | Performed by: FAMILY MEDICINE

## 2022-05-12 RX ORDER — PEDI MULTIVIT NO.91/IRON FUM 15 MG
1 TABLET,CHEWABLE ORAL DAILY
COMMUNITY

## 2022-05-12 RX ORDER — TRAMADOL HYDROCHLORIDE 50 MG/1
50 TABLET ORAL 2 TIMES DAILY PRN
COMMUNITY
Start: 2022-05-05

## 2022-05-18 DIAGNOSIS — G70.00 MG (MYASTHENIA GRAVIS) (HCC): ICD-10-CM

## 2022-05-18 RX ORDER — PREDNISONE 2.5 MG
TABLET ORAL
Qty: 90 TABLET | Refills: 3 | Status: SHIPPED | OUTPATIENT
Start: 2022-05-18 | End: 2022-06-07 | Stop reason: ALTCHOICE

## 2022-05-19 ENCOUNTER — HOSPITAL ENCOUNTER (OUTPATIENT)
Dept: BONE DENSITY | Facility: HOSPITAL | Age: 52
Discharge: HOME | End: 2022-05-19
Payer: OTHER GOVERNMENT

## 2022-05-19 DIAGNOSIS — Z79.899 ENCOUNTER FOR LONG-TERM CURRENT USE OF MEDICATION: ICD-10-CM

## 2022-05-19 DIAGNOSIS — Z78.0 POST-MENOPAUSE: ICD-10-CM

## 2022-05-19 DIAGNOSIS — S52.501A CLOSED FRACTURE OF DISTAL END OF RIGHT RADIUS, UNSPECIFIED FRACTURE MORPHOLOGY, INITIAL ENCOUNTER: ICD-10-CM

## 2022-05-19 PROCEDURE — 77080 DXA BONE DENSITY AXIAL: CPT

## 2022-05-25 ENCOUNTER — OFFICE VISIT (OUTPATIENT)
Dept: OBGYN CLINIC | Facility: CLINIC | Age: 52
End: 2022-05-25
Payer: OTHER GOVERNMENT

## 2022-05-25 VITALS
BODY MASS INDEX: 25.49 KG/M2 | SYSTOLIC BLOOD PRESSURE: 117 MMHG | HEART RATE: 67 BPM | WEIGHT: 153 LBS | DIASTOLIC BLOOD PRESSURE: 82 MMHG | HEIGHT: 65 IN

## 2022-05-25 DIAGNOSIS — S52.591D OTHER CLOSED FRACTURE OF DISTAL END OF RIGHT RADIUS WITH ROUTINE HEALING, SUBSEQUENT ENCOUNTER: Primary | ICD-10-CM

## 2022-05-25 DIAGNOSIS — S63.501D SPRAIN OF RIGHT WRIST, SUBSEQUENT ENCOUNTER: ICD-10-CM

## 2022-05-25 DIAGNOSIS — M65.831 EXTENSOR TENOSYNOVITIS OF RIGHT WRIST: ICD-10-CM

## 2022-05-25 DIAGNOSIS — S63.502D SPRAIN OF LEFT WRIST, SUBSEQUENT ENCOUNTER: ICD-10-CM

## 2022-05-25 PROBLEM — S63.501A SPRAIN OF RIGHT WRIST: Status: ACTIVE | Noted: 2022-05-25

## 2022-05-25 PROBLEM — S52.501D CLOSED FRACTURE OF LOWER END OF RIGHT RADIUS WITH ROUTINE HEALING: Status: ACTIVE | Noted: 2022-05-25

## 2022-05-25 PROBLEM — S63.502A SPRAIN OF LEFT WRIST: Status: ACTIVE | Noted: 2022-05-25

## 2022-05-25 PROBLEM — M65.931 EXTENSOR TENOSYNOVITIS OF RIGHT WRIST: Status: ACTIVE | Noted: 2022-05-25

## 2022-05-25 PROCEDURE — 99213 OFFICE O/P EST LOW 20 MIN: CPT | Performed by: FAMILY MEDICINE

## 2022-05-25 NOTE — RESULT ENCOUNTER NOTE
Paige Robertson,  Your DEXA scan results show osteopenia, or low bone density  This does not require treatment with medication, but it is recommended that you keep up in adequate amount of calcium and vitamin-D  Your intake of calcium should be at least 1500 mg daily  This is the best to come from diet, or from supplements if you are unable to get enough from your diet  You should also have enough vitamin-D  You should be getting at least 1000 units of vitamin-D daily, unless we have already discussed you taking a greater amount due to a vitamin-D deficiency  We will likely repeat your DEXA scan test in about 2 years  Let me know if you have any questions    Take care,   Nii Reyes, DO

## 2022-05-25 NOTE — PROGRESS NOTES
Assessment/Plan:  Assessment/Plan    Diagnoses and all orders for this visit:    Other closed fracture of distal end of right radius with routine healing, subsequent encounter  -     Ambulatory Referral to PT/OT Hand Therapy; Future    Sprain of right wrist, subsequent encounter  -     Ambulatory Referral to PT/OT Hand Therapy; Future    Extensor tenosynovitis of right wrist  -     Ambulatory Referral to PT/OT Hand Therapy; Future    Sprain of left wrist, subsequent encounter  -     Ambulatory Referral to PT/OT Hand Therapy; Future          49-year-old right-hand-dominant female with bilateral wrist pain, right worse than left, following [address] injury more than 2 months ago  Discussed with patient physical exam, impression and plan  Physical exam noted for mild tenderness distal radius  She has mild tenderness ulnar aspect of the wrist at the TFCC, ulnar styloid, and head of the 4th and 5th metacarpals  She has intact range of motion of the wrist and digits of the hand  Finkelstein's is unremarkable  Clinical impression is that she is improving, and likely symptomatic from altered mechanics within the wrist   At this time I will refer her to hand therapy which she is to start as soon as possible and do home exercises as directed  She may continue to wean out of thumb spica brace when outdoors  She will follow up in 5 weeks at which point she will be re-evaluated  Subjective:   Patient ID: Thomas Sullivan is a 46 y o  female  Chief Complaint   Patient presents with    Right Wrist - Follow-up, Pain       49-year-old right-hand-dominant female following up for bilateral wrist pain, right worse than left, following [address] injury more than 2 months ago  She was last seen by me 4 weeks ago at which point she was improving, but not fully recovered from injury  First dorsal compartment corticosteroid injection was deferred due to concurrent fracture    She was advised to start weaning out of thumb spica brace when at home and to apply topical diclofenac gel  She reports improvement in symptoms since her last visit  At home she has been more active without use of the brace with daily tasks such as preparing meals and helping take care of her parents  She has still been wearing brace when outdoors  She reports having pain that is more bothersome at the ulnar aspect the wrist, achy and sore, associated numbness of the 5th digit, worse with bending of the wrist and axial loading such as when getting up from seated position, and improved with resting  She has still been applying topical diclofenac  Wrist Pain  This is a new problem  The current episode started more than 1 month ago  The problem occurs daily  The problem has been gradually improving  Associated symptoms include arthralgias and numbness  Pertinent negatives include no joint swelling or weakness  The symptoms are aggravated by twisting (Axial load)  She has tried rest and immobilization (Topical diclofenac) for the symptoms  The treatment provided moderate relief  Review of Systems   Musculoskeletal: Positive for arthralgias  Negative for joint swelling  Neurological: Positive for numbness  Negative for weakness  Objective:  Vitals:    05/25/22 0756   BP: 117/82   Pulse: 67   Weight: 69 4 kg (153 lb)   Height: 5' 4 5" (1 638 m)     Right Hand Exam     Muscle Strength   The patient has normal right wrist strength  Tests   Finkelstein's test: negative    Other   Sensation: decreased  Pulse: present          Observations     Right Wrist/Hand   Negative for deformity  Tenderness     Right Wrist/Hand   Tenderness in the third dorsal compartment, fourth dorsal compartment, triangular fibrocartilage complex  and scaphoid  No tenderness in the first dorsal compartment, second dorsal compartment, sixth dorsal compartment, carpometacarpal joint and lunate       Additional Tenderness Details  Right  -heads of 4th and 5th metacarpals, ulnar styloid, distal radius    Active Range of Motion     Right Wrist   Normal active range of motion  Wrist flexion: with pain  Wrist extension: with pain    Additional Active Range of Motion Details  Normal range of motion of fingers of right hand    Strength/Myotome Testing     Right Wrist/Hand   Normal wrist strength    Tests     Right Wrist/Hand   Negative Finkelstein's  Physical Exam  Vitals and nursing note reviewed  Constitutional:       General: She is not in acute distress  Appearance: She is well-developed  She is not ill-appearing or diaphoretic  HENT:      Head: Normocephalic  Right Ear: External ear normal       Left Ear: External ear normal    Eyes:      Conjunctiva/sclera: Conjunctivae normal    Neck:      Trachea: No tracheal deviation  Cardiovascular:      Rate and Rhythm: Normal rate  Pulmonary:      Effort: Pulmonary effort is normal  No respiratory distress  Abdominal:      General: There is no distension  Musculoskeletal:         General: Tenderness present  No swelling, deformity or signs of injury  Right hand: No deformity  Skin:     General: Skin is warm and dry  Coloration: Skin is not jaundiced or pale  Neurological:      Mental Status: She is alert and oriented to person, place, and time  Psychiatric:         Mood and Affect: Mood normal          Behavior: Behavior normal          Thought Content:  Thought content normal          Judgment: Judgment normal

## 2022-06-02 ENCOUNTER — EVALUATION (OUTPATIENT)
Dept: PHYSICAL THERAPY | Facility: CLINIC | Age: 52
End: 2022-06-02
Payer: OTHER GOVERNMENT

## 2022-06-02 DIAGNOSIS — M54.2 NECK PAIN: Primary | ICD-10-CM

## 2022-06-02 PROCEDURE — 97110 THERAPEUTIC EXERCISES: CPT | Performed by: PHYSICAL THERAPIST

## 2022-06-02 PROCEDURE — 97161 PT EVAL LOW COMPLEX 20 MIN: CPT | Performed by: PHYSICAL THERAPIST

## 2022-06-02 PROCEDURE — 97112 NEUROMUSCULAR REEDUCATION: CPT | Performed by: PHYSICAL THERAPIST

## 2022-06-02 NOTE — PROGRESS NOTES
PT Evaluation     Today's date: 2022  Patient name: Sara Kelly  : 1970  MRN: [id number]  Referring provider: Shawna Dye DO  Dx:   Encounter Diagnosis     ICD-10-CM    1  Neck pain  M54 2                   Assessment  Assessment details: Sara Kelly is a 46 y o  female referred with primary diagnosis of Neck pain  (primary encounter diagnosis)   Patient presents with the following functional limitations: Pain looking overhead and over left shoulder  She demonstrates poor postural awareness and has limitations in cervical ROM  There are spasms noted in bilateral UT  Neck pain was abolished with repeated cervical retractions  Patient will educated in posture and instructed in a basic HEP  She will be seen for one additional visit for instruction in a comprehensive HEP, after which her chart will be placed on hold x 2 weeks  Patient will contact therapist after 2 weeks to advise of her progress  If doing well, PT services will be discharged  Patient told to contact clinic at any time if she feels she needs to be seen in the clinic  Functional limitations: Pain looking overhead and over left shoulder  Understanding of Dx/Px/POC: good   Prognosis: fair  Prognosis details: Chronic neck pain    Goals  STG (3-4 visits)  1  Patient will be independent and compliant with a HEP in order to maintain gains made with skilled PT services  2  Patient will report the ability to independently manage her neck pain  3  Patient will report the ability to look over her left shoulder while driving without increased pain    Plan  Plan details: Patient will be seen for one additional visit after her IE to finalize a HEP  Chart will then be placed on hold x 2 weeks during which time she will see how symptoms respond to postural correction and daily performance of a HEP  Patient will contact clinic after 2 weeks to advise if additional skilled PT services are necessary, or if she is ready for DC  Patient was also instructed to contact clinic at any time over the next 2 weeks to attend in person PT if she feels that is necessary  Patient would benefit from: skilled physical therapy  Planned therapy interventions: patient education, postural training, neuromuscular re-education, manual therapy, strengthening, stretching, therapeutic activities, therapeutic exercise and home exercise program  Frequency: 1x week  Duration in visits: 4  Plan of Care beginning date: 2022  Plan of Care expiration date: 2022  Treatment plan discussed with: patient        Subjective Evaluation    History of Present Illness  Mechanism of injury: Patient states she has been having chronic neck pain since 2022  She has been seeing a chiropractor about once a week for adjustments in her neck and back  She is referred now to outpatient PT services  Recurrent probem    Quality of life: fair    Pain  Current pain ratin  At best pain ratin  At worst pain ratin  Location: Left UT pain  Quality: sharp and throbbing (intermittent tingling left UT)  Relieving factors: heat  Progression: no change    Social Support  Lives with: parents    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)    Diagnostic Tests  No diagnostic tests performed    FCE comments: Has to transfers parents and tends to use mostly left UE due to healing right wrist fracture  Denies paresthesias or weakness into left UE  (+) sleep disturbances related to entire body pain  Treatments  Previous treatment: medication  Current treatment: medication  Patient Goals  Patient goals for therapy: increased motion and decreased pain  Patient goal: Look over shoulder without pain in neck  Objective     Concurrent Complaints  Positive for disturbed sleep  Negative for dizziness, faints, headaches and nausea/motion sickness    Static Posture     Head  Forward  Shoulders  Rounded      Scapulae  Left protracted and right protracted  Palpation   Left   Tenderness of the suboccipitals and upper trapezius  Right   Tenderness of the suboccipitals and upper trapezius  Tenderness   Cervical Spine   Tenderness in the spinous process (Haider  c2-4)  Neurological Testing     Sensation   Cervical/Thoracic   Left   Intact: light touch    Right   Intact: light touch    Active Range of Motion   Cervical/Thoracic Spine       Cervical    Flexion: 55 degrees   Extension: 17 degrees     with pain  Left lateral flexion: 30 degrees     with pain  Right lateral flexion: 30 degrees     with pain  Left rotation: 45 degrees with pain  Right rotation: 73 degrees             Joint Play   Joints within functional limits: C2, C3, C4, C5, C6 and C7   Mechanical Assessment    Cervical    Seated retraction: repeated movements   Pain location: centralized  Pain intensity: better  Pain level: abolished    Thoracic      Lumbar      Strength/Myotome Testing     Left Shoulder     Planes of Motion   Flexion: 4   Abduction: 4   External rotation at 0°: 4   Internal rotation at 0°: 4     Right Shoulder     Planes of Motion   Flexion: 4   Abduction: 4   External rotation at 0°: 4   Internal rotation at 0°: 4     Left Elbow   Flexion: 4  Extension: 4    Right Elbow   Flexion: 4  Extension: 4    Left Wrist/Hand   Wrist extension: 4  Wrist flexion: 4  Radial deviation: 4  Ulnar deviation: 4    Right Wrist/Hand   Wrist extension: 4  Wrist flexion: 4  Radial deviation: 4  Ulnar deviation: 4    Tests   Cervical   Negative VBI  Left   Negative Spurling's Test A  Right   Negative Spurling's Test A  Left Shoulder   Negative ULTT1, ULTT3 and ULTT4  Right Shoulder   Negative ULTT1, ULTT3 and ULTT4  Neuro Exam:     Headaches   Patient reports headaches: No               Precautions: Sjoegren's, Raynaud's, Polyneuropathy, Myasthenia Gravis, FM  Access Code: [facility]  URL: [email or web address]  Date: 06/02/2022  Prepared by: Donna Emmanuel Manuals 6/2            Loaded minimal retractions x10                                                   Neuro Re-Ed             Pt education Posture  C-spine anatomy    Centralization            Bkwd shoulder rolls x30            Scapular retractions 5"x30            Webslide rows M,L             Loaded cervical retractions x10                                      Ther Ex             UBE retro             UT stretch left 3x30"            MWM left cervical rotation             AROM c-spine                                                                 Ther Activity                                       Gait Training                                       Modalities

## 2022-06-06 ENCOUNTER — EVALUATION (OUTPATIENT)
Dept: OCCUPATIONAL THERAPY | Facility: CLINIC | Age: 52
End: 2022-06-06
Payer: OTHER GOVERNMENT

## 2022-06-06 ENCOUNTER — OFFICE VISIT (OUTPATIENT)
Dept: PHYSICAL THERAPY | Facility: CLINIC | Age: 52
End: 2022-06-06
Payer: OTHER GOVERNMENT

## 2022-06-06 DIAGNOSIS — S52.591D OTHER CLOSED FRACTURE OF DISTAL END OF RIGHT RADIUS WITH ROUTINE HEALING, SUBSEQUENT ENCOUNTER: ICD-10-CM

## 2022-06-06 DIAGNOSIS — M54.2 NECK PAIN: Primary | ICD-10-CM

## 2022-06-06 DIAGNOSIS — S63.501D SPRAIN OF RIGHT WRIST, SUBSEQUENT ENCOUNTER: ICD-10-CM

## 2022-06-06 DIAGNOSIS — M65.831 EXTENSOR TENOSYNOVITIS OF RIGHT WRIST: ICD-10-CM

## 2022-06-06 DIAGNOSIS — S63.502D SPRAIN OF LEFT WRIST, SUBSEQUENT ENCOUNTER: ICD-10-CM

## 2022-06-06 PROCEDURE — 97165 OT EVAL LOW COMPLEX 30 MIN: CPT

## 2022-06-06 PROCEDURE — 97110 THERAPEUTIC EXERCISES: CPT

## 2022-06-06 PROCEDURE — 97112 NEUROMUSCULAR REEDUCATION: CPT

## 2022-06-06 NOTE — PROGRESS NOTES
Daily Note     Today's date: 2022  Patient name: Geneva Arango  : 1970  MRN: [id number]  Referring provider: Yusra Rodriguez DO  Dx:   Encounter Diagnosis     ICD-10-CM    1  Neck pain  M54 2                   Subjective: Pt noted that no changes since IE  Objective: See treatment diary below      Assessment: Continued with treatment session, added additional exercises with no changes in pain status  Advised patient on proper postural awareness  Tolerated treatment fair  Patient exhibited good technique with therapeutic exercises and would benefit from continued PT  Instructed With HEP on every other day performance, unless noted otherwise and/or having increased discomfort or pain   Noted compliance with exercises  DOMS reviewed and acknowledged by patient may have 24 to 48 hours of muscle soreness following treatment session         Plan: Continue per plan of care  Precautions: Sjoegren's, Raynaud's, Polyneuropathy, Myasthenia Gravis, FM  Access Code: [facility]  URL: [email or web address]  Date: 2022  Prepared by: Chase Alfaro       Manuals            Loaded minimal retractions x10                                                   Neuro Re-Ed             Pt education Posture  C-spine anatomy    Centralization            Bkwd shoulder rolls x30 30x            Scapular retractions 5"x30 5" 30x            Webslide rows M,L  RTB 2x 10            Loaded cervical retractions x10 15x                                      Ther Ex             UBE retro  5' seated            UT stretch left 3x30" 3x 30"            MWM left cervical rotation             AROM c-spine                                                                 Ther Activity                                       Gait Training                                       Modalities

## 2022-06-06 NOTE — PROGRESS NOTES
OT Evaluation     Today's date: 2022  Patient name: Cristina Hernandez  : 1970  MRN: [id number]  Referring provider: Sarwat Heredia DO  Dx:   Encounter Diagnosis     ICD-10-CM    1  Other closed fracture of distal end of right radius with routine healing, subsequent encounter  [id number] Ambulatory Referral to PT/OT Hand Therapy   2  Sprain of right wrist, subsequent encounter  S63 [id number] Ambulatory Referral to PT/OT Hand Therapy   3  Extensor tenosynovitis of right wrist  [id number] Ambulatory Referral to PT/OT Hand Therapy   4  Sprain of left wrist, subsequent encounter  S63 [id number] Ambulatory Referral to PT/OT Hand Therapy                  Assessment  Assessment details: Cristina Hernandez is a 46 y o , Right HD female referred to hand therapy for bilateral wrist pain and right non displaced distal radius fracture  Onset of injury 3/2022 due to fall on outstretched hands  Patient presents 20 with impaired strength, and sensation of the BUE  Deficits also noted in pain, weight bearing on hands, and functional use of the bilateral UE  Patient has positive provocative testing for right EPL tendinitis  Patient is a good candidate for OT services to abolish pain and edema and restore pain free ROM, strength, coordination, and sensation for a return to independence in daily tasks      Impairments: activity intolerance, impaired physical strength, lacks appropriate home exercise program, pain with function and weight-bearing intolerance  Other impairment: MG; polyneuropathy; Raynaud's syndrome; Sjogren's disease  Functional limitations: Patient with weight bearing on open palms; lifting; resistive graspingBarriers to therapy: MG; polyneuropathy; Raynaud's syndrome; Sjogren's disease  Understanding of Dx/Px/POC: excellent   Prognosis: good    Goals  STGs (4 weeks)  Patient will be independent in HEP of strengthening; pain mgt  Patient will report an average pain level of 2/10  Patient will demonstrate pain free AROM wrist and forearm   Patient will demonstrate 30% reduction in pain during activities  LTGs (12 weeks)  Patient will demonstrate independence in a HEP to maintain ROM, strength, and function at discharge  Patient will report an average pain level of 1-2/10 to be independent in daily tasks  Patient will demonstrate 5/5 muscle strength in the wrist and forearm to be MI for meal prep  Patient will demonstrate right hand strength equal to the left hand to be MI for IADL tasks  Patient will demonstrate resolution of pain when weight bearing on open palms  Patient will demonstrate resolution of right thumb EPL tendinitis      Plan  Patient would benefit from: skilled occupational therapy and custom splinting  Planned modality interventions: ultrasound, thermotherapy: hydrocollator packs and cryotherapy  Planned therapy interventions: activity modification, compression, dressing changes, fine motor coordination training, graded activity, graded exercise, home exercise program, therapeutic exercise, therapeutic activities, stretching, strengthening, patient education, orthotic fitting/training, neuromuscular re-education and manual therapy  Other planned therapy interventions: IASTM; cupping; kinesio tape; blood flowrestriction  Frequency: 2x week  Duration in weeks: 12  Plan of Care beginning date: 6/6/2022  Plan of Care expiration date: 9/9/2022  Treatment plan discussed with: patient        Subjective Evaluation    History of Present Illness  Mechanism of injury: trauma  Mechanism of injury: Patient reports she fell on driveway on outstretched hands in March 2022  Did not seek medical attention until April  Went to urgent care due to persistent pain and swelling in the right wrist  Xrays revealed non displaced right distal radius fx  Issued a thumb spica splint she wears for activities  Now with persistent pain in both wrists  Pain when weight bearing on open palms   Patient now presents for OT evaluation and treatment          Recurrent probem    Quality of life: fair    Pain  Current pain ratin  At best pain ratin  At worst pain rating: 3  Location: right wrist  Quality: dull ache  Relieving factors: rest and medications  Exacerbated by: weight bearing onto open palms  Progression: improved    Social Support  Lives with: parents and spouse    Employment status: not working (retired)  Hand dominance: right      Diagnostic Tests  X-ray: abnormal  Treatments  Previous treatment: immobilization  Patient Goals  Patient goals for therapy: decreased edema, decreased pain, increased motion, increased strength and independence with ADLs/IADLs          Objective     Observations     Left Wrist/Hand   Negative for atrophy, deformity and edema  Right Wrist/Hand   Negative for atrophy, deformity and edema  Tenderness     Left Elbow   No tenderness in the lateral epicondyle  Left Wrist/Hand   Tenderness in the TFCC  No tenderness in the first dorsal compartment, second dorsal compartment and lateral epicondyle  Right Wrist/Hand   Tenderness in the first dorsal compartment, second dorsal compartment, intersection dorsal forearm area and triangular fibrocartilage complex   Neurological Testing     Sensation     Wrist/Hand   Left   Intact: light touch    Right   Diminished: light touch    Comments   Right light touch: Loss of protective sensation dorsal the web space and P1 small finger RDN    Active Range of Motion   Left Shoulder   Normal active range of motion    Right Shoulder   Normal active range of motion    Left Elbow   Normal active range of motion    Right Elbow   Normal active range of motion    Left Wrist   Normal active range of motion    Right Wrist   Normal active range of motion    Left Thumb   Opposition: 22; WNL    Right Thumb   Opposition: 22:  WNL    Additional Active Range of Motion Details  22: WNL    Strength/Myotome Testing     Left Elbow   Normal strength    Right Elbow Normal strength    Left Wrist/Hand      (2nd hand position)     Trial 1: 65    Thumb Strength  Key/Lateral Pinch     Trial 1: 11 5  Tip/Two-Point Pinch     Trial 1: 11  Palmar/Three-Point Pinch     Trial 1: 15    Right Wrist/Hand      (2nd hand position)     Trial 1: 35    Thumb Strength   Key/Lateral Pinch     Trial 1: 10 5  Tip/Two-Point Pinch     Trial 1: 9  Palmar/Three-Point Pinch     Trial 1: 13    Tests     Left Wrist/Hand   Positive TFCC load  Negative Finkelstein's, lunotriquetral shear, scapholunate shear and ulnotriquetral shear  Right Wrist/Hand   Positive CMC grind, Finkelstein's and TFCC load  Negative lunotriquetral shear, scapholunate shear and ulnotriquetral shear                Precautions: right non displaced distal radius fx 3/2022; b/l thumb CMC OA; b/l wrist sprains; partial tear EPL right thumb; MG; polyneuropathy; Raynaud's syndrome; Sjogren's disease         Date 6/6       Visit 1       Manuals        IASTM R 1st DC; intersection; b/l ulnar wrist        Cupping        Kinesio tape R EPL inhibit w/ I strip at 1st dc and intersection; L ECU inhibit w/ I strip ulnar side of wrist               Neuro Re-Ed         UBE                                                        Ther Ex        Wrist isometrics advance to isotonic        Thumb isometrics         errol stretch        Ecc R wrist ext, dt        Ecc R EPB        TGE        Wrist stabilization                Ther Activity                        HEP Kinesio tape wear, care, precautions; wrist isometrics                       Modalities        [facility] 5'

## 2022-06-07 ENCOUNTER — OFFICE VISIT (OUTPATIENT)
Dept: NEUROLOGY | Facility: CLINIC | Age: 52
End: 2022-06-07
Payer: OTHER GOVERNMENT

## 2022-06-07 VITALS
HEART RATE: 63 BPM | SYSTOLIC BLOOD PRESSURE: 118 MMHG | DIASTOLIC BLOOD PRESSURE: 78 MMHG | HEIGHT: 65 IN | WEIGHT: 152.8 LBS | BODY MASS INDEX: 25.46 KG/M2

## 2022-06-07 DIAGNOSIS — M54.16 LUMBAR RADICULOPATHY: ICD-10-CM

## 2022-06-07 DIAGNOSIS — M48.02 CERVICAL SPINAL STENOSIS: ICD-10-CM

## 2022-06-07 DIAGNOSIS — G70.00 MG (MYASTHENIA GRAVIS) (HCC): Primary | ICD-10-CM

## 2022-06-07 PROCEDURE — 99214 OFFICE O/P EST MOD 30 MIN: CPT

## 2022-06-07 RX ORDER — GABAPENTIN 300 MG/1
CAPSULE ORAL
Qty: 270 CAPSULE | Refills: 3 | Status: SHIPPED | OUTPATIENT
Start: 2022-06-07

## 2022-06-07 NOTE — PATIENT INSTRUCTIONS
- Continue Mestinon 60 mg 4 times a day  - Discontinue prednisone 2 5 mg; let your rheumatologist know  - Report any worsening vision changes, shortness of breath, weakness, or difficulty with speaking or swallowing  - Continue to follow up with your other health care providers  - Follow up in 6 months as needed; sooner if needed

## 2022-06-07 NOTE — ASSESSMENT & PLAN NOTE
In terms of her neuropathy symptoms she does not feel the gabapentin 300 mg 3 times daily has been effective  She describes numbness (no tingling) of her left toes and intermittent sharp stabbing pain of the lateral part of her right foot  She is scheduled to see pain management in a few weeks for her chronic low back pain and is hoping to receive another SI joint injection which she found very effective in the past for both pain and her numbness  It is questionable if her numbness and pain is coming from neuropathy or more from her lumbar radiculopathy  In the interim I have asked her to continue gabapentin 300 mg 3 times daily and advised that she can use over-the-counter lidocaine patches or Voltaren gel as needed  When she receives her SI joint injection if this is ineffective I have asked her to contact the office, then we can consider either increasing the doses of gabapentin or alternatively trying an alternative therapy such as Lyrica or Cymbalta

## 2022-06-07 NOTE — ASSESSMENT & PLAN NOTE
In terms of her polyneuropathy she does not feel the gabapentin 300 mg 3 times daily has been effective  She describes numbness (no tingling) of her left toes and intermittent sharp stabbing pain of the lateral part of her right foot  She is scheduled to see pain management in a few weeks and is hoping to receive another SI joint injection which she found very effective in the past for both pain and her numbness  It is questionable if her numbness and pain is coming from neuropathy or more from her lumbar radiculopathy  In the interim I have asked her to continue gabapentin 300 mg 3 times daily and advised that she can use over-the-counter lidocaine patches or Voltaren gel as needed  When she receives her SI joint injection if this is ineffective I have asked her to contact the office, then we can consider either increasing the doses of gabapentin or alternatively trying an alternative therapy such as Lyrica or Cymbalta

## 2022-06-07 NOTE — PROGRESS NOTES
Patient ID: Kieran Smith is a 46 y o  female  Assessment/Plan:    MG (myasthenia gravis) ([facility] )  Kieran Smith is a 38-year-old female known to the practice for myasthenia gravis diagnosed in 2014 s/p thymectomy  She is also followed closely by her rheumatologist for her history of Sjogren's syndrome and is maintained on Celebrex and tramadol as needed  She returns to the office today and states she has continued on Mestinon 60 mg qid and does feel her diplopia has improved  She is taking prednisone 2 5 mg every other day and states she does not notice a difference on days she does or does not take it  She denies any shortness of breath, difficulty speaking or swallowing  She has generalized weakness, but no more than her usual baseline  Overall from a myasthenia gravis standpoint she appears to be stable  Plan discussed with patient today:  - Continue Mestinon 60 mg 4 times a day  - Discontinue prednisone 2 5 mg due to osteopenia and recent wrist fracture; let your rheumatologist know in case they would prefer to keep you on prednisone for rheumatologic reasons   - Report any worsening vision changes, shortness of breath, weakness, or difficulty with speaking or swallowing  - Continue to follow up with your other health care providers  - Follow up in 6 months as needed; sooner if needed     Polyneuropathy  In terms of her neuropathy symptoms she does not feel the gabapentin 300 mg 3 times daily has been effective  She describes numbness (no tingling) of her left toes and intermittent sharp stabbing pain of the lateral part of her right foot  She is scheduled to see pain management in a few weeks for her chronic low back pain and is hoping to receive another SI joint injection which she found very effective in the past for both pain and her numbness  It is questionable if her numbness and pain is coming from neuropathy or more from her lumbar radiculopathy      In the interim I have asked her to continue gabapentin 300 mg 3 times daily and advised that she can use over-the-counter lidocaine patches or Voltaren gel as needed  When she receives her SI joint injection if this is ineffective I have asked her to contact the office, then we can consider either increasing the doses of gabapentin or alternatively trying an alternative therapy such as Lyrica or Cymbalta  Diagnoses and all orders for this visit:    MG (myasthenia gravis) ([address] )    Lumbar radiculopathy           Subjective:    LAURA Geneva Arango is a 57-year-old female known to the practice for myasthenia gravis diagnosed in 2014 s/p thymectomy  She is maintained on Mestinon 60 mg 3 times daily and remains on prednisone 2 5 mg daily which also helps with her primary Sjogren's syndrome  She follows closely with her rheumatologist in this regard and is maintained on Celebrex and tramadol as needed  She also suffers from chronic low back pain and is followed by pain management and is maintained on gabapentin 300 mg 3 times daily managed by our office  She was last seen by Dr Barrientos 2/8/22 and at that time was experiencing diplopia with distant vision intermittently throughout the day  As such her Mestinon was increased to 4 times daily  She was provided a prescription for Lomotil 1 tablet 1 to 2 times a day as needed as she was also experiencing chronic diarrhea thought to be induced by Mestinon  She returns to the office today and states she has continued on Mestinon 60 mg qid and does feel her diplopia has improved  She does still note some difficulty with focusing her vision at times however  She has been to Ophthalmology recently and was told her vision is good  She is using Lomotil once or twice a week as needed  She is taking prednisone 2 5 mg every other day and states she does not notice a difference on days she does or does not take it  She denies any shortness of breath, difficulty speaking or swallowing   She has generalized weakness, no more than her usual baseline  She also has numbness (no tingling) of her left toes, intermittent sharp stabbing pain of the lateral part of her right foot, and most recently began to have neck pain with associated tingling in the neck radiating into the left shoulder however is undergoing physical therapy with noted improvement after only 2 visits  In March she did suffer a fall on ice and suffered a right wrist fracture, but was not seen/treated until recently for this  She is now in a splint  Her most recent dxa scan does show she has osteopenia  She is independent of all ADLs and is the primary caregiver of both of her elderly parents, with her mom having dementia  The following portions of the patient's history were reviewed and updated as appropriate: allergies, current medications, past family history, past medical history, past social history and past surgical history  Objective:    Blood pressure 118/78, pulse 63, height 5' 4 5" (1 638 m), weight 69 3 kg (152 lb 12 8 oz), last menstrual period 01/01/2016, not currently breastfeeding  Neurological Exam    On neurological examination patient is alert, awake, oriented and in no distress  Speech is fluent without dysarthria or aphasia  Cranial nerves 2-12 were symmetrically intact bilaterally  No evidence of diplopia or ptosis on exam today  No evidence of any focal weakness or sensory loss in the upper or lower extremities  Motor testing reveals 5/5 strength of the bilateral upper extremities, however limited due to wrist fracture  However hand  bilaterally is 5/5  Strength of bilateral lower extremities, 5-/5 proximally and 5/5 distally  No give way on repetitive testing, although she does endorse bilateral hip bursa pain with repetitive testing of bilateral lower extremities proximally  There was no pronator drift  No fasciculations present  No abnormal involuntary movements   Finger- to-nose reveals no tremor or ataxia and intact proprioceptive function, no dysmetria was noted  Sensation was intact to vibration, light touch, and temperature in bilateral upper and lower extremities  Deep tendon reflexes were 2+ and symmetric in the bilateral upper and lower extremities  She is able to rise easily without assistance from a seated position  Casual gait is normal including stance, stride, and arm swing  Normal tandem gait  Romberg is absent  ROS:    Review of Systems   Constitutional: Negative  HENT: Negative  Eyes: Negative  Respiratory: Negative  Cardiovascular: Negative  Gastrointestinal: Negative  Endocrine: Negative  Genitourinary: Negative  Musculoskeletal: Negative  Skin: Negative  Allergic/Immunologic: Negative  Neurological: Positive for numbness  Hematological: Negative  Psychiatric/Behavioral: Negative  Reviewed ROS as entered by medical assistant

## 2022-06-07 NOTE — ASSESSMENT & PLAN NOTE
Jc Fuller is a 57-year-old female known to the practice for myasthenia gravis diagnosed in 2014 s/p thymectomy  She is also followed closely by her rheumatologist for her history of Sjogren's syndrome and is maintained on Celebrex and tramadol as needed  She returns to the office today and states she has continued on Mestinon 60 mg qid and does feel her diplopia has improved  She is taking prednisone 2 5 mg every other day and states she does not notice a difference on days she does or does not take it  She denies any shortness of breath, difficulty speaking or swallowing  She has generalized weakness, but no more than her usual baseline  Overall from a myasthenia gravis standpoint she appears to be stable      Plan discussed with patient today:  - Continue Mestinon 60 mg 4 times a day  - Discontinue prednisone 2 5 mg due to osteopenia and recent wrist fracture; let your rheumatologist know in case they would prefer to keep you on prednisone for rheumatologic reasons   - Report any worsening vision changes, shortness of breath, weakness, or difficulty with speaking or swallowing  - Continue to follow up with your other health care providers  - Follow up in 6 months as needed; sooner if needed

## 2022-06-07 NOTE — Clinical Note
Please review my note  I discontinued her prednisone 2 5 mg daily  I want to make sure that you are okay with this change in treatment?
10-Nov-2018 15:07

## 2022-06-20 ENCOUNTER — APPOINTMENT (OUTPATIENT)
Dept: OCCUPATIONAL THERAPY | Facility: CLINIC | Age: 52
End: 2022-06-20
Payer: OTHER GOVERNMENT

## 2022-06-20 ENCOUNTER — APPOINTMENT (OUTPATIENT)
Dept: PHYSICAL THERAPY | Facility: CLINIC | Age: 52
End: 2022-06-20
Payer: OTHER GOVERNMENT

## 2022-06-23 ENCOUNTER — OFFICE VISIT (OUTPATIENT)
Dept: PHYSICAL THERAPY | Facility: CLINIC | Age: 52
End: 2022-06-23
Payer: OTHER GOVERNMENT

## 2022-06-23 ENCOUNTER — OFFICE VISIT (OUTPATIENT)
Dept: OCCUPATIONAL THERAPY | Facility: CLINIC | Age: 52
End: 2022-06-23
Payer: OTHER GOVERNMENT

## 2022-06-23 DIAGNOSIS — M54.2 NECK PAIN: Primary | ICD-10-CM

## 2022-06-23 DIAGNOSIS — S52.591D OTHER CLOSED FRACTURE OF DISTAL END OF RIGHT RADIUS WITH ROUTINE HEALING, SUBSEQUENT ENCOUNTER: Primary | ICD-10-CM

## 2022-06-23 DIAGNOSIS — S63.501D SPRAIN OF RIGHT WRIST, SUBSEQUENT ENCOUNTER: ICD-10-CM

## 2022-06-23 DIAGNOSIS — S63.502D SPRAIN OF LEFT WRIST, SUBSEQUENT ENCOUNTER: ICD-10-CM

## 2022-06-23 DIAGNOSIS — M65.831 EXTENSOR TENOSYNOVITIS OF RIGHT WRIST: ICD-10-CM

## 2022-06-23 PROCEDURE — 97110 THERAPEUTIC EXERCISES: CPT

## 2022-06-23 PROCEDURE — 97112 NEUROMUSCULAR REEDUCATION: CPT

## 2022-06-23 PROCEDURE — 97140 MANUAL THERAPY 1/> REGIONS: CPT

## 2022-06-23 NOTE — PROGRESS NOTES
Pain Medicine Follow-Up Note    Assessment:  1  Sacroiliac pain    2  Chronic bilateral low back pain, unspecified whether sciatica present    3  Lumbar spondylosis        Plan:  Orders Placed This Encounter   Procedures    FL spine and pain procedure     Standing Status:   Future     Standing Expiration Date:   6/24/2026     Order Specific Question:   Reason for Exam:     Answer:   b/l SI joint injection     Order Specific Question:   Anticoagulant hold needed? Answer:   no     Order Specific Question:   Is the patient pregnant? Answer:   Unknown       No orders of the defined types were placed in this encounter  My impressions and treatment recommendations were discussed in detail with the patient who verbalized understanding and had no further questions  This is a 47-year-old female who returns to our office with recurrent bilateral low back pain  Appears to be secondary to sacroiliitis again, as well as related to lumbar spondylosis as she has notable pain with facet loading bilaterally  Also has tenderness to palpation of the bilateral SI joints with pain with provocative maneuvers as noted below  Last SI joint injection performed over 1 year ago provided 6 months of relief of nearly 100%  Given description of same symptoms today, discussed repeating the injection under fluoroscopic guidance which she is agreeable to  If no significant relief, then may consider L4-5 and L5-S1 medial branch block    [place] Prescription Drug Monitoring Program report was reviewed and was appropriate       Complete risks and benefits including bleeding, infection, tissue reaction, nerve injury and allergic reaction were discussed  The approach was demonstrated using models and literature was provided  Verbal and written consent was obtained  Discharge instructions were provided  I personally saw and examined the patient and I agree with the above discussed plan of care      History of Present Illness:    Kannan Rivas is a 46 y o  female who presents to [facility] for interval re-evaluation of the above stated pain complaints  The patient has a past medical and chronic pain history as outlined in the assessment section  She was last seen on 02/25/2021 for bilateral sacroiliac joint injection with significant relief for 6 months  She reports recurrence of pain same area  Pain score 8/10  Worse in the evening and night  Pain is constant, dull/aching, throbbing, pressure-like, shooting in nature       She continues to follow with Rheumatology given history of Sjogren syndrome and rheumatoid arthritis  Also continues seeing Neurology in regards to myasthenia gravis  Other than as stated above, the patient denies any interval changes in medications, medical condition, mental condition, symptoms, or allergies since the last office visit  Review of Systems:    Review of Systems   Respiratory: Negative for shortness of breath  Cardiovascular: Negative for chest pain  Gastrointestinal: Negative for constipation, diarrhea, nausea and vomiting  Musculoskeletal: Positive for arthralgias, gait problem, joint swelling and myalgias  Decreased ROM  Pain in lower back, hip/pelvis   Skin: Negative for rash  Neurological: Negative for dizziness, seizures and weakness  All other systems reviewed and are negative          Patient Active Problem List   Diagnosis    Asymptomatic stenosis of left carotid artery    Family history of premature CAD    MG (myasthenia gravis) ([address] )    Chronic pain syndrome    Greater trochanteric bursitis of both hips    Lumbar radiculopathy    Generalized joint pain    Cervical spinal stenosis    Abnormal cervical Papanicolaou smear    Gastroesophageal reflux disease    Irritable bowel syndrome with diarrhea    Menopausal symptom    Polyneuropathy    Raynaud's disease    Sjogren's syndrome with keratoconjunctivitis sicca ([address] )    Family history of breast cancer in sister    Persistent insomnia    Bilateral sacroiliitis ([address] )    DDD (degenerative disc disease), lumbar    Acquired compression deformity of vertebra    Fibromyalgia    Dyspareunia in female    Diarrhea    Family history of colon cancer    Anxiety    Closed fracture of lower end of right radius with routine healing    Sprain of right wrist    Extensor tenosynovitis of right wrist    Sprain of left wrist       Past Medical History:   Diagnosis Date    Abnormal Pap smear of cervix     Arthritis     RA    Carotid stenosis, asymptomatic     Chemical exposure     Saritha Scruggs 67 - worked across the [address]    Dizziness     HPV in female     IBS (irritable bowel syndrome)     Myasthenia gravis ([address] )     dx 2014    Myasthenia gravis ([address]     Neuropathy     left foot    Polyneuropathy     Raynaud disease     dx 2016    Sjoegren syndrome     dx 2010    Syncope     Venereal wart 04/13/2015       Past Surgical History:   Procedure Laterality Date    CHOLECYSTECTOMY  2016    COLONOSCOPY      THYMECTOMY  2016    UPPER GASTROINTESTINAL ENDOSCOPY         Family History   Problem Relation Age of Onset    Arthritis Mother     Dementia Mother     Diabetes Father     Hypertension Father     Stroke Father     Breast cancer Sister 71    Arthritis Sister     Heart disease Sister    Bhaskar Moose Diabetes Sister     Cirrhosis Sister         non-alcoholic    Diverticulitis Sister     Coronary artery disease Sister     Other Sister         carotid artery disease    Colon cancer Sister     Breast cancer Sister         63's    Liver cancer Sister     Ross syndrome Sister     Thyroid disease Sister    Bhaskar Moose Stroke Sister     Urolithiasis Sister     Diverticulitis Brother     Dementia Maternal Grandmother     Heart attack Maternal Grandfather     Stroke Paternal Grandmother     Stroke Paternal Grandfather     No Known Problems Maternal Aunt     No Known Problems Paternal Aunt     No Known Problems Paternal Aunt        Social History     Occupational History     Employer: [employer]   Tobacco Use    Smoking status: Former Smoker     Packs/day: 0 50     Years: 8 00     Pack years: 4 00     Types: Cigarettes     Start date: 2001     Quit date: 10/10/2010     Years since quittin 7    Smokeless tobacco: Never Used   Vaping Use    Vaping Use: Former    Start date: 10/10/2010    Quit date: 2011   Substance and Sexual Activity    Alcohol use: Not Currently    Drug use: No    Sexual activity: Not Currently     Partners: Male     Birth control/protection: Post-menopausal         Current Outpatient Medications:     celecoxib (CeleBREX) 200 mg capsule, Take 200 mg by mouth 2 (two) times a day , Disp: , Rfl:     diphenoxylate-atropine (LOMOTIL) 2 5-0 025 mg per tablet, Take 1 tablet by mouth 2 (two) times a day as needed for diarrhea, Disp: 60 tablet, Rfl: 2    escitalopram (Lexapro) 10 mg tablet, Take 1 tablet (10 mg total) by mouth daily, Disp: 90 tablet, Rfl: 1    folic acid (FOLVITE) 1 mg tablet, Take 1 mg by mouth daily  , Disp: , Rfl:     gabapentin (NEURONTIN) 300 mg capsule, TAKE 1 CAPSULE THREE TIMES A DAY, Disp: 270 capsule, Rfl: 3    lifitegrast (Xiidra) 5 % op solution, Administer 1 drop to both eyes 2 (two) times a day, Disp: , Rfl:     multivitamin-minerals (CENTRUM ADULTS) tablet, Take 1 tablet by mouth daily, Disp: , Rfl:     omeprazole (PriLOSEC) 20 mg delayed release capsule, TAKE 1 CAPSULE DAILY, Disp: 90 capsule, Rfl: 3    pediatric multivitamin-iron (POLY-VI-SOL with IRON) 15 MG chewable tablet, Chew 1 tablet  in the morning , Disp: , Rfl:     pyridostigmine (MESTINON) 60 mg tablet, Take 1 tablet (60 mg total) by mouth 4 (four) times a day, Disp: 360 tablet, Rfl: 3    traMADol (ULTRAM) 50 mg tablet, Take 50 mg by mouth 2 (two) times a day as needed, Disp: , Rfl:     zolpidem (AMBIEN) 10 mg tablet, Take 10 mg by mouth daily at bedtime, Disp: , Rfl:     No Known Allergies    Physical Exam:    /76 (BP Location: Left arm, Patient Position: Sitting, Cuff Size: Standard)   Pulse 61   Ht 5' 4 5" (1 638 m)   Wt 70 8 kg (156 lb)   LMP 01/01/2016 (Approximate)   BMI 26 36 kg/m²     Constitutional:normal, well developed, well nourished, alert, in no distress and non-toxic and no overt pain behavior    Eyes:anicteric  HEENT:grossly intact  Neck:supple, symmetric, trachea midline and no masses   Pulmonary:even and unlabored  Cardiovascular:No edema or pitting edema present  Skin:Normal without rashes or lesions and well hydrated  Psychiatric:Mood and affect appropriate  Neurologic:Cranial Nerves II-XII grossly intact  Musculoskeletal:normal     Lumbar Spine Exam      Palpation/Tenderness:  left sacroiliac joint tenderness  right sacroiliac joint tenderness  Special Tests:  Left Straight Leg Test:  positive  Right Straight Leg Test:  positive  Left Lg's Maneuver:  positive  Right Lg's Maneuver:  positive  Left Gaenslen's Test:  positive  Right Gaenslen's Test:  positive        Imaging  FL spine and pain procedure    (Results Pending)         Orders Placed This Encounter   Procedures    FL spine and pain procedure

## 2022-06-23 NOTE — PROGRESS NOTES
Daily Note    Today's date: 22  Patient name: Demetrius Allen  : 1970  MRN: [id number]  Referring provider: Donna Hughes DO  Dx:   Encounter Diagnosis     ICD-10-CM    1  Neck pain  M54 2        Start Time: 1015  Stop Time: 1100  Total time in clinic (min): 45 minutes      Subjective: Erna reports stiffness in the neck 3/10  She notes adherence to HEP  Objective: See treatment diary below  Assessment: Enra tolerated treatment well with moderate cuing  TE's were performed with increased resistance and increased reps  New TE's were demonstrated with proper technique, and tolerated well  Following treatment, the patient demonstrated fatigue post treatment, exhibited good technique with therapeutic exercises and would benefit from continued PT  Feels better s/p session    Plan: Continue per plan of care  Precautions: Sjoegren's, Raynaud's, Polyneuropathy, Myasthenia Gravis, FM  Access Code: [facility]  URL: [email or web address]  Date: 2022  Prepared by: Lucius Ferrell       Manuals           Loaded minimal retractions x10            SOR   TS                                    Neuro Re-Ed             Pt education Posture  C-spine anatomy    Centralization            Bkwd shoulder rolls x30 30x            Scapular retractions 5"x30 5" 30x  5" 30x          Webslide rows M,L  RTB 2x 10  [id number]          Shrugs    5# 3x10          Unilateral shrug holds   15x 5" ea          Loaded cervical retractions x10 15x  15x supine                                    Ther Ex             UBE retro  5' seated  5'          UT stretch left 3x30" 3x 30"  3x30"          LS stretch left   3x30"          MWM left cervical rotation             AROM c-spine                                                                 Ther Activity                                       Gait Training                                       Modalities                                                Manning Gaucher Basil, PT  6/23/2022,10:59 AM

## 2022-06-23 NOTE — PROGRESS NOTES
Daily Note     Today's date: 2022  Patient name: Shanell Tejeda  : 1970  MRN: [id number]  Referring provider: Ed Penny, DO  Dx:   Encounter Diagnosis     ICD-10-CM    1  Other closed fracture of distal end of right radius with routine healing, subsequent encounter  S52 591D    2  Sprain of right wrist, subsequent encounter  S63 501D    3  Extensor tenosynovitis of right wrist  M65 831    4  Sprain of left wrist, subsequent encounter  S63 502D                   Subjective: I feel a little better  I liked that tape for my wrist        Objective: See treatment diary below      Assessment: Tolerated treatment well  Patient exhibited good technique with therapeutic exercises  Decreased pain in wrist b/l  Initiated eccentric strengthening      Plan: Continue per plan of care        Precautions: right non displaced distal radius fx 3/2022; b/l thumb CMC OA; b/l wrist sprains; partial tear EPL right thumb; MG; polyneuropathy; Raynaud's syndrome; Sjogren's disease         Date       Visit 1 2      Manuals        IASTM R 1st DC; intersection; b/l ulnar wrist  10' total      Cupping        Kinesio tape R EPL inhibit w/ I strip at 1st dc and intersection; L ECU inhibit w/ I strip ulnar side of wrist 5' same as previous              Neuro Re-Ed         UBE                                                        Ther Ex        Wrist isometrics advance to isotonic        Thumb isometrics         Passive wrist extension stretch  10" x 10 b/l      finklestein stretch  10" x 10 R only      Ecc R wrist ext, dt  1# x 20 b/l for ext only      Ecc R EPB        TGE        Wrist stabilization                Ther Activity                        HEP Kinesio tape wear, care, precautions; wrist isometrics Passive wrist ext stretch; R Finklestein stretch; ecc wrist ext b/l                      Modalities        MHP 5' 5'

## 2022-06-23 NOTE — H&P (VIEW-ONLY)
Pain Medicine Follow-Up Note    Assessment:  1  Sacroiliac pain    2  Chronic bilateral low back pain, unspecified whether sciatica present    3  Lumbar spondylosis        Plan:  Orders Placed This Encounter   Procedures    FL spine and pain procedure     Standing Status:   Future     Standing Expiration Date:   6/24/2026     Order Specific Question:   Reason for Exam:     Answer:   b/l SI joint injection     Order Specific Question:   Anticoagulant hold needed? Answer:   no     Order Specific Question:   Is the patient pregnant? Answer:   Unknown       No orders of the defined types were placed in this encounter  My impressions and treatment recommendations were discussed in detail with the patient who verbalized understanding and had no further questions  This is a 70-year-old female who returns to our office with recurrent bilateral low back pain  Appears to be secondary to sacroiliitis again, as well as related to lumbar spondylosis as she has notable pain with facet loading bilaterally  Also has tenderness to palpation of the bilateral SI joints with pain with provocative maneuvers as noted below  Last SI joint injection performed over 1 year ago provided 6 months of relief of nearly 100%  Given description of same symptoms today, discussed repeating the injection under fluoroscopic guidance which she is agreeable to  If no significant relief, then may consider L4-5 and L5-S1 medial branch block    [place] Prescription Drug Monitoring Program report was reviewed and was appropriate       Complete risks and benefits including bleeding, infection, tissue reaction, nerve injury and allergic reaction were discussed  The approach was demonstrated using models and literature was provided  Verbal and written consent was obtained  Discharge instructions were provided  I personally saw and examined the patient and I agree with the above discussed plan of care      History of Present Illness:    Coni Tomlinson is a 46 y o  female who presents to [facility] for interval re-evaluation of the above stated pain complaints  The patient has a past medical and chronic pain history as outlined in the assessment section  She was last seen on 02/25/2021 for bilateral sacroiliac joint injection with significant relief for 6 months  She reports recurrence of pain same area  Pain score 8/10  Worse in the evening and night  Pain is constant, dull/aching, throbbing, pressure-like, shooting in nature       She continues to follow with Rheumatology given history of Sjogren syndrome and rheumatoid arthritis  Also continues seeing Neurology in regards to myasthenia gravis  Other than as stated above, the patient denies any interval changes in medications, medical condition, mental condition, symptoms, or allergies since the last office visit  Review of Systems:    Review of Systems   Respiratory: Negative for shortness of breath  Cardiovascular: Negative for chest pain  Gastrointestinal: Negative for constipation, diarrhea, nausea and vomiting  Musculoskeletal: Positive for arthralgias, gait problem, joint swelling and myalgias  Decreased ROM  Pain in lower back, hip/pelvis   Skin: Negative for rash  Neurological: Negative for dizziness, seizures and weakness  All other systems reviewed and are negative          Patient Active Problem List   Diagnosis    Asymptomatic stenosis of left carotid artery    Family history of premature CAD    MG (myasthenia gravis) ([address] )    Chronic pain syndrome    Greater trochanteric bursitis of both hips    Lumbar radiculopathy    Generalized joint pain    Cervical spinal stenosis    Abnormal cervical Papanicolaou smear    Gastroesophageal reflux disease    Irritable bowel syndrome with diarrhea    Menopausal symptom    Polyneuropathy    Raynaud's disease    Sjogren's syndrome with keratoconjunctivitis sicca ([facility]    Family history of breast cancer in sister    Persistent insomnia    Bilateral sacroiliitis ([facility]    DDD (degenerative disc disease), lumbar    Acquired compression deformity of vertebra    Fibromyalgia    Dyspareunia in female    Diarrhea    Family history of colon cancer    Anxiety    Closed fracture of lower end of right radius with routine healing    Sprain of right wrist    Extensor tenosynovitis of right wrist    Sprain of left wrist       Past Medical History:   Diagnosis Date    Abnormal Pap smear of cervix     Arthritis     RA    Carotid stenosis, asymptomatic     Chemical exposure     Saritha Scruggs 67 - worked across [address]    Dizziness     HPV in female     IBS (irritable bowel syndrome)     Myasthenia gravis ([facility] )     dx 2014    Myasthenia gravis ([facility]     Neuropathy     left foot    Polyneuropathy     Raynaud disease     dx 2016    Sjoegren syndrome     dx 2010    Syncope     Venereal wart 04/13/2015       Past Surgical History:   Procedure Laterality Date    CHOLECYSTECTOMY  2016    COLONOSCOPY      THYMECTOMY  2016    UPPER GASTROINTESTINAL ENDOSCOPY         Family History   Problem Relation Age of Onset    Arthritis Mother     Dementia Mother     Diabetes Father     Hypertension Father     Stroke Father     Breast cancer Sister 71    Arthritis Sister     Heart disease Sister    [place] Diabetes Sister     Cirrhosis Sister         non-alcoholic    Diverticulitis Sister     Coronary artery disease Sister     Other Sister         carotid artery disease    Colon cancer Sister     Breast cancer Sister         63's    Liver cancer Sister     Ross syndrome Sister     Thyroid disease Sister    [place] Stroke Sister     Urolithiasis Sister     Diverticulitis Brother     Dementia Maternal Grandmother     Heart attack Maternal Grandfather     Stroke Paternal Grandmother     Stroke Paternal Grandfather     No Known Problems Maternal Aunt     No Known Problems Paternal Aunt     No Known Problems Paternal Aunt        Social History     Occupational History     Employer: [employer]   Tobacco Use    Smoking status: Former Smoker     Packs/day: 0 50     Years: 8 00     Pack years: 4 00     Types: Cigarettes     Start date: 2001     Quit date: 10/10/2010     Years since quittin 7    Smokeless tobacco: Never Used   Vaping Use    Vaping Use: Former    Start date: 10/10/2010    Quit date: 2011   Substance and Sexual Activity    Alcohol use: Not Currently    Drug use: No    Sexual activity: Not Currently     Partners: Male     Birth control/protection: Post-menopausal         Current Outpatient Medications:     celecoxib (CeleBREX) 200 mg capsule, Take 200 mg by mouth 2 (two) times a day , Disp: , Rfl:     diphenoxylate-atropine (LOMOTIL) 2 5-0 025 mg per tablet, Take 1 tablet by mouth 2 (two) times a day as needed for diarrhea, Disp: 60 tablet, Rfl: 2    escitalopram (Lexapro) 10 mg tablet, Take 1 tablet (10 mg total) by mouth daily, Disp: 90 tablet, Rfl: 1    folic acid (FOLVITE) 1 mg tablet, Take 1 mg by mouth daily  , Disp: , Rfl:     gabapentin (NEURONTIN) 300 mg capsule, TAKE 1 CAPSULE THREE TIMES A DAY, Disp: 270 capsule, Rfl: 3    lifitegrast (Xiidra) 5 % op solution, Administer 1 drop to both eyes 2 (two) times a day, Disp: , Rfl:     multivitamin-minerals (CENTRUM ADULTS) tablet, Take 1 tablet by mouth daily, Disp: , Rfl:     omeprazole (PriLOSEC) 20 mg delayed release capsule, TAKE 1 CAPSULE DAILY, Disp: 90 capsule, Rfl: 3    pediatric multivitamin-iron (POLY-VI-SOL with IRON) 15 MG chewable tablet, Chew 1 tablet  in the morning , Disp: , Rfl:     pyridostigmine (MESTINON) 60 mg tablet, Take 1 tablet (60 mg total) by mouth 4 (four) times a day, Disp: 360 tablet, Rfl: 3    traMADol (ULTRAM) 50 mg tablet, Take 50 mg by mouth 2 (two) times a day as needed, Disp: , Rfl:     zolpidem (AMBIEN) 10 mg tablet, Take 10 mg by mouth daily at bedtime, Disp: , Rfl:     No Known Allergies    Physical Exam:    /76 (BP Location: Left arm, Patient Position: Sitting, Cuff Size: Standard)   Pulse 61   Ht 5' 4 5" (1 638 m)   Wt 70 8 kg (156 lb)   LMP 01/01/2016 (Approximate)   BMI 26 36 kg/m²     Constitutional:normal, well developed, well nourished, alert, in no distress and non-toxic and no overt pain behavior    Eyes:anicteric  HEENT:grossly intact  Neck:supple, symmetric, trachea midline and no masses   Pulmonary:even and unlabored  Cardiovascular:No edema or pitting edema present  Skin:Normal without rashes or lesions and well hydrated  Psychiatric:Mood and affect appropriate  Neurologic:Cranial Nerves II-XII grossly intact  Musculoskeletal:normal     Lumbar Spine Exam      Palpation/Tenderness:  left sacroiliac joint tenderness  right sacroiliac joint tenderness  Special Tests:  Left Straight Leg Test:  positive  Right Straight Leg Test:  positive  Left Lg's Maneuver:  positive  Right Lg's Maneuver:  positive  Left Gaenslen's Test:  positive  Right Gaenslen's Test:  positive        Imaging  FL spine and pain procedure    (Results Pending)         Orders Placed This Encounter   Procedures    FL spine and pain procedure

## 2022-06-24 ENCOUNTER — OFFICE VISIT (OUTPATIENT)
Dept: PAIN MEDICINE | Facility: CLINIC | Age: 52
End: 2022-06-24
Payer: OTHER GOVERNMENT

## 2022-06-24 VITALS
DIASTOLIC BLOOD PRESSURE: 76 MMHG | WEIGHT: 156 LBS | HEIGHT: 65 IN | HEART RATE: 61 BPM | SYSTOLIC BLOOD PRESSURE: 119 MMHG | BODY MASS INDEX: 25.99 KG/M2

## 2022-06-24 DIAGNOSIS — M47.816 LUMBAR SPONDYLOSIS: ICD-10-CM

## 2022-06-24 DIAGNOSIS — M53.3 SACROILIAC PAIN: Primary | ICD-10-CM

## 2022-06-24 DIAGNOSIS — G89.29 CHRONIC BILATERAL LOW BACK PAIN, UNSPECIFIED WHETHER SCIATICA PRESENT: ICD-10-CM

## 2022-06-24 DIAGNOSIS — M54.50 CHRONIC BILATERAL LOW BACK PAIN, UNSPECIFIED WHETHER SCIATICA PRESENT: ICD-10-CM

## 2022-06-24 PROCEDURE — 99214 OFFICE O/P EST MOD 30 MIN: CPT | Performed by: STUDENT IN AN ORGANIZED HEALTH CARE EDUCATION/TRAINING PROGRAM

## 2022-06-27 ENCOUNTER — APPOINTMENT (OUTPATIENT)
Dept: OCCUPATIONAL THERAPY | Facility: CLINIC | Age: 52
End: 2022-06-27
Payer: OTHER GOVERNMENT

## 2022-06-27 ENCOUNTER — APPOINTMENT (OUTPATIENT)
Dept: PHYSICAL THERAPY | Facility: CLINIC | Age: 52
End: 2022-06-27
Payer: OTHER GOVERNMENT

## 2022-06-29 ENCOUNTER — TELEPHONE (OUTPATIENT)
Dept: PAIN MEDICINE | Facility: CLINIC | Age: 52
End: 2022-06-29

## 2022-06-29 ENCOUNTER — OFFICE VISIT (OUTPATIENT)
Dept: OBGYN CLINIC | Facility: CLINIC | Age: 52
End: 2022-06-29
Payer: OTHER GOVERNMENT

## 2022-06-29 VITALS
WEIGHT: 153 LBS | HEART RATE: 76 BPM | HEIGHT: 65 IN | RESPIRATION RATE: 18 BRPM | BODY MASS INDEX: 25.49 KG/M2 | DIASTOLIC BLOOD PRESSURE: 70 MMHG | SYSTOLIC BLOOD PRESSURE: 102 MMHG

## 2022-06-29 DIAGNOSIS — M65.831 EXTENSOR TENOSYNOVITIS OF RIGHT WRIST: ICD-10-CM

## 2022-06-29 DIAGNOSIS — S63.501D SPRAIN OF RIGHT WRIST, SUBSEQUENT ENCOUNTER: ICD-10-CM

## 2022-06-29 DIAGNOSIS — S52.591D OTHER CLOSED FRACTURE OF DISTAL END OF RIGHT RADIUS WITH ROUTINE HEALING, SUBSEQUENT ENCOUNTER: Primary | ICD-10-CM

## 2022-06-29 DIAGNOSIS — S63.502D SPRAIN OF LEFT WRIST, SUBSEQUENT ENCOUNTER: ICD-10-CM

## 2022-06-29 PROCEDURE — 99213 OFFICE O/P EST LOW 20 MIN: CPT | Performed by: FAMILY MEDICINE

## 2022-06-29 NOTE — PROGRESS NOTES
Assessment/Plan:  Assessment/Plan   Diagnoses and all orders for this visit:    Other closed fracture of distal end of right radius with routine healing, subsequent encounter    Sprain of right wrist, subsequent encounter    Extensor tenosynovitis of right wrist    Sprain of left wrist, subsequent encounter      75-year-old right-hand-dominant female with bilateral lateral wrist pain, right worse than left, following [address] injury more than 3 months ago  Discussed with patient physical exam, impression and plan  Physical exam is currently unremarkable for bony or soft tissue tenderness of the wrists and hands  She demonstrates intact range of motion and strength in both wrist and digits of both hands  She is intact neurovascularly  There is mild pain right wrist with axial load when doing pushups  Clinical impression is that she is recovering well from her injury  Recommend she complete her current course of occupational therapy after which point she may continue with home exercise program   She will follow up as needed  Subjective:   Patient ID: Geneva Arango is a 46 y o  female  Chief Complaint   Patient presents with    Right Wrist - Clicking, Follow-up       75-year-old right-hand-dominant female following up for bilateral wrist pain, right worse than left, onset fall injury more than 3 months ago  She was last seen by me 5 weeks ago which point she was referred to formal therapy  She has been attending formal therapy and doing home exercises since 06/06/2022  She reports feeling much better since her last visit  She currently denies any pain of the wrist and hands  She has been doing physical activity including lifting gallon of milk without any issue  She reports having some numbness at the 4th webspace of the right hand  Wrist Pain  This is a new problem  The current episode started more than 1 month ago  The problem has been gradually improving  Associated symptoms include numbness  Pertinent negatives include no arthralgias, joint swelling or weakness  Nothing aggravates the symptoms  She has tried rest and immobilization (Formal therapy, home exercise) for the symptoms  The treatment provided significant relief  Review of Systems   Musculoskeletal: Negative for arthralgias and joint swelling  Neurological: Positive for numbness  Negative for weakness  Objective:  Vitals:    06/29/22 1309   BP: 102/70   Pulse: 76   Resp: 18   Weight: 69 4 kg (153 lb)   Height: 5' 4 5" (1 638 m)     Right Hand Exam     Muscle Strength   The patient has normal right wrist strength  Tests   Finkelstein's test: negative    Other   Sensation: normal  Pulse: present      Left Hand Exam     Muscle Strength   The patient has normal left wrist strength  Tests   Finkelstein's test: negative    Other   Sensation: normal  Pulse: present          Observations     Left Wrist/Hand   Negative for deformity  Right Wrist/Hand   Negative for deformity  Tenderness     Left Wrist/Hand   No tenderness in the first dorsal compartment, second dorsal compartment, fifth dorsal compartment, sixth dorsal compartment, carpometacarpal joint, triangular fibrocartilage complex , distal radioulnar joint, scaphoid and lunate  Right Wrist/Hand   No tenderness in the first dorsal compartment, second dorsal compartment, fifth dorsal compartment, sixth dorsal compartment, carpometacarpal joint, triangular fibrocartilage complex , distal radioulnar joint, scaphoid and lunate  Active Range of Motion     Additional Active Range of Motion Details  Normal range of motion of fingers digits of both hands    Strength/Myotome Testing     Left Wrist/Hand   Normal wrist strength    Right Wrist/Hand   Normal wrist strength    Tests     Left Wrist/Hand   Negative distal radial-ulnar joint stress, Finkelstein's and TFCC load  Right Wrist/Hand   Negative distal radial-ulnar joint stress, Finkelstein's and TFCC load  Physical Exam  Vitals and nursing note reviewed  Constitutional:       General: She is not in acute distress  Appearance: She is well-developed  She is not ill-appearing or diaphoretic  HENT:      Head: Normocephalic  Right Ear: External ear normal       Left Ear: External ear normal    Eyes:      Conjunctiva/sclera: Conjunctivae normal    Neck:      Trachea: No tracheal deviation  Cardiovascular:      Rate and Rhythm: Normal rate  Pulmonary:      Effort: Pulmonary effort is normal  No respiratory distress  Abdominal:      General: There is no distension  Musculoskeletal:         General: No swelling, tenderness or deformity  Right hand: No deformity  Left hand: No deformity  Skin:     General: Skin is warm and dry  Coloration: Skin is not jaundiced or pale  Neurological:      Mental Status: She is alert and oriented to person, place, and time  Psychiatric:         Mood and Affect: Mood normal          Behavior: Behavior normal          Thought Content:  Thought content normal          Judgment: Judgment normal

## 2022-06-30 ENCOUNTER — OFFICE VISIT (OUTPATIENT)
Dept: OCCUPATIONAL THERAPY | Facility: CLINIC | Age: 52
End: 2022-06-30
Payer: OTHER GOVERNMENT

## 2022-06-30 ENCOUNTER — OFFICE VISIT (OUTPATIENT)
Dept: PHYSICAL THERAPY | Facility: CLINIC | Age: 52
End: 2022-06-30
Payer: OTHER GOVERNMENT

## 2022-06-30 DIAGNOSIS — S52.591D OTHER CLOSED FRACTURE OF DISTAL END OF RIGHT RADIUS WITH ROUTINE HEALING, SUBSEQUENT ENCOUNTER: Primary | ICD-10-CM

## 2022-06-30 DIAGNOSIS — M54.2 NECK PAIN: Primary | ICD-10-CM

## 2022-06-30 DIAGNOSIS — M65.831 EXTENSOR TENOSYNOVITIS OF RIGHT WRIST: ICD-10-CM

## 2022-06-30 DIAGNOSIS — S63.501D SPRAIN OF RIGHT WRIST, SUBSEQUENT ENCOUNTER: ICD-10-CM

## 2022-06-30 DIAGNOSIS — S63.502D SPRAIN OF LEFT WRIST, SUBSEQUENT ENCOUNTER: ICD-10-CM

## 2022-06-30 PROCEDURE — 97110 THERAPEUTIC EXERCISES: CPT

## 2022-06-30 PROCEDURE — 97140 MANUAL THERAPY 1/> REGIONS: CPT

## 2022-06-30 NOTE — PROGRESS NOTES
Daily Note     Today's date: 2022  Patient name: Valeria Barrera  : 1970  MRN: [id number]  Referring provider: Chuckie Curry DO  Dx:   Encounter Diagnosis     ICD-10-CM    1  Neck pain  M54 2                   Subjective: Pt noted that her neck has been feeling good  And she has been using her HEP at home to help with the pain and dismcomfort  Pt noted that she would like to make today her last day of PT  Objective: See treatment diary below      Assessment: Continued with treatment session, discussed continuation of HEP and stretches  Pt acknowledged understanding  Tolerated treatment fair  Patient exhibited good technique with therapeutic exercises no longer requires any perform cues for exercises  Reviewed towel snag rotations with proper form today  Plan: At this time patient will be D/C by primary care provider  Precautions: Sjoegren's, Raynaud's, Polyneuropathy, Myasthenia Gravis, FM  Access Code: [facility] - updated on 22  URL: [email or web address]  Date: 2022     Prepared by: Abhilash Head       Manuals          Loaded minimal retractions x10            SOR   TS                                    Neuro Re-Ed             Pt education Posture  C-spine anatomy  Centralization   education on HEP and proper posture            Bkwd shoulder rolls x30 30x            Scapular retractions 5"x30 5" 30x  5" 30x          Webslide rows M,L  RTB 2x 10  [id number]          Shrugs    5# 3x10          Unilateral shrug holds   15x 5" ea          Loaded cervical retractions x10 15x  15x supine                                    Ther Ex             UBE retro  5' seated  5' 10 min nustep with UE           UT stretch left 3x30" 3x 30"  3x30"          LS stretch left   3x30"          MWM left cervical rotation    5" 10x          AROM c-spine                                                                 Ther Activity                                       Gait Training                                       Modalities                                         Access Code: [facility]  URL: [email or web address]  Date: 06/30/2022  Prepared by: Tima Daniel    Exercises  · Seated Passive Cervical Retraction - 1 x daily - 7 x weekly - 3 sets - 10 reps  · Seated Cervical Sidebending Stretch - 1 x daily - 7 x weekly - 3 sets - 30 sec hold  · Standing Backward Shoulder Rolls - 1 x daily - 7 x weekly - 3 sets - 10 reps  · Seated Scapular Retraction - 1 x daily - 7 x weekly - 2-3 sets - 10 reps - 5 sec hold  · Seated Assisted Cervical Rotation with Towel - 1 x daily - 7 x weekly - 10 reps - 5 sec hold  · Standing Shoulder Row with Anchored Resistance - 1 x daily - 7 x weekly - 3 sets - 10 reps - 3 sec hold  · Shoulder extension with resistance - Neutral - 1 x daily - 7 x weekly - 2-3 sets - 10 reps - 3 sec hold  · Standing Shoulder Shrugs with Dumbbells - 1 x daily - 7 x weekly - 2 sets - 10 reps

## 2022-06-30 NOTE — PROGRESS NOTES
OT Discharge     Today's date: 2022  Patient name: Dusty Quarles  : 1970  MRN: [id number]  Referring provider: Alexa Roberson DO  Dx:   Encounter Diagnosis     ICD-10-CM    1  Other closed fracture of distal end of right radius with routine healing, subsequent encounter  S52 591D    2  Sprain of right wrist, subsequent encounter  S63 501D    3  Extensor tenosynovitis of right wrist  M65 831    4  Sprain of left wrist, subsequent encounter  S63 502D                   Assessment  Assessment details: Dusty Quarles is a 46 y o , Right HD female referred to hand therapy for bilateral wrist pain and right non displaced distal radius fracture  Onset of injury 3/2022 due to fall on outstretched hands  Patient presents 22 with impaired strength, and sensation of the BUE  Deficits also noted in pain, weight bearing on hands, and functional use of the bilateral UE  Patient has positive provocative testing for right EPL tendinitis  Patient is a good candidate for OT services to abolish pain and edema and restore pain free ROM, strength, coordination, and sensation for a return to independence in daily tasks  22: Patient attended 3/3 OT appts  She now demonstrates AROM and strength in b/l hands and arms WNL  Pain has resolved  Patient has resumed most daily activities, but does report mild difficulty with heavy lifting and grasping  Patient is independent in HEP   DC to [facility] at this time    Impairments: activity intolerance, impaired physical strength, lacks appropriate home exercise program, pain with function and weight-bearing intolerance  Other impairment: MG; polyneuropathy; Raynaud's syndrome; Sjogren's disease  Functional limitations: Mild difficulty with weight bearing on open palms; lifting; resistive graspingBarriers to therapy: MG; polyneuropathy; Raynaud's syndrome; Sjogren's disease  Understanding of Dx/Px/POC: excellent   Prognosis: good    Goals  STGs (4 weeks)  Patient will be independent in HEP of strengthening; pain mgt  MET  Patient will report an average pain level of 2/10  MET  Patient will demonstrate pain free AROM wrist and forearm  MET  Patient will demonstrate 30% reduction in pain during activities  MET  LTGs (12 weeks)  Patient will demonstrate independence in a HEP to maintain ROM, strength, and function at discharge  MET  Patient will report an average pain level of 1-2/10 to be independent in daily tasks  MET  Patient will demonstrate 5/5 muscle strength in the wrist and forearm to be MI for meal prep  MET  Patient will demonstrate right hand strength equal to the left hand to be MI for IADL tasks  MET  Patient will demonstrate resolution of pain when weight bearing on open palms  PARTIALLY MET  Patient will demonstrate resolution of right thumb EPL tendinitis  MET      Plan  Plan details: 6/30/22: DC to HEP  Patient would benefit from: skilled occupational therapy and custom splinting  Planned modality interventions: ultrasound, thermotherapy: hydrocollator packs and cryotherapy  Planned therapy interventions: activity modification, compression, dressing changes, fine motor coordination training, graded activity, graded exercise, home exercise program, therapeutic exercise, therapeutic activities, stretching, strengthening, patient education, orthotic fitting/training, neuromuscular re-education and manual therapy  Other planned therapy interventions: IASTM; cupping; kinesio tape; blood flowrestriction  Frequency: 2x week  Duration in weeks: 12  Plan of Care beginning date: 6/6/2022  Plan of Care expiration date: 9/9/2022  Treatment plan discussed with: patient        Subjective Evaluation    History of Present Illness  Mechanism of injury: trauma  Mechanism of injury: Patient reports she fell on driveway on outstretched hands in March 2022  Did not seek medical attention until April   Went to urgent care due to persistent pain and swelling in the right wrist  Xrays revealed non displaced right distal radius fx  Issued a thumb spica splint she wears for activities  Now with persistent pain in both wrists  Pain when weight bearing on open palms  Patient now presents for OT evaluation and treatment    22: I feel better  The doctor said I can stop therapy          Recurrent probem    Quality of life: excellent    Pain  Current pain ratin  At best pain ratin  At worst pain ratin  Location: right wrist  Exacerbated by: weight bearing onto open palms  Progression: resolved    Social Support  Lives with: parents and spouse    Employment status: not working (retired)  Hand dominance: right      Diagnostic Tests  X-ray: abnormal  Treatments  Previous treatment: immobilization  Current treatment: occupational therapy  Patient Goals  Patient goals for therapy: decreased edema, decreased pain, increased motion, increased strength and independence with ADLs/IADLs          Objective     Observations     Left Wrist/Hand   Negative for atrophy, deformity and edema  Right Wrist/Hand   Negative for atrophy, deformity and edema  Tenderness     Left Elbow   No tenderness in the lateral epicondyle  Left Wrist/Hand   No tenderness in the first dorsal compartment, second dorsal compartment, lateral epicondyle and triangular fibrocartilage complex   Right Wrist/Hand   No tenderness in the first dorsal compartment, second dorsal compartment, intersection dorsal forearm area and triangular fibrocartilage complex        Neurological Testing     Sensation     Wrist/Hand   Left   Intact: light touch    Right   Diminished: light touch    Comments   Right light touch: Diminished protective sensation dorsal the web space and P1 small finger RDN    Active Range of Motion   Left Shoulder   Normal active range of motion    Right Shoulder   Normal active range of motion    Left Elbow   Normal active range of motion    Right Elbow   Normal active range of motion    Left Wrist Normal active range of motion    Right Wrist   Normal active range of motion    Left Thumb   Opposition: 6/6/22; WNL    Right Thumb   Opposition: 6/6/22: WNL    Additional Active Range of Motion Details  6/6/22: WNL    Strength/Myotome Testing     Left Elbow   Normal strength    Right Elbow   Normal strength    Left Wrist/Hand   Normal wrist strength     (2nd hand position)     Trial 1: 85    Thumb Strength  Key/Lateral Pinch     Trial 1: 14 5  Tip/Two-Point Pinch     Trial 1: 11  Palmar/Three-Point Pinch     Trial 1: 16 5    Right Wrist/Hand   Normal wrist strength     (2nd hand position)     Trial 1: 73    Thumb Strength   Key/Lateral Pinch     Trial 1: 16  Tip/Two-Point Pinch     Trial 1: 12  Palmar/Three-Point Pinch     Trial 1: 18    Tests     Left Wrist/Hand   Negative Finkelstein's, lunotriquetral shear, scapholunate shear and ulnotriquetral shear  Right Wrist/Hand   Negative CMC grind, Finkelstein's, lunotriquetral shear, scapholunate shear, TFCC load and ulnotriquetral shear                Precautions: right non displaced distal radius fx 3/2022; b/l thumb CMC OA; b/l wrist sprains; partial tear EPL right thumb; MG; polyneuropathy; Raynaud's syndrome; Sjogren's disease         Date 6/6 6/23 6/30     Visit 1 2 3 DC     Manuals        IASTM R 1st DC; intersection; b/l ulnar wrist  10' total 10' total     Cupping        Kinesio tape R EPL inhibit w/ I strip at 1st dc and intersection; L ECU inhibit w/ I strip ulnar side of wrist 5' same as previous              Neuro Re-Ed         UBE                                                        Ther Ex        Wrist isometrics advance to isotonic        Thumb isometrics         Passive wrist extension stretch  10" x 10 b/l 10" x 10 b/l     finklestein stretch  10" x 10 R only 10" x 10 R only     Wrist curls ext, flex, dt   2# x 10 b/l all planes     Ecc R wrist ext, dt  1# x 20 b/l for ext only      Ecc R EPB        TGE        Wrist stabilization Ther Activity                        HEP Kinesio tape wear, care, precautions; wrist isometrics Passive wrist ext stretch; R Finklestein stretch; ecc wrist ext b/l Wrist curls  1 session, 3x/wk for HEP                     Modalities        [facility] 5' 5' 5'

## 2022-06-30 NOTE — PROGRESS NOTES
Daily Note     Today's date: 2022  Patient name: Jc Fuller  : 1970  MRN: [id number]  Referring provider: Rafia Cummings DO  Dx:   Encounter Diagnosis     ICD-10-CM    1  Other closed fracture of distal end of right radius with routine healing, subsequent encounter  S52 591D    2  Sprain of right wrist, subsequent encounter  S63 501D    3  Extensor tenosynovitis of right wrist  M65 831    4  Sprain of left wrist, subsequent encounter  S63 502D                   Subjective: ***      Objective: See treatment diary below      Assessment: Tolerated treatment {Tolerated treatment :[id number]}   Patient {assessment:2989927634}      Plan: {PLAN:7318611222}     Precautions: right non displaced distal radius fx 3/2022; b/l thumb CMC OA; b/l wrist sprains; partial tear EPL right thumb; MG; polyneuropathy; Raynaud's syndrome; Sjogren's disease         Date      Visit 1 2 3     Manuals        IASTM R 1st DC; intersection; b/l ulnar wrist  10' total      Cupping        Kinesio tape R EPL inhibit w/ I strip at 1st dc and intersection; L ECU inhibit w/ I strip ulnar side of wrist 5' same as previous              Neuro Re-Ed         UBE                                                        Ther Ex        Wrist isometrics advance to isotonic        Thumb isometrics         Passive wrist extension stretch  10" x 10 b/l      finklestein stretch  10" x 10 R only      Ecc R wrist ext, dt  1# x 20 b/l for ext only      Ecc R EPB        TGE        Wrist stabilization                Ther Activity                        HEP Kinesio tape wear, care, precautions; wrist isometrics Passive wrist ext stretch; R Finklestein stretch; ecc wrist ext b/l                      Modalities        [facility] 5' 5'

## 2022-07-01 ENCOUNTER — OFFICE VISIT (OUTPATIENT)
Dept: FAMILY MEDICINE CLINIC | Facility: CLINIC | Age: 52
End: 2022-07-01
Payer: OTHER GOVERNMENT

## 2022-07-01 VITALS
TEMPERATURE: 97.6 F | HEIGHT: 65 IN | BODY MASS INDEX: 25.46 KG/M2 | SYSTOLIC BLOOD PRESSURE: 108 MMHG | HEART RATE: 76 BPM | DIASTOLIC BLOOD PRESSURE: 72 MMHG | RESPIRATION RATE: 16 BRPM | OXYGEN SATURATION: 98 % | WEIGHT: 152.8 LBS

## 2022-07-01 DIAGNOSIS — B96.89 ACUTE BACTERIAL BRONCHITIS: ICD-10-CM

## 2022-07-01 DIAGNOSIS — J20.8 ACUTE BACTERIAL BRONCHITIS: ICD-10-CM

## 2022-07-01 DIAGNOSIS — B34.9 VIRAL ILLNESS: Primary | ICD-10-CM

## 2022-07-01 PROCEDURE — U0005 INFEC AGEN DETEC AMPLI PROBE: HCPCS | Performed by: FAMILY MEDICINE

## 2022-07-01 PROCEDURE — U0003 INFECTIOUS AGENT DETECTION BY NUCLEIC ACID (DNA OR RNA); SEVERE ACUTE RESPIRATORY SYNDROME CORONAVIRUS 2 (SARS-COV-2) (CORONAVIRUS DISEASE [COVID-19]), AMPLIFIED PROBE TECHNIQUE, MAKING USE OF HIGH THROUGHPUT TECHNOLOGIES AS DESCRIBED BY CMS-2020-01-R: HCPCS | Performed by: FAMILY MEDICINE

## 2022-07-01 PROCEDURE — 99213 OFFICE O/P EST LOW 20 MIN: CPT | Performed by: FAMILY MEDICINE

## 2022-07-01 RX ORDER — ALBUTEROL SULFATE 90 UG/1
2 AEROSOL, METERED RESPIRATORY (INHALATION) EVERY 4 HOURS PRN
Qty: 18 G | Refills: 0 | Status: SHIPPED | OUTPATIENT
Start: 2022-07-01

## 2022-07-01 RX ORDER — BENZONATATE 200 MG/1
200 CAPSULE ORAL 3 TIMES DAILY PRN
Qty: 30 CAPSULE | Refills: 0 | Status: SHIPPED | OUTPATIENT
Start: 2022-07-01 | End: 2022-08-18

## 2022-07-01 RX ORDER — BENZONATATE 200 MG/1
200 CAPSULE ORAL 3 TIMES DAILY PRN
Qty: 30 CAPSULE | Refills: 0 | Status: SHIPPED | OUTPATIENT
Start: 2022-07-01 | End: 2022-07-01 | Stop reason: SDUPTHER

## 2022-07-01 NOTE — PATIENT INSTRUCTIONS
PAXLOVID Dynamic Medication Interaction Guide    Current medication list will be searched to evaluate for possible drug interactions  If there are no results below, that means that no medications were found on the med list where there would be an interaction with Paxlovid  If there is clinical concern that there might be a medication that the patient is taking that may interact with Paxlovid that did not show up on this report, a drug interaction search should be completed by 3rd party website  Tramadol (Ultram): No dose adjustment    Effect on Drug Level Possible Effect Recommendation During Paxlovid Treatment   Decreased Decreased effects, increased side effects No dose adjustment required       Sources:  IDSA:  Management of Drug Interactions With Nirmatrelvir/Ritonavir (Paxlovid®)  Paxlovid Fact Sheet for Healthcare Providers  NIH: Drug-Drug Interactions With Ritonavir-Boosted Nirmatrelvir (Paxlovid)

## 2022-07-01 NOTE — PROGRESS NOTES
Assessment/Plan:   Cristino Martin was seen today for cough, hm and medication refill  Diagnoses and all orders for this visit:    Viral illness  -     COVID Only- Office Collect  -     Discontinue: benzonatate (TESSALON) 200 MG capsule; Take 1 capsule (200 mg total) by mouth 3 (three) times a day as needed for cough  -     albuterol (PROVENTIL HFA,VENTOLIN HFA) 90 mcg/act inhaler; Inhale 2 puffs every 4 (four) hours as needed for wheezing or shortness of breath  -     benzonatate (TESSALON) 200 MG capsule; Take 1 capsule (200 mg total) by mouth 3 (three) times a day as needed for cough    Acute bacterial bronchitis  -     amoxicillin (AMOXIL) 500 mg capsule; Take 1 capsule (500 mg total) by mouth 2 (two) times a day for 10 days    If she is positive she can have Paxlovid =-send to [facility] in [place]  Reviewed risks and benefits   If negative viral illness likely  However pt reports that she usually needs antibiotics when she gets sick  Will send in antibiotics if  persists or worsens  Patient Instructions     PAXLOVID Dynamic Medication Interaction Guide    Current medication list will be searched to evaluate for possible drug interactions  If there are no results below, that means that no medications were found on the med list where there would be an interaction with Paxlovid  If there is clinical concern that there might be a medication that the patient is taking that may interact with Paxlovid that did not show up on this report, a drug interaction search should be completed by 3rd party website  Tramadol (Ultram): No dose adjustment    Effect on Drug Level Possible Effect Recommendation During Paxlovid Treatment   Decreased Decreased effects, increased side effects No dose adjustment required       Sources:  IDSA:  Management of Drug Interactions With Nirmatrelvir/Ritonavir (Paxlovid®)  Paxlovid Fact Sheet for Healthcare Providers  NIH: Drug-Drug Interactions With Ritonavir-Boosted Nirmatrelvir (Paxlovid)      No follow-ups on file  Subjective:    HPI  Joao Mohan is a 46 y o  female who presents with:  Chief Complaint     Cough; HM; Medication Refill        HPI     Cough      Additional comments: Sore throat, headache, earache, voice loss, no fevers, has been going on since yesterday  Scalp hurts as well  At home COVID test was negative  Has not taken any medications               HM      Additional comments: COVID booster #4 did not get               Medication Refill      Additional comments: None at this time           Last edited by Yuan Lechuga on 7/1/2022 11:00 AM  (History)        ---Above per clinical staff & reviewed  ---        Today:  Dry cough, tickle in throat, coughing a lot , pnd, no nasal d/c, body aches, no fevers or chills, 2 weeks ago was in [place], scratchy      The following portions of the patient's history were reviewed and updated as appropriate: allergies, current medications, past family history, past medical history, past social history, past surgical history and problem list   Review of Systems  ROS:  all others negative - no chest pain, SOB, normal urine and bowels  no GERD  sleeping well  mood good       Objective:    /72   Pulse 76   Temp 97 6 °F (36 4 °C)   Resp 16   Ht 5' 4 5" (1 638 m)   Wt 69 3 kg (152 lb 12 8 oz)   LMP 01/01/2016 (Approximate)   SpO2 98%   BMI 25 82 kg/m²   Wt Readings from Last 3 Encounters:   07/01/22 69 3 kg (152 lb 12 8 oz)   06/29/22 69 4 kg (153 lb)   06/24/22 70 8 kg (156 lb)     BP Readings from Last 3 Encounters:   07/01/22 108/72   06/29/22 102/70   06/24/22 119/76       Current Medications:  Current Outpatient Medications   Medication Sig Dispense Refill    albuterol (PROVENTIL HFA,VENTOLIN HFA) 90 mcg/act inhaler Inhale 2 puffs every 4 (four) hours as needed for wheezing or shortness of breath 18 g 0    amoxicillin (AMOXIL) 500 mg capsule Take 1 capsule (500 mg total) by mouth 2 (two) times a day for 10 days 20 capsule 0    benzonatate (TESSALON) 200 MG capsule Take 1 capsule (200 mg total) by mouth 3 (three) times a day as needed for cough 30 capsule 0    celecoxib (CeleBREX) 200 mg capsule Take 200 mg by mouth 2 (two) times a day       diphenoxylate-atropine (LOMOTIL) 2 5-0 025 mg per tablet Take 1 tablet by mouth 2 (two) times a day as needed for diarrhea 60 tablet 2    escitalopram (Lexapro) 10 mg tablet Take 1 tablet (10 mg total) by mouth daily 90 tablet 1    folic acid (FOLVITE) 1 mg tablet Take 1 mg by mouth daily        gabapentin (NEURONTIN) 300 mg capsule TAKE 1 CAPSULE THREE TIMES A  capsule 3    lifitegrast (Xiidra) 5 % op solution Administer 1 drop to both eyes 2 (two) times a day      multivitamin-minerals (CENTRUM ADULTS) tablet Take 1 tablet by mouth daily      omeprazole (PriLOSEC) 20 mg delayed release capsule TAKE 1 CAPSULE DAILY 90 capsule 3    pediatric multivitamin-iron (POLY-VI-SOL with IRON) 15 MG chewable tablet Chew 1 tablet  in the morning   pyridostigmine (MESTINON) 60 mg tablet Take 1 tablet (60 mg total) by mouth 4 (four) times a day 360 tablet 3    traMADol (ULTRAM) 50 mg tablet Take 50 mg by mouth 2 (two) times a day as needed      zolpidem (AMBIEN) 10 mg tablet Take 10 mg by mouth daily at bedtime       No current facility-administered medications for this visit  Physical Exam   Constitutional: she appears well-developed and well-nourished  HENT: Head: Normocephalic  Right Ear: External ear normal  Tympanic membrane normal    Left Ear: External ear normal  Tympanic membrane normal    Nose: Nose normal  No mucosal edema, + erythema &  rhinorrhea  Right sinus exhibits no maxillary sinus tenderness  Left sinus exhibits no maxillary sinus tenderness  Mouth/Throat: Oropharynx is clear and moist    Eyes: Normal conjunctiva  No erythema  No discharge  Neck: No pain on exam  Neck supple  Cardiovascular: Normal rate, regular rhythm and normal heart sounds  Pulmonary/Chest: [place] normal and breath sounds normal    Abdominal: Soft  Bowel sounds are normal  There is no tenderness  Lymphadenopathy: she has no cervical adenopathy  Neurological: she is alert and oriented to person, place, and time  Skin: Skin is warm and dry  Psychiatric: she has a normal mood and affect   her behavior is normal

## 2022-07-02 LAB — SARS-COV-2 RNA RESP QL NAA+PROBE: NEGATIVE

## 2022-07-02 RX ORDER — AMOXICILLIN 500 MG/1
500 CAPSULE ORAL 2 TIMES DAILY
Qty: 20 CAPSULE | Refills: 0 | Status: SHIPPED | OUTPATIENT
Start: 2022-07-02 | End: 2022-07-12

## 2022-07-06 NOTE — TELEPHONE ENCOUNTER
Pt called in to check on the status of the procedure  Please be advised thank you    Pt can be reached @ [phone] 20-Feb-2017

## 2022-07-19 ENCOUNTER — HOSPITAL ENCOUNTER (OUTPATIENT)
Dept: RADIOLOGY | Facility: CLINIC | Age: 52
Discharge: HOME | End: 2022-07-19
Admitting: STUDENT IN AN ORGANIZED HEALTH CARE EDUCATION/TRAINING PROGRAM
Payer: OTHER GOVERNMENT

## 2022-07-19 VITALS
DIASTOLIC BLOOD PRESSURE: 77 MMHG | TEMPERATURE: 97 F | SYSTOLIC BLOOD PRESSURE: 123 MMHG | OXYGEN SATURATION: 97 % | RESPIRATION RATE: 20 BRPM | HEART RATE: 60 BPM

## 2022-07-19 DIAGNOSIS — M53.3 SACROILIAC PAIN: ICD-10-CM

## 2022-07-19 PROCEDURE — A9579 GAD-BASE MR CONTRAST NOS,1ML: HCPCS | Performed by: STUDENT IN AN ORGANIZED HEALTH CARE EDUCATION/TRAINING PROGRAM

## 2022-07-19 PROCEDURE — 27096 INJECT SACROILIAC JOINT: CPT | Performed by: STUDENT IN AN ORGANIZED HEALTH CARE EDUCATION/TRAINING PROGRAM

## 2022-07-19 RX ORDER — METHYLPREDNISOLONE ACETATE 80 MG/ML
80 INJECTION, SUSPENSION INTRA-ARTICULAR; INTRALESIONAL; INTRAMUSCULAR; PARENTERAL; SOFT TISSUE ONCE
Status: COMPLETED | OUTPATIENT
Start: 2022-07-19 | End: 2022-07-19

## 2022-07-19 RX ORDER — BUPIVACAINE HCL/PF 2.5 MG/ML
3 VIAL (ML) INJECTION ONCE
Status: COMPLETED | OUTPATIENT
Start: 2022-07-19 | End: 2022-07-19

## 2022-07-19 RX ADMIN — GADOPENTETATE DIMEGLUMINE 1 ML: 469.01 INJECTION INTRAVENOUS at 13:18

## 2022-07-19 RX ADMIN — Medication 3 ML: at 13:18

## 2022-07-19 RX ADMIN — METHYLPREDNISOLONE ACETATE 80 MG: 80 INJECTION, SUSPENSION INTRA-ARTICULAR; INTRALESIONAL; INTRAMUSCULAR; PARENTERAL; SOFT TISSUE at 13:18

## 2022-07-19 NOTE — INTERVAL H&P NOTE
Update: (This section must be completed if the H&P was completed greater than 24 hrs to procedure or admission)    H&P reviewed  After examining the patient, I find no changed to the H&P since it had been written  Patient re-evaluated   Accept as history and physical     Kailyn Murray MD/July 19, 2022/1:01 PM

## 2022-07-19 NOTE — DISCHARGE INSTR - LAB
Steroid Joint Injection   WHAT YOU NEED TO KNOW:   A steroid joint injection is a procedure to inject steroid medicine into a joint  Steroid medicine decreases pain and inflammation  The injection may also contain an anesthetic (numbing medicine) to decrease pain  It may be done to treat conditions such as arthritis, gout, or carpal tunnel syndrome  The injections may be given in your knee, ankle, shoulder, elbow, wrist, ankle or sacroiliac joint  Do not apply heat to any area that is numb  If you have discomfort or soreness at the injection site, you may apply ice today, 20 minutes on and 20 minutes off  Tomorrow you may use ice or warm, moist heat  Do not apply ice or heat directly to the skin  You may have an increase or change in the discomfort for 36-48 hours after your treatment  Apply ice and continue with any pain medicine you have been prescribed  Do not do anything strenuous today  You may shower, but no tub baths or hot tubs today  You may resume your normal activities tomorrow, but do not overdo it  Resume normal activities slowly when you are feeling better  If you experience redness, drainage or swelling at the injection site, or if you develop a fever above 100 degrees, please call [facility] at [phone] or go to the Emergency Room  Continue to take all routine medicines prescribed by your primary care physician unless otherwise instructed by our staff  Most blood thinners should be started again according to your regularly scheduled dosing  If you have any questions, please give our office a call  As no general anesthesia was used in today's procedure, you should not experience any side effects related to anesthesia  If you have a problem specifically related to your procedure, please call our office at [phone]  Problems not related to your procedure should be directed to your primary care physician

## 2022-08-01 DIAGNOSIS — F41.9 ANXIETY: ICD-10-CM

## 2022-08-01 RX ORDER — ESCITALOPRAM OXALATE 10 MG/1
10 TABLET ORAL DAILY
Qty: 90 TABLET | Refills: 1 | Status: SHIPPED | OUTPATIENT
Start: 2022-08-01 | End: 2022-08-17 | Stop reason: SDUPTHER

## 2022-08-17 DIAGNOSIS — F41.9 ANXIETY: ICD-10-CM

## 2022-08-17 RX ORDER — ESCITALOPRAM OXALATE 10 MG/1
10 TABLET ORAL DAILY
Qty: 90 TABLET | Refills: 1 | Status: SHIPPED | OUTPATIENT
Start: 2022-08-17 | End: 2022-09-20

## 2022-08-18 ENCOUNTER — OFFICE VISIT (OUTPATIENT)
Dept: FAMILY MEDICINE CLINIC | Facility: CLINIC | Age: 52
End: 2022-08-18
Payer: OTHER GOVERNMENT

## 2022-08-18 ENCOUNTER — HOSPITAL ENCOUNTER (OUTPATIENT)
Dept: RADIOLOGY | Facility: HOSPITAL | Age: 52
Discharge: HOME | End: 2022-08-18
Attending: FAMILY MEDICINE
Payer: OTHER GOVERNMENT

## 2022-08-18 VITALS
HEART RATE: 81 BPM | SYSTOLIC BLOOD PRESSURE: 118 MMHG | WEIGHT: 154.6 LBS | DIASTOLIC BLOOD PRESSURE: 70 MMHG | TEMPERATURE: 98.2 F | OXYGEN SATURATION: 97 % | HEIGHT: 65 IN | BODY MASS INDEX: 25.76 KG/M2

## 2022-08-18 DIAGNOSIS — R07.89 STERNAL PAIN: ICD-10-CM

## 2022-08-18 DIAGNOSIS — R07.81 RIB PAIN ON RIGHT SIDE: Primary | ICD-10-CM

## 2022-08-18 DIAGNOSIS — M99.08 RIB CAGE DYSFUNCTION: ICD-10-CM

## 2022-08-18 DIAGNOSIS — R07.81 RIB PAIN ON RIGHT SIDE: ICD-10-CM

## 2022-08-18 PROCEDURE — 99214 OFFICE O/P EST MOD 30 MIN: CPT | Performed by: FAMILY MEDICINE

## 2022-08-18 PROCEDURE — 71111 X-RAY EXAM RIBS/CHEST4/> VWS: CPT

## 2022-08-18 RX ORDER — AMOXICILLIN 500 MG/1
CAPSULE ORAL
COMMUNITY
Start: 2022-07-02 | End: 2022-08-18

## 2022-08-18 NOTE — PROGRESS NOTES
Assessment/Plan:  Problem List Items Addressed This Visit    None     Visit Diagnoses     Rib pain on right side    -  Primary    Relevant Orders    XR ribs bilateral 4+ vw w pa chest (Completed)    Ambulatory Referral to Physical Therapy    Suspect rib dysfunction  Check Xray to R/O fracture  If negative, start PT  Continue Celebrex 200mg BID, Neurontin 300mg TID, Ultram 50mg BID PRN  Per Rheum Dr Oriana Duque Rx on 7/26/22  You may use Tylenol (Acetaminophen) up to 3,000mg daily (in 24 hours) as needed for pain or fever  Use incentive spirometer (patient has DME at home) 4 times daily  Sternal pain        Relevant Orders    XR ribs bilateral 4+ vw w pa chest (Completed)    Ambulatory Referral to Physical Therapy    See above  Rib cage dysfunction        Relevant Orders    XR ribs bilateral 4+ vw w pa chest (Completed)    Ambulatory Referral to Physical Therapy    See above  Return if symptoms worsen or fail to improve  Future Appointments   Date Time Provider Ro Li   9/20/2022 10:20 AM Estefania Benitez DO [facility]   12/9/2022  1:45 PM MINDA Anaya [facility]        Subjective:     Emre Rivera is a 46 y o  female who presents today for a follow-up on her acute medical conditions  HPI:  Chief Complaint   Patient presents with    Rib Pain     Started 2 days ago while playing with dog, heard a pop sound  -- Above per clinical staff and reviewed  --    HPI      Today:      Controlled Substance Review    [place] or [place]  reviewed: No red flags were identified; safe to proceed with prescription           Right 6th Rib Pain and Sternal Pain - Symptoms x 2 days  Naomy Ling a popping sound and had pain when she rotated while playing with sister's dog  Sharp, stabbing, dull, achy, constant  Currently 6/10, Max 7/10  Worse c breathing, rotation, walking, right arm abduction, flexion, extension    Not using OTC meds or treatment  On Celebrex 200mg BID, Neurontin 300mg TID, Ultram 50mg BID PRN  Per Rheum Dr Jim Duke on 7/26/22  The following portions of the patient's history were reviewed and updated as appropriate: allergies, current medications, past family history, past medical history, past social history, past surgical history and problem list       Review of Systems   Constitutional: Negative for appetite change, chills, diaphoresis, fatigue and fever  Respiratory: Negative for cough, chest tightness, shortness of breath and wheezing  Cardiovascular: Positive for chest pain  Gastrointestinal: Positive for diarrhea (Chronic)  Negative for abdominal pain, blood in stool, nausea and vomiting  Genitourinary: Negative for dysuria  Current Outpatient Medications   Medication Sig Dispense Refill    albuterol (PROVENTIL HFA,VENTOLIN HFA) 90 mcg/act inhaler Inhale 2 puffs every 4 (four) hours as needed for wheezing or shortness of breath 18 g 0    celecoxib (CeleBREX) 200 mg capsule Take 200 mg by mouth 2 (two) times a day       diphenoxylate-atropine (LOMOTIL) 2 5-0 025 mg per tablet Take 1 tablet by mouth 2 (two) times a day as needed for diarrhea 60 tablet 2    escitalopram (Lexapro) 10 mg tablet Take 1 tablet (10 mg total) by mouth daily 90 tablet 1    folic acid (FOLVITE) 1 mg tablet Take 1 mg by mouth daily        gabapentin (NEURONTIN) 300 mg capsule TAKE 1 CAPSULE THREE TIMES A  capsule 3    lifitegrast (Xiidra) 5 % op solution Administer 1 drop to both eyes 2 (two) times a day      multivitamin-minerals (CENTRUM ADULTS) tablet Take 1 tablet by mouth daily      omeprazole (PriLOSEC) 20 mg delayed release capsule TAKE 1 CAPSULE DAILY 90 capsule 3    pediatric multivitamin-iron (POLY-VI-SOL with IRON) 15 MG chewable tablet Chew 1 tablet  in the morning        pyridostigmine (MESTINON) 60 mg tablet Take 1 tablet (60 mg total) by mouth 4 (four) times a day 360 tablet 3    traMADol (ULTRAM) 50 mg tablet Take 50 mg by mouth 2 (two) times a day as needed      zolpidem (AMBIEN) 10 mg tablet Take 10 mg by mouth daily at bedtime       No current facility-administered medications for this visit  Objective:  /70   Pulse 81   Temp 98 2 °F (36 8 °C)   Ht 5' 4 5" (1 638 m)   Wt 70 1 kg (154 lb 9 6 oz)   LMP 01/01/2016 (Approximate)   SpO2 97%   BMI 26 13 kg/m²    Wt Readings from Last 3 Encounters:   08/18/22 70 1 kg (154 lb 9 6 oz)   07/01/22 69 3 kg (152 lb 12 8 oz)   06/29/22 69 4 kg (153 lb)      BP Readings from Last 3 Encounters:   08/18/22 118/70   07/19/22 123/77   07/01/22 108/72          Physical Exam  Vitals and nursing note reviewed  Constitutional:       Appearance: Normal appearance  She is well-developed  HENT:      Head: Normocephalic and atraumatic  Eyes:      Conjunctiva/sclera: Conjunctivae normal    Neck:      Thyroid: No thyromegaly  Cardiovascular:      Rate and Rhythm: Normal rate and regular rhythm  Pulses: Normal pulses  Heart sounds: Normal heart sounds  Pulmonary:      Effort: Pulmonary effort is normal       Breath sounds: Normal breath sounds  Chest:      Chest wall: Tenderness (Inferior sternum and right anterior and lateral ribs 8-10 ) present  Musculoskeletal:         General: No swelling  Cervical back: Neck supple  Right lower leg: No edema  Left lower leg: No edema  Skin:     Findings: No rash  Neurological:      General: No focal deficit present  Mental Status: She is alert and oriented to person, place, and time     Psychiatric:         Mood and Affect: Mood normal          Lab Results:      Lab Results   Component Value Date    WBC 3 65 (L) 05/03/2022    HGB 12 8 05/03/2022    HCT 39 6 05/03/2022     05/03/2022    TRIG 94 10/13/2021    HDL 57 10/13/2021    ALT 44 05/03/2022    AST 37 05/03/2022     12/07/2014    K 4 3 05/03/2022     (H) 05/03/2022    CREATININE 0 91 05/03/2022 BUN 13 05/03/2022    CO2 26 05/03/2022    INR 1 08 11/05/2014    GLUF 91 05/03/2022    HGBA1C 5 5 10/13/2021     No results found for: URICACID  Invalid input(s): BASENAME Vitamin D    FL spine and pain procedure    Result Date: 7/19/2022  Narrative: PROCEDURE NOTE PATIENT NAME:  George Reza RECORD NUMBER:  [id number] YOB: 1970 DATE OF PROCEDURE:  07/19/22 PROCEDURE:  Bilateral sacroiliac joint injection with steroid and local anesthetic under fluoroscopic guidance  ATTENDING PHYSICIAN:  LISA Up  PREPROCEDURE DIAGNOSIS:  Bilateral sacroiliitis POSTPROCEDURE DIAGNOSIS:  Bilateral sacroiliitis ANESTHESIA:  Local ESTIMATED BLOOD LOSS:  Minimal COMPLICATIONS:  None   CONSENT:  Today's procedure, its potential benefits as well as its risks and potential side effects were reviewed  Discussed risks of the procedure include bleeding, infection, abscess formation, nerve irritation or damage, seizure, reaction to medications administered, failure of the pain to improve, and exacerbation of the pain  These risks were explained in detail to the patient, who verbalized understanding and wished to proceed  Informed consent was signed  DESCRIPTION OF PROCEDURES:   After written informed consent was obtained, the patient was taken to the fluoroscopy suite and placed in the prone position  Anatomical landmarks were identified by way of fluoroscopy in multiple views  The skin overlying the sacroiliac region was prepped using antiseptic and draped in the usual sterile fashion  Strict aseptic technique was utilized  The skin and subcutaneous tissues at each needle entry site were infiltrated with a total of 1 ml of 1% preservative-free lidocaine using a 25 gauge 1-1/2-inch needle  A 22 gauge 3-1/2-inch needle was then incrementally advanced using multiple fluoroscopic views into the inferior posterior pole of the left sacroiliac joint    Proper needle placement was confirmed with the aid of fluoroscopy in the AP and lateral views  After negative aspiration, 0 5 mL Gadavist was injected which delineated the left sacroiliac joint under fluoroscopy in the AP view  After negative aspiration was reconfirmed, 2 ml from a mixture consisting of 3 mL of preservative-free 0 25% bupivacaine mixed with 1 mL of 80 mg/mL of Depo-Medrol was slowly injected  Attention was then directed to the right sacroiliac joint injection  A 22 gauge 3-1/2-inch needle was then incrementally advanced using multiple fluoroscopic views into the inferior posterior pole of the right sacroiliac joint  Proper needle placement was confirmed with the aid of fluoroscopy in the AP and lateral views  After negative aspiration, 0 5 mL Gadavist was injected which delineated the left sacroiliac joint under fluoroscopy in the AP view  After negative aspiration was reconfirmed, 2 ml from a mixture consisting of 3 mL of preservative-free 0 25% bupivacaine mixed with 1 mL of 80 mg/mL of Depo-Medrol was slowly injected  All needles were removed with tip intact  Hemostasis was maintained  There were no apparent paresthesias or complications  The area was wiped clean and a Band-Aid was placed as necessary  The patient tolerated the procedure well  After a period of observation, the patient was noted to be hemodynamically stable and neurovascularly intact following the procedure as prior to the procedure, and was ultimately discharged home with supervision in good condition  The patient was instructed that we would call in approximately 7 days to obtain an update          POCT Labs

## 2022-08-18 NOTE — RESULT ENCOUNTER NOTE
Stable chest x-ray  No active lung disease  No evidence of rib fractures  Continue plan of care      Message sent to patient via [employer] patient portal

## 2022-08-18 NOTE — PATIENT INSTRUCTIONS
You may use Tylenol (Acetaminophen) up to 3,000mg daily (in 24 hours) as needed for pain or fever  Start Incentive Spirometer - 4 times daily

## 2022-09-20 ENCOUNTER — OFFICE VISIT (OUTPATIENT)
Dept: FAMILY MEDICINE CLINIC | Facility: CLINIC | Age: 52
End: 2022-09-20
Payer: OTHER GOVERNMENT

## 2022-09-20 VITALS
TEMPERATURE: 98.7 F | BODY MASS INDEX: 25.99 KG/M2 | SYSTOLIC BLOOD PRESSURE: 120 MMHG | HEIGHT: 65 IN | HEART RATE: 75 BPM | DIASTOLIC BLOOD PRESSURE: 78 MMHG | WEIGHT: 156 LBS | RESPIRATION RATE: 18 BRPM | OXYGEN SATURATION: 98 %

## 2022-09-20 DIAGNOSIS — Z00.00 WELL ADULT EXAM: Primary | ICD-10-CM

## 2022-09-20 DIAGNOSIS — Z78.0 POST-MENOPAUSE: ICD-10-CM

## 2022-09-20 DIAGNOSIS — F41.9 ANXIETY: ICD-10-CM

## 2022-09-20 PROCEDURE — 99214 OFFICE O/P EST MOD 30 MIN: CPT | Performed by: FAMILY MEDICINE

## 2022-09-20 PROCEDURE — 99396 PREV VISIT EST AGE 40-64: CPT | Performed by: FAMILY MEDICINE

## 2022-09-20 RX ORDER — ESCITALOPRAM OXALATE 20 MG/1
20 TABLET ORAL DAILY
Qty: 90 TABLET | Refills: 1 | Status: SHIPPED | OUTPATIENT
Start: 2022-09-20 | End: 2022-09-26

## 2022-09-20 RX ORDER — ESCITALOPRAM OXALATE 20 MG/1
20 TABLET ORAL DAILY
Qty: 30 TABLET | Refills: 0 | Status: SHIPPED | OUTPATIENT
Start: 2022-09-20

## 2022-09-20 NOTE — PROGRESS NOTES
Assessment/Plan:      1  Well adult exam  See below     2  Anxiety  Not at goal   Increase lexapro from 10 to 20 mg   Refer to counseling if able   Follow up in 4 - 6 weeks   - escitalopram (LEXAPRO) 20 mg tablet; Take 1 tablet (20 mg total) by mouth daily  Dispense: 30 tablet; Refill: 0  - escitalopram (LEXAPRO) 20 mg tablet; Take 1 tablet (20 mg total) by mouth daily  Dispense: 90 tablet; Refill: 1    3  Post-menopause  Order dexa   recommed flu shot   - DXA bone density spine hip and pelvis; Future      Well adult exam  ·         Continue healthy diet   ·         Encourage exercise 4 times a week or more for minimum 30 minutes  ·         Continue to see dentist, wear seatbelt  ·         Health maintenance reviewed - order DEXA  Reviewed age appropriate health maintenance screenings and immunizations that are due, risks and benefits of these  Health Maintenance   Topic Date Due    OT PLAN OF CARE  07/30/2022    Influenza Vaccine (1) 09/01/2022    COVID-19 Vaccine (4 - Booster for Pfizer series) 11/18/2022 (Originally 1/17/2022)    Breast Cancer Screening: Mammogram  01/03/2023    BMI: Followup Plan  05/12/2023    Depression Screening  09/20/2023    BMI: Adult  09/20/2023    Annual Physical  09/20/2023    Cervical Cancer Screening  11/29/2024    Colorectal Cancer Screening  02/15/2027    DTaP,Tdap,and Td Vaccines (2 - Td or Tdap) 05/09/2029    HIV Screening  Completed    Hepatitis C Screening  Completed    Osteoporosis Screening  Completed    Pneumococcal Vaccine: Pediatrics (0 to 5 Years) and At-Risk Patients (6 to 59 Years)  Aged Out    HIB Vaccine  Aged Out    Hepatitis B Vaccine  Aged Out    IPV Vaccine  Aged Out    Hepatitis A Vaccine  Aged Out    Meningococcal ACWY Vaccine  Aged Out    HPV Vaccine  Aged Out     Return in about 1 month (around 10/20/2022) for mood check (virtual or in person)       Subjective:    LAURA Cohn is a 46 y o  female who presents today for a physical  Chief Complaint   Patient presents with    Physical Exam     Pain all over her body would like to know if CBD oil would help     Medication Refill     None at this time          Patient Care Team:  Tahira Hudson DO as PCP - General (Family Medicine)  MD Sandro Pride MD Nonie Harts, MD (Cardiology)  Mykel Matson DO (Rheumatology)    PHQ-2/9 Depression Screening    Little interest or pleasure in doing things: 0 - not at all  Feeling down, depressed, or hopeless: 0 - not at all  Trouble falling or staying asleep, or sleeping too much: 2 - more than half the days  Feeling tired or having little energy: 1 - several days  Poor appetite or overeatin - not at all  Feeling bad about yourself - or that you are a failure or have let yourself or your family down: 0 - not at all  Trouble concentrating on things, such as reading the newspaper or watching television: 0 - not at all  Moving or speaking so slowly that other people could have noticed  Or the opposite - being so fidgety or restless that you have been moving around a lot more than usual: 1 - several days  Thoughts that you would be better off dead, or of hurting yourself in some way: 0 - not at all  PHQ-2 Score: 0  PHQ-2 Interpretation: Negative depression screen  PHQ-9 Score: 4   PHQ-9 Interpretation: No or Minimal depression         HUNTER-7 Flowsheet Screening    Flowsheet Row Most Recent Value   Over the last 2 weeks, how often have you been bothered by any of the following problems? Feeling nervous, anxious, or on edge 1   Not being able to stop or control worrying 0   Worrying too much about different things 0   Trouble relaxing 1   Being so restless that it is hard to sit still 1   Becoming easily annoyed or irritable 0   Feeling afraid as if something awful might happen 0   HUNTER-7 Total Score 3          ---Above per clinical staff & reviewed   ---  Patient here today for a physical:    Diet: healthy   Exercise: No   Dental visits:  Yes - due   Seatbelt: yes     Concerns today:  Having a hard time - parents have been struggling with falls  Having trouble with anxiety and pain   Having a lot of pain, not exercise  Not sleeping well - trouble falling asleep   Once she wakes up and cannot get back to sleep   taking care of her father   Taking Ambien every night   No snoring   Eating, healthy foods   Painting to relax, not seeing a counselor - time restricts her   No time for PT right now   Sees chiropractor  Tightness in chest at times          The following portions of the patient's history were reviewed and updated as appropriate: allergies, current medications, past family history, past medical history, past social history, past surgical history and problem list      Current Medications:  Current Outpatient Medications   Medication Sig Dispense Refill    albuterol (PROVENTIL HFA,VENTOLIN HFA) 90 mcg/act inhaler Inhale 2 puffs every 4 (four) hours as needed for wheezing or shortness of breath 18 g 0    celecoxib (CeleBREX) 200 mg capsule Take 200 mg by mouth 2 (two) times a day       diphenoxylate-atropine (LOMOTIL) 2 5-0 025 mg per tablet Take 1 tablet by mouth 2 (two) times a day as needed for diarrhea 60 tablet 2    escitalopram (LEXAPRO) 20 mg tablet Take 1 tablet (20 mg total) by mouth daily 30 tablet 0    escitalopram (LEXAPRO) 20 mg tablet Take 1 tablet (20 mg total) by mouth daily 90 tablet 1    folic acid (FOLVITE) 1 mg tablet Take 1 mg by mouth daily        gabapentin (NEURONTIN) 300 mg capsule TAKE 1 CAPSULE THREE TIMES A  capsule 3    lifitegrast (Xiidra) 5 % op solution Administer 1 drop to both eyes 2 (two) times a day      multivitamin-minerals (CENTRUM ADULTS) tablet Take 1 tablet by mouth daily      omeprazole (PriLOSEC) 20 mg delayed release capsule TAKE 1 CAPSULE DAILY 90 capsule 3    pediatric multivitamin-iron (POLY-VI-SOL with IRON) 15 MG chewable tablet Chew 1 tablet  in the morning   pyridostigmine (MESTINON) 60 mg tablet Take 1 tablet (60 mg total) by mouth 4 (four) times a day 360 tablet 3    traMADol (ULTRAM) 50 mg tablet Take 50 mg by mouth 2 (two) times a day as needed      zolpidem (AMBIEN) 10 mg tablet Take 10 mg by mouth daily at bedtime       No current facility-administered medications for this visit  Objective:      /78   Pulse 75   Temp 98 7 °F (37 1 °C)   Resp 18   Ht 5' 4 5" (1 638 m)   Wt 70 8 kg (156 lb)   LMP 01/01/2016 (Approximate)   SpO2 98%   BMI 26 36 kg/m²   BP Readings from Last 3 Encounters:   09/20/22 120/78   08/18/22 118/70   07/19/22 123/77     Wt Readings from Last 3 Encounters:   09/20/22 70 8 kg (156 lb)   08/18/22 70 1 kg (154 lb 9 6 oz)   07/01/22 69 3 kg (152 lb 12 8 oz)       Review of Systems  ROS:  all others negative - no chest pain, SOB, normal urine and bowels  no GERD  Not sleeping well  Mood as above  + pain  Physical Exam   Constitutional: she appears well-developed and well-nourished  HENT: Head: Normocephalic  Right Ear: External ear normal  Tympanic membrane normal    Left Ear: External ear normal  Tympanic membrane normal    Nose: Nose normal  No mucosal edema, No rhinorrhea  Right sinus exhibits no maxillary sinus tenderness  Left sinus exhibits no maxillary sinus tenderness  Mouth/Throat: Oropharynx is clear and moist    Eyes: Normal conjunctiva  No erythema  No discharge  Neck: No pain on exam  Neck supple  Cardiovascular: Normal rate, regular rhythm and normal heart sounds  Pulmonary/Chest: Effort normal and breath sounds normal  No wheezes  No rales  No rhonchi  Abdominal: Soft  Bowel sounds are normal  There is no tenderness  Musculoskeletal: she exhibits no edema  Lymphadenopathy: she has no cervical adenopathy  Neurological: she  is alert and oriented to person, place, and time  Skin: Skin is warm and dry  No rashes  Psychiatric: she  has a normal mood and affect   her behavior is normal  Thought content normal    Vitals reviewed  Depression Screening and Follow-up Plan: Patient was screened for depression during today's encounter  They screened negative with a PHQ-2 score of 0

## 2022-09-20 NOTE — PATIENT INSTRUCTIONS
Counseling services:    [facility]   [address], [address], [place], [address] [facility]  [phone]    [address] [facility] (they have equine therapy too)  [address] [facility], [facility] [address],  [place], [address] [facility]  [address], [address],  [address] [facility], [address]  [place] [address]   70 [facility], [address]   [address]  [address] [facility], [address]  [place], [address]    [facility]   [address]  [facility] [address], [place], [address]     [address] [address]   2[address] [facility], [address]    [address]  41 [facility], [address]  [address] [place] [address]  [email or web address]    COLEMAN Harley, TABATHA  (patients age 11-adult)   [address], [address] [facility], [address]   [address], [address], [address]   [facility], [address], [facility], [place], [place] [address]  [address] [facility] (specializing in addiction)  [facility], [facility]  [address]  [address], [place], 6[address] [facility]   [facility], [address] [facility] , [facility], [place], [address] [facility]   [address] [facility], [address], [facility], [address] [facility], [address], [place], [address]  [phone]     Roman Jamison PsyD  [address]60 [facility], [facility]   [phone]    Jake Kemp, [address] [address] [phone]      Hotlines:   Please program these numbers into your phone in case you or someone you know needs them  All services are free       [facility] Grisel Lopez who call, text, or chat with 988 will be directly connected to the [address] [place] [address]  The existing Lifeline phone number ([phone]) will remain available  Callers can also connect with the [facility] [employer] or assistance in [address] [place] [address]  988 can be used by anyone, any time, at no cost  Trained crisis response professionals can support individuals considering suicide, self-harm, or any behavioral, substance abuse or misuse or mental health need for themselves or people looking for help for a loved one experiencing a mental health crisis  Lifeline services are available 24 hours a day, seven days a week at no cost to the caller  Crisis Text Line: text 338955     You are connected to a Crisis Counselor to bring a hot moment to a cool calm through active listening and collaborative problem solving  WarmLine:  167.923.5021 or 082-787-3034   They provide a supportive listening ear if you need it  They also can also provide information about mental health concerns and services  Crisis Line:  [facility] [phone],  [facility] [phone], [facility]: [phone]   24/7 crisis counseling, on-site counseling and walk-in counseling services available  National Suicide Prevention Lifeline:  5-072-883-634-220-9673  En [address] [place] [address] [phone]   This is free, confidential, and available 24/7  [place]: [phone]   For those facing or having survived abuse 24 hour confidential help line, emergency safe house, counseling, advocacy and education  If you or someone your know are feeling as though you are going to hurt yourself, do not wait - GET HELP RIGHT AWAY  Go to the closest Emergency Room, call 911, or call someone you trust, family member or friend to be with you until you can get some help

## 2022-09-26 ENCOUNTER — TELEMEDICINE (OUTPATIENT)
Dept: FAMILY MEDICINE CLINIC | Facility: CLINIC | Age: 52
End: 2022-09-26
Payer: OTHER GOVERNMENT

## 2022-09-26 VITALS — TEMPERATURE: 98.6 F | HEIGHT: 65 IN | BODY MASS INDEX: 25.83 KG/M2 | WEIGHT: 155 LBS

## 2022-09-26 DIAGNOSIS — U07.1 COVID-19: Primary | ICD-10-CM

## 2022-09-26 PROCEDURE — 99213 OFFICE O/P EST LOW 20 MIN: CPT | Performed by: FAMILY MEDICINE

## 2022-09-26 RX ORDER — NIRMATRELVIR AND RITONAVIR 300-100 MG
3 KIT ORAL 2 TIMES DAILY
Qty: 30 TABLET | Refills: 0 | Status: SHIPPED | OUTPATIENT
Start: 2022-09-26 | End: 2022-10-01

## 2022-09-26 NOTE — PROGRESS NOTES
COVID-19 Outpatient Progress Note    Assessment/Plan:    Problem List Items Addressed This Visit    None     Visit Diagnoses     COVID-19    -  Primary    Relevant Medications    nirmatrelvir & ritonavir (Paxlovid, 300/100,) tablet therapy pack         Disposition:     Rest  Fluids  COVID +  Risks and benefits and side effects of Paxlovid reviewed  Continue Tylenol as needed pain, fever  Can use tramadol as needed as usual    If vaccinated and positive will need to isolate for 5 days since start of symptoms, then mask for the next 5 days  Do not return to school or work until you are fever free for 24 hours without  fever-reducing medications  If not feeling well enough to return to work after 5 days we can extend work note  If negative call us if symptoms worsen or do no improve by 10 days  COVID GUIDELINES PER THE CDC AS OF December 2021:  -If You Test Positive for COVID-19 (Isolate)  Everyone, regardless of vaccination status  Stay home for 5 days   If you have no symptoms or your symptoms are resolving after 5 days, you can leave your house  Continue to wear a mask around others for 5 additional days  If you have a fever, continue to stay home until your fever resolves  -If You Were Exposed to Someone with COVID-19  If you:  Have been boosted OR Completed the primary series of Pfizer or Moderna vaccine within the last 6 months OR Completed the primary series of J&J vaccine within the last 2 months - You do not need to [employer] a mask around others for 10 days  Test on day 5, if possible  If you develop symptoms get a test and stay home for 5 days from the start of the symptoms  If you:  Completed the primary series of Pfizer or Moderna vaccine over 6 months ago and are not boosted OR Completed the primary series of J&J over 2 months ago and are not boosted OR Are unvaccinated - you should Quarantine  Stay home for 5 days   After that continue to wear a mask around others for 5 additional days  If you can't quarantine you must wear a mask for 10 days  Test on day 5 if possible  If you develop symptoms get a test and stay home for 5 days from the start of the symptoms  From the [facility] website as of 12/27/2021 CDC Updates and Shortens Recommended Isolation and Quarantine Period for [address] [place] [address] Dr CARDOSO         Patient meets criteria for PAXLOVID and they have been counseled appropriately according to EUA documentation released by the FDA  After discussion, patient agrees to treatment  [address] is an investigational medicine used to treat mild-to-moderate COVID-19 in adults and children (15years of age and older weighing at least 80 pounds (40 kg)) with positive results of direct SARS-CoV-2 viral testing, and who are at high risk for progression to severe COVID-19, including hospitalization or death  PAXLOVID is investigational because it is still being studied  There is limited information about the safety and effectiveness of using PAXLOVID to treat people with mild-to-moderate COVID-19  The FDA has authorized the emergency use of PAXLOVID for the treatment of mild-tomoderate COVID-19 in adults and children (15years of age and older weighing at least 80 pounds (40 kg)) with a positive test for the virus that causes COVID-19, and who are at high risk for progression to severe COVID-19, including hospitalization or death, under an EUA  What should I tell my healthcare provider before I take PAXLOVID? Tell your healthcare provider if you:  - Have any allergies  - Have liver or kidney disease  - Are pregnant or plan to become pregnant  - Are breastfeeding a child  - Have any serious illnesses    Tell your healthcare provider about all the medicines you take, including prescription and over-the-counter medicines, vitamins, and herbal supplements  Some medicines may interact with PAXLOVID and may cause serious side effects   Keep a list of your medicines to show your healthcare provider and pharmacist when you get a new medicine  You can ask your healthcare provider or pharmacist for a list of medicines that interact with PAXLOVID  Do not start taking a new medicine without telling your healthcare provider  Your healthcare provider can tell you if it is safe to take PAXLOVID with other medicines  Tell your healthcare provider if you are taking combined hormonal contraceptive  PAXLOVID may affect how your birth control pills work  Females who are able to become pregnant should use another effective alternative form of contraception or an additional barrier method of contraception  Talk to your healthcare provider if you have any questions about contraceptive methods that might be right for you  How do I take PAXLOVID? PAXLOVID consists of 2 medicines: nirmatrelvir and ritonavir  - Take 2 pink tablets of nirmatrelvir with 1 white tablet of ritonavir by mouth 2 times each day (in the morning and in the evening) for 5 days  For each dose, take all 3 tablets at the same time  - If you have kidney disease, talk to your healthcare provider  You may need a different dose  - Swallow the tablets whole  Do not chew, break, or crush the tablets  - Take PAXLOVID with or without food  - Do not stop taking PAXLOVID without talking to your healthcare provider, even if you feel better  - If you miss a dose of PAXLOVID within 8 hours of the time it is usually taken, take it as soon as you remember  If you miss a dose by more than 8 hours, skip the missed dose and take the next dose at your regular time  Do not take 2 doses of PAXLOVID at the same time  - If you take too much PAXLOVID, call your healthcare provider or go to the nearest hospital emergency room right away    - If you are taking a ritonavir- or cobicistat-containing medicine to treat hepatitis C or Human Immunodeficiency Virus (HIV), you should continue to take your medicine as prescribed by your healthcare provider   - Talk to your healthcare provider if you do not feel better or if you feel worse after 5 days  Who should generally not take PAXLOVID? Do not take PAXLOVID if:  You are allergic to nirmatrelvir, ritonavir, or any of the ingredients in PAXLOVID  You are taking any of the following medicines:  - Alfuzosin  - Pethidine, piroxicam, propoxyphene  - Ranolazine  - Amiodarone, dronedarone, flecainide, propafenone, quinidine  - Colchicine  - Lurasidone, pimozide, clozapine  - Dihydroergotamine, ergotamine, methylergonovine  - Lovastatin, simvastatin  - Sildenafil (Revatio®) for pulmonary arterial hypertension (PAH)  - Triazolam, oral midazolam  - Apalutamide  - Carbamazepine, phenobarbital, phenytoin  - Rifampin  - St  Abs Wort (hypericum perforatum)    What are the important possible side effects of PAXLOVID? Possible side effects of PAXLOVID are:  - Liver Problems  Tell your healthcare provider right away if you have any of these signs and symptoms of liver problems: loss of appetite, yellowing of your skin and the whites of eyes (jaundice), dark-colored urine, pale colored stools and itchy skin, stomach area (abdominal) pain  - Resistance to HIV Medicines  If you have untreated HIV infection, PAXLOVID may lead to some HIV medicines not working as well in the future  - Other possible side effects include: altered sense of taste, diarrhea, high blood pressure, or muscle aches    These are not all the possible side effects of PAXLOVID  Not many people have taken PAXLOVID  Serious and unexpected side effects may happen  Jewell Gerard is still being studied, so it is possible that all of the risks are not known at this time  What other treatment choices are there? Like Shaji Pastel may allow for the emergency use of other medicines to treat people with COVID-19   Go to https://[employer]/ for information on the emergency use of other medicines that are authorized by FDA to treat people with COVID-19  Your healthcare provider may talk with you about clinical trials for which you may be eligible  It is your choice to be treated or not to be treated with PAXLOVID  Should you decide not to receive it or for your child not to receive it, it will not change your standard medical care  What if I am pregnant or breastfeeding? There is no experience treating pregnant women or breastfeeding mothers with PAXLOVID  For a mother and unborn baby, the benefit of taking PAXLOVID may be greater than the risk from the treatment  If you are pregnant, discuss your options and specific situation with your healthcare provider  It is recommended that you use effective barrier contraception or do not have sexual activity while taking PAXLOVID  If you are breastfeeding, discuss your options and specific situation with your healthcare provider  How do I report side effects with PAXLOVID? Contact your healthcare provider if you have any side effects that bother you or do not go away  Report side effects to FDA MedWatch at www fda gov/medwatch or call 2-177-KEC2669 or you can report side effects to [email or web address]  at the contact information provided below  Website Fax number Telephone number   [employer] [phone]     How should I store Deyanira Victor? Store PAXLOVID tablets at room temperature between 68°F to 77°F (20°C to 25°C)  Full fact sheet for patients, parents, and caregivers can be found at: http://www [employer]/    I have spent 10 minutes directly with the patient   Greater than 50% of this time was spent in counseling/coordination of care regarding: prognosis, risks and benefits of treatment options, instructions for management, importance of treatment compliance and risk factor reductions  Encounter provider: Henny Hendrickson DO     Provider located at: [address] [address] [phone]     Recent Visits  Date Type Provider Dept   09/20/22 Office Visit DO Alfredo Murphy   Showing recent visits within past 7 days and meeting all other requirements  Today's Visits  Date Type Provider Dept   09/26/22 Telemedicine DO Alfredo Murphy   Showing today's visits and meeting all other requirements  Future Appointments  No visits were found meeting these conditions  Showing future appointments within next 150 days and meeting all other requirements     This virtual check-in was done via [facility] and patient was informed that this is a secure, HIPAA-compliant platform  She agrees to proceed  Patient agrees to participate in a virtual check in via telephone or video visit instead of presenting to the office to address urgent/immediate medical needs  Patient is aware this is a billable service  She acknowledged consent and understanding of privacy and security of the video platform  The patient has agreed to participate and understands they can discontinue the visit at any time  After connecting through [facility], the patient was identified by name and date of birth  Robert Rasmussen was informed that this was a telemedicine visit and that the exam was being conducted confidentially over secure lines  My office door was closed  No one else was in the room  Robert Rasmussen acknowledged consent and understanding of privacy and security of the telemedicine visit  I informed the patient that I have reviewed her record in Epic and presented the opportunity for her to ask any questions regarding the visit today  The patient agreed to participate      Verification of patient location:  Patient is located in the following state in which I hold an active license: [place]    Subjective:   Caryn Hobson is a 46 y o  female who is concerned about COVID-19  Patient's symptoms include chills, nasal congestion, diarrhea, myalgias and headache      - Date of symptom onset: 9/25/2022  - Date of exposure: 9/25/2022      COVID-19 vaccination status: Fully vaccinated with booster    Yesterday started with all over all body pain  Headache   day 9  Appetite down  Congestion  Diarrhea mild  No meds yet  Lab Results   Component Value Date    SARSCOV2 Negative 07/01/2022       Review of Systems   Constitutional: Positive for chills  HENT: Positive for congestion  Gastrointestinal: Positive for diarrhea  Musculoskeletal: Positive for myalgias  Neurological: Positive for headaches  Current Outpatient Medications on File Prior to Visit   Medication Sig    albuterol (PROVENTIL HFA,VENTOLIN HFA) 90 mcg/act inhaler Inhale 2 puffs every 4 (four) hours as needed for wheezing or shortness of breath    celecoxib (CeleBREX) 200 mg capsule Take 200 mg by mouth 2 (two) times a day     diphenoxylate-atropine (LOMOTIL) 2 5-0 025 mg per tablet Take 1 tablet by mouth 2 (two) times a day as needed for diarrhea    escitalopram (LEXAPRO) 20 mg tablet Take 1 tablet (20 mg total) by mouth daily    folic acid (FOLVITE) 1 mg tablet Take 1 mg by mouth daily      gabapentin (NEURONTIN) 300 mg capsule TAKE 1 CAPSULE THREE TIMES A DAY    lifitegrast (Xiidra) 5 % op solution Administer 1 drop to both eyes 2 (two) times a day    multivitamin-minerals (CENTRUM ADULTS) tablet Take 1 tablet by mouth daily    omeprazole (PriLOSEC) 20 mg delayed release capsule TAKE 1 CAPSULE DAILY    pediatric multivitamin-iron (POLY-VI-SOL with IRON) 15 MG chewable tablet Chew 1 tablet  in the morning      pyridostigmine (MESTINON) 60 mg tablet Take 1 tablet (60 mg total) by mouth 4 (four) times a day    traMADol (ULTRAM) 50 mg tablet Take 50 mg by mouth 2 (two) times a day as needed    zolpidem (AMBIEN) 10 mg tablet Take 10 mg by mouth daily at bedtime    [DISCONTINUED] escitalopram (LEXAPRO) 20 mg tablet Take 1 tablet (20 mg total) by mouth daily       Objective:    Temp 98 6 °F (37 °C)   Ht 5' 4 5" (1 638 m)   Wt 70 3 kg (155 lb)   LMP 01/01/2016 (Approximate)   BMI 26 19 kg/m²      Physical Exam  Constitutional:       Appearance: She is well-developed  HENT:      Head: Normocephalic  Right Ear: External ear normal       Left Ear: External ear normal       Nose: Nose normal    Eyes:      General: No scleral icterus  Right eye: No discharge  Left eye: No discharge  Pupils: Pupils are equal, round, and reactive to light  Pulmonary:      Effort: Pulmonary effort is normal  No respiratory distress  Musculoskeletal:      Cervical back: Normal range of motion  Skin:     General: Skin is warm and dry  Neurological:      Mental Status: She is alert and oriented to person, place, and time  Psychiatric:         Behavior: Behavior normal          Thought Content:  Thought content normal        Jonathan Boyle, DO

## 2022-10-12 PROBLEM — M35.01 SJOGREN'S SYNDROME WITH KERATOCONJUNCTIVITIS SICCA (HCC): Status: RESOLVED | Noted: 2021-09-11 | Resolved: 2022-10-12

## 2022-11-25 ENCOUNTER — TELEMEDICINE (OUTPATIENT)
Dept: FAMILY MEDICINE CLINIC | Facility: CLINIC | Age: 52
End: 2022-11-25

## 2022-11-25 VITALS — WEIGHT: 155 LBS | HEIGHT: 65 IN | BODY MASS INDEX: 25.83 KG/M2

## 2022-11-25 DIAGNOSIS — J01.00 ACUTE NON-RECURRENT MAXILLARY SINUSITIS: Primary | ICD-10-CM

## 2022-11-25 DIAGNOSIS — F41.9 ANXIETY: ICD-10-CM

## 2022-11-25 RX ORDER — ESCITALOPRAM OXALATE 20 MG/1
20 TABLET ORAL DAILY
Qty: 90 TABLET | Refills: 1 | Status: SHIPPED | OUTPATIENT
Start: 2022-11-25

## 2022-11-25 RX ORDER — DOXYCYCLINE HYCLATE 100 MG/1
100 CAPSULE ORAL EVERY 12 HOURS SCHEDULED
Qty: 20 CAPSULE | Refills: 0 | Status: SHIPPED | OUTPATIENT
Start: 2022-11-25 | End: 2022-12-05

## 2022-11-25 NOTE — PROGRESS NOTES
Virtual Regular Visit    Assessment/Plan:     1  Anxiety  Feels lexapro 20 mg working well   Continue this dose   Grieving loss of her father a month ago - offered counseling/support groups  Will call if she is interested   Continue ambien for sleep   Recommend PT again for FM flares   - escitalopram (LEXAPRO) 20 mg tablet; Take 1 tablet (20 mg total) by mouth daily  Dispense: 90 tablet; Refill: 1    2  Acute non-recurrent maxillary sinusitis  Symptoms worsening and > 10 days making bacterial more likely  Prescription   - doxycycline hyclate (VIBRAMYCIN) 100 mg capsule; Take 1 capsule (100 mg total) by mouth every 12 (twelve) hours for 10 days  Dispense: 20 capsule; Refill: 0      Return in about 2 months (around 1/25/2023) for virtual visit: mood   Reason for visit is   Chief Complaint   Patient presents with   • Anxiety   • Depression     Mood Follow up    • HM     Declined Flu And COVID- patient states that they called her to schedule her DEXA and the 5/2023 and when she was told to have the next one      Encounter provider Lala Alexander DO  Provider located at [address] [place] [id number] [phone]    Recent Visits  No visits were found meeting these conditions  Showing recent visits within past 7 days and meeting all other requirements  Today's Visits  Date Type Provider Dept   11/25/22 Telemedicine [facility] today's visits and meeting all other requirements  Future Appointments  No visits were found meeting these conditions  Showing future appointments within next 150 days and meeting all other requirements       The patient was identified by name and date of birth  [facility] was informed that this is a telemedicine visit and that the visit is being conducted through the [employer]  She agrees to proceed     My office door was closed  No one else was in the room    She acknowledged consent and understanding of privacy and security of the video platform  The patient has agreed to participate and understands they can discontinue the visit at any time  Patient is currently located in the state of [place]  Patient is currently located in a state in which I am licensed    Patient is aware this is a billable service  Jose Miguel Lyman is a 45 y o  female is being seen via Video Visit today due to the COVID-19 pandemic    Chief Complaint   Patient presents with   • Anxiety   • Depression     Mood Follow up    • HM     Declined Flu And COVID- patient states that they called her to schedule her DEXA and the 5/2023 and when she was told to have the next one        Today's concerns are:    Dad passed away last month   He gave up, home hospice at her house     Her mother does not unerstand every day saying it again   Hard to care for her dad physically   Sleeping little better now - she sleeps on the couch  mom does sleep but sometimes her mom needs help going to the [place]  Back and neck and knees painful   Friend from [employer] and sister comes a little   Feels like lexapro 20 mg is fine      Cold symptoms a week ago   Green   pnd and bring up mucus  Over the counter meds not helping - mucinex, claritin   Tylenol   Pressure   10 days of symptoms   No improvement   No fevers  Symptoms worse   covid negative       Vitals:    11/25/22 0808   Weight: 70 3 kg (155 lb)   Height: 5' 4 5" (1 638 m)     Wt Readings from Last 3 Encounters:   11/25/22 70 3 kg (155 lb)   09/26/22 70 3 kg (155 lb)   09/20/22 70 8 kg (156 lb)     BP Readings from Last 3 Encounters:   09/20/22 120/78   08/18/22 118/70   07/19/22 123/77       PHQ-2/9 Depression Screening    Little interest or pleasure in doing things: 0 - not at all  Feeling down, depressed, or hopeless: 0 - not at all  Trouble falling or staying asleep, or sleeping too much: 0 - not at all  Feeling tired or having little energy: 1 - several days  Poor appetite or overeatin - not at all  Feeling bad about yourself - or that you are a failure or have let yourself or your family down: 0 - not at all  Trouble concentrating on things, such as reading the newspaper or watching television: 0 - not at all  Moving or speaking so slowly that other people could have noticed  Or the opposite - being so fidgety or restless that you have been moving around a lot more than usual: 0 - not at all  Thoughts that you would be better off dead, or of hurting yourself in some way: 0 - not at all  PHQ-2 Score: 0  PHQ-2 Interpretation: Negative depression screen  PHQ-9 Score: 1   PHQ-9 Interpretation: No or Minimal depression        HUNTER-7 Flowsheet Screening    Flowsheet Row Most Recent Value   Over the last 2 weeks, how often have you been bothered by any of the following problems?     Feeling nervous, anxious, or on edge 0   Not being able to stop or control worrying 0   Worrying too much about different things 0   Trouble relaxing 1   Being so restless that it is hard to sit still 0   Becoming easily annoyed or irritable 0   Feeling afraid as if something awful might happen 0   HUNTER-7 Total Score 1         Past Medical History:   Diagnosis Date   • Abnormal Pap smear of cervix    • Anxiety July   • Arthritis     RA   • Carotid stenosis, asymptomatic    • Chemical exposure     Saritha Scru[address]s 67 - worked across the [address]   • Dizziness    • HPV in female    • IBS (irritable bowel syndrome)    • Myasthenia gravis ([facility] [address] )     dx    • Myasthenia gravis ([facility] [address] )    • Neuropathy     left foot   • Polyneuropathy    • Raynaud disease     dx    • Sjoegren syndrome     dx    • Syncope    • Venereal wart 2015     Past Surgical History:   Procedure Laterality Date   • CHOLECYSTECTOMY  2016   • COLONOSCOPY     • THYMECTOMY  2016   • UPPER GASTROINTESTINAL ENDOSCOPY         Current Medications:  Current Outpatient Medications   Medication Sig Dispense Refill   • albuterol (PROVENTIL HFA,VENTOLIN HFA) 90 mcg/act inhaler Inhale 2 puffs every 4 (four) hours as needed for wheezing or shortness of breath 18 g 0   • celecoxib (CeleBREX) 200 mg capsule Take 200 mg by mouth 2 (two) times a day      • diphenoxylate-atropine (LOMOTIL) 2 5-0 025 mg per tablet Take 1 tablet by mouth 2 (two) times a day as needed for diarrhea 60 tablet 2   • doxycycline hyclate (VIBRAMYCIN) 100 mg capsule Take 1 capsule (100 mg total) by mouth every 12 (twelve) hours for 10 days 20 capsule 0   • escitalopram (LEXAPRO) 20 mg tablet Take 1 tablet (20 mg total) by mouth daily 90 tablet 1   • folic acid (FOLVITE) 1 mg tablet Take 1 mg by mouth daily       • gabapentin (NEURONTIN) 300 mg capsule TAKE 1 CAPSULE THREE TIMES A  capsule 3   • lifitegrast (Xiidra) 5 % op solution Administer 1 drop to both eyes 2 (two) times a day     • multivitamin-minerals (CENTRUM ADULTS) tablet Take 1 tablet by mouth daily     • omeprazole (PriLOSEC) 20 mg delayed release capsule TAKE 1 CAPSULE DAILY 90 capsule 3   • pediatric multivitamin-iron (POLY-VI-SOL with IRON) 15 MG chewable tablet Chew 1 tablet  in the morning  • pyridostigmine (MESTINON) 60 mg tablet Take 1 tablet (60 mg total) by mouth 4 (four) times a day 360 tablet 3   • traMADol (ULTRAM) 50 mg tablet Take 50 mg by mouth 2 (two) times a day as needed     • zolpidem (AMBIEN) 10 mg tablet Take 10 mg by mouth daily at bedtime       No current facility-administered medications for this visit  Allergies:  No Known Allergies    Review of Systems  all others negative - no chest pain, SOB, normal urine and bowels  no GERD  sleeping well  mood as above  Physical Exam   Video Exam Pt not examined in person - seen over FaceTime   Constitutional:  she appears well-developed and well-nourished     HENT: Head: Normocephalic    + maxillary pressure   Right Ear: External ear normal    Left Ear: External ear normal    Nose: Nose normal    Eyes: Pupils are equal, round, and reactive to light  Right eye exhibits no discharge  Left eye exhibits no discharge  No scleral icterus  Neck: Normal range of motion  Pulmonary/Chest: Effort normal  No respiratory distress  Neurological: she is alert and oriented to person, place, and time  Skin: Skin is warm and dry on face - no rashes  Not pale  Not diaphoretic  Psychiatric: she  has a normal mood and affect  she behavior is normal  Thought content normal        As a result of this visit, I have not referred the patient for further respiratory evaluation  VIRTUAL VISIT [facility] acknowledges that she has consented to an online visit or consultation  She understands that the online visit is based solely on information provided by her, and that, in the absence of a face-to-face physical evaluation by the physician, the diagnosis she receives is both limited and provisional in terms of accuracy and completeness  This is not intended to replace a full medical face-to-face evaluation by the physician  Pamela Anne understands and accepts these terms  Depression Screening and Follow-up Plan: Patient was screened for depression during today's encounter  They screened negative with a PHQ-2 score of 0

## 2022-11-25 NOTE — PATIENT INSTRUCTIONS
[facility] [phone]  [place] location [address], [address] [place] [address], [address]  [address] [facility], [address]    [place] [phone]  [address], [address] [place] [address], [address]      To schedule a mammogram:     [employer] [phone]  or  Go to [employer], then to Scheduling Ticket and Click on SCHEDULE NOW       For this option it will ask you the date of your last mammogram    Your last mammogram date was :  01/03/2022

## 2022-12-05 ENCOUNTER — TELEPHONE (OUTPATIENT)
Dept: NEUROLOGY | Facility: CLINIC | Age: 52
End: 2022-12-05

## 2022-12-05 NOTE — TELEPHONE ENCOUNTER
Contacted patient and informed her that her insurance auth for neurology has    She's going to contact her Primary care so they can retrieve another referral

## 2022-12-05 NOTE — TELEPHONE ENCOUNTER
Hi, this is [employer]  I called a little while ago and they called me back  I needed a referral for the neurologist  Dr Salima Cabrera  My primary doctor said that I need an NPI # and the diagnosis code  So I can get the referral  So if you can call me back [phone]  Thank you   Bynorris

## 2022-12-05 NOTE — TELEPHONE ENCOUNTER
Spoke with pt an rescheduled her to be seen by Dr Jewell Tovar for a 60 minute visit on 12/14/22 at 2:30 pm at the [facility] office  Provided pt with Dr Mckayla Hager NPI number and diagnosis code for MG  Awaiting referral from PCP

## 2022-12-05 NOTE — TELEPHONE ENCOUNTER
Received vm from [facility], this is Chela damon again [phone]  I just called a little while ago  I need the npi number for stacey and the diagnosis code  So my primary can give you a referral  And also I notice on my chart that they have me going down to [place]  I didn't want to go to [place]  I want to go to the one on [address] in [place] near me  If you can change that, that would be great  [phone]  Thank you   Bye

## 2022-12-05 NOTE — TELEPHONE ENCOUNTER
[facility], my name is Chela damon ,  [phone]  I'm calling because I have an appointment with [facility] on Friday at 1:45  And I wanted to make sure my referral is up to date, so I don't have to be calling my primary while I'm there to get it  So I just want to make sure I have an updated referral from my primary thank you   Bye

## 2022-12-05 NOTE — TELEPHONE ENCOUNTER
Giovanna Jansen  to [facility] [place] • Neurology [place] Clinical Team 6 • Isaura Rincon [place] • Dana Parikh, DERRICK      1:44 PM   Please offer patient to reschedule with Dr Batsheva Clayton or Dr Denise Villarreal in [place] if she would prefer to be seen there

## 2022-12-09 ENCOUNTER — APPOINTMENT (OUTPATIENT)
Dept: LAB | Facility: CLINIC | Age: 52
End: 2022-12-09

## 2022-12-09 DIAGNOSIS — M35.01 SJOGREN'S SYNDROME WITH KERATOCONJUNCTIVITIS SICCA (HCC): ICD-10-CM

## 2022-12-09 LAB
ALBUMIN SERPL BCP-MCNC: 3.8 G/DL (ref 3.5–5)
ALP SERPL-CCNC: 98 U/L (ref 46–116)
ALT SERPL W P-5'-P-CCNC: 54 U/L (ref 12–78)
ANION GAP SERPL CALCULATED.3IONS-SCNC: 4 MMOL/L (ref 4–13)
AST SERPL W P-5'-P-CCNC: 57 U/L (ref 5–45)
BASOPHILS # BLD AUTO: 0.02 THOUSANDS/ÂΜL (ref 0–0.1)
BASOPHILS NFR BLD AUTO: 1 % (ref 0–1)
BILIRUB SERPL-MCNC: 0.56 MG/DL (ref 0.2–1)
BUN SERPL-MCNC: 13 MG/DL (ref 5–25)
CALCIUM SERPL-MCNC: 9.3 MG/DL (ref 8.3–10.1)
CHLORIDE SERPL-SCNC: 107 MMOL/L (ref 96–108)
CO2 SERPL-SCNC: 28 MMOL/L (ref 21–32)
CREAT SERPL-MCNC: 0.89 MG/DL (ref 0.6–1.3)
CRP SERPL QL: <3 MG/L
EOSINOPHIL # BLD AUTO: 0.02 THOUSAND/ÂΜL (ref 0–0.61)
EOSINOPHIL NFR BLD AUTO: 1 % (ref 0–6)
ERYTHROCYTE [DISTWIDTH] IN BLOOD BY AUTOMATED COUNT: 12.8 % (ref 11.6–15.1)
GFR SERPL CREATININE-BSD FRML MDRD: 73 ML/MIN/1.73SQ M
GLUCOSE P FAST SERPL-MCNC: 117 MG/DL (ref 65–99)
HCT VFR BLD AUTO: 41.7 % (ref 34.8–46.1)
HGB BLD-MCNC: 13.1 G/DL (ref 11.5–15.4)
IMM GRANULOCYTES # BLD AUTO: 0.01 THOUSAND/UL (ref 0–0.2)
IMM GRANULOCYTES NFR BLD AUTO: 0 % (ref 0–2)
LYMPHOCYTES # BLD AUTO: 0.92 THOUSANDS/ÂΜL (ref 0.6–4.47)
LYMPHOCYTES NFR BLD AUTO: 22 % (ref 14–44)
MCH RBC QN AUTO: 28.1 PG (ref 26.8–34.3)
MCHC RBC AUTO-ENTMCNC: 31.4 G/DL (ref 31.4–37.4)
MCV RBC AUTO: 89 FL (ref 82–98)
MONOCYTES # BLD AUTO: 0.13 THOUSAND/ÂΜL (ref 0.17–1.22)
MONOCYTES NFR BLD AUTO: 3 % (ref 4–12)
NEUTROPHILS # BLD AUTO: 3.06 THOUSANDS/ÂΜL (ref 1.85–7.62)
NEUTS SEG NFR BLD AUTO: 73 % (ref 43–75)
NRBC BLD AUTO-RTO: 0 /100 WBCS
PLATELET # BLD AUTO: 260 THOUSANDS/UL (ref 149–390)
PMV BLD AUTO: 9 FL (ref 8.9–12.7)
POTASSIUM SERPL-SCNC: 4.3 MMOL/L (ref 3.5–5.3)
PROT SERPL-MCNC: 7.5 G/DL (ref 6.4–8.4)
RBC # BLD AUTO: 4.67 MILLION/UL (ref 3.81–5.12)
SODIUM SERPL-SCNC: 139 MMOL/L (ref 135–147)
WBC # BLD AUTO: 4.16 THOUSAND/UL (ref 4.31–10.16)

## 2022-12-11 LAB — B2 MICROGLOB SERPL-MCNC: 2.3 MG/L (ref 0.6–2.4)

## 2022-12-14 LAB
ALBUMIN SERPL ELPH-MCNC: 4.2 G/DL (ref 3.5–5)
ALBUMIN SERPL ELPH-MCNC: 57.6 % (ref 52–65)
ALPHA1 GLOB SERPL ELPH-MCNC: 0.35 G/DL (ref 0.1–0.4)
ALPHA1 GLOB SERPL ELPH-MCNC: 4.8 % (ref 2.5–5)
ALPHA2 GLOB SERPL ELPH-MCNC: 0.71 G/DL (ref 0.4–1.2)
ALPHA2 GLOB SERPL ELPH-MCNC: 9.7 % (ref 7–13)
BETA GLOB ABNORMAL SERPL ELPH-MCNC: 0.47 G/DL (ref 0.4–0.8)
BETA1 GLOB SERPL ELPH-MCNC: 6.4 % (ref 5–13)
BETA2 GLOB SERPL ELPH-MCNC: 4.5 % (ref 2–8)
BETA2+GAMMA GLOB SERPL ELPH-MCNC: 0.33 G/DL (ref 0.2–0.5)
GAMMA GLOB ABNORMAL SERPL ELPH-MCNC: 1.24 G/DL (ref 0.5–1.6)
GAMMA GLOB SERPL ELPH-MCNC: 17 % (ref 12–22)
IGG/ALB SER: 1.36 {RATIO} (ref 1.1–1.8)
INTERPRETATION UR IFE-IMP: NORMAL
PROT PATTERN SERPL ELPH-IMP: NORMAL
PROT SERPL-MCNC: 7.3 G/DL (ref 6.4–8.2)

## 2022-12-29 ENCOUNTER — TELEPHONE (OUTPATIENT)
Dept: OTHER | Facility: OTHER | Age: 52
End: 2022-12-29

## 2022-12-29 DIAGNOSIS — M54.9 BACK PAIN, UNSPECIFIED BACK LOCATION, UNSPECIFIED BACK PAIN LATERALITY, UNSPECIFIED CHRONICITY: Primary | ICD-10-CM

## 2022-12-29 NOTE — TELEPHONE ENCOUNTER
Call from patient advising she is experiencing a lot more back pain and she would like to know if Dr Oracio Munson can put in a referral for Dr Freddie Mckinnon to do epidural injections in her back for pain relief? Please call her when referral is in so she can make an appointment

## 2023-01-04 NOTE — PROGRESS NOTES
Pain Medicine Follow-Up Note    Assessment:  1  Sacroiliitis ([address] )    2  Chronic bilateral low back pain without sciatica    3  Lumbar spondylolysis    4  Lumbar facet arthropathy        Plan:  Orders Placed This Encounter   Procedures   • FL spine and pain procedure     Standing Status:   Future     Standing Expiration Date:   1/6/2027     Order Specific Question:   Reason for Exam:     Answer:   b/l SI joint injection     Order Specific Question:   Is the patient pregnant? Answer:   No     Order Specific Question:   Anticoagulant hold needed? Answer:   no       No orders of the defined types were placed in this encounter  My impressions and treatment recommendations were discussed in detail with the patient who verbalized understanding and had no further questions  This is a 78-year-old female who returns her office with increasing axial low back pain bilaterally  Again has notable tenderness to palpation of the bilateral SI joints with pain with provocative maneuvers as noted below  She also has positive facet loading bilaterally  Clinically symptomatic again from sacroiliitis and possibly lumbar facet arthropathy  She also has notable spondylolysis at L4 and L5 with 1 spondylolisthesis based on x-ray from 2020  We discussed starting with bilateral SI joint injection which was previously significantly helpful for her with nearly 90% improvement for 6 months  Has notable axial pain at that time, may consider further imaging with flexion-extension films to assess for dynamic instability  She was also the possible candidate for L4-5 and L5-S1 medial branch block which was also discussed with her today  [place] Prescription Drug Monitoring Program report was reviewed and was appropriate     Complete risks and benefits including bleeding, infection, tissue reaction, nerve injury and allergic reaction were discussed   The approach was demonstrated using models and literature was provided  Verbal and written consent was obtained  Discharge instructions were provided  I personally saw and examined the patient and I agree with the above discussed plan of care  History of Present Illness:    Deidre Wilder is a 45 y o  female who presents to [facility] for interval re-evaluation of the above stated pain complaints  The patient has a past medical and chronic pain history as outlined in the assessment section  She was last seen on 1922 for bilateral SI joint injection with 90% improvement for 6 months  Has recurrence of bilateral low back pain radiating to the bilateral hips  It is worse in the evening and nighttime  Pain is constant, burning, sharp, dull/aching, cramping, shooting in nature  rePorts the pain is in the same area, however it is worse than before         Other than as stated above, the patient denies any interval changes in medications, medical condition, mental condition, symptoms, or allergies since the last office visit           Review of Systems:    Review of Systems      Past Medical History:   Diagnosis Date   • Abnormal Pap smear of cervix    • Anxiety July   • Arthritis     RA   • Carotid stenosis, asymptomatic    • Chemical exposure     Saritha Scruggs 67 - worked [address]   • Dizziness    • HPV in female    • IBS (irritable bowel syndrome)    • Myasthenia gravis ([address] )     dx 2014   • Myasthenia gravis ([facility] [address] )    • Neuropathy     left foot   • Polyneuropathy    • Raynaud disease     dx 2016   • Sjoegren syndrome     dx 2010   • Syncope    • Venereal wart 04/13/2015       Past Surgical History:   Procedure Laterality Date   • CHOLECYSTECTOMY  2016   • COLONOSCOPY     • THYMECTOMY  2016   • UPPER GASTROINTESTINAL ENDOSCOPY         Family History   Problem Relation Age of Onset   • Arthritis Mother    • Dementia Mother    • Diabetes Father    • Hypertension Father    • Stroke Father    • Heart disease Father    • Hearing loss Father    • Breast cancer Sister 71   • Arthritis Sister    • Heart disease Sister    • Diabetes Sister    • Cirrhosis Sister         non-alcoholic   • Diverticulitis Sister    • Coronary artery disease Sister    • Other Sister         carotid artery disease   • Colon cancer Sister    • Breast cancer Sister         63's   • Liver cancer Sister    • Ross syndrome Sister    • Thyroid disease Sister    • Stroke Sister    • Urolithiasis Sister    • Diverticulitis Brother    • Dementia Maternal Grandmother    • Heart attack Maternal Grandfather    • Stroke Paternal Grandmother    • Stroke Paternal Grandfather    • No Known Problems Maternal Aunt    • No Known Problems Paternal Aunt    • No Known Problems Paternal Aunt        Social History     Occupational History     Employer: [employer]   Tobacco Use   • Smoking status: Former     Packs/day: 0 50     Years: 8 00     Pack years: 4 00     Types: Cigarettes     Start date: 2001     Quit date: 10/10/2010     Years since quittin 2   • Smokeless tobacco: Never   Vaping Use   • Vaping Use: Former   • Start date: 10/10/2010   • Quit date: 2011   Substance and Sexual Activity   • Alcohol use: Not Currently   • Drug use: No   • Sexual activity: Not Currently     Partners: Male     Birth control/protection: Post-menopausal         Current Outpatient Medications:   •  albuterol (PROVENTIL HFA,VENTOLIN HFA) 90 mcg/act inhaler, Inhale 2 puffs every 4 (four) hours as needed for wheezing or shortness of breath, Disp: 18 g, Rfl: 0  •  celecoxib (CeleBREX) 200 mg capsule, Take 200 mg by mouth 2 (two) times a day , Disp: , Rfl:   •  diphenoxylate-atropine (LOMOTIL) 2 5-0 025 mg per tablet, Take 1 tablet by mouth 2 (two) times a day as needed for diarrhea, Disp: 60 tablet, Rfl: 2  •  escitalopram (LEXAPRO) 20 mg tablet, Take 1 tablet (20 mg total) by mouth daily, Disp: 90 tablet, Rfl: 1  •  folic acid (FOLVITE) 1 mg tablet, Take 1 mg by mouth daily  , Disp: , Rfl:   •  gabapentin (NEURONTIN) 300 mg capsule, TAKE 1 CAPSULE THREE TIMES A DAY, Disp: 270 capsule, Rfl: 3  •  lifitegrast (Xiidra) 5 % op solution, Administer 1 drop to both eyes 2 (two) times a day, Disp: , Rfl:   •  multivitamin-minerals (CENTRUM ADULTS) tablet, Take 1 tablet by mouth daily, Disp: , Rfl:   •  omeprazole (PriLOSEC) 20 mg delayed release capsule, TAKE 1 CAPSULE DAILY, Disp: 90 capsule, Rfl: 3  •  pediatric multivitamin-iron (POLY-VI-SOL with IRON) 15 MG chewable tablet, Chew 1 tablet  in the morning , Disp: , Rfl:   •  pyridostigmine (MESTINON) 60 mg tablet, Take 1 tablet (60 mg total) by mouth 4 (four) times a day, Disp: 360 tablet, Rfl: 3  •  traMADol (ULTRAM) 50 mg tablet, Take 50 mg by mouth 2 (two) times a day as needed, Disp: , Rfl:   •  zolpidem (AMBIEN) 10 mg tablet, Take 10 mg by mouth daily at bedtime, Disp: , Rfl:     No Known Allergies    Physical Exam:    /76 (BP Location: Left arm, Patient Position: Sitting, Cuff Size: Standard)   Pulse 78   Wt 74 1 kg (163 lb 6 4 oz)   LMP 01/01/2016 (Approximate)   BMI 27 61 kg/m²     Constitutional:normal, well developed, well nourished, alert, in no distress and non-toxic and no overt pain behavior  Eyes:anicteric  HEENT:grossly intact  Neck:supple, symmetric, trachea midline and no masses   Pulmonary:even and unlabored  Cardiovascular:No edema or pitting edema present  Skin:Normal without rashes or lesions and well hydrated  Psychiatric:Mood and affect appropriate  Neurologic:Cranial Nerves II-XII grossly intact  Musculoskeletal: Tender to palpation over the bilateral SI joints, positive Gaenslen's, positive Estefania, positive thigh thrust bilaterally  Positive facet loading bilaterally        Imaging  FL spine and pain procedure    (Results Pending)         Orders Placed This Encounter   Procedures   • FL spine and pain procedure

## 2023-01-06 ENCOUNTER — OFFICE VISIT (OUTPATIENT)
Dept: PAIN MEDICINE | Facility: CLINIC | Age: 53
End: 2023-01-06

## 2023-01-06 VITALS
SYSTOLIC BLOOD PRESSURE: 111 MMHG | HEART RATE: 78 BPM | DIASTOLIC BLOOD PRESSURE: 76 MMHG | BODY MASS INDEX: 27.61 KG/M2 | WEIGHT: 163.4 LBS

## 2023-01-06 DIAGNOSIS — M43.06 LUMBAR SPONDYLOLYSIS: ICD-10-CM

## 2023-01-06 DIAGNOSIS — M54.50 CHRONIC BILATERAL LOW BACK PAIN WITHOUT SCIATICA: ICD-10-CM

## 2023-01-06 DIAGNOSIS — M46.1 SACROILIITIS (HCC): Primary | ICD-10-CM

## 2023-01-06 DIAGNOSIS — G89.29 CHRONIC BILATERAL LOW BACK PAIN WITHOUT SCIATICA: ICD-10-CM

## 2023-01-06 DIAGNOSIS — M47.816 LUMBAR FACET ARTHROPATHY: ICD-10-CM

## 2023-01-16 ENCOUNTER — TELEPHONE (OUTPATIENT)
Dept: PAIN MEDICINE | Facility: CLINIC | Age: 53
End: 2023-01-16

## 2023-01-16 NOTE — TELEPHONE ENCOUNTER
Caller: dayron    Doctor: jaron    Reason for call: Please reach out for scheduling, thank you      Call back#: [phone]

## 2023-01-17 NOTE — TELEPHONE ENCOUNTER
S/w pt and scheduled Bilateral SI Joint injection for 2/723 215 pm arrival  Gave pre procedure instructions and /vaccine policy

## 2023-01-26 PROBLEM — S63.502A SPRAIN OF LEFT WRIST: Status: RESOLVED | Noted: 2022-05-25 | Resolved: 2023-01-26

## 2023-01-26 PROBLEM — S63.501A SPRAIN OF RIGHT WRIST: Status: RESOLVED | Noted: 2022-05-25 | Resolved: 2023-01-26

## 2023-01-27 ENCOUNTER — TELEMEDICINE (OUTPATIENT)
Dept: FAMILY MEDICINE CLINIC | Facility: CLINIC | Age: 53
End: 2023-01-27

## 2023-01-27 DIAGNOSIS — G89.4 CHRONIC PAIN SYNDROME: ICD-10-CM

## 2023-01-27 DIAGNOSIS — G47.00 PERSISTENT INSOMNIA: ICD-10-CM

## 2023-01-27 DIAGNOSIS — Z12.31 SCREENING MAMMOGRAM, ENCOUNTER FOR: ICD-10-CM

## 2023-01-27 DIAGNOSIS — M79.7 FIBROMYALGIA: ICD-10-CM

## 2023-01-27 DIAGNOSIS — F41.9 ANXIETY: Primary | ICD-10-CM

## 2023-01-27 DIAGNOSIS — M79.671 RIGHT FOOT PAIN: ICD-10-CM

## 2023-01-27 DIAGNOSIS — M19.91 PRIMARY ARTHROSIS OF MULTIPLE JOINTS: ICD-10-CM

## 2023-01-27 RX ORDER — ZOLPIDEM TARTRATE 10 MG/1
10 TABLET ORAL
Qty: 90 TABLET | Refills: 2 | Status: SHIPPED | OUTPATIENT
Start: 2023-01-27

## 2023-01-27 RX ORDER — ESCITALOPRAM OXALATE 20 MG/1
20 TABLET ORAL DAILY
Qty: 90 TABLET | Refills: 2 | Status: SHIPPED | OUTPATIENT
Start: 2023-01-27

## 2023-01-27 NOTE — PROGRESS NOTES
Virtual Regular Visit    Assessment/Plan:     1  Anxiety  Stable on Lexapro  Continue this  She feels this is working well, will continue long term  - escitalopram (LEXAPRO) 20 mg tablet; Take 1 tablet (20 mg total) by mouth daily  Dispense: 90 tablet; Refill: 2    2  Persistent insomnia  Ambien helpful  Controlled Substance Review  Refill today   [place]MP or [place]  reviewed: No red flags were identified; safe to proceed with prescription     - zolpidem (AMBIEN) 10 mg tablet; Take 1 tablet (10 mg total) by mouth daily at bedtime  Dispense: 90 tablet; Refill: 2    3  Right foot pain  Pt c/o foot pain for over a year  Virtual visit so unable to examine  Reports she has an appointment with rheum soon  Recommend she show them both her foot and her swollen painful knee  In future she should contact them if joint becomes swollen    - NOTE: pt was never advised to stop methotrexate  When the dose was corrected in the EMR her AVS said "stop medicine" as part of this process  She misunderstood this  Even being advised that she was not supposed to stop this medicine, she continued to not take it and said she felt better without it  Felt it was causing weight gain  She may be open to trying this again  - XR foot 3+ vw right; Future - refer to podiatrist if needed  4  Screening mammogram, encounter for  Update   - Mammo screening bilateral w cad; Future    5  Fibromyalgia  Following with rheum - see above  6  Chronic pain syndrome  See above     7  Primary arthrosis of multiple joints  See above  Advised her for knee pain & swelling she should talk to her rheumatologist  May need fluid aspirated  She has an upcoming appointment  She can also let her know about the foot pain, but suspect she will need a podiatry referral    Return in about 8 months (around 9/20/2023)      Reason for visit is   Chief Complaint   Patient presents with   • Follow-up     Encounter provider Pina Dickson DO  Provider located at [facility]  [address] [place] [address] [address] [place] [address]  [phone]    Recent Visits  Date Type Provider Dept   01/27/23 Telemedicine DO Alfredo Craven   Showing recent visits within past 7 days and meeting all other requirements  Future Appointments  No visits were found meeting these conditions  Showing future appointments within next 150 days and meeting all other requirements       The patient was identified by name and date of birth  Mannie Bunch was informed that this is a telemedicine visit and that the visit is being conducted through the [employer]  She agrees to proceed     My office door was closed  No one else was in the room  She acknowledged consent and understanding of privacy and security of the video platform  The patient has agreed to participate and understands they can discontinue the visit at any time  Patient is currently located in the [facility]  Patient is currently located in a state in which I am licensed    Patient is aware this is a billable service  Subjective  Mannie Bunch is a 45 y o  female is being seen via Video Visit today due to the COVID-19 pandemic  Chief Complaint   Patient presents with   • Follow-up       Today's concerns are:    Holidays dtr came from [place]   Getting gut out more   Trying to get some me time   Painting   Visiting friends, have dinner, movies   Takes Dahlia [place] every night but if she wakes her she cannot go back to sleep   Uses ambien nightly   Denies any side effects  Wishes to stay on Lexapro - feels it is helping wlel     Knees painful and bad   One swollen and had a lot of fluid     Since she fell and broke her wrist her right foot is painful, sharp pain no matterhow she moves it - almost a year now  If arches her foot hurts    Now constant pain, even to the touch    There were no vitals filed for this visit    Wt Readings from Last 3 Encounters:   01/06/23 74 1 kg (163 lb 6 4 oz)   11/25/22 70 3 kg (155 lb)   09/26/22 70 3 kg (155 lb)     BP Readings from Last 3 Encounters:   01/06/23 111/76   09/20/22 120/78   08/18/22 118/70       PHQ-2/9 Depression Screening    Little interest or pleasure in doing things: 0 - not at all  Feeling down, depressed, or hopeless: 0 - not at all       ?    Past Medical History:   Diagnosis Date   • Abnormal Pap smear of cervix    • Anxiety July   • Arthritis     RA   • Carotid stenosis, asymptomatic    • Chemical exposure     Saritha Scruggs 67 - worked across the [address]   • Dizziness    • HPV in female    • IBS (irritable bowel syndrome)    • Myasthenia gravis ([facility] [address] )     dx 2014   • Myasthenia gravis ([facility] [address] )    • Neuropathy     left foot   • Polyneuropathy    • Raynaud disease     dx 2016   • Sjoegren syndrome     dx 2010   • Syncope    • Venereal wart 04/13/2015     Past Surgical History:   Procedure Laterality Date   • CHOLECYSTECTOMY  2016   • COLONOSCOPY     • THYMECTOMY  2016   • UPPER GASTROINTESTINAL ENDOSCOPY         Current Medications:  Current Outpatient Medications   Medication Sig Dispense Refill   • escitalopram (LEXAPRO) 20 mg tablet Take 1 tablet (20 mg total) by mouth daily 90 tablet 2   • zolpidem (AMBIEN) 10 mg tablet Take 1 tablet (10 mg total) by mouth daily at bedtime 90 tablet 2   • celecoxib (CeleBREX) 200 mg capsule Take 200 mg by mouth 2 (two) times a day      • diphenoxylate-atropine (LOMOTIL) 2 5-0 025 mg per tablet Take 1 tablet by mouth 2 (two) times a day as needed for diarrhea 60 tablet 2   • folic acid (FOLVITE) 1 mg tablet Take 1 mg by mouth daily       • gabapentin (NEURONTIN) 300 mg capsule TAKE 1 CAPSULE THREE TIMES A  capsule 3   • lifitegrast (Xiidra) 5 % op solution Administer 1 drop to both eyes 2 (two) times a day     • multivitamin-minerals (CENTRUM ADULTS) tablet Take 1 tablet by mouth daily     • omeprazole (PriLOSEC) 20 mg delayed release capsule TAKE 1 CAPSULE DAILY 90 capsule 3   • pediatric multivitamin-iron (POLY-VI-SOL with IRON) 15 MG chewable tablet Chew 1 tablet  in the morning  • pyridostigmine (MESTINON) 60 mg tablet Take 1 tablet (60 mg total) by mouth 4 (four) times a day 360 tablet 3   • traMADol (ULTRAM) 50 mg tablet Take 50 mg by mouth 2 (two) times a day as needed       No current facility-administered medications for this visit  Allergies:  No Known Allergies    Review of Systems  all others negative - no chest pain, SOB, normal urine and bowels  no GERD  sleeping better with Ambien but not always through the night  mood as above  + pains  Physical Exam   Video Exam Pt not examined in person - seen over FaceTime   Constitutional:  she appears well-developed and well-nourished  HENT: Head: Normocephalic  Right Ear: External ear normal    Left Ear: External ear normal    Nose: Nose normal    Eyes: Pupils are equal, round, and reactive to light  Right eye exhibits no discharge  Left eye exhibits no discharge  No scleral icterus  Neck: Normal range of motion  Pulmonary/Chest: Effort normal  No respiratory distress  Neurological: she is alert and oriented to person, place, and time  Skin: Skin is warm and dry on face - no rashes  Not pale  Not diaphoretic  Psychiatric: she  has a normal mood and affect  she behavior is normal  Thought content normal      Pt c/o knee and foot pain, but this is a virtual visit so I am unable to examine these areas  As a result of this visit, I have not referred the patient for further respiratory evaluation  VIRTUAL VISIT [facility] acknowledges that she has consented to an online visit or consultation  She understands that the online visit is based solely on information provided by her, and that, in the absence of a face-to-face physical evaluation by the physician, the diagnosis she receives is both limited and provisional in terms of accuracy and completeness   This is not intended to replace a full medical face-to-face evaluation by the physician  Tyler Anne understands and accepts these terms  BMI Counseling: There is no height or weight on file to calculate BMI  The BMI is above normal  Nutrition recommendations include decreasing portion sizes  Exercise recommendations include exercising 3-5 times per week  Rationale for BMI follow-up plan is due to patient being overweight or obese

## 2023-01-28 PROBLEM — M19.91: Status: ACTIVE | Noted: 2023-01-28

## 2023-02-07 ENCOUNTER — HOSPITAL ENCOUNTER (OUTPATIENT)
Dept: RADIOLOGY | Facility: CLINIC | Age: 53
Discharge: HOME | End: 2023-02-07
Admitting: STUDENT IN AN ORGANIZED HEALTH CARE EDUCATION/TRAINING PROGRAM

## 2023-02-07 VITALS
SYSTOLIC BLOOD PRESSURE: 132 MMHG | TEMPERATURE: 97.8 F | DIASTOLIC BLOOD PRESSURE: 92 MMHG | RESPIRATION RATE: 18 BRPM | HEART RATE: 68 BPM | OXYGEN SATURATION: 97 %

## 2023-02-07 DIAGNOSIS — M46.1 SACROILIITIS (HCC): ICD-10-CM

## 2023-02-07 RX ORDER — BUPIVACAINE HCL/PF 2.5 MG/ML
3 VIAL (ML) INJECTION ONCE
Status: COMPLETED | OUTPATIENT
Start: 2023-02-07 | End: 2023-02-07

## 2023-02-07 RX ORDER — METHYLPREDNISOLONE ACETATE 80 MG/ML
80 INJECTION, SUSPENSION INTRA-ARTICULAR; INTRALESIONAL; INTRAMUSCULAR; PARENTERAL; SOFT TISSUE ONCE
Status: COMPLETED | OUTPATIENT
Start: 2023-02-07 | End: 2023-02-07

## 2023-02-07 RX ADMIN — Medication 3 ML: at 14:48

## 2023-02-07 RX ADMIN — METHYLPREDNISOLONE ACETATE 80 MG: 80 INJECTION, SUSPENSION INTRA-ARTICULAR; INTRALESIONAL; INTRAMUSCULAR; PARENTERAL; SOFT TISSUE at 14:48

## 2023-02-07 RX ADMIN — IOHEXOL 1 ML: 300 INJECTION, SOLUTION INTRAVENOUS at 14:48

## 2023-02-07 NOTE — DISCHARGE INSTR - LAB
Steroid Joint Injection   WHAT YOU NEED TO KNOW:   A steroid joint injection is a procedure to inject steroid medicine into a joint  Steroid medicine decreases pain and inflammation  The injection may also contain an anesthetic (numbing medicine) to decrease pain  It may be done to treat conditions such as arthritis, gout, or carpal tunnel syndrome  The injections may be given in your knee, ankle, shoulder, elbow, wrist, ankle or sacroiliac joint  Do not apply heat to any area that is numb  If you have discomfort or soreness at the injection site, you may apply ice today, 20 minutes on and 20 minutes off  Tomorrow you may use ice or warm, moist heat  Do not apply ice or heat directly to the skin  You may have an increase or change in the discomfort for 36-48 hours after your treatment  Apply ice and continue with any pain medicine you have been prescribed  Do not do anything strenuous today  You may shower, but no tub baths or hot tubs today  You may resume your normal activities tomorrow, but do not “overdo it”  Resume normal activities slowly when you are feeling better  If you experience redness, drainage or swelling at the injection site, or if you develop a fever above 100 degrees, please call [facility] at [phone] or go to the Emergency Room  Continue to take all routine medicines prescribed by your primary care physician unless otherwise instructed by our staff  Most blood thinners should be started again according to your regularly scheduled dosing  If you have any questions, please give our office a call  As no general anesthesia was used in today's procedure, you should not experience any side effects related to anesthesia  If you are diabetic, the steroids used in today's injection may temporarily increase your blood sugar levels after the first few days after your injection   Please keep a close eye on your sugars and alert the doctor who manages your diabetes if your sugars are significantly high from your baseline or you are symptomatic  If you have a problem specifically related to your procedure, please call our office at [phone]  Problems not related to your procedure should be directed to your primary care physician

## 2023-02-07 NOTE — H&P
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   • Dizziness    • HPV in female    • IBS (irritable bowel syndrome)    • Myasthenia gravis ([facility] [address] )     dx 2014   • Myasthenia gravis ([facility] [address] )    • Neuropathy     left foot   • Polyneuropathy    • Raynaud disease     dx 2016   • Sjoegren syndrome     dx 2010   • Syncope    • Venereal wart 04/13/2015       Past Surgical History:   Procedure Laterality Date   • CHOLECYSTECTOMY  2016   • COLONOSCOPY     • THYMECTOMY  2016   • UPPER GASTROINTESTINAL ENDOSCOPY           Current Outpatient Medications:   •  celecoxib (CeleBREX) 200 mg capsule, Take 200 mg by mouth 2 (two) times a day , Disp: , Rfl:   •  diphenoxylate-atropine (LOMOTIL) 2 5-0 025 mg per tablet, Take 1 tablet by mouth 2 (two) times a day as needed for diarrhea, Disp: 60 tablet, Rfl: 2  •  escitalopram (LEXAPRO) 20 mg tablet, Take 1 tablet (20 mg total) by mouth daily, Disp: 90 tablet, Rfl: 2  •  folic acid (FOLVITE) 1 mg tablet, Take 1 mg by mouth daily  , Disp: , Rfl:   •  gabapentin (NEURONTIN) 300 mg capsule, TAKE 1 CAPSULE THREE TIMES A DAY, Disp: 270 capsule, Rfl: 3  •  lifitegrast (Xiidra) 5 % op solution, Administer 1 drop to both eyes 2 (two) times a day, Disp: , Rfl:   •  multivitamin-minerals (CENTRUM ADULTS) tablet, Take 1 tablet by mouth daily, Disp: , Rfl:   •  omeprazole (PriLOSEC) 20 mg delayed release capsule, TAKE 1 CAPSULE DAILY, Disp: 90 capsule, Rfl: 3  •  pediatric multivitamin-iron (POLY-VI-SOL with IRON) 15 MG chewable tablet, Chew 1 tablet  in the morning , Disp: , Rfl:   •  pyridostigmine (MESTINON) 60 mg tablet, Take 1 tablet (60 mg total) by mouth 4 (four) times a day, Disp: 360 tablet, Rfl: 3  •  traMADol (ULTRAM) 50 mg tablet, Take 50 mg by mouth 2 (two) times a day as needed, Disp: , Rfl:   •  zolpidem (AMBIEN) 10 mg tablet, Take 1 tablet (10 mg total) by mouth daily at bedtime, Disp: 90 tablet, Rfl: 2    No Known Allergies    Physical Exam:   Vitals:    02/07/23 1435   BP: 127/79   Pulse: 64   Resp: 17   Temp: 97 8 °F (36 6 °C)   SpO2: 99%     General: Awake, Alert, Oriented x 3, Mood and affect appropriate  Respiratory: Respirations even and unlabored  Cardiovascular: Peripheral pulses intact; no edema  Musculoskeletal Exam: ttp b/l SI joints    ASA Score: 3    Patient/Chart Verification  Patient ID Verified: Verbal  ID Band Applied: No  Consents Confirmed: Procedural, To be obtained in the Pre-Procedure area  H&P( within 30 days) Verified: To be obtained in the Pre-Procedure area  Interval H&P(within 24 hr) Complete (required for Outpatients and Surgery Admit only): To be obtained in the Pre-Procedure area  Allergies Reviewed: Yes  Anticoag/NSAID held?: No  Currently on antibiotics?: No    Assessment:   1   Sacroiliitis (HCC)        Plan: b/l SI joint injection

## 2023-02-08 ENCOUNTER — ANNUAL EXAM (OUTPATIENT)
Dept: OBGYN CLINIC | Facility: CLINIC | Age: 53
End: 2023-02-08

## 2023-02-08 VITALS
SYSTOLIC BLOOD PRESSURE: 118 MMHG | WEIGHT: 166 LBS | HEIGHT: 65 IN | DIASTOLIC BLOOD PRESSURE: 74 MMHG | BODY MASS INDEX: 27.66 KG/M2

## 2023-02-08 DIAGNOSIS — Z01.419 WOMEN'S ANNUAL ROUTINE GYNECOLOGICAL EXAMINATION: Primary | ICD-10-CM

## 2023-02-08 DIAGNOSIS — A63.0 GENITAL WARTS: ICD-10-CM

## 2023-02-08 DIAGNOSIS — N95.2 POSTMENOPAUSAL ATROPHIC VAGINITIS: ICD-10-CM

## 2023-02-08 NOTE — PROGRESS NOTES
ASSESSMENT & PLAN:   Diagnoses and all orders for this visit:    Women's annual routine gynecological examination    Postmenopausal atrophic vaginitis  -     Prasterone 6 5 MG INST; Insert 1 suppository into the vagina in the morning  - If not covered, will send in estrace three times weekly  Other orders  -     methotrexate 2 5 mg tablet; 10 mg once a week      Genital warts   - desires removal - on perineum  Will schedule procedure visit  The following were reviewed in today's visit: ASCCP guidelines, Gardisil vaccination, STD testing breast self exam, use and side effects of HRT, menopause, exercise and healthy diet  Patient to return to office in yearly for annual exam      All questions have been answered to her satisfaction  CC:  Annual Gynecologic Examination  Chief Complaint   Patient presents with   • Gynecologic Exam     Patient is here today for her yearly exam, pap up to date(21-wnl), mammo 1/3/22-ordered/managed by PCP, DXA 22-ordered/managed by PCP, colonoscopy 22-repeat 5 years  Patient states she is still struggling with pain during intercourse  HPI: Wiley Kamara is a 45 y o  No obstetric history on file  who presents for annual gynecologic examination  She has the following concerns:  Still having pain with intercourse  We discussed vaginal treatment options such as estrogen and vaginal suppositories  H/o HPV  genital warts  Has lesions she would like removed         Health Maintenance:    Exercise: not often  Breast exams/breast awareness: sometimes  Diet: good diet  Last mammogram: 2022  Colorectal cancer screenin  Dexa - scheduled      Past Medical History:   Diagnosis Date   • Abnormal Pap smear of cervix    • Anxiety July   • Arthritis     RA   • Carotid stenosis, asymptomatic    • Chemical exposure     Saritha Scruggs 67 - worked [address] the [address]   • Dizziness    • HPV in female    • IBS (irritable bowel syndrome)    • Myasthenia gravis [facility])     dx 2014   • Myasthenia gravis ([facility] [address] )    • Neuropathy     left foot   • Polyneuropathy    • Raynaud disease     dx 2016   • Sjoegren syndrome     dx 2010   • Syncope    • Venereal wart 04/13/2015       Past Surgical History:   Procedure Laterality Date   • CHOLECYSTECTOMY  2016   • COLONOSCOPY     • THYMECTOMY  2016   • UPPER GASTROINTESTINAL ENDOSCOPY         Past OB/Gyn History:   Patient's last menstrual period was 01/01/2016 (approximate)  Menopausal status: postmenopausal  Menopausal symptoms: None    Last Pap: 2021 : no abnormalities  History of abnormal Pap smear: yes - LSIL    Patient is not currently sexually active   Pain with intercourse      Family History  Family History   Problem Relation Age of Onset   • Arthritis Mother    • Dementia Mother    • Diabetes Father    • Hypertension Father    • Stroke Father    • Heart disease Father    • Hearing loss Father    • Breast cancer Sister 71   • Arthritis Sister    • Heart disease Sister    • Diabetes Sister    • Cirrhosis Sister         non-alcoholic   • Diverticulitis Sister    • Coronary artery disease Sister    • Other Sister         carotid artery disease   • Colon cancer Sister    • Breast cancer Sister         63's   • Liver cancer Sister    • Ross syndrome Sister    • Thyroid disease Sister    • Stroke Sister    • Urolithiasis Sister    • Diverticulitis Brother    • Dementia Maternal Grandmother    • Heart attack Maternal Grandfather    • Stroke Paternal Grandmother    • Stroke Paternal Grandfather    • No Known Problems Maternal Aunt    • No Known Problems Paternal Aunt    • No Known Problems Paternal Aunt        Family history of uterine or ovarian cancer: no  Family history of breast cancer: yes  Family history of colon cancer: yes    Social History:  Social History     Socioeconomic History   • Marital status: /Civil Union     Spouse name: Not on file   • Number of children: 0   • Years of education: Not on file   • Highest education level: Not on file   Occupational History     Employer: [employer]   Tobacco Use   • Smoking status: Former     Packs/day: 0 50     Years: 8 00     Pack years: 4 00     Types: Cigarettes     Start date: 2001     Quit date: 10/10/2010     Years since quittin 3   • Smokeless tobacco: Never   Vaping Use   • Vaping Use: Former   • Start date: 10/10/2010   • Quit date: 2011   Substance and Sexual Activity   • Alcohol use: Not Currently   • Drug use: No   • Sexual activity: Not Currently     Partners: Male     Birth control/protection: Post-menopausal   Other Topics Concern   • Not on file   Social History Narrative   • Not on file     Social Determinants of Health     Financial Resource Strain: Not on file   Food Insecurity: Not on file   Transportation Needs: Not on file   Physical Activity: Not on file   Stress: Not on file   Social Connections: Not on file   Intimate Partner Violence: Not on file   Housing Stability: Not on file     Domestic violence screen: negative    Allergies:  No Known Allergies    Medications:    Current Outpatient Medications:   •  celecoxib (CeleBREX) 200 mg capsule, Take 200 mg by mouth 2 (two) times a day , Disp: , Rfl:   •  diphenoxylate-atropine (LOMOTIL) 2 5-0 025 mg per tablet, Take 1 tablet by mouth 2 (two) times a day as needed for diarrhea, Disp: 60 tablet, Rfl: 2  •  escitalopram (LEXAPRO) 20 mg tablet, Take 1 tablet (20 mg total) by mouth daily, Disp: 90 tablet, Rfl: 2  •  folic acid (FOLVITE) 1 mg tablet, Take 1 mg by mouth daily  , Disp: , Rfl:   •  gabapentin (NEURONTIN) 300 mg capsule, TAKE 1 CAPSULE THREE TIMES A DAY, Disp: 270 capsule, Rfl: 3  •  lifitegrast (Xiidra) 5 % op solution, Administer 1 drop to both eyes 2 (two) times a day, Disp: , Rfl:   •  methotrexate 2 5 mg tablet, 10 mg once a week, Disp: , Rfl:   •  multivitamin-minerals (CENTRUM ADULTS) tablet, Take 1 tablet by mouth daily, Disp: , Rfl:   •  omeprazole (PriLOSEC) 20 mg delayed release capsule, TAKE 1 CAPSULE DAILY, Disp: 90 capsule, Rfl: 3  •  pediatric multivitamin-iron (POLY-VI-SOL with IRON) 15 MG chewable tablet, Chew 1 tablet  in the morning , Disp: , Rfl:   •  Prasterone 6 5 MG INST, Insert 1 suppository into the vagina in the morning, Disp: 84 each, Rfl: 3  •  pyridostigmine (MESTINON) 60 mg tablet, Take 1 tablet (60 mg total) by mouth 4 (four) times a day, Disp: 360 tablet, Rfl: 3  •  traMADol (ULTRAM) 50 mg tablet, Take 50 mg by mouth 2 (two) times a day as needed, Disp: , Rfl:   •  zolpidem (AMBIEN) 10 mg tablet, Take 1 tablet (10 mg total) by mouth daily at bedtime, Disp: 90 tablet, Rfl: 2    Review of Systems:  Review of Systems   Constitutional: Negative for chills and fever  Respiratory: Negative for cough and shortness of breath  Cardiovascular: Negative for chest pain and palpitations  Gastrointestinal: Positive for abdominal distention  Negative for abdominal pain, blood in stool, constipation, nausea and vomiting  Genitourinary: Positive for dyspareunia, dysuria and vaginal pain  Negative for difficulty urinating, frequency, pelvic pain, urgency, vaginal bleeding and vaginal discharge  Neurological: Negative for headaches  Physical Exam:  /74 (BP Location: Right arm, Patient Position: Sitting, Cuff Size: Standard)   Ht 5' 4 5" (1 638 m)   Wt 75 3 kg (166 lb)   LMP 01/01/2016 (Approximate)   BMI 28 05 kg/m²    Physical Exam  Constitutional:       General: She is awake  Appearance: Normal appearance  She is well-developed  Genitourinary:      Vulva, bladder and urethral meatus normal       Right Labia: No rash, tenderness or lesions  Left Labia: No tenderness, lesions or rash  No labial fusion noted  No vaginal discharge, erythema, tenderness or bleeding  No vaginal prolapse present  No vaginal atrophy present  Right Adnexa: not tender, not full and no mass present       Left Adnexa: not tender, not full and no mass present  No cervical motion tenderness, discharge, lesion or polyp  Uterus is not enlarged, tender or irregular  No uterine mass detected  No urethral prolapse present  Bladder is not tender  Pelvic exam was performed with patient in the lithotomy position  Breasts:     Right: No inverted nipple, mass, nipple discharge, skin change or tenderness  Left: No inverted nipple, mass, nipple discharge, skin change or tenderness  HENT:      Head: Normocephalic and atraumatic  Cardiovascular:      Rate and Rhythm: Normal rate and regular rhythm  Heart sounds: Normal heart sounds  Pulmonary:      Effort: Pulmonary effort is normal  No tachypnea or respiratory distress  Breath sounds: Normal breath sounds  Abdominal:      General: Abdomen is flat  There is no distension  Palpations: Abdomen is soft  Tenderness: There is no abdominal tenderness  There is no guarding or rebound  Musculoskeletal:      Cervical back: Neck supple  Lymphadenopathy:      Upper Body:      Right upper body: No supraclavicular or axillary adenopathy  Left upper body: No supraclavicular or axillary adenopathy  Neurological:      General: No focal deficit present  Mental Status: She is alert and oriented to person, place, and time  Psychiatric:         Mood and Affect: Mood normal          Behavior: Behavior normal          Thought Content: Thought content normal          Judgment: Judgment normal    Vitals reviewed

## 2023-02-09 ENCOUNTER — TELEPHONE (OUTPATIENT)
Dept: OBGYN CLINIC | Facility: CLINIC | Age: 53
End: 2023-02-09

## 2023-02-09 NOTE — TELEPHONE ENCOUNTER
Received fax from pts pharmacy that [facility] requires a prior auth    PA initiated today, 02/09/23, on [email or web address]   Key# [id number]    PA APPROVED   AUTH # [id number]  VALID: 01/10/23-02/09/24

## 2023-02-16 ENCOUNTER — TELEPHONE (OUTPATIENT)
Dept: RADIOLOGY | Facility: CLINIC | Age: 53
End: 2023-02-16

## 2023-02-16 NOTE — TELEPHONE ENCOUNTER
----- Message from Nat Jimenez, RN sent at 2/16/2023  7:48 AM EST -----  Regarding: FW: Injection follow up  Contact: [phone]    ----- Message -----  From: Jd Matthews "Elmer Bello"  Sent: 2/15/2023   7:29 PM EST  To: [facility]  Subject: Injection follow up                              Jo Chan  Thank you for getting back to me  Yes, I would be interested

## 2023-02-16 NOTE — TELEPHONE ENCOUNTER
Kirti Hannon MD  to [facility] [facility]    SP    12:52 PM  Next step would be to consdier L4-5 and L5-S1 mbb  I believe we discussed this in office

## 2023-02-17 NOTE — TELEPHONE ENCOUNTER
S/w pt and scheduled Bilateral L4-5 and L5-S1 MBB #1 for 3/16/23 230 pm arrival  Gave pre procedure instructions and  policy

## 2023-03-15 ENCOUNTER — PROCEDURE VISIT (OUTPATIENT)
Dept: OBGYN CLINIC | Facility: CLINIC | Age: 53
End: 2023-03-15

## 2023-03-15 VITALS
WEIGHT: 163 LBS | BODY MASS INDEX: 27.16 KG/M2 | DIASTOLIC BLOOD PRESSURE: 70 MMHG | SYSTOLIC BLOOD PRESSURE: 132 MMHG | HEIGHT: 65 IN

## 2023-03-15 DIAGNOSIS — A63.0 GENITAL WARTS: Primary | ICD-10-CM

## 2023-03-15 NOTE — PROGRESS NOTES
Skin excision    Date/Time: 3/15/2023 2:16 PM  Performed by: Wan Godoy DO  Authorized by: Wan Godoy DO   Universal Protocol:  Consent: Written consent obtained  Risks and benefits: risks, benefits and alternatives were discussed  Time out: Immediately prior to procedure a "time out" was called to verify the correct patient, procedure, equipment, support staff and site/side marked as required  Patient understanding: patient states understanding of the procedure being performed  Patient identity confirmed: verbally with patient      Procedure Details - Skin Excision:     Number of Lesions:  5  Lesion 1:     Body area: Anogenital    Anogenital location:  Vulva    Malignancy: benign lesion        Initial size (mm):  2        Final defect size (mm):  2    Destruction method comment:  Excised with scalpel     Repair Comments: Cautery with silver nitrate sticks - hemostatic  Lesion 2:     Body area: Anogenital    Anogenital location:  Vulva    Malignancy: benign lesion        Initial size (mm):  2    Final defect size (mm):  2    Destruction method comment:  Excised with scalpel    Wound repair type: cautery with silver nitrate sticks  Lesion 3:     Body area: Anogenital    Anogenital location:  Perineal    Malignancy: benign lesion        Initial size (mm):  2    Hidradenitis suppurativa: No    Final defect size (mm):  5    Destruction method comment:  Excision with scalpel    Repair Comments: Cautery with silver nitrate sticks - hemostatic  Lesion 4:     Body area: Anogenital    Anogenital location:  Perineal    Malignancy: benign lesion  Hidradenitis suppurativa: No    Initial size (mm):  1 5    Final defect size (mm):  5    Destruction method comment:  Excision with scalpel     Cautery with silver nitrate sticks - hemostatic  Lesion 5:     Body area:   Anogenital    Anogenital location:  Perineal    Malignancy: benign lesion  Hidradenitis suppurativa: No    Inital size (mm):  1 5    Final defect size (mm):  5    Destruction method comment:  Excision with scalpel     Cautery with silver nitrate sticks - hemostatic

## 2023-03-15 NOTE — PATIENT INSTRUCTIONS
Apply pressure if bleeding occurs  May seen brown/black discharge  Take ibuprofen 600mg every 6 hours x 24-48 hours

## 2023-03-16 ENCOUNTER — HOSPITAL ENCOUNTER (OUTPATIENT)
Dept: RADIOLOGY | Facility: CLINIC | Age: 53
Discharge: HOME | End: 2023-03-16
Admitting: STUDENT IN AN ORGANIZED HEALTH CARE EDUCATION/TRAINING PROGRAM

## 2023-03-16 VITALS
RESPIRATION RATE: 20 BRPM | DIASTOLIC BLOOD PRESSURE: 83 MMHG | OXYGEN SATURATION: 99 % | TEMPERATURE: 97.2 F | SYSTOLIC BLOOD PRESSURE: 122 MMHG | HEART RATE: 69 BPM

## 2023-03-16 DIAGNOSIS — M47.816 LUMBAR FACET ARTHROPATHY: ICD-10-CM

## 2023-03-16 RX ORDER — BUPIVACAINE HCL/PF 2.5 MG/ML
3 VIAL (ML) INJECTION ONCE
Status: COMPLETED | OUTPATIENT
Start: 2023-03-16 | End: 2023-03-16

## 2023-03-16 RX ADMIN — Medication 3 ML: at 15:15

## 2023-03-16 NOTE — DISCHARGE INSTR - LAB

## 2023-03-16 NOTE — H&P
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   • Dizziness    • HPV in female    • IBS (irritable bowel syndrome)    • Myasthenia gravis ([facility] [address] )     dx 2014   • Myasthenia gravis ([facility] [address] )    • Neuropathy     left foot   • Polyneuropathy    • Raynaud disease     dx 2016   • Sjoegren syndrome     dx 2010   • Syncope    • Venereal wart 04/13/2015       Past Surgical History:   Procedure Laterality Date   • CHOLECYSTECTOMY  2016   • COLONOSCOPY     • THYMECTOMY  2016   • UPPER GASTROINTESTINAL ENDOSCOPY           Current Outpatient Medications:   •  celecoxib (CeleBREX) 200 mg capsule, Take 200 mg by mouth 2 (two) times a day , Disp: , Rfl:   •  diphenoxylate-atropine (LOMOTIL) 2 5-0 025 mg per tablet, Take 1 tablet by mouth 2 (two) times a day as needed for diarrhea, Disp: 60 tablet, Rfl: 2  •  escitalopram (LEXAPRO) 20 mg tablet, Take 1 tablet (20 mg total) by mouth daily, Disp: 90 tablet, Rfl: 2  •  folic acid (FOLVITE) 1 mg tablet, Take 1 mg by mouth daily  , Disp: , Rfl:   •  gabapentin (NEURONTIN) 300 mg capsule, TAKE 1 CAPSULE THREE TIMES A DAY, Disp: 270 capsule, Rfl: 3  •  lifitegrast (Xiidra) 5 % op solution, Administer 1 drop to both eyes 2 (two) times a day, Disp: , Rfl:   •  methotrexate 2 5 mg tablet, 10 mg once a week, Disp: , Rfl:   •  multivitamin-minerals (CENTRUM ADULTS) tablet, Take 1 tablet by mouth daily, Disp: , Rfl:   •  omeprazole (PriLOSEC) 20 mg delayed release capsule, TAKE 1 CAPSULE DAILY, Disp: 90 capsule, Rfl: 3  •  pediatric multivitamin-iron (POLY-VI-SOL with IRON) 15 MG chewable tablet, Chew 1 tablet  in the morning , Disp: , Rfl:   •  Prasterone 6 5 MG INST, Insert 1 suppository into the vagina in the morning, Disp: 84 each, Rfl: 3  •  pyridostigmine (MESTINON) 60 mg tablet, Take 1 tablet (60 mg total) by mouth 4 (four) times a day, Disp: 360 tablet, Rfl: 3  •  traMADol (ULTRAM) 50 mg tablet, Take 50 mg by mouth 2 (two) times a day as needed, Disp: , Rfl:   •  zolpidem (AMBIEN) 10 mg tablet, Take 1 tablet (10 mg total) by mouth daily at bedtime, Disp: 90 tablet, Rfl: 2    No Known Allergies    Physical Exam:   Vitals:    03/16/23 1457   BP:    Pulse:    Resp:    Temp: (!) 97 2 °F (36 2 °C)   SpO2:      General: Awake, Alert, Oriented x 3, Mood and affect appropriate  Respiratory: Respirations even and unlabored  Cardiovascular: Peripheral pulses intact; no edema  Musculoskeletal Exam: face tloading positive b/l    ASA Score: 3    Patient/Chart Verification  Patient ID Verified: Verbal  ID Band Applied: No  Consents Confirmed: To be obtained in the Pre-Procedure area  H&P( within 30 days) Verified: To be obtained in the Pre-Procedure area  Interval H&P(within 24 hr) Complete (required for Outpatients and Surgery Admit only): To be obtained in the Pre-Procedure area  Allergies Reviewed: Yes  Anticoag/NSAID held?: NA  Currently on antibiotics?: No  Pregnancy denied?: NA    Assessment:   1   Lumbar facet arthropathy        Plan: Bilateral L4-5 and L5-S1 MBB #1

## 2023-03-22 DIAGNOSIS — A63.0 CONDYLOMA ACUMINATA: Primary | ICD-10-CM

## 2023-03-22 DIAGNOSIS — M54.59 INTRACTABLE LOW BACK PAIN: Primary | ICD-10-CM

## 2023-03-22 RX ORDER — IMIQUIMOD 12.5 MG/.25G
1 CREAM TOPICAL 3 TIMES WEEKLY
Qty: 24 EACH | Refills: 0 | Status: SHIPPED | OUTPATIENT
Start: 2023-03-22 | End: 2023-05-16

## 2023-03-23 ENCOUNTER — APPOINTMENT (OUTPATIENT)
Dept: LAB | Facility: CLINIC | Age: 53
End: 2023-03-23

## 2023-03-23 DIAGNOSIS — M35.01 SJOGREN'S SYNDROME WITH KERATOCONJUNCTIVITIS SICCA (HCC): Primary | ICD-10-CM

## 2023-03-23 DIAGNOSIS — Z11.59 NEED FOR HEPATITIS C SCREENING TEST: ICD-10-CM

## 2023-03-23 DIAGNOSIS — Z11.59 NEED FOR HEPATITIS B SCREENING TEST: ICD-10-CM

## 2023-03-23 LAB
ALBUMIN SERPL BCP-MCNC: 3.8 G/DL (ref 3.5–5)
ALP SERPL-CCNC: 71 U/L (ref 46–116)
ALT SERPL W P-5'-P-CCNC: 39 U/L (ref 12–78)
ANION GAP SERPL CALCULATED.3IONS-SCNC: 0 MMOL/L (ref 4–13)
AST SERPL W P-5'-P-CCNC: 35 U/L (ref 5–45)
BACTERIA UR QL AUTO: ABNORMAL /HPF
BASOPHILS # BLD AUTO: 0.03 THOUSANDS/ÂΜL (ref 0–0.1)
BASOPHILS NFR BLD AUTO: 1 % (ref 0–1)
BILIRUB SERPL-MCNC: 0.5 MG/DL (ref 0.2–1)
BILIRUB UR QL STRIP: NEGATIVE
BUN SERPL-MCNC: 14 MG/DL (ref 5–25)
CALCIUM SERPL-MCNC: 9 MG/DL (ref 8.3–10.1)
CHLORIDE SERPL-SCNC: 110 MMOL/L (ref 96–108)
CLARITY UR: CLEAR
CO2 SERPL-SCNC: 28 MMOL/L (ref 21–32)
COLOR UR: ABNORMAL
CREAT SERPL-MCNC: 0.77 MG/DL (ref 0.6–1.3)
CRP SERPL QL: <3 MG/L
EOSINOPHIL # BLD AUTO: 0.11 THOUSAND/ÂΜL (ref 0–0.61)
EOSINOPHIL NFR BLD AUTO: 4 % (ref 0–6)
ERYTHROCYTE [DISTWIDTH] IN BLOOD BY AUTOMATED COUNT: 13.3 % (ref 11.6–15.1)
ERYTHROCYTE [SEDIMENTATION RATE] IN BLOOD: 14 MM/HOUR (ref 0–29)
GFR SERPL CREATININE-BSD FRML MDRD: 87 ML/MIN/1.73SQ M
GLUCOSE P FAST SERPL-MCNC: 93 MG/DL (ref 65–99)
GLUCOSE UR STRIP-MCNC: NEGATIVE MG/DL
HCT VFR BLD AUTO: 36.9 % (ref 34.8–46.1)
HGB BLD-MCNC: 12 G/DL (ref 11.5–15.4)
HGB UR QL STRIP.AUTO: NEGATIVE
IMM GRANULOCYTES # BLD AUTO: 0.01 THOUSAND/UL (ref 0–0.2)
IMM GRANULOCYTES NFR BLD AUTO: 0 % (ref 0–2)
KETONES UR STRIP-MCNC: NEGATIVE MG/DL
LEUKOCYTE ESTERASE UR QL STRIP: ABNORMAL
LYMPHOCYTES # BLD AUTO: 1.21 THOUSANDS/ÂΜL (ref 0.6–4.47)
LYMPHOCYTES NFR BLD AUTO: 40 % (ref 14–44)
MCH RBC QN AUTO: 29.2 PG (ref 26.8–34.3)
MCHC RBC AUTO-ENTMCNC: 32.5 G/DL (ref 31.4–37.4)
MCV RBC AUTO: 90 FL (ref 82–98)
MONOCYTES # BLD AUTO: 0.28 THOUSAND/ÂΜL (ref 0.17–1.22)
MONOCYTES NFR BLD AUTO: 9 % (ref 4–12)
NEUTROPHILS # BLD AUTO: 1.42 THOUSANDS/ÂΜL (ref 1.85–7.62)
NEUTS SEG NFR BLD AUTO: 46 % (ref 43–75)
NITRITE UR QL STRIP: NEGATIVE
NON-SQ EPI CELLS URNS QL MICRO: ABNORMAL /HPF
NRBC BLD AUTO-RTO: 0 /100 WBCS
PH UR STRIP.AUTO: 6.5 [PH]
PLATELET # BLD AUTO: 237 THOUSANDS/UL (ref 149–390)
PMV BLD AUTO: 8.7 FL (ref 8.9–12.7)
POTASSIUM SERPL-SCNC: 4 MMOL/L (ref 3.5–5.3)
PROT SERPL-MCNC: 7 G/DL (ref 6.4–8.4)
PROT UR STRIP-MCNC: NEGATIVE MG/DL
RBC # BLD AUTO: 4.11 MILLION/UL (ref 3.81–5.12)
RBC #/AREA URNS AUTO: ABNORMAL /HPF
SODIUM SERPL-SCNC: 138 MMOL/L (ref 135–147)
SP GR UR STRIP.AUTO: 1.01 (ref 1–1.03)
UROBILINOGEN UR STRIP-ACNC: <2 MG/DL
WBC # BLD AUTO: 3.06 THOUSAND/UL (ref 4.31–10.16)
WBC #/AREA URNS AUTO: ABNORMAL /HPF

## 2023-03-24 LAB
HBV SURFACE AG SER QL: NORMAL
HCV AB SER QL: NORMAL

## 2023-03-25 LAB
ALBUMIN SERPL ELPH-MCNC: 4.64 G/DL (ref 3.5–5)
ALBUMIN SERPL ELPH-MCNC: 69.3 % (ref 52–65)
ALPHA1 GLOB SERPL ELPH-MCNC: 0.23 G/DL (ref 0.1–0.4)
ALPHA1 GLOB SERPL ELPH-MCNC: 3.4 % (ref 2.5–5)
ALPHA2 GLOB SERPL ELPH-MCNC: 0.52 G/DL (ref 0.4–1.2)
ALPHA2 GLOB SERPL ELPH-MCNC: 7.8 % (ref 7–13)
BETA GLOB ABNORMAL SERPL ELPH-MCNC: 0.39 G/DL (ref 0.4–0.8)
BETA1 GLOB SERPL ELPH-MCNC: 5.8 % (ref 5–13)
BETA2 GLOB SERPL ELPH-MCNC: 2.7 % (ref 2–8)
BETA2+GAMMA GLOB SERPL ELPH-MCNC: 0.18 G/DL (ref 0.2–0.5)
GAMMA GLOB ABNORMAL SERPL ELPH-MCNC: 0.74 G/DL (ref 0.5–1.6)
GAMMA GLOB SERPL ELPH-MCNC: 11 % (ref 12–22)
IGG/ALB SER: 2.26 {RATIO} (ref 1.1–1.8)
PROT PATTERN SERPL ELPH-IMP: ABNORMAL
PROT SERPL-MCNC: 6.7 G/DL (ref 6.4–8.2)

## 2023-04-05 DIAGNOSIS — M48.02 CERVICAL SPINAL STENOSIS: ICD-10-CM

## 2023-04-05 DIAGNOSIS — M54.16 LUMBAR RADICULOPATHY: ICD-10-CM

## 2023-04-27 ENCOUNTER — HOSPITAL ENCOUNTER (OUTPATIENT)
Dept: MRI IMAGING | Facility: HOSPITAL | Age: 53
Discharge: HOME | End: 2023-04-27
Attending: STUDENT IN AN ORGANIZED HEALTH CARE EDUCATION/TRAINING PROGRAM

## 2023-04-27 DIAGNOSIS — M54.59 INTRACTABLE LOW BACK PAIN: ICD-10-CM

## 2023-05-01 ENCOUNTER — TELEPHONE (OUTPATIENT)
Dept: PAIN MEDICINE | Facility: CLINIC | Age: 53
End: 2023-05-01

## 2023-05-01 NOTE — TELEPHONE ENCOUNTER
MRI shows that there is are degenerative changes in the joints of the back, however if this were source of symptoms then MBB would have been helpful  She has the shifting of the vertebrae at L4-5 which she is already aware of  As a result of these issues, however, she has narrowing in the tunnels where the nerves are exiting  Would ask if there is any pain down the legs, especially the right leg  We could consider epidural injection which may helpful reduce the inflammation in the lower back  If this isnt helpful, then I would have her get a new set of xrays with bending and have her see a spine surgeon to help evaluate

## 2023-05-01 NOTE — TELEPHONE ENCOUNTER
Caller: patient    Doctor: Wilber Maddox     Reason for call: pt states her MRI results are final & she would like to discuss them with a nurse    Call back#: [phone]

## 2023-05-02 DIAGNOSIS — M54.16 LUMBAR RADICULOPATHY: Primary | ICD-10-CM

## 2023-05-02 NOTE — TELEPHONE ENCOUNTER
"S/W pt and advised of results  Pt states she has pain going down both legs with left a little worse than right  States last pm, she fell asleep in a recliner and when she woke up and got up, she had \"numbness\" in her legs which caused her to fall onto her right knee and twist her right ankle  States numbness was short-lived and she does not have any reg flag symptoms     Pt just wanted SP to be aware  She is agreeable to the epidural  Please place order  "

## 2023-05-17 ENCOUNTER — HOSPITAL ENCOUNTER (OUTPATIENT)
Dept: RADIOLOGY | Facility: CLINIC | Age: 53
Discharge: HOME | End: 2023-05-17

## 2023-05-17 VITALS
OXYGEN SATURATION: 98 % | HEART RATE: 65 BPM | DIASTOLIC BLOOD PRESSURE: 78 MMHG | SYSTOLIC BLOOD PRESSURE: 126 MMHG | TEMPERATURE: 97.1 F | RESPIRATION RATE: 18 BRPM

## 2023-05-17 DIAGNOSIS — A63.0 CONDYLOMA ACUMINATA: ICD-10-CM

## 2023-05-17 DIAGNOSIS — M54.16 LUMBAR RADICULOPATHY: ICD-10-CM

## 2023-05-17 RX ORDER — METHYLPREDNISOLONE ACETATE 80 MG/ML
80 INJECTION, SUSPENSION INTRA-ARTICULAR; INTRALESIONAL; INTRAMUSCULAR; PARENTERAL; SOFT TISSUE ONCE
Status: COMPLETED | OUTPATIENT
Start: 2023-05-17 | End: 2023-05-17

## 2023-05-17 RX ADMIN — IOHEXOL 1 ML: 300 INJECTION, SOLUTION INTRAVENOUS at 13:16

## 2023-05-17 RX ADMIN — METHYLPREDNISOLONE ACETATE 80 MG: 80 INJECTION, SUSPENSION INTRA-ARTICULAR; INTRALESIONAL; INTRAMUSCULAR; PARENTERAL; SOFT TISSUE at 13:17

## 2023-05-17 NOTE — TELEPHONE ENCOUNTER
Pt is requesting a refill of her ALDARA 5% Cream  [facility]

## 2023-05-17 NOTE — H&P
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   • Dizziness    • HPV in female    • IBS (irritable bowel syndrome)    • Myasthenia gravis ([facility] [address] )     dx 2014   • Myasthenia gravis ([facility] [address] )    • Neuropathy     left foot   • Polyneuropathy    • Raynaud disease     dx 2016   • Sjoegren syndrome     dx 2010   • Syncope    • Venereal wart 04/13/2015       Past Surgical History:   Procedure Laterality Date   • CHOLECYSTECTOMY  2016   • COLONOSCOPY     • THYMECTOMY  2016   • UPPER GASTROINTESTINAL ENDOSCOPY           Current Outpatient Medications:   •  celecoxib (CeleBREX) 200 mg capsule, Take 200 mg by mouth 2 (two) times a day , Disp: , Rfl:   •  diphenoxylate-atropine (LOMOTIL) 2 5-0 025 mg per tablet, Take 1 tablet by mouth 2 (two) times a day as needed for diarrhea, Disp: 60 tablet, Rfl: 2  •  escitalopram (LEXAPRO) 20 mg tablet, Take 1 tablet (20 mg total) by mouth daily, Disp: 90 tablet, Rfl: 2  •  folic acid (FOLVITE) 1 mg tablet, Take 1 mg by mouth daily  , Disp: , Rfl:   •  gabapentin (NEURONTIN) 300 mg capsule, TAKE 1 CAPSULE THREE TIMES A DAY, Disp: 270 capsule, Rfl: 3  •  imiquimod (ALDARA) 5 % cream, Apply 1 packet topically 3 (three) times a week for 24 doses, Disp: 24 each, Rfl: 0  •  lifitegrast (Xiidra) 5 % op solution, Administer 1 drop to both eyes 2 (two) times a day, Disp: , Rfl:   •  methotrexate 2 5 mg tablet, 10 mg once a week, Disp: , Rfl:   •  multivitamin-minerals (CENTRUM ADULTS) tablet, Take 1 tablet by mouth daily, Disp: , Rfl:   •  omeprazole (PriLOSEC) 20 mg delayed release capsule, TAKE 1 CAPSULE DAILY, Disp: 90 capsule, Rfl: 3  •  pediatric multivitamin-iron (POLY-VI-SOL with IRON) 15 MG chewable tablet, Chew 1 tablet  in the morning , Disp: , Rfl:   •  Prasterone 6 5 MG INST, Insert 1 suppository into the vagina in the morning, Disp: 84 each, Rfl: 3  •  pyridostigmine (MESTINON) 60 mg tablet, Take 1 tablet (60 mg total) by mouth 4 (four) times a day, Disp: 360 tablet, Rfl: 3  •  traMADol (ULTRAM) 50 mg tablet, Take 50 mg by mouth 2 (two) times a day as needed, Disp: , Rfl:   •  zolpidem (AMBIEN) 10 mg tablet, Take 1 tablet (10 mg total) by mouth daily at bedtime, Disp: 90 tablet, Rfl: 2    No Known Allergies    Physical Exam: There were no vitals filed for this visit  General: Awake, Alert, Oriented x 3, Mood and affect appropriate  Respiratory: Respirations even and unlabored  Cardiovascular: Peripheral pulses intact; no edema  Musculoskeletal Exam: back non erythematous with no lesions    ASA Score: 3    Patient/Chart Verification  Patient ID Verified: Verbal  ID Band Applied: No  Consents Confirmed: Procedural, To be obtained in the Pre-Procedure area  H&P( within 30 days) Verified: To be obtained in the Pre-Procedure area  Interval H&P(within 24 hr) Complete (required for Outpatients and Surgery Admit only): To be obtained in the Pre-Procedure area  Allergies Reviewed: Yes  Anticoag/NSAID held?: No  Currently on antibiotics?: No    Assessment:   1   Lumbar radiculopathy        Plan: L5-S1 LESI

## 2023-05-17 NOTE — DISCHARGE INSTR - LAB
Epidural Steroid Injection   WHAT YOU NEED TO KNOW:   An epidural steroid injection (DENIZ) is a procedure to inject steroid medicine into the epidural space  The epidural space is between your spinal cord and vertebrae  Steroids reduce inflammation and fluid buildup in your spine that may be causing pain  You may be given pain medicine along with the steroids  ACTIVITY  Do not drive or operate machinery today  No strenuous activity today - bending, lifting, etc   You may resume normal activites starting tomorrow - start slowly and as tolerated  You may shower today, but no tub baths or hot tubs  You may have numbness for several hours from the local anesthetic  Please use caution and common sense, especially with weight-bearing activities  CARE OF THE INJECTION SITE  If you have soreness or pain, apply ice to the area today (20 minutes on/20 minutes off)  Starting tomorrow, you may use warm, moist heat or ice if needed  You may have an increase or change in your discomfort for 36-48 hours after your treatment  Apply ice and continue with any pain medication you have been prescribed  Notify the [facility] if you have any of the following: redness, drainage, swelling, headache, stiff neck or fever above 100°F     SPECIAL INSTRUCTIONS  Our office will contact you in approximately 7 days for a progress report  MEDICATIONS  Continue to take all routine medications  Our office may have instructed you to hold some medications  As no general anesthesia was used in today's procedure, you should not experience any side effects related to anesthesia  If you are diabetic, the steroids used in today's injection may temporarily increase your blood sugar levels after the first few days after your injection  Please keep a close eye on your sugars and alert the doctor who manages your diabetes if your sugars are significantly high from your baseline or you are symptomatic       If you have a problem specifically related to your procedure, please call our office at [phone]  Problems not related to your procedure should be directed to your primary care physician

## 2023-05-19 RX ORDER — IMIQUIMOD 12.5 MG/.25G
1 CREAM TOPICAL 3 TIMES WEEKLY
Qty: 24 EACH | Refills: 0 | Status: SHIPPED | OUTPATIENT
Start: 2023-05-19 | End: 2023-07-13

## 2023-06-02 ENCOUNTER — TELEPHONE (OUTPATIENT)
Dept: FAMILY MEDICINE CLINIC | Facility: CLINIC | Age: 53
End: 2023-06-02

## 2023-06-02 NOTE — TELEPHONE ENCOUNTER
Spoke to patient and obtained the below Pre-op information:    Name of procedure: Lumbar Laminectomy and Lumbar Fusion  Diagnosis: M48 062, M43 16,G8 311 & G8 312  Date of procedure (within 30 days): 7/31/2023  Surgeon: Dr Villavicencio Head  Location of procedure (hospital or out patient facility name): [facility]  PATs date/location/ type (Which labs? EKG?  CXR?): Lab Work  Records (on epic or requested):   Type of anaesthesia:  General  Paperwork to complete for Pre-op (patient bringing vs  calling office to obtain prior to appointment): Clearance Form will be faxed  Pre-op appointment scheduled (date/time): 7/24/23

## 2023-06-07 ENCOUNTER — HOSPITAL ENCOUNTER (OUTPATIENT)
Dept: MAMMOGRAPHY | Facility: HOSPITAL | Age: 53
Discharge: HOME | End: 2023-06-07
Attending: FAMILY MEDICINE
Payer: OTHER GOVERNMENT

## 2023-06-07 ENCOUNTER — TELEPHONE (OUTPATIENT)
Dept: NEUROLOGY | Facility: CLINIC | Age: 53
End: 2023-06-07

## 2023-06-07 VITALS — BODY MASS INDEX: 27.14 KG/M2 | HEIGHT: 65 IN | WEIGHT: 162.92 LBS

## 2023-06-07 DIAGNOSIS — Z12.31 ENCOUNTER FOR SCREENING MAMMOGRAM FOR MALIGNANT NEOPLASM OF BREAST: ICD-10-CM

## 2023-06-07 PROCEDURE — 77063 BREAST TOMOSYNTHESIS BI: CPT

## 2023-06-07 PROCEDURE — 77067 SCR MAMMO BI INCL CAD: CPT

## 2023-06-07 NOTE — TELEPHONE ENCOUNTER
An order for a referral for medical clearance came through [facility] for Dr Maddie España

## 2023-06-09 PROBLEM — Z91.89 AT INCREASED RISK OF BREAST CANCER: Status: ACTIVE | Noted: 2023-06-09

## 2023-06-09 NOTE — RESULT ENCOUNTER NOTE
Alyssa Garrison,  Your mammogram is normal  We recommend you schedule your next mammogram in one year     Have a good day,   Dr Kamilla Fair

## 2023-06-16 ENCOUNTER — TELEPHONE (OUTPATIENT)
Dept: NEUROLOGY | Facility: CLINIC | Age: 53
End: 2023-06-16

## 2023-06-16 NOTE — TELEPHONE ENCOUNTER
Hello,      I have called the patient to offer an OVL  appt per Jadiel Kwon, If still available we can schedule on 8/2/2023,All, DR Mclaughlin at 11:30 am - 12 pm and then block the 12:30 pm for her lunch      Thank you,      Jason Fraga

## 2023-06-19 NOTE — TELEPHONE ENCOUNTER
Adriano Catherine from [place] [id number] called to check if patient is scheduled for an appointment  Informed her patient is being offered with Dr Dong Dumont but she stated patient needs to be seen before 7/31/23 to be cleared for surgery  Patient is have spinal surgery  Adriano Catherine wants a return call when another appointment is available  There is also in media a form to be completed before surgery

## 2023-06-20 NOTE — TELEPHONE ENCOUNTER
I spoke with Dr Richie Turk, he will see patient for clearance for surgery, appointment scheduled Monday 6/26/23 @ 2:30PM  Dr Richie Turk approved NP in 30 min time slot  Patient aware that she will be seeing Dr Richie Turk for surgery clearance and will then to follow up with Dr Juliocesar Onofre for continuation of care  Dr Juliocesar Onofre - Dr Richie Turk mentioned that he may need some discussion with you in clearing her for surgery

## 2023-06-26 ENCOUNTER — OFFICE VISIT (OUTPATIENT)
Dept: NEUROLOGY | Facility: CLINIC | Age: 53
End: 2023-06-26
Payer: OTHER GOVERNMENT

## 2023-06-26 VITALS
WEIGHT: 165 LBS | DIASTOLIC BLOOD PRESSURE: 80 MMHG | OXYGEN SATURATION: 97 % | HEIGHT: 66 IN | BODY MASS INDEX: 26.52 KG/M2 | HEART RATE: 67 BPM | SYSTOLIC BLOOD PRESSURE: 120 MMHG

## 2023-06-26 DIAGNOSIS — G70.00 MG (MYASTHENIA GRAVIS) (HCC): Primary | ICD-10-CM

## 2023-06-26 PROCEDURE — 99215 OFFICE O/P EST HI 40 MIN: CPT | Performed by: PSYCHIATRY & NEUROLOGY

## 2023-06-26 NOTE — PROGRESS NOTES
Mely Ritter is a 45 y o  female  Chief Complaint   Patient presents with   • MG (myasthenia gravis)   • Polyneuropathy       Assessment:  1  MG (myasthenia gravis) ([address] )        Plan:  Continue with Mestinon 60 mg 4 times a day  Follow-up in 6 months  Discussion:  Dr Priscilla Canales and Yahaira's notes were reviewed, patient's myasthenia gravis currently seems to be stable on Mestinon 60 mg 4 times daily I have advised her to continue with same, I will discuss her case with one of our neuromuscular specialist home she was supposed to see her regarding if we need to take any other precautions during her surgery, I have advised her that sometimes people with myasthenia can be difficult to wean after intubation and she does understand the risks, luckily she has had couple of surgeries in the past with anesthesia without any complications, and she has been off of the steroids for at least more than a year, she was advised to keep a blood pressure cholesterol sugar under control, she also would need preop clearance from her family physician, continue follow-up with her other physicians, to go to the hospital if has any worsening symptoms and call us otherwise to see me back in 6 months      Subjective:    HPI   64-year-old female with history of myasthenia gravis diagnosed in 2014 status post thymectomy, she had been seeing my associate Dr Prisiclla Canales and subsequently saw [facility], she also has a history of Sjogren's disease and does follow-up with a rheumatologist and is maintained on Celebrex and tramadol as needed, from the myasthenia standpoint a few patient has been on Mestinon 60 mg 4 times a day and she was on prednisone for a few years and has been off of the prednisone for the last 2 years secondary to her history of osteopenia, she is here today to get clearance for her lumbar spinal surgery of complex spinal fusion at L4-S1, patient tells me that currently she is not having any double vision no droopiness "of the eyelids no difficulty with breathing she has never been intubated in her lifetime no difficulty speaking or swallowing no weakness, she does have history of neuropathy and is on gabapentin 300 mg 3 times a day and seems to be doing well she continues to have low back issues for which she is in follow-up with a spine surgeon and is going for surgery, she denies any side effects to the Mestinon no diarrhea, she is independent of all her ADLs and is a primary caregiver for her parents with her mom having dementia, appetite is good weight has been stable no other complaints      Vitals:    06/26/23 1427   BP: 120/80   BP Location: Right arm   Patient Position: Sitting   Cuff Size: Standard   Pulse: 67   SpO2: 97%   Weight: 74 8 kg (165 lb)   Height: 5' 5 5\" (1 664 m)       Current Medications    Current Outpatient Medications:   •  celecoxib (CeleBREX) 200 mg capsule, Take 200 mg by mouth 2 (two) times a day , Disp: , Rfl:   •  diphenoxylate-atropine (LOMOTIL) 2 5-0 025 mg per tablet, Take 1 tablet by mouth 2 (two) times a day as needed for diarrhea, Disp: 60 tablet, Rfl: 2  •  escitalopram (LEXAPRO) 20 mg tablet, Take 1 tablet (20 mg total) by mouth daily, Disp: 90 tablet, Rfl: 2  •  folic acid (FOLVITE) 1 mg tablet, Take 1 mg by mouth daily  , Disp: , Rfl:   •  gabapentin (NEURONTIN) 300 mg capsule, TAKE 1 CAPSULE THREE TIMES A DAY, Disp: 270 capsule, Rfl: 3  •  lifitegrast (Xiidra) 5 % op solution, Administer 1 drop to both eyes 2 (two) times a day, Disp: , Rfl:   •  methotrexate 2 5 mg tablet, 10 mg once a week, Disp: , Rfl:   •  multivitamin-minerals (CENTRUM ADULTS) tablet, Take 1 tablet by mouth daily, Disp: , Rfl:   •  omeprazole (PriLOSEC) 20 mg delayed release capsule, TAKE 1 CAPSULE DAILY, Disp: 90 capsule, Rfl: 3  •  Prasterone 6 5 MG INST, Insert 1 suppository into the vagina in the morning, Disp: 84 each, Rfl: 3  •  pyridostigmine (MESTINON) 60 mg tablet, Take 1 tablet (60 mg total) by mouth 4 (four) " times a day, Disp: 360 tablet, Rfl: 3  •  traMADol (ULTRAM) 50 mg tablet, Take 50 mg by mouth 2 (two) times a day as needed, Disp: , Rfl:   •  zolpidem (AMBIEN) 10 mg tablet, Take 1 tablet (10 mg total) by mouth daily at bedtime, Disp: 90 tablet, Rfl: 2  •  imiquimod (ALDARA) 5 % cream, Apply 1 packet topically 3 (three) times a week for 24 doses, Disp: 24 each, Rfl: 0  •  pediatric multivitamin-iron (POLY-VI-SOL with IRON) 15 MG chewable tablet, Chew 1 tablet  in the morning , Disp: , Rfl:       Allergies  Patient has no known allergies      Past Medical History  Past Medical History:   Diagnosis Date   • Abnormal Pap smear of cervix    • Anxiety July   • Arthritis     RA   • Carotid stenosis, asymptomatic    • Chemical exposure     [address] 67 - worked [address]   • Dizziness    • HPV in female    • IBS (irritable bowel syndrome)    • Myasthenia gravis ([facility] [address] )     dx 2014   • Myasthenia gravis ([facility] [address] )    • Neuropathy     left foot   • Polyneuropathy    • Raynaud disease     dx 2016   • Sjoegren syndrome     dx 2010   • Syncope    • Venereal wart 04/13/2015         Past Surgical History:  Past Surgical History:   Procedure Laterality Date   • CHOLECYSTECTOMY  2016   • COLONOSCOPY     • THYMECTOMY  2016   • UPPER GASTROINTESTINAL ENDOSCOPY           Family History:  Family History   Problem Relation Age of Onset   • Arthritis Mother    • Dementia Mother    • Diabetes Father    • Hypertension Father    • Stroke Father    • Heart disease Father    • Hearing loss Father    • Breast cancer Sister 71   • Arthritis Sister    • Heart disease Sister    • Diabetes Sister    • Cirrhosis Sister         non-alcoholic   • Diverticulitis Sister    • Coronary artery disease Sister    • Other Sister         carotid artery disease   • Colon cancer Sister    • Breast cancer Sister         63's   • Liver cancer Sister    • Ross syndrome Sister    • Thyroid disease Sister    • Stroke Sister    • Urolithiasis Sister    • Dementia Maternal Grandmother    • Heart attack Maternal Grandfather    • Stroke Paternal Grandmother    • Stroke Paternal Grandfather    • Diverticulitis Brother    • No Known Problems Brother    • No Known Problems Brother    • No Known Problems Brother    • No Known Problems Brother    • No Known Problems Maternal Aunt    • No Known Problems Paternal Aunt    • No Known Problems Paternal Aunt        Social History:   reports that she quit smoking about 12 years ago  Her smoking use included cigarettes  She started smoking about 21 years ago  She has a 4 00 pack-year smoking history  She has never used smokeless tobacco  She reports that she does not currently use alcohol  She reports that she does not use drugs  I have reviewed the past medical history, surgical history, social and family history, current medications, allergies vitals, review of systems, and updated this information as appropriate today  Objective:    Physical Exam    Neurological Exam     GENERAL:  Cooperative in no acute distress  Well-developed and well-nourished     HEAD and NECK   Head is atraumatic normocephalic with no lesions or masses  Neck is supple with full range of motion     CARDIOVASCULAR  Carotid Arteries-no carotid bruits  NEUROLOGIC:  Mental Status-the patient is awake alert and oriented without aphasia or apraxia  Cranial Nerves: Visual fields are full to confrontation  Extraocular movements are full without nystagmus  Pupils are 2-1/2 mm and reactive  Face is symmetrical to light touch  Movements of facial expression move symmetrically  Hearing is normal to finger rub bilaterally  Soft palate lifts symmetrically  Shoulder shrug is symmetrical  Tongue is midline without atrophy  Motor: No drift is noted on arm extension  Strength is full in the upper and lower extremities with normal bulk and tone  Sensory: Intact to temperature and vibratory sensation in the upper and lower extremities bilaterally   Cortical function is intact  Coordination: Finger to nose testing is performed accurately  Romberg is negative  Gait reveals a normal base with symmetrical arm swing  Tandem walk is normal   Reflexes:    2+ and symmetrical  toes are downgoing    ROS:  Review of Systems   Constitutional: Negative for appetite change, fatigue and fever  HENT: Negative  Negative for hearing loss, tinnitus, trouble swallowing and voice change  Eyes: Negative  Negative for photophobia, pain and visual disturbance  Respiratory: Negative  Negative for shortness of breath  Cardiovascular: Negative  Negative for palpitations  Gastrointestinal: Negative  Negative for nausea and vomiting  Endocrine: Negative  Negative for cold intolerance  Genitourinary: Negative  Negative for dysuria, frequency and urgency  Musculoskeletal: Positive for back pain  Negative for gait problem, myalgias and neck pain  Skin: Negative  Negative for rash  Allergic/Immunologic: Negative  Neurological: Positive for numbness  Negative for dizziness, tremors, seizures, syncope, facial asymmetry, speech difficulty, weakness, light-headedness and headaches  Hematological: Negative  Does not bruise/bleed easily  Psychiatric/Behavioral: Negative  Negative for confusion, hallucinations and sleep disturbance

## 2023-07-04 RX ORDER — GABAPENTIN 300 MG/1
CAPSULE ORAL
Qty: 270 CAPSULE | Refills: 3 | OUTPATIENT
Start: 2023-07-04

## 2023-07-06 ENCOUNTER — APPOINTMENT (OUTPATIENT)
Dept: LAB | Facility: CLINIC | Age: 53
End: 2023-07-06
Payer: OTHER GOVERNMENT

## 2023-07-06 DIAGNOSIS — Z01.818 OTHER SPECIFIED PRE-OPERATIVE EXAMINATION: ICD-10-CM

## 2023-07-06 LAB
ALBUMIN SERPL BCP-MCNC: 3.6 G/DL (ref 3.5–5)
ALP SERPL-CCNC: 80 U/L (ref 46–116)
ALT SERPL W P-5'-P-CCNC: 48 U/L (ref 12–78)
ANION GAP SERPL CALCULATED.3IONS-SCNC: 4 MMOL/L
AST SERPL W P-5'-P-CCNC: 38 U/L (ref 5–45)
BASOPHILS # BLD AUTO: 0.03 THOUSANDS/ÂΜL (ref 0–0.1)
BASOPHILS NFR BLD AUTO: 1 % (ref 0–1)
BILIRUB SERPL-MCNC: 0.59 MG/DL (ref 0.2–1)
BUN SERPL-MCNC: 13 MG/DL (ref 5–25)
CALCIUM SERPL-MCNC: 9.1 MG/DL (ref 8.3–10.1)
CHLORIDE SERPL-SCNC: 110 MMOL/L (ref 96–108)
CO2 SERPL-SCNC: 26 MMOL/L (ref 21–32)
CREAT SERPL-MCNC: 0.83 MG/DL (ref 0.6–1.3)
EOSINOPHIL # BLD AUTO: 0.12 THOUSAND/ÂΜL (ref 0–0.61)
EOSINOPHIL NFR BLD AUTO: 4 % (ref 0–6)
ERYTHROCYTE [DISTWIDTH] IN BLOOD BY AUTOMATED COUNT: 14.2 % (ref 11.6–15.1)
GFR SERPL CREATININE-BSD FRML MDRD: 80 ML/MIN/1.73SQ M
GLUCOSE P FAST SERPL-MCNC: 94 MG/DL (ref 65–99)
HCT VFR BLD AUTO: 40.5 % (ref 34.8–46.1)
HGB BLD-MCNC: 13.2 G/DL (ref 11.5–15.4)
IMM GRANULOCYTES # BLD AUTO: 0 THOUSAND/UL (ref 0–0.2)
IMM GRANULOCYTES NFR BLD AUTO: 0 % (ref 0–2)
LYMPHOCYTES # BLD AUTO: 1.23 THOUSANDS/ÂΜL (ref 0.6–4.47)
LYMPHOCYTES NFR BLD AUTO: 42 % (ref 14–44)
MCH RBC QN AUTO: 28.4 PG (ref 26.8–34.3)
MCHC RBC AUTO-ENTMCNC: 32.6 G/DL (ref 31.4–37.4)
MCV RBC AUTO: 87 FL (ref 82–98)
MONOCYTES # BLD AUTO: 0.33 THOUSAND/ÂΜL (ref 0.17–1.22)
MONOCYTES NFR BLD AUTO: 12 % (ref 4–12)
NEUTROPHILS # BLD AUTO: 1.17 THOUSANDS/ÂΜL (ref 1.85–7.62)
NEUTS SEG NFR BLD AUTO: 41 % (ref 43–75)
NRBC BLD AUTO-RTO: 0 /100 WBCS
PLATELET # BLD AUTO: 204 THOUSANDS/UL (ref 149–390)
PMV BLD AUTO: 8.9 FL (ref 8.9–12.7)
POTASSIUM SERPL-SCNC: 4.2 MMOL/L (ref 3.5–5.3)
PROT SERPL-MCNC: 7 G/DL (ref 6.4–8.4)
RBC # BLD AUTO: 4.65 MILLION/UL (ref 3.81–5.12)
SODIUM SERPL-SCNC: 140 MMOL/L (ref 135–147)
WBC # BLD AUTO: 2.88 THOUSAND/UL (ref 4.31–10.16)

## 2023-07-06 PROCEDURE — 36415 COLL VENOUS BLD VENIPUNCTURE: CPT

## 2023-07-06 PROCEDURE — 85025 COMPLETE CBC W/AUTO DIFF WBC: CPT

## 2023-07-06 PROCEDURE — 80053 COMPREHEN METABOLIC PANEL: CPT

## 2023-07-10 ENCOUNTER — TELEPHONE (OUTPATIENT)
Dept: NEUROLOGY | Facility: CLINIC | Age: 53
End: 2023-07-10

## 2023-07-10 NOTE — TELEPHONE ENCOUNTER
Called pt to offer sooner appointment with Dr. Ronit Lockhart. No answer, left voicemail for pt to call back.

## 2023-07-12 ENCOUNTER — TELEPHONE (OUTPATIENT)
Dept: NEUROLOGY | Facility: CLINIC | Age: 53
End: 2023-07-12

## 2023-07-12 NOTE — TELEPHONE ENCOUNTER
Called pt again to offer sooner appointment time for Dr. Ly Barahona. No answer, left voicemail for pt to call back.

## 2023-07-12 NOTE — TELEPHONE ENCOUNTER
Patient called back.      Patient was added to the schedule for tomorrow 7/13/23  At 9 am in the CV office

## 2023-07-13 ENCOUNTER — OFFICE VISIT (OUTPATIENT)
Dept: NEUROLOGY | Facility: CLINIC | Age: 53
End: 2023-07-13
Payer: OTHER GOVERNMENT

## 2023-07-13 VITALS
BODY MASS INDEX: 26.66 KG/M2 | HEIGHT: 66 IN | HEART RATE: 70 BPM | SYSTOLIC BLOOD PRESSURE: 135 MMHG | TEMPERATURE: 98.3 F | WEIGHT: 165.9 LBS | DIASTOLIC BLOOD PRESSURE: 80 MMHG

## 2023-07-13 DIAGNOSIS — G70.00 MYASTHENIA GRAVIS WITHOUT (ACUTE) EXACERBATION (HCC): ICD-10-CM

## 2023-07-13 DIAGNOSIS — G70.00 MG (MYASTHENIA GRAVIS) (HCC): Primary | ICD-10-CM

## 2023-07-13 DIAGNOSIS — M54.16 RADICULOPATHY, LUMBAR REGION: ICD-10-CM

## 2023-07-13 PROCEDURE — 99215 OFFICE O/P EST HI 40 MIN: CPT | Performed by: PSYCHIATRY & NEUROLOGY

## 2023-07-13 NOTE — PATIENT INSTRUCTIONS
Please check with Dr. Des Baker if she would be ok to switch you form methotrexate to azathioprine or mycophenolate.      Follow up in 4-6 months

## 2023-07-13 NOTE — PROGRESS NOTES
Had the pleasure of seeing your patient in neurology clinic for neuromuscular consultation. As you know, patient is a 57-year-old woman with myasthenia gravis diagnosed in 2014 status post thymectomy, with positive acetylcholine receptor antibodies. In addition has Sjogren's. She has been followed by my colleague Dr. Gerald Barrett, in the past has also been seen by Dr. Tia Du, comes in today for neuromuscular consultation. Patient does have lower back pain, follows with our pain management colleagues. Labs: positive SSA antibodies checked in 2017 as well as 2022 (2.3, 3.8 normal less than 0.9), SSB was normal.  2022 sed rate, B12, CRP have been normal.  TSH has been normal, HIV negative      Pathology from thymectomy December 2014: Mild thymic hyperplasia with germinal center formation, compatible with myasthenia gravis, lymph nodes negative for malignancy. Does have history of smoking, quit more than 10 years ago. Denies any significant alcohol use.

## 2023-07-13 NOTE — PROGRESS NOTES
Patient ID: Anum Greer is a 45 y.o. female. Assessment/Plan:    MG (myasthenia gravis) ([address])  This patient has myasthenia gravis, that was diagnosed in 2014 with predominantly ocular symptoms, status post thymectomy in 2014. She was managed with pyridostigmine and prednisone at the time, which was gradually tapered off, was on 2.5 mg for at least a few years before she completely discontinued the medicine 2 to 3 years ago. She comes in today for evaluation for progressive symptoms, however most of her symptoms are related to neck pain, back pain, polyarthralgias, certainly has some fatigability, but no significant ocular or bulbar symptoms, no shortness of breath, further supported by essentially normal muscle strength testing in both upper and lower extremities as noted below, and normal cranial nerve examination today. At this time, my suspicion is her symptoms are multifactorial in nature explained to the patient today, she was in agreement and demonstrated understanding. Discussed a trial of low-dose prednisone, which she was hesitant to do, I do not think there is any role of adding IVIG/newer medications. I did suggest we could switch her methotrexate to azathioprine/mycophenolate, which would help with both myasthenia gravis and Sjogren's, she would like to discuss this with her rheumatologist and will get back to me after her visit with them. We will continue Mestinon in the meantime at her current dose of 60 mg 4 times a day. She will return to follow-up with me in 4 to 6 months. In the interim, she is being planned for lumbar spine surgery, at this time, I do not see any contraindication for her to proceed with the surgery from myasthenia gravis standpoint. Thank you for let me to participate in her care.        Diagnoses and all orders for this visit:    MG (myasthenia gravis) ([address])    Myasthenia gravis without (acute) exacerbation ([address])  -     Ambulatory Referral to Neurology    Radiculopathy, lumbar region  -     Ambulatory Referral to Neurology           Subjective:    HPI    I had the pleasure of seeing your patient in neurology clinic for neuromuscular consultation. As you know, patient is a 13-year-old woman with myasthenia gravis diagnosed in 2014 status post thymectomy, with positive acetylcholine receptor antibodies. In addition has Sjogren's. She has been followed by my colleague Dr. Missy Cormier, in the past has also been seen by Dr. Beatris Man, comes in today for neuromuscular consultation. She was diagnosed with Myasthenic gravis, was diagnosed in 2014. Her initial symptoms were diplopia and droopy eyes (in the left eye). She was started on mestinon and prednisone 60 mg. She was on high dose prednisone for some time, has been gradually lowering the dose, was on low-dose prednisone 2.5 mg for at least a few years which was eventually discontinued a few years ago. Continues to take Mestinon 60 mg 4 times daily. At the time of her initial diagnosis, she did not have any bulbar symptoms, denies having shortness of breath, fatigue, muscle weakness in upper or lower extremities. Over the last few years, she reports she has had constant neck pain, lower back pain. Lower back pain does radiate to both lower extremities, making it hard for her to walk any considerable distance. She has followed with our pain management colleagues, has had epidural steroid injections as well as neck injections. Reports numbness in the left foot, not much in the way of any paresthesias in the right foot. No clear muscle weakness in either upper or lower extremity, however fatigability and endurance has been an issue. Does not get any double vision anymore, some days may feel left eye is droopy, but this is not a consistent symptom.   Swallowing has not been affected, does report jaw feeling tired if she is eating certain foods, when she is eating solid foods, also has TMJ issues and dry mouth. Breathing is ok. Feels symptoms feel worse at the end of the day. Patient does have lower back pain, follows with our pain management colleagues. Being planned for laminectomy in near future. Has numbness in the left foot. Besides this, has Sjogren's, reports dry mouth, dry eyes as a result of her disease, with polyarthralgias, on methotrexate. Follows with Dr. Kaia Bartlett. Labs: positive SSA antibodies checked in 2017 as well as 2022 (2.3, 3.8 normal less than 0.9), SSB was normal.  2022 sed rate, B12, CRP have been normal.  TSH has been normal, HIV negative    MRI C spine April 2023: Diffuse degenerative disease, Mild spinal stenopsis reported at C4-5, C 5-6 and C6-7. Neuroforaminal narrowing reported bilaterally at all the levels, worst at C4-5 and C5-6. MRI lumbar spine April 2023 degenerative changes mostly at L4-5, L5-S1, with mild to moderate bilateral neuroforaminal narrowing bilaterally, mild mass effect on the thecal sac. 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 Does have history of smoking, quit more than 10 years ago. Denies any significant alcohol use. Parents moved with her in 2018, father passed away last year at age 80. Mother has dementia. The following portions of the patient's history were reviewed and updated as appropriate: allergies, current medications, past family history, past medical history, past social history, past surgical history and problem list.         Objective:    Blood pressure 135/80, pulse 70, temperature 98.3 °F (36.8 °C), temperature source Temporal, height 5' 5.5" (1.664 m), weight 75.3 kg (165 lb 14.4 oz), last menstrual period 01/01/2016, not currently breastfeeding. Physical Exam  General examination, patient was not in any acute distress. HEENT was unremarkable.   Extremities did not reveal any edema. Neurological Exam     MG-ADL 6/24, MG QOL 17/30  Neurologically, patient was awake and alert, speech was fluent without any dysarthria or aphasia. Cranial examination did not reveal any ptosis at baseline, with sustained upward gaze, extraocular movements were smooth and conjugate, patient did not endorse any diplopia. Strength of eye closure muscles was normal.  She was able to puff out her cheeks well, and push her tongue into her cheeks well. On motor examination, strength was normal in neck flexors, extensors, and all muscle groups in both upper and lower extremities, proximally and distally. Did have diffuse cervical myofascial tenderness. On sensory examination, she had mild acral sensory loss, more so on the left compared to the right foot to all modalities, flexes were 2+ in the upper extremities, 2 at the knees, ankles were 1 bilaterally. Gait was casual and normal.      ROS:  I reviewed the below ROS and what is mentioned in HPI, the remainder of ROS was negative. Review of Systems   Constitutional: Negative for appetite change, fatigue and fever. HENT: Negative. Negative for hearing loss, tinnitus, trouble swallowing and voice change. Eyes: Negative. Negative for photophobia, pain and visual disturbance. Respiratory: Negative. Negative for shortness of breath. Cardiovascular: Negative. Negative for palpitations. Gastrointestinal: Negative. Negative for nausea and vomiting. Endocrine: Negative. Negative for cold intolerance. Genitourinary: Negative. Negative for dysuria, frequency and urgency. Musculoskeletal: Negative for back pain, gait problem, myalgias and neck pain. Skin: Negative. Negative for rash. Allergic/Immunologic: Negative. Neurological: Negative. Negative for dizziness, tremors, seizures, syncope, facial asymmetry, speech difficulty, weakness, light-headedness, numbness and headaches. Hematological: Negative.   Does not bruise/bleed easily. Psychiatric/Behavioral: Negative. Negative for confusion, hallucinations and sleep disturbance.

## 2023-07-14 NOTE — ASSESSMENT & PLAN NOTE
This patient has myasthenia gravis, that was diagnosed in 2014 with predominantly ocular symptoms, status post thymectomy in 2014. She was managed with pyridostigmine and prednisone at the time, which was gradually tapered off, was on 2.5 mg for at least a few years before she completely discontinued the medicine 2 to 3 years ago. She comes in today for evaluation for progressive symptoms, however most of her symptoms are related to neck pain, back pain, polyarthralgias, certainly has some fatigability, but no significant ocular or bulbar symptoms, no shortness of breath, further supported by essentially normal muscle strength testing in both upper and lower extremities as noted below, and normal cranial nerve examination today. At this time, my suspicion is her symptoms are multifactorial in nature explained to the patient today, she was in agreement and demonstrated understanding. Discussed a trial of low-dose prednisone, which she was hesitant to do, I do not think there is any role of adding IVIG/newer medications. I did suggest we could switch her methotrexate to azathioprine/mycophenolate, which would help with both myasthenia gravis and Sjogren's, she would like to discuss this with her rheumatologist and will get back to me after her visit with them. We will continue Mestinon in the meantime at her current dose of 60 mg 4 times a day. She will return to follow-up with me in 4 to 6 months. In the interim, she is being planned for lumbar spine surgery, at this time, I do not see any contraindication for her to proceed with the surgery from myasthenia gravis standpoint. Thank you for let me to participate in her care.

## 2023-07-17 NOTE — TELEPHONE ENCOUNTER
Telephone call to patient and left a voicemail message for the patient to return my call about a sooner appt with Dr Shannon Rivers. I left my name and number.

## 2023-07-18 ENCOUNTER — TELEPHONE (OUTPATIENT)
Dept: FAMILY MEDICINE CLINIC | Facility: CLINIC | Age: 53
End: 2023-07-18

## 2023-07-18 NOTE — TELEPHONE ENCOUNTER
Spoke to patient and obtained the below Pre-op information:    Name of procedure: Laminectomy and Fusion L-4 to S-1  Diagnosis: Lumbar Laminectomy Bilateral  Date of procedure (within 30 days): July 31  Surgeon: Dr. Madeline Raymond  Location of procedure (hospital or out patient facility name): [facility]  PATs date/location/ type (Which labs? EKG?  CXR?): Labs were done 7/6/23  Records (on epic or requested):   Type of anaesthesia:  General  Paperwork to complete for Pre-op (patient bringing vs. calling office to obtain prior to appointment): Patient will bring in forms  Pre-op appointment scheduled (date/time): 7/24/23

## 2023-07-18 NOTE — TELEPHONE ENCOUNTER
Please obtain pre-op form before appt     Pre Op Clearance  Lumbar Fusion and Laminectomy  Surgery Date 7/31/23  [facility]  EKG to be done at pre op clearance visit narciso    Thank you

## 2023-07-24 ENCOUNTER — OFFICE VISIT (OUTPATIENT)
Dept: FAMILY MEDICINE CLINIC | Facility: CLINIC | Age: 53
End: 2023-07-24
Payer: OTHER GOVERNMENT

## 2023-07-24 ENCOUNTER — TELEPHONE (OUTPATIENT)
Dept: FAMILY MEDICINE CLINIC | Facility: CLINIC | Age: 53
End: 2023-07-24

## 2023-07-24 ENCOUNTER — OFFICE VISIT (OUTPATIENT)
Dept: LAB | Facility: CLINIC | Age: 53
End: 2023-07-24
Payer: OTHER GOVERNMENT

## 2023-07-24 VITALS
SYSTOLIC BLOOD PRESSURE: 124 MMHG | TEMPERATURE: 98.7 F | HEIGHT: 65 IN | DIASTOLIC BLOOD PRESSURE: 82 MMHG | WEIGHT: 166 LBS | BODY MASS INDEX: 27.66 KG/M2 | RESPIRATION RATE: 18 BRPM | HEART RATE: 74 BPM | OXYGEN SATURATION: 98 %

## 2023-07-24 DIAGNOSIS — Z01.818 PRE-OPERATIVE CLEARANCE: ICD-10-CM

## 2023-07-24 DIAGNOSIS — M79.7 FIBROMYALGIA: ICD-10-CM

## 2023-07-24 DIAGNOSIS — F41.9 ANXIETY: ICD-10-CM

## 2023-07-24 DIAGNOSIS — Z01.818 PRE-OPERATIVE CLEARANCE: Primary | ICD-10-CM

## 2023-07-24 DIAGNOSIS — G70.00 MG (MYASTHENIA GRAVIS) (HCC): ICD-10-CM

## 2023-07-24 DIAGNOSIS — M48.062 LUMBAR STENOSIS WITH NEUROGENIC CLAUDICATION: ICD-10-CM

## 2023-07-24 PROCEDURE — 99244 OFF/OP CNSLTJ NEW/EST MOD 40: CPT | Performed by: FAMILY MEDICINE

## 2023-07-24 PROCEDURE — 93005 ELECTROCARDIOGRAM TRACING: CPT

## 2023-07-24 RX ORDER — ESCITALOPRAM OXALATE 20 MG/1
20 TABLET ORAL DAILY
Qty: 90 TABLET | Refills: 3 | Status: SHIPPED | OUTPATIENT
Start: 2023-07-24

## 2023-07-24 NOTE — PATIENT INSTRUCTIONS
Do not use NSAID's for 7 days prior to surgery. These include Advil, Motrin, Aleve. You make take Tylenol if you need it. Or Celebrex. Hold multivitamin also. With prescription pain medication recommend colace twice a day, miralax daily. If no BM in 2 days use Dulolax.

## 2023-07-24 NOTE — PROGRESS NOTES
Presurgical Evaluation    Subjective:      Patient ID: Moon Flores is a 45 y.o. female. Chief Complaint   Patient presents with   • Pre-op Exam     Lumbar Fusion and Laminectomy  Surgery Date 7/31/23  [facility] [address] [facility]       HPI  Pt here at written request from Dr. Jumana Yoon for pre-op clearance     The following portions of the patient's history were reviewed and updated as appropriate: allergies, current medications, past family history, past medical history, past social history, past surgical history and problem list.    Procedure date: 07/31/2023     Surgeon:  Yoon Guadarrama MD    Planned procedure:  Lumbar laminectomy bilateral L4-S1. PSF-TLIF L4-S1   Diagnosis for procedure:   Monoparesis Of Lower Limb  Lumbar stenosis with neurogenic claudication  Spondylolisthesis of lumbar region          Prior anesthesia: Yes   General; Complications:  None / Tolerated well  MAC; Complications:  None / Tolerated well  Local; Complications:  None / Tolerated well     Plans to ask for inpatient rehab right away. CAD History: None   NOTE: Patient should see Cardiology if time available before surgery, and if appropriate. Pulmonary History: None - no history of sleep study. Very rare snoring. Renal history: None    Diabetes History:  None     Neurological History: MG     On Immunosuppressant meds/biologics: Yes - MTX     Advised her to talk to neurologist regarding medications  ?neuro wondering about switching from celebrex to prednisone     Review of Systems      ROS:  all others negative - no chest pain, SOB, normal urine and bowels. no GERD. sleeping well. mood good. Knee pain bilaterally. Trouble sleeping.      Current Outpatient Medications   Medication Sig Dispense Refill   • celecoxib (CeleBREX) 200 mg capsule Take 200 mg by mouth 2 (two) times a day      • diphenoxylate-atropine (LOMOTIL) 2.5-0.025 mg per tablet Take 1 tablet by mouth 2 (two) times a day as needed for diarrhea 60 tablet 2   • escitalopram (LEXAPRO) 20 mg tablet TAKE 1 TABLET DAILY 90 tablet 3   • folic acid (FOLVITE) 1 mg tablet Take 1 mg by mouth daily       • gabapentin (NEURONTIN) 300 mg capsule TAKE 1 CAPSULE THREE TIMES A  capsule 3   • lifitegrast (Xiidra) 5 % op solution Administer 1 drop to both eyes 2 (two) times a day     • methotrexate 2.5 mg tablet 10 mg once a week     • multivitamin-minerals (CENTRUM ADULTS) tablet Take 1 tablet by mouth daily     • omeprazole (PriLOSEC) 20 mg delayed release capsule TAKE 1 CAPSULE DAILY 90 capsule 3   • Prasterone 6.5 MG INST Insert 1 suppository into the vagina in the morning 84 each 3   • pyridostigmine (MESTINON) 60 mg tablet Take 1 tablet (60 mg total) by mouth 4 (four) times a day 360 tablet 3   • traMADol (ULTRAM) 50 mg tablet Take 50 mg by mouth 2 (two) times a day as needed     • zolpidem (AMBIEN) 10 mg tablet Take 1 tablet (10 mg total) by mouth daily at bedtime 90 tablet 2   • imiquimod (ALDARA) 5 % cream Apply 1 packet topically 3 (three) times a week for 24 doses 24 each 0     No current facility-administered medications for this visit. Allergies on file:   Patient has no known allergies.     Patient Active Problem List   Diagnosis   • Asymptomatic stenosis of left carotid artery   • Family history of premature CAD   • MG (myasthenia gravis) ([address])   • Chronic pain syndrome   • Greater trochanteric bursitis of both hips   • Lumbar radiculopathy   • Generalized joint pain   • Cervical spinal stenosis   • Abnormal cervical Papanicolaou smear   • Gastroesophageal reflux disease   • Irritable bowel syndrome with diarrhea   • Menopausal symptom   • Polyneuropathy   • Raynaud's disease   • Family history of breast cancer in sister   • Persistent insomnia   • Bilateral sacroiliitis (HCC)   • DDD (degenerative disc disease), lumbar   • Acquired compression deformity of vertebra   • Fibromyalgia   • Dyspareunia in female   • Diarrhea   • Family history of colon cancer   • Anxiety   • Closed fracture of lower end of right radius with routine healing   • Extensor tenosynovitis of right wrist   • Primary arthrosis of multiple joints   • At increased risk of breast cancer per scoring system         Past Medical History:   Diagnosis Date   • Abnormal Pap smear of cervix    • Anxiety July   • Arthritis     RA   • Carotid stenosis, asymptomatic    • Chemical exposure     [address] - worked across the street   • Dizziness    • HPV in female    • IBS (irritable bowel syndrome)    • Myasthenia gravis ([address])     dx 2014   • Myasthenia gravis ([address])    • Neuropathy     left foot   • Polyneuropathy    • Raynaud disease     dx 2016   • Sjoegren syndrome     dx 2010   • Syncope    • Venereal wart 04/13/2015       Past Surgical History:   Procedure Laterality Date   • CHOLECYSTECTOMY  2016   • COLONOSCOPY     • THYMECTOMY  2016   • UPPER GASTROINTESTINAL ENDOSCOPY         Family History   Problem Relation Age of Onset   • Arthritis Mother    • Dementia Mother    • Diabetes Father    • Hypertension Father    • Stroke Father    • Heart disease Father    • Hearing loss Father    • Breast cancer Sister 71   • Arthritis Sister    • Heart disease Sister    • Diabetes Sister    • Cirrhosis Sister         non-alcoholic   • Diverticulitis Sister    • Coronary artery disease Sister    • Other Sister         carotid artery disease   • Colon cancer Sister    • Breast cancer Sister         63's   • Liver cancer Sister    • Ross syndrome Sister    • Thyroid disease Sister    • Stroke Sister    • Urolithiasis Sister    • Dementia Maternal Grandmother    • Heart attack Maternal Grandfather    • Stroke Paternal Grandmother    • Stroke Paternal Grandfather    • Diverticulitis Brother    • No Known Problems Brother    • No Known Problems Brother    • No Known Problems Brother    • No Known Problems Brother    • No Known Problems Maternal Aunt    • No Known Problems Paternal Aunt    • No Known Problems Paternal Aunt        Social History     Tobacco Use   • Smoking status: Former     Packs/day: 0.50     Years: 8.00     Total pack years: 4.00     Types: Cigarettes     Start date: 2001     Quit date: 10/10/2010     Years since quittin.7   • Smokeless tobacco: Never   Vaping Use   • Vaping Use: Former   • Start date: 10/10/2010   • Quit date: 2011   Substance Use Topics   • Alcohol use: Not Currently   • Drug use: No       Objective:    Vitals:    23 0857   BP: 124/82   Pulse: 74   Resp: 18   Temp: 98.7 °F (37.1 °C)   SpO2: 98%   Weight: 75.3 kg (166 lb)   Height: 5' 5.2" (1.656 m)        Physical Exam  Vitals reviewed. Constitutional:       Appearance: Normal appearance. HENT:      Head: Normocephalic. Cardiovascular:      Rate and Rhythm: Normal rate and regular rhythm. Pulmonary:      Effort: Pulmonary effort is normal.      Breath sounds: Normal breath sounds. Abdominal:      Palpations: Abdomen is soft. Tenderness: There is no abdominal tenderness. Skin:     General: Skin is dry. Neurological:      Mental Status: She is alert. Mental status is at baseline. Psychiatric:         Mood and Affect: Mood normal.           Preop labs/testing available and reviewed: yes - attached. Usual low WBC. eGFR   Date Value Ref Range Status   2023 80 ml/min/1.73sq m Final     WBC   Date Value Ref Range Status   2023 2.88 (L) 4.31 - 10.16 Thousand/uL Final     Hemoglobin   Date Value Ref Range Status   2023 13.2 11.5 - 15.4 g/dL Final     Hematocrit   Date Value Ref Range Status   2023 40.5 34.8 - 46.1 % Final     Platelets   Date Value Ref Range Status   2023 204 149 - 390 Thousands/uL Final          EKG yes - ordered today. . normal.     Echo no    Stress test/cath no    PFT/Ousmane no    Functional capacity: Climb stairs                        4 Mets   Pick the highest level patient can comfortably perform   4 mets or greater for surgery    RCRI  High Risk surgery? 1 Point  CAD History:         1 Point   MI; Positive Stress Test; CP due to Mi;  Nitrate Usage to control Angina; Pathologic Q wave on EKG  CHF Active:         1 Point   Pulm Edema; Paroxysmal Nocturnal Dyspnea;  Bibasilar Rales (crackles);S3; CHF on CXR  Cerebrovascular Disease (TIA or CVA):     1 Point  DM on Insulin:        1 Point  Serum Creat >2.0 mg/dl:       1 Point          Total Points: 1     Scorin: Class I, Very Low Risk (0.4%)     1: Class II, Low risk (0.9%)     2: Class III Moderate (6.6%)     3: Class IV High (>11%)      MONTSE Risk:  GFR:   eGFR   Date Value Ref Range Status   2023 80 ml/min/1.73sq m Final         For PCP:  If GFR>60, Hold ACE/ARB/Diuretic on the day of surgery, and NSAIDS 10 days before. If GFR<45, Consider PRE and POST op Nephrology Consult. If 46 <GFR> 59 : Has Patient had MONTSE in last 6 Months? no   If YES: Preop Nephrology consult   If No:  [address] [facility] Nephrology consult. Assessment/Plan:    Patient is medically optimized (cleared) for the planned procedure. Further testing/evaluation is not required. Postop concerns: no    Problem List Items Addressed This Visit        Unprioritized    MG (myasthenia gravis) ([address])    Fibromyalgia   Other Visit Diagnoses     Pre-operative clearance    -  Primary    Relevant Orders    ECG 12 lead    Lumbar stenosis with neurogenic claudication            Low risk patient. Cleared for surgery. Call placed to rheum asking if methotrexate needs to be held prior to surgery. Copy of pre-op clearance. Labs and EKG will be faxed to Dr. Mildred Flores today.

## 2023-07-25 LAB
ATRIAL RATE: 61 BPM
ATRIAL RATE: 61 BPM
P AXIS: 1 DEGREES
P AXIS: 12 DEGREES
PR INTERVAL: 130 MS
PR INTERVAL: 132 MS
QRS AXIS: 36 DEGREES
QRS AXIS: 41 DEGREES
QRSD INTERVAL: 88 MS
QRSD INTERVAL: 92 MS
QT INTERVAL: 454 MS
QT INTERVAL: 460 MS
QTC INTERVAL: 457 MS
QTC INTERVAL: 463 MS
T WAVE AXIS: 38 DEGREES
T WAVE AXIS: 40 DEGREES
VENTRICULAR RATE: 61 BPM
VENTRICULAR RATE: 61 BPM

## 2023-07-25 PROCEDURE — 93010 ELECTROCARDIOGRAM REPORT: CPT | Performed by: INTERNAL MEDICINE

## 2023-07-25 NOTE — TELEPHONE ENCOUNTER
Phone call placed [facility] rheum office spoke to [facility] pt is to hold her methotrexate 1 week before, the week of and 1 week after. [facility] states they have informed the patient of this as well.      Pre op From Faxed

## 2023-08-02 ENCOUNTER — TELEPHONE (OUTPATIENT)
Dept: FAMILY MEDICINE CLINIC | Facility: CLINIC | Age: 53
End: 2023-08-02

## 2023-08-02 NOTE — TELEPHONE ENCOUNTER
Pt d/c from [facility] - please offer TCM appointment with us (7 - 14 calendar days from d/c)  - virtual okay.  Also please update surgical history

## 2023-08-02 NOTE — TELEPHONE ENCOUNTER
Called patient, left message for patient to call the office back. Patient's surgical history has been updated.

## 2023-08-04 NOTE — TELEPHONE ENCOUNTER
Called patient, appointment scheduled for 08/14/223 @ 01:00PM for virtual TCM. Please advise if appointment needs to be 20 or 40 minutes. Appointment scheduled for 40 currently.

## 2023-08-14 ENCOUNTER — TELEMEDICINE (OUTPATIENT)
Dept: FAMILY MEDICINE CLINIC | Facility: CLINIC | Age: 53
End: 2023-08-14
Payer: OTHER GOVERNMENT

## 2023-08-14 VITALS — BODY MASS INDEX: 27.66 KG/M2 | WEIGHT: 166 LBS | HEIGHT: 65 IN

## 2023-08-14 DIAGNOSIS — M48.062 LUMBAR STENOSIS WITH NEUROGENIC CLAUDICATION: Primary | ICD-10-CM

## 2023-08-14 DIAGNOSIS — Z09 HOSPITAL DISCHARGE FOLLOW-UP: ICD-10-CM

## 2023-08-14 PROCEDURE — 99495 TRANSJ CARE MGMT MOD F2F 14D: CPT | Performed by: FAMILY MEDICINE

## 2023-08-14 NOTE — PROGRESS NOTES
Assessment/Plan:     1. Lumbar stenosis with neurogenic claudication    2. Hospital discharge follow-up     pt doing well   Feeling much better since surgery   Pain better  No concerns or questions     No follow-ups on file. Subjective:   Tricia Rosado is a 45 y.o. female here today for a hospital follow-up.   Patient Active Problem List   Diagnosis   • Asymptomatic stenosis of left carotid artery   • Family history of premature CAD   • MG (myasthenia gravis) ([address])   • Chronic pain syndrome   • Greater trochanteric bursitis of both hips   • Lumbar radiculopathy   • Generalized joint pain   • Cervical spinal stenosis   • Abnormal cervical Papanicolaou smear   • Gastroesophageal reflux disease   • Irritable bowel syndrome with diarrhea   • Menopausal symptom   • Polyneuropathy   • Raynaud's disease   • Family history of breast cancer in sister   • Persistent insomnia   • Bilateral sacroiliitis ([address])   • DDD (degenerative disc disease), lumbar   • Acquired compression deformity of vertebra   • Fibromyalgia   • Dyspareunia in female   • Diarrhea   • Family history of colon cancer   • Anxiety   • Closed fracture of lower end of right radius with routine healing   • Extensor tenosynovitis of right wrist   • Primary arthrosis of multiple joints   • At increased risk of breast cancer per scoring system         Current medications:  Current Outpatient Medications   Medication Sig Dispense Refill   • celecoxib (CeleBREX) 200 mg capsule Take 200 mg by mouth 2 (two) times a day      • escitalopram (LEXAPRO) 20 mg tablet TAKE 1 TABLET DAILY 90 tablet 3   • folic acid (FOLVITE) 1 mg tablet Take 1 mg by mouth daily       • gabapentin (NEURONTIN) 300 mg capsule TAKE 1 CAPSULE THREE TIMES A  capsule 3   • lifitegrast (Xiidra) 5 % op solution Administer 1 drop to both eyes 2 (two) times a day     • methotrexate 2.5 mg tablet 10 mg once a week     • multivitamin-minerals (CENTRUM ADULTS) tablet Take 1 tablet by mouth daily     • omeprazole (PriLOSEC) 20 mg delayed release capsule TAKE 1 CAPSULE DAILY 90 capsule 3   • Prasterone 6.5 MG INST Insert 1 suppository into the vagina in the morning 84 each 3   • pyridostigmine (MESTINON) 60 mg tablet Take 1 tablet (60 mg total) by mouth 4 (four) times a day 360 tablet 3   • traMADol (ULTRAM) 50 mg tablet Take 50 mg by mouth 2 (two) times a day as needed     • zolpidem (AMBIEN) 10 mg tablet Take 1 tablet (10 mg total) by mouth daily at bedtime 90 tablet 2     No current facility-administered medications for this visit. HPI:   Chief Complaint   Patient presents with   • Virtual Tcm     Patient states that she feels great      -- Above per clinical staff and reviewed. --    TCM Call     None      TCM Call     None          Hospital Course:  DC Date/Time/Disposition: 8/2/2023 There are no discharge diagnoses documented for the most recent discharge. [address] [place] [address] [place] [address] Date: 7/31/2023   Discharge Date: 8/2/23    Admission Diagnosis   Lumbar stenosis with neurogenic claudication [M48.062]  Spondylolisthesis of lumbar region [M43.16]  Monoparesis of lower extremity affecting right dominant side ([address]) [G83.11]  Monoparesis of lower extremity affecting left dominant side ([address]) [G83.12]    Discharge Diagnoses    Active Hospital Problems   Diagnosis POA   Principal Problem: Lumbar stenosis with neurogenic claudication Yes       Operative Procedures Performed  Procedure(s):  Correct Procedure: Lumbar laminectomy bilateral L4-S1. PSF-TLIF L4-S1    Hospital Course  Patient was admitted to the hospital and underwent the above listed procedure. Patient tolerated the procedure well and returned to the recovery room in stable condition. On POD # 2 patient was able to be discharged in afebrile and stable condition. They were give instructions on activity level as well as wound care. They were given prescriptions for pain and a follow up visit in the office in 2-3 weeks. Discharge Disposition  home    Medications    Medication List       START taking these medications     acetaminophen 500 MG tablet                  Where to Get Your Medications       These medications were sent to [address] [place] [address]., [address] [address] [facility], [address] [place] [address] Drive,[facility] [address]     Phone: [phone]   acetaminophen 500 MG tablet  methocarbamoL 750 MG tablet  oxyCODONE 5 MG immediate release tablet        Allergies  No Known Allergies    Outpatient Follow-Up  2-3 weeks post op as scheduled     Active Issues Requiring Follow-Up  none    Test Results Pending at Discharge  Pending Labs     Order Current Status   BASIC METABOLIC PANEL In process               Physical Exam at Discharge  Condition of Patient on Discharge: stable      Colleen Nazario CM  8/2/2023  9:50 AM  DC Date/Time/Disposition: 8/2/2023 There are no discharge diagnoses documented for the most recent discharge. [address] [place] [address] Date: 7/31/2023   Discharge Date: 8/2/23    Admission Diagnosis   Lumbar stenosis with neurogenic claudication [M48.062]  Spondylolisthesis of lumbar region [M43.16]  Monoparesis of lower extremity affecting right dominant side ([address]) [G83.11]  Monoparesis of lower extremity affecting left dominant side ([address]) [G83.12]    Discharge Diagnoses    Active Hospital Problems   Diagnosis POA   Principal Problem: Lumbar stenosis with neurogenic claudication Yes       Operative Procedures Performed  Procedure(s):  Correct Procedure: Lumbar laminectomy bilateral L4-S1. PSF-TLIF L4-S1    Hospital Course  Patient was admitted to the hospital and underwent the above listed procedure. Patient tolerated the procedure well and returned to the recovery room in stable condition. On POD # 2 patient was able to be discharged in afebrile and stable condition. They were give instructions on activity level as well as wound care.  They were given prescriptions for pain and a follow up visit in the office in 2-3 weeks. Discharge Disposition  home    Medications        Today:  Just feeling good - no longer having pain, not needing oxy   Sleeping well   Walking around the house   Sore where the incision was only   Feels wonderful. Pain across the back and leg is gone. Healing well   No worries or concerns  Skin glue almost off  Post op next week   No bowel troubles   To restart MTX  To see Dr. Toney Wagner next month  Was advised not to take celebrex x 3 months by surgeon - not sure why   Using tylenol instead - so far doing well         Barbra Finley presents today for hospital follow-up visit. Patient was contacted within 2 business days. Medications were reconciled. Hospital discharge summary was reviewed. As part of this post-hospital visit, records from the hospital, including history and physical examination, discharge summary, all laboratory tests and radiographic studies was reviewed with this patient. The following portions of the patient's history were reviewed and updated as appropriate: allergies, current medications, past family history, past medical history, past social history, past surgical history and problem list.    Objective:  Vitals:  Ht 5' 5.2" (1.656 m)   Wt 75.3 kg (166 lb)   LMP 01/01/2016 (Approximate)   BMI 27.45 kg/m²    Wt Readings from Last 3 Encounters:   08/14/23 75.3 kg (166 lb)   07/24/23 75.3 kg (166 lb)   07/13/23 75.3 kg (165 lb 14.4 oz)      BP Readings from Last 3 Encounters:   07/24/23 124/82   07/13/23 135/80   06/26/23 120/80        Review of Systems   She has no other concerns. No unexpected weight changes. No chest pain, SOB, or palpitations. No GERD. No changes in bowels or bladder. Sleeping well. No mood changes. Physical Exam   Pt not examined in person - seen over virtual visit via [address] [place] [address] well developed, well nourished. No acute distress. Respiratory effort normal. No signs of respiratory distress. Awake and alert. Skin on face no rashes, not pale. Mood normal. No signs of depression or anxiety. The patient was identified by name and date of birth. Gaetano Stephens informed that this is a telemedicine visit and that the visit is being conducted through [facility] and patient was informed this is a secure, HIPAA-complaint platform. She agrees to proceed. .  My office door was closed. No one else was in the room. She acknowledged consent and understanding of privacy and security of the video platform. The patient has agreed to participate and understands they can discontinue the visit at any time. Patient is aware this is a billable service.

## 2023-09-13 DIAGNOSIS — G47.00 PERSISTENT INSOMNIA: ICD-10-CM

## 2023-09-15 RX ORDER — ZOLPIDEM TARTRATE 10 MG/1
10 TABLET ORAL
Qty: 90 TABLET | Refills: 0 | Status: SHIPPED | OUTPATIENT
Start: 2023-09-15

## 2023-09-25 DIAGNOSIS — G70.00 MG (MYASTHENIA GRAVIS) (HCC): ICD-10-CM

## 2023-09-25 RX ORDER — PYRIDOSTIGMINE BROMIDE 60 MG/1
60 TABLET ORAL 4 TIMES DAILY
Qty: 360 TABLET | Refills: 0 | Status: CANCELLED | OUTPATIENT
Start: 2023-09-25

## 2023-09-26 DIAGNOSIS — G70.00 MG (MYASTHENIA GRAVIS) (HCC): ICD-10-CM

## 2023-09-26 RX ORDER — PYRIDOSTIGMINE BROMIDE 60 MG/1
60 TABLET ORAL 4 TIMES DAILY
Qty: 360 TABLET | Refills: 0 | Status: CANCELLED | OUTPATIENT
Start: 2023-09-26

## 2023-09-27 DIAGNOSIS — G70.00 MG (MYASTHENIA GRAVIS) (HCC): ICD-10-CM

## 2023-09-27 NOTE — TELEPHONE ENCOUNTER
Medication Refill Request     Name pyridostigmine (MESTINON) 60 mg tablet    Dose/Frequency Take 1 tablet (60 mg total) by mouth 4 (four) times a day  Quantity 60  Verified pharmacy   [x]  Verified ordering Provider   [x]  Does patient have enough for the next 3 days?  Yes [] No [x]      Patient is requesting a  Call back from office in reference to the medication please f/u patient has no medication left patient would also like to schedule appointment for follow up

## 2023-09-28 RX ORDER — PYRIDOSTIGMINE BROMIDE 60 MG/1
60 TABLET ORAL 4 TIMES DAILY
Qty: 360 TABLET | Refills: 3 | Status: SHIPPED | OUTPATIENT
Start: 2023-09-28

## 2023-09-28 NOTE — TELEPHONE ENCOUNTER
Pt left a VM requesting a refill of Mestinon. Dr. Marlo Sosa sent script today to [facility]. Called pt to make aware. She verbalized an understanding.

## 2023-11-07 ENCOUNTER — TELEPHONE (OUTPATIENT)
Age: 53
End: 2023-11-07

## 2023-11-07 NOTE — TELEPHONE ENCOUNTER
[facility]  Requested a referral to;    Dr. Ab Shelley  NPI [id number]  # [phone]  Fax# [phone]  [address]  And the Date of Service is 11/15/23

## 2023-11-30 ENCOUNTER — TELEPHONE (OUTPATIENT)
Age: 53
End: 2023-11-30

## 2023-11-30 NOTE — TELEPHONE ENCOUNTER
[facility] Rheumatology is calling for an Insurance Referral for upcoming appointment:    Appointment Date: Tuesday, December 26, 2023  Provider: Dr. Andres : [id number]  Dx Code: M15.9  Facility Address: [address] [place] [address], [address] [place] [address] Phone: [phone]  Facility Fax: [phone]    *Staff message sent/submitted 11/30 to Primary Care Auth team for Insurance Referral*

## 2023-12-08 ENCOUNTER — OFFICE VISIT (OUTPATIENT)
Dept: FAMILY MEDICINE CLINIC | Facility: CLINIC | Age: 53
End: 2023-12-08
Payer: OTHER GOVERNMENT

## 2023-12-08 ENCOUNTER — TELEPHONE (OUTPATIENT)
Dept: NEUROLOGY | Facility: CLINIC | Age: 53
End: 2023-12-08

## 2023-12-08 VITALS
WEIGHT: 169 LBS | RESPIRATION RATE: 16 BRPM | DIASTOLIC BLOOD PRESSURE: 80 MMHG | OXYGEN SATURATION: 98 % | BODY MASS INDEX: 28.16 KG/M2 | TEMPERATURE: 98 F | HEIGHT: 65 IN | HEART RATE: 78 BPM | SYSTOLIC BLOOD PRESSURE: 122 MMHG

## 2023-12-08 DIAGNOSIS — Z23 ENCOUNTER FOR IMMUNIZATION: ICD-10-CM

## 2023-12-08 DIAGNOSIS — F41.9 ANXIETY: ICD-10-CM

## 2023-12-08 DIAGNOSIS — Z79.899 ENCOUNTER FOR LONG-TERM CURRENT USE OF MEDICATION: ICD-10-CM

## 2023-12-08 DIAGNOSIS — Z00.00 WELL ADULT EXAM: Primary | ICD-10-CM

## 2023-12-08 DIAGNOSIS — D64.9 ANEMIA, UNSPECIFIED TYPE: ICD-10-CM

## 2023-12-08 DIAGNOSIS — R06.83 SNORING: ICD-10-CM

## 2023-12-08 DIAGNOSIS — M79.7 FIBROMYALGIA: ICD-10-CM

## 2023-12-08 DIAGNOSIS — R53.83 OTHER FATIGUE: ICD-10-CM

## 2023-12-08 PROCEDURE — 90471 IMMUNIZATION ADMIN: CPT

## 2023-12-08 PROCEDURE — 99214 OFFICE O/P EST MOD 30 MIN: CPT | Performed by: FAMILY MEDICINE

## 2023-12-08 PROCEDURE — 99396 PREV VISIT EST AGE 40-64: CPT | Performed by: FAMILY MEDICINE

## 2023-12-08 PROCEDURE — 90686 IIV4 VACC NO PRSV 0.5 ML IM: CPT

## 2023-12-08 NOTE — TELEPHONE ENCOUNTER
Spoke to pt to confirm pt's appt w/ Dr. Agustín Mares in [facility] on 12/14/23 at 3. Advised pt to arrive 10 minutes prior to check in with the . Pt confirmed.

## 2023-12-08 NOTE — PATIENT INSTRUCTIONS
Consider Cymbalta for pain and mood instead of neurontin and lexapro. For Sleep:     Calm is an sowmya - this has soothing background sounds like ocean sounds and can talk you through meditation to help you sleep. This sowmya also has bedtime stories. HeadSpace is an sowmya to help with meditation and relaxation. "Sleep with me" is a podcast you can try -a nice boring voice telling you a boring story to help you sleep. Sleep Hygiene Tips  The most common cause of insomnia is a change in your daily routine. For example,  traveling, change in work hours, disruption of other behaviors (eating, exercise, leisure,  etc.), and relationship conflicts can all cause sleep problems. Paying attention to good  sleep hygiene is the most important thing you can do to maintain good sleep. Do:  1. Go to bed at the same time each day. 2. Get up from bed at the same time each day. 3. Get regular exercise each day, preferably in the morning. There is good evidence that  regular exercise improves restful sleep. This includes stretching and aerobic exercise. 4. Get regular exposure to outdoor or bright lights, especially in the late afternoon. 5. Keep the temperature in your bedroom comfortable. 6. Keep the bedroom quiet when sleeping. 7. Keep the bedroom dark enough to facilitate sleep. 8. Use your bed only for sleep and sex. 9. Take medications as directed. It is helpful to take prescribed sleeping pills 1 hour  before bedtime, so they are causing drowsiness when you lie down, or 10 hours  before getting up, to avoid daytime drowsiness. 10. Use a relaxation exercise just before going to sleep.  o Muscle relaxation, imagery, massage, warm bath, etc.  11. Keep your feet and hands warm. Wear warm socks and/or mittens or gloves to bed. Don't:  1. Exercise just before going to bed.   2. Engage in stimulating activity just before bed, such as playing a competitive game,  watching an exciting program on television or movie, or having an important  discussion with a loved one.  3. Have caffeine in the evening (coffee, many teas, chocolate, sodas, etc.) . 4. Read or watch television in bed. 5. Use alcohol to help you sleep. 6. Go to bed too hungry or too full. 7. Take another person's sleeping pills. 8. Take over-the-counter sleeping pills, without your doctor's knowledge. Tolerance can  develop rapidly with these medications. Diphenhydramine (an ingredient commonly  found in over-the-counter sleep meds) can have serious side effects for elderly patients. 9. Take daytime naps. 10. Command yourself to go to sleep. This only makes your mind and body more alert. If you lie in bed awake for more than 20-30 minutes, get up, go to a different room (or different part of the bedroom), participate in a quiet activity (e.g. non-excitable reading or television),and then return to bed when you feel sleepy. Do this as many times during the night as needed.

## 2023-12-08 NOTE — PROGRESS NOTES
Assessment/Plan:     1. Well adult exam    2. Snoring  Will check for sleep apnea on home sleep study.   -     Home Study; Future    3. Other fatigue  -     Home Study; Future  -     Vitamin D 25 hydroxy; Future  -     Lipid panel; Future  -     Comprehensive metabolic panel; Future  -     Iron Panel (Includes Ferritin, Iron Sat%, Iron, and TIBC); Future    4. Anemia, unspecified type  -     Vitamin D 25 hydroxy; Future  -     Lipid panel; Future  -     Comprehensive metabolic panel; Future  -     Iron Panel (Includes Ferritin, Iron Sat%, Iron, and TIBC); Future    5. Anxiety  Assessment & Plan:  Doing well on Lexapro       6. Fibromyalgia  Assessment & Plan:  Does not feel her neuropathy is well controlled. Consider Cymbalta instead. She will talk to neuro and rheum about this. 7. Encounter for long-term current use of medication  -     Vitamin D 25 hydroxy; Future  -     Lipid panel; Future  -     Comprehensive metabolic panel; Future  -     Iron Panel (Includes Ferritin, Iron Sat%, Iron, and TIBC); Future    8. Encounter for immunization  -     influenza vaccine, quadrivalent, 0.5 mL, preservative-free, for adult and pediatric patients 6 mos+ (AFLURIA, FLUARIX, FLULAVAL, FLUZONE)         Well adult exam  ·         Continue healthy diet   ·         Encourage exercise 4 times a week or more for minimum 30 minutes. ·         Continue to see dentist, wear seatbelt. ·         Health maintenance reviewed - flu shot today. Update labs. Reviewed age appropriate health maintenance screenings and immunizations that are due, risks and benefits of these.    Health Maintenance   Topic Date Due   • Pneumococcal Vaccine: Pediatrics (0 to 5 Years) and At-Risk Patients (6 to 59 Years) (1 - PCV) Never done   • COVID-19 Vaccine (4 - Booster for Pfizer series) 11/12/2021   • OT PLAN OF CARE  07/30/2022   • BMI: Followup Plan  01/27/2024   • Breast Cancer Screening: Mammogram  06/07/2024   • Cervical Cancer Screening 2024   • Depression Screening  2024   • BMI: Adult  2024   • Annual Physical  2024   • Colorectal Cancer Screening  02/15/2027   • DTaP,Tdap,and Td Vaccines (2 - Td or Tdap) 2029   • HIV Screening  Completed   • Hepatitis C Screening  Completed   • Influenza Vaccine  Completed   • HIB Vaccine  Aged Out   • IPV Vaccine  Aged Out   • Hepatitis A Vaccine  Aged Out   • Meningococcal ACWY Vaccine  Aged Out   • HPV Vaccine  Aged Out     Return in about 6 months (around 2024) for CC. Subjective:    LAURA Edouard is a 52 y.o. female who presents today for a physical.     Chief Complaint   Patient presents with   • Physical Exam         Patient Care Team:  Deborah Aranda DO as PCP - General (Family Medicine)  MD Hamilton Bragg MD Evangelina Divers, MD (Cardiology)  Christie Stockton DO (Rheumatology)    PHQ-2/9 Depression Screening    Little interest or pleasure in doing things: 0 - not at all  Feeling down, depressed, or hopeless: 0 - not at all  Trouble falling or staying asleep, or sleeping too much: 1 - several days  Feeling tired or having little energy: 0 - not at all  Poor appetite or overeatin - not at all  Feeling bad about yourself - or that you are a failure or have let yourself or your family down: 0 - not at all  Trouble concentrating on things, such as reading the newspaper or watching television: 0 - not at all  Moving or speaking so slowly that other people could have noticed.  Or the opposite - being so fidgety or restless that you have been moving around a lot more than usual: 0 - not at all  Thoughts that you would be better off dead, or of hurting yourself in some way: 0 - not at all  PHQ-2 Score: 0  PHQ-2 Interpretation: Negative depression screen  PHQ-9 Score: 1   PHQ-9 Interpretation: No or Minimal depression         HUNTER-7 Flowsheet Screening    Flowsheet Row Most Recent Value   Over the last 2 weeks, how often have you been bothered by any of the following problems? Feeling nervous, anxious, or on edge 0   Not being able to stop or control worrying 0   Worrying too much about different things 0   Trouble relaxing 0   Being so restless that it is hard to sit still 0   Becoming easily annoyed or irritable 0   Feeling afraid as if something awful might happen 0   HUNTER-7 Total Score 0          ---Above per clinical staff & reviewed.  ---  Patient here today for a physical:    Diet: good   Exercise:  getting up and walking right away   Dental visits:  yes   Sleep: 4 hours, poor quality, snores, does not feel refreshed       Concerns today:  Surgery went well, healing well  Wearing belt daily 30 minutes   Checking on her mom often during the night   Takign tramadol twice a day   Also ibuprofen and tylenol arthritis for hand and knee pain    Jose maybe not helping much   To see new neurologist in [place] next week             The following portions of the patient's history were reviewed and updated as appropriate: allergies, current medications, past family history, past medical history, past social history, past surgical history and problem list.     Current Medications:  Current Outpatient Medications   Medication Sig Dispense Refill   • escitalopram (LEXAPRO) 20 mg tablet TAKE 1 TABLET DAILY 90 tablet 3   • folic acid (FOLVITE) 1 mg tablet Take 1 mg by mouth daily       • gabapentin (NEURONTIN) 300 mg capsule TAKE 1 CAPSULE THREE TIMES A  capsule 3   • lifitegrast (Xiidra) 5 % op solution Administer 1 drop to both eyes 2 (two) times a day     • methotrexate 2.5 mg tablet 10 mg once a week     • multivitamin-minerals (CENTRUM ADULTS) tablet Take 1 tablet by mouth daily     • Prasterone 6.5 MG INST Insert 1 suppository into the vagina in the morning 84 each 3   • pyridostigmine (MESTINON) 60 mg tablet Take 1 tablet (60 mg total) by mouth 4 (four) times a day 360 tablet 3   • traMADol (ULTRAM) 50 mg tablet Take 50 mg by mouth 2 (two) times a day as needed     • zolpidem (AMBIEN) 10 mg tablet Take 1 tablet (10 mg total) by mouth daily at bedtime 90 tablet 0     No current facility-administered medications for this visit. Objective:      /80   Pulse 78   Temp 98 °F (36.7 °C)   Resp 16   Ht 5' 5.2" (1.656 m)   Wt 76.7 kg (169 lb)   LMP 01/01/2016 (Approximate)   SpO2 98%   BMI 27.95 kg/m²   BP Readings from Last 3 Encounters:   12/08/23 122/80   07/24/23 124/82   07/13/23 135/80     Wt Readings from Last 3 Encounters:   12/08/23 76.7 kg (169 lb)   08/14/23 75.3 kg (166 lb)   07/24/23 75.3 kg (166 lb)       Review of Systems  ROS:  all others negative - no chest pain, SOB, normal urine and bowels. no GERD. Not sleeping well. mood good. Physical Exam   Constitutional: she appears well-developed and well-nourished. HENT: Head: Normocephalic. Right Ear: External ear normal. Tympanic membrane normal.   Left Ear: External ear normal. Tympanic membrane normal.   Nose: Nose normal. No mucosal edema, No rhinorrhea. Right sinus exhibits no maxillary sinus tenderness. Left sinus exhibits no maxillary sinus tenderness. Mouth/Throat: Oropharynx is clear and moist.   Eyes: Normal conjunctiva. No erythema. No discharge. Neck: No pain on exam. Neck supple. Cardiovascular: Normal rate, regular rhythm and normal heart sounds. Pulmonary/Chest: Effort normal and breath sounds normal. No wheezes. No rales. No rhonchi. Abdominal: Soft. Bowel sounds are normal. There is no tenderness. Musculoskeletal: she exhibits no edema. Lymphadenopathy: she has no cervical adenopathy. Neurological: she  is alert and oriented to person, place, and time. Skin: Skin is warm and dry. No rashes. Psychiatric: she  has a normal mood and affect. her behavior is normal. Thought content normal.   Vitals reviewed. Depression Screening and Follow-up Plan: Patient was screened for depression during today's encounter.  They screened negative with a PHQ-2 score of 0.

## 2023-12-09 PROBLEM — R19.7 DIARRHEA: Status: RESOLVED | Noted: 2021-11-22 | Resolved: 2023-12-09

## 2023-12-09 NOTE — ASSESSMENT & PLAN NOTE
Does not feel her neuropathy is well controlled. Consider Cymbalta instead. She will talk to neuro and rheum about this.

## 2023-12-11 ENCOUNTER — TELEPHONE (OUTPATIENT)
Dept: NEUROLOGY | Facility: CLINIC | Age: 53
End: 2023-12-11

## 2023-12-14 ENCOUNTER — OFFICE VISIT (OUTPATIENT)
Dept: NEUROLOGY | Facility: CLINIC | Age: 53
End: 2023-12-14
Payer: OTHER GOVERNMENT

## 2023-12-14 VITALS
WEIGHT: 167.8 LBS | HEART RATE: 65 BPM | OXYGEN SATURATION: 99 % | TEMPERATURE: 98.3 F | BODY MASS INDEX: 27.96 KG/M2 | DIASTOLIC BLOOD PRESSURE: 70 MMHG | SYSTOLIC BLOOD PRESSURE: 128 MMHG | HEIGHT: 65 IN

## 2023-12-14 DIAGNOSIS — M79.7 FIBROMYALGIA: ICD-10-CM

## 2023-12-14 DIAGNOSIS — G89.4 CHRONIC PAIN SYNDROME: ICD-10-CM

## 2023-12-14 DIAGNOSIS — G70.00 MG (MYASTHENIA GRAVIS) (HCC): Primary | ICD-10-CM

## 2023-12-14 PROCEDURE — 99214 OFFICE O/P EST MOD 30 MIN: CPT | Performed by: PSYCHIATRY & NEUROLOGY

## 2023-12-14 NOTE — ASSESSMENT & PLAN NOTE
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 Continues to take pyridostigmine 60 mg 4 times a day. Would note heaviness of the eyelids if she misses a dose of her Mestinon. Denies any other double vision, which was a consistent symptom for her in the past, no speech or swallowing difficulty, no shortness of breath, does have generalized fatigue with polyarthralgias, which certainly could be from her underlying fibromyalgia/Sjogren's syndrome. At this time, I do not think we need to add steroids, or any other immunotherapy for myasthenia gravis, she will continue pyridostigmine 60 mg 4 times a day. Recommended to discuss with Dr. [place] Navarro regarding switching methotrexate to any other immunomodulatory therapy considering ongoing symptoms. On gabapentin for fibromyalgia and chronic pain, would like to try Cymbalta, which is absolutely okay with me, patient will discuss this with her primary care physician. Follow-up in 6 months.

## 2023-12-14 NOTE — PROGRESS NOTES
I had the pleasure of seeing your patient in neurology clinic for neuromuscular follow-up. As you know, patient is a 52-year-old woman with myasthenia gravis, with positive acetylcholine receptor antibodies diagnosed in 2014 with predominantly ocular symptoms, status post thymectomy in 2014. She was last seen by me in July 2023, now returns for follow-up. Besides this, patient has Sjogren's. For treatment, patient was on prednisone and pyridostigmine in the past, these medications were discontinued 3 years ago. On her last visit we had discussed either lower dose of prednisone, or switching the methotrexate to azathioprine/mycophenolate which may help with myasthenia as well as Sjogren's. She was going to discuss with her rheumatologist.    In the interim had EMG November 2023 performed at outside facility, study was reported normal for both lower extremities without any evidence of generalized neuropathy, focal neuropathy or radiculopathy in either lower extremity. Previous history from initial visit in July 2023:  She was diagnosed with Myasthenic gravis, was diagnosed in 2014. Her initial symptoms were diplopia and droopy eyes (in the left eye). She was started on mestinon and prednisone 60 mg. She was on high dose prednisone for some time, has been gradually lowering the dose, was on low-dose prednisone 2.5 mg for at least a few years which was eventually discontinued a few years ago. Continues to take Mestinon 60 mg 4 times daily. At the time of her initial diagnosis, she did not have any bulbar symptoms, denies having shortness of breath, fatigue, muscle weakness in upper or lower extremities. Over the last few years, she reports she has had constant neck pain, lower back pain. Lower back pain does radiate to both lower extremities, making it hard for her to walk any considerable distance.   She has followed with our pain management colleagues, has had epidural steroid injections as well as neck injections. Reports numbness in the left foot, not much in the way of any paresthesias in the right foot. No clear muscle weakness in either upper or lower extremity, however fatigability and endurance has been an issue. Does not get any double vision anymore, some days may feel left eye is droopy, but this is not a consistent symptom. Swallowing has not been affected, does report jaw feeling tired if she is eating certain foods, when she is eating solid foods, also has TMJ issues and dry mouth. Breathing is ok. Feels symptoms feel worse at the end of the day. Patient does have lower back pain, follows with our pain management colleagues. Being planned for laminectomy in near future. Has numbness in the left foot. Besides this, has Sjogren's, reports dry mouth, dry eyes as a result of her disease, with polyarthralgias, on methotrexate. Follows with Dr. Annis Leyden. Labs: positive SSA antibodies checked in 2017 as well as 2022 (2.3, 3.8 normal less than 0.9), SSB was normal.  2022 sed rate, B12, CRP have been normal.  TSH has been normal, HIV negative    MRI C spine April 2023: Diffuse degenerative disease, Mild spinal stenopsis reported at C4-5, C 5-6 and C6-7. Neuroforaminal narrowing reported bilaterally at all the levels, worst at C4-5 and C5-6. MRI lumbar spine April 2023 degenerative changes mostly at L4-5, L5-S1, with mild to moderate bilateral neuroforaminal narrowing bilaterally, mild mass effect on the thecal sac. 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 Does have history of smoking, quit more than 10 years ago. Denies any significant alcohol use. Parents moved with her in 2018, father passed away last year at age 80. Mother has dementia.

## 2023-12-14 NOTE — PROGRESS NOTES
Patient ID: Yvon Wynn is a 52 y.o. female. 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 Continues to take pyridostigmine 60 mg 4 times a day. Would note heaviness of the eyelids if she misses a dose of her Mestinon. Denies any other double vision, which was a consistent symptom for her in the past, no speech or swallowing difficulty, no shortness of breath, does have generalized fatigue with polyarthralgias, which certainly could be from her underlying fibromyalgia/Sjogren's syndrome. At this time, I do not think we need to add steroids, or any other immunotherapy for myasthenia gravis, she will continue pyridostigmine 60 mg 4 times a day. Recommended to discuss with Dr. Michael Carrington regarding switching methotrexate to any other immunomodulatory therapy considering ongoing symptoms. On gabapentin for fibromyalgia and chronic pain, would like to try Cymbalta, which is absolutely okay with me, patient will discuss this with her primary care physician. Follow-up in 6 months. Diagnoses and all orders for this visit:    MG (myasthenia gravis) ([address])    Chronic pain syndrome    Fibromyalgia           Subjective:    HPI    I had the pleasure of seeing your patient in neurology clinic for neuromuscular follow-up. As you know, patient is a 57-year-old woman with myasthenia gravis, with positive acetylcholine receptor antibodies diagnosed in 2014 with predominantly ocular symptoms, status post thymectomy in 2014. She was last seen by me in July 2023, now returns for follow-up. Besides this, patient has Sjogren's and fibromyalgia, follows with rheumatology. .    Since last being seen, patient continues to report generalized fatigue, along with generalized body pain including myalgias and polyarthralgias. Reports some neuropathic pain in her feet, but bigger issue is pain in all her body. Continues to take pyridostigmine 60 mg 4 times a day for myasthenia gravis, does report drooping of the eyes or heaviness in the eyes if she misses a dose. Denies any double vision which has been a symptom for her in the past.  No change in her speech or swallowing. Occasional general fatigue, denies any shortness of breath, complains of generalized fatigue, but that could be because of her chronic pain. Reports generalized fatigability, but no other weakness in upper or lower extremities. For treatment, patient was on prednisone in the past, which was discontinued 3 years ago. On her last visit we had discussed either low dose of prednisone, or switching the methotrexate to azathioprine/mycophenolate which may help with myasthenia as well as Sjogren's. She was going to discuss with her rheumatologist, but forgot to do so. In the interim had EMG November 2023 performed at outside facility, study was reported normal for both lower extremities without any evidence of generalized neuropathy, focal neuropathy or radiculopathy in either lower extremity. Previous history from initial visit in July 2023:  She was diagnosed with Myasthenic gravis, was diagnosed in 2014. Her initial symptoms were diplopia and droopy eyes (in the left eye). She was started on mestinon and prednisone 60 mg. She was on high dose prednisone for some time, has been gradually lowering the dose, was on low-dose prednisone 2.5 mg for at least a few years which was eventually discontinued a few years ago. Continues to take Mestinon 60 mg 4 times daily. At the time of her initial diagnosis, she did not have any bulbar symptoms, denies having shortness of breath, fatigue, muscle weakness in upper or lower extremities.      Over the last few years, she reports she has had constant neck pain, lower back pain. Lower back pain does radiate to both lower extremities, making it hard for her to walk any considerable distance. She has followed with our pain management colleagues, has had epidural steroid injections as well as neck injections. Reports numbness in the left foot, not much in the way of any paresthesias in the right foot. No clear muscle weakness in either upper or lower extremity, however fatigability and endurance has been an issue. Does not get any double vision anymore, some days may feel left eye is droopy, but this is not a consistent symptom. Swallowing has not been affected, does report jaw feeling tired if she is eating certain foods, when she is eating solid foods, also has TMJ issues and dry mouth. Breathing is ok. Feels symptoms feel worse at the end of the day. Patient does have lower back pain, follows with our pain management colleagues. Being planned for laminectomy in near future. Has numbness in the left foot. Besides this, has Sjogren's, reports dry mouth, dry eyes as a result of her disease, with polyarthralgias, on methotrexate. Follows with Dr. Krishan Bueno. Labs: positive SSA antibodies checked in 2017 as well as 2022 (2.3, 3.8 normal less than 0.9), SSB was normal.  2022 sed rate, B12, CRP have been normal.  TSH has been normal, HIV negative    MRI C spine April 2023: Diffuse degenerative disease, Mild spinal stenopsis reported at C4-5, C 5-6 and C6-7. Neuroforaminal narrowing reported bilaterally at all the levels, worst at C4-5 and C5-6. MRI lumbar spine April 2023 degenerative changes mostly at L4-5, L5-S1, with mild to moderate bilateral neuroforaminal narrowing bilaterally, mild mass effect on the thecal sac. 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      Does have history of smoking, quit more than 10 years ago. Denies any significant alcohol use. Parents moved with her in 2018, father passed away last year at age 80. Mother has dementia. The following portions of the patient's history were reviewed and updated as appropriate: allergies, current medications, past family history, past medical history, past social history, past surgical history, and problem list.         Objective:    Blood pressure 128/70, pulse 65, temperature 98.3 °F (36.8 °C), temperature source Temporal, height 5' 5.2" (1.656 m), weight 76.1 kg (167 lb 12.8 oz), last menstrual period 01/01/2016, SpO2 99%, not currently breastfeeding. Physical Exam  On general examination, patient was not in any acute distress. HEENT was unremarkable. Extremities did not reveal any edema. Neurological Exam  MG-ADL 3/24, MG-QOL 6/30  Neurologically, patient was awake and alert. Speech was fluent without any dysarthria. Cranial examination did not reveal any ptosis at baseline, or with sustained upward gaze. Extraocular movements were smooth no diplopia even with sustained upward or lateral gaze. Strength of eye closure muscles was normal.  No facial asymmetry or weakness. She was able to puff out her cheeks well and push her tongue into her cheeks well. Motor examination revealed normal strength in neck flexors, extensors, and all muscle groups in both upper and lower extremities, proximally and distally. Reflexes were 2+ throughout, gait was casual and normal.  Sensation was grossly normal.    ROS:    Review of Systems   Constitutional:  Negative for appetite change, fatigue and fever. HENT: Negative. Negative for hearing loss, tinnitus, trouble swallowing and voice change. Eyes: Negative. Negative for photophobia, pain and visual disturbance. Respiratory: Negative. Negative for shortness of breath. Cardiovascular: Negative. Negative for palpitations. Gastrointestinal: Negative.   Negative for nausea and vomiting. Endocrine: Negative. Negative for cold intolerance. Genitourinary: Negative. Negative for dysuria, frequency and urgency. Musculoskeletal:  Negative for back pain, gait problem, myalgias and neck pain. All over body pain     Skin: Negative. Negative for rash. Allergic/Immunologic: Negative. Neurological:  Positive for headaches (all over head pain- every morning). Negative for dizziness, tremors, seizures, syncope, facial asymmetry, speech difficulty, weakness, light-headedness and numbness. Hematological: Negative. Does not bruise/bleed easily. Psychiatric/Behavioral: Negative. Negative for confusion, hallucinations and sleep disturbance. All other systems reviewed and are negative.

## 2023-12-15 DIAGNOSIS — G47.00 PERSISTENT INSOMNIA: ICD-10-CM

## 2023-12-18 ENCOUNTER — TELEPHONE (OUTPATIENT)
Dept: SLEEP CENTER | Facility: CLINIC | Age: 53
End: 2023-12-18

## 2023-12-18 RX ORDER — ZOLPIDEM TARTRATE 10 MG/1
10 TABLET ORAL
Qty: 90 TABLET | Refills: 0 | Status: SHIPPED | OUTPATIENT
Start: 2023-12-18

## 2023-12-18 NOTE — TELEPHONE ENCOUNTER
Reviewed PDMP  Appears pt is taking nightly every 90 days for >1 year.    Refill sent (PCP out office)

## 2023-12-18 NOTE — TELEPHONE ENCOUNTER
----- Message from Deng Condon MD sent at 12/16/2023  1:41 PM EST -----  approved  ----- Message -----  From: Amna Garner  Sent: 12/11/2023  10:20 AM EST  To: Sleep Medicine [place] Provider    This Home sleep study needs approval.     If approved please sign and return to clerical pool.    If denied please include reasons why.  Also provide alternative testing if warranted. Please sign and return to clerical pool.

## 2023-12-22 ENCOUNTER — TELEPHONE (OUTPATIENT)
Age: 53
End: 2023-12-22

## 2023-12-22 NOTE — TELEPHONE ENCOUNTER
[facility] is calling for an Insurance Referral for upcoming appointment:     Appointment Date: Thursday, December 28, 2023  Provider: Dr. Sonam Olguin  NPI: [id number]  Dx Code: M15.9  Facility Address: [address], [place] [address]  Facility Phone: [phone]  Facility Fax: [phone]

## 2023-12-26 NOTE — TELEPHONE ENCOUNTER
Erna called in, [facility] rheumatology still hasn't received the ins referral, I forwarded the information above to the prior auth team.

## 2023-12-27 NOTE — TELEPHONE ENCOUNTER
[facility] is calling for an Insurance referral that has to be under [facility]  Rheumatology      Appointment Date: Thursday, December 28, 2023  Provider: Dr. Sonam Olguin  NPI: [id number]  Dx Code: M15.9  Facility Address: [facility] [place] , [place] [address]  Facility Phone: [phone]  Facility Fax: [phone]

## 2023-12-28 ENCOUNTER — TELEPHONE (OUTPATIENT)
Age: 53
End: 2023-12-28

## 2023-12-28 NOTE — TELEPHONE ENCOUNTER
FYI: Pt called and requested an revised referral earlier today. She wanted to check the status of the incorrect referral as she has an appt  today. Eliza ARAIZA was notified. She was extremely helpful and will contact patient and assist.

## 2023-12-28 NOTE — TELEPHONE ENCOUNTER
Spoke with patient, patient had stated that the insurance referral that was provided was incorrect. Patient stated that the name of the physician and the NPI number was incorrect.   The NPI should be [id number], [facility]. Please advise.

## 2023-12-29 ENCOUNTER — TELEPHONE (OUTPATIENT)
Age: 53
End: 2023-12-29

## 2023-12-29 DIAGNOSIS — M48.02 CERVICAL SPINAL STENOSIS: ICD-10-CM

## 2023-12-29 DIAGNOSIS — M48.062 LUMBAR STENOSIS WITH NEUROGENIC CLAUDICATION: ICD-10-CM

## 2023-12-29 DIAGNOSIS — G89.4 CHRONIC PAIN SYNDROME: ICD-10-CM

## 2023-12-29 DIAGNOSIS — F41.9 ANXIETY: Primary | ICD-10-CM

## 2023-12-29 DIAGNOSIS — M54.16 LUMBAR RADICULOPATHY: ICD-10-CM

## 2023-12-29 DIAGNOSIS — M79.7 FIBROMYALGIA: ICD-10-CM

## 2023-12-29 RX ORDER — GABAPENTIN 300 MG/1
CAPSULE ORAL
Start: 2023-12-29

## 2023-12-29 RX ORDER — DULOXETIN HYDROCHLORIDE 30 MG/1
30 CAPSULE, DELAYED RELEASE ORAL DAILY
Qty: 30 CAPSULE | Refills: 5 | Status: SHIPPED | OUTPATIENT
Start: 2023-12-29

## 2023-12-29 NOTE — TELEPHONE ENCOUNTER
Patient called leaving message for Dr Morris. Both neurology and rheumatology agreed for her to go on Cymbalta.

## 2023-12-29 NOTE — TELEPHONE ENCOUNTER
Will start Cymbalta at 30 mg daily. I want her to take this in the morning, and stop her morning dose of Neurontin, so she is just taking Neurontin 300 mg in the afternoon and evening/before bed. Have her message me in 2 weeks to let me know how she feels, and I will advise the next change in dosing.  Schedule visit with me in 4 weeks to review this (virtual is okay).

## 2023-12-29 NOTE — TELEPHONE ENCOUNTER
When she sends a message in 2 weeks the plan is wean off lexapro and further go down on neurontin if able.

## 2023-12-29 NOTE — TELEPHONE ENCOUNTER
Called patient, she's been advised of Dr. Morris's instructions. 4 week appointment has been scheduled. Patient states that Dr. Morris's plan was to wean patient off of lexapro if she was going to start Cymbalta. Please advise if patient needs to be weaned off this medication.

## 2024-01-04 NOTE — TELEPHONE ENCOUNTER
Called patient, she's been advised that Dr. Morris will follow up with medications in two weeks from starting Cymbalta. Patient verbalized understanding.

## 2024-01-25 ENCOUNTER — TELEPHONE (OUTPATIENT)
Dept: FAMILY MEDICINE CLINIC | Facility: CLINIC | Age: 54
End: 2024-01-25

## 2024-01-26 NOTE — TELEPHONE ENCOUNTER
Called and spoke with patient. Patient notified of fasting labs to be completed prior to their appointment on 1/30/24.

## 2024-01-30 ENCOUNTER — HOSPITAL ENCOUNTER (OUTPATIENT)
Dept: SLEEP CENTER | Facility: CLINIC | Age: 54
Discharge: HOME | End: 2024-01-30
Payer: OTHER GOVERNMENT

## 2024-01-30 ENCOUNTER — TELEMEDICINE (OUTPATIENT)
Dept: FAMILY MEDICINE CLINIC | Facility: CLINIC | Age: 54
End: 2024-01-30
Payer: OTHER GOVERNMENT

## 2024-01-30 DIAGNOSIS — R06.83 SNORING: ICD-10-CM

## 2024-01-30 DIAGNOSIS — R92.2 DENSE BREASTS: ICD-10-CM

## 2024-01-30 DIAGNOSIS — R92.30 DENSE BREASTS: ICD-10-CM

## 2024-01-30 DIAGNOSIS — E55.9 VITAMIN D DEFICIENCY: ICD-10-CM

## 2024-01-30 DIAGNOSIS — G62.9 POLYNEUROPATHY: ICD-10-CM

## 2024-01-30 DIAGNOSIS — Z91.89 AT INCREASED RISK OF BREAST CANCER: ICD-10-CM

## 2024-01-30 DIAGNOSIS — M48.062 LUMBAR STENOSIS WITH NEUROGENIC CLAUDICATION: ICD-10-CM

## 2024-01-30 DIAGNOSIS — M79.7 FIBROMYALGIA: ICD-10-CM

## 2024-01-30 DIAGNOSIS — F41.9 ANXIETY: Primary | ICD-10-CM

## 2024-01-30 DIAGNOSIS — R53.83 OTHER FATIGUE: ICD-10-CM

## 2024-01-30 DIAGNOSIS — M54.16 LUMBAR RADICULOPATHY: ICD-10-CM

## 2024-01-30 DIAGNOSIS — G89.4 CHRONIC PAIN SYNDROME: ICD-10-CM

## 2024-01-30 DIAGNOSIS — R79.0 LOW FERRITIN: ICD-10-CM

## 2024-01-30 PROCEDURE — 99214 OFFICE O/P EST MOD 30 MIN: CPT | Performed by: FAMILY MEDICINE

## 2024-01-30 PROCEDURE — G0399 HOME SLEEP TEST/TYPE 3 PORTA: HCPCS

## 2024-01-30 RX ORDER — FERROUS SULFATE 325(65) MG
325 TABLET ORAL DAILY
Qty: 30 TABLET | Refills: 5 | Status: SHIPPED | OUTPATIENT
Start: 2024-01-30 | End: 2024-01-30 | Stop reason: SDUPTHER

## 2024-01-30 RX ORDER — ERGOCALCIFEROL 1.25 MG/1
50000 CAPSULE ORAL WEEKLY
Qty: 12 CAPSULE | Refills: 0 | Status: SHIPPED | OUTPATIENT
Start: 2024-01-30

## 2024-01-30 RX ORDER — FERROUS SULFATE 325(65) MG
325 TABLET ORAL DAILY
Qty: 90 TABLET | Refills: 1 | Status: SHIPPED | OUTPATIENT
Start: 2024-01-30

## 2024-01-30 RX ORDER — DULOXETIN HYDROCHLORIDE 30 MG/1
30 CAPSULE, DELAYED RELEASE ORAL 2 TIMES DAILY
Qty: 180 CAPSULE | Refills: 2 | Status: SHIPPED | OUTPATIENT
Start: 2024-01-30

## 2024-01-30 NOTE — PROGRESS NOTES
Home Sleep Study Documentation    HOME STUDY DEVICE: Noxturnal no                                           Shruthi G3 yes      Pre-Sleep Home Study:    Set-up and instructions performed by: Martha    Technician performed demonstration for Patient: yes    Return demonstration performed by Patient: yes    Written instructions provided to Patient: yes    Patient signed consent form: yes        Post-Sleep Home Study:    Additional comments by Patient: None    Home Sleep Study Failed:no:    Failure reason: N/A    Reported or Detected: N/A    Scored by: TAM Xie

## 2024-01-30 NOTE — PROGRESS NOTES
Virtual Regular Visit    Assessment/Plan:   1. Anxiety  Assessment & Plan:  Improved with addition of cymbalta. She feels it wears off in afternoon. Have her stop lexapro and increase Cymabalta to 30 mg twice a day.     Orders:  -     DULoxetine (CYMBALTA) 30 mg delayed release capsule; Take 1 capsule (30 mg total) by mouth 2 (two) times a day    2. Chronic pain syndrome  Assessment & Plan:  Finding some benefits with Cymbalta for pain also. Consider increase dose to try to help this more. (60 mg in am, 30 mg in pm). Will send [employer] message.     Orders:  -     DULoxetine (CYMBALTA) 30 mg delayed release capsule; Take 1 capsule (30 mg total) by mouth 2 (two) times a day    3. Lumbar radiculopathy  Assessment & Plan:  See above       4. Polyneuropathy  Assessment & Plan:  See above.       5. At increased risk of breast cancer per scoring system   Assessment & Plan:  Due to family history and dense breasts. Recommend ABUS or MRI. Check on insurance coverage and let us know.       6. Dense breasts  Assessment & Plan:  See above.       7. Low ferritin  Assessment & Plan:  Add iron daily. Repeat labs in 3 months     Orders:  -     ergocalciferol (VITAMIN D2) 50,000 units; Take 1 capsule (50,000 Units total) by mouth once a week After 12 weeks get blood work to determine your next dose.  -     Iron Panel (Includes Ferritin, Iron Sat%, Iron, and TIBC); Future; Expected date: 04/30/2024  -     Vitamin D 25 hydroxy; Future; Expected date: 04/30/2024  -     ferrous sulfate 325 (65 Fe) mg tablet; Take 1 tablet (325 mg total) by mouth daily Take a with meal & vitamin C.    8. Vitamin D deficiency  Assessment & Plan:  Add vitamin D weekly. Check labs 3 months.     Orders:  -     ergocalciferol (VITAMIN D2) 50,000 units; Take 1 capsule (50,000 Units total) by mouth once a week After 12 weeks get blood work to determine your next dose.  -     Iron Panel (Includes Ferritin, Iron Sat%, Iron, and TIBC); Future; Expected date:  04/30/2024  -     Vitamin D 25 hydroxy; Future; Expected date: 04/30/2024  -     ferrous sulfate 325 (65 Fe) mg tablet; Take 1 tablet (325 mg total) by mouth daily Take a with meal & vitamin C.    9. Fibromyalgia  Assessment & Plan:  See above.     Orders:  -     DULoxetine (CYMBALTA) 30 mg delayed release capsule; Take 1 capsule (30 mg total) by mouth 2 (two) times a day    10. Lumbar stenosis with neurogenic claudication  Assessment & Plan:  See above.     Orders:  -     DULoxetine (CYMBALTA) 30 mg delayed release capsule; Take 1 capsule (30 mg total) by mouth 2 (two) times a day         Return in about 14 weeks (around 5/7/2024) for labs CC .    Reason for visit is   Chief Complaint   Patient presents with   • Follow-up     4 week, wants to discuss fatigue, headache     Encounter provider Vania Morris DO  Provider located at [facility]  [address] [place] [id number] [phone]    Recent Visits  Date Type Provider Dept   01/30/24 Telemedicine Vania Morris DO [facility]   01/25/24 Telephone Vania Morris DO [facility]   Showing recent visits within past 7 days and meeting all other requirements  Future Appointments  No visits were found meeting these conditions.  Showing future appointments within next 150 days and meeting all other requirements       The patient was identified by name and date of birth. Chela Anne was informed that this is a telemedicine visit and that the visit is being conducted through the Epic Embedded platform. She agrees to proceed..  My office door was closed. No one else was in the room.  She acknowledged consent and understanding of privacy and security of the video platform. The patient has agreed to participate and understands they can discontinue the visit at any time.    Patient is currently located in the state [facility]  Patient is currently located in a state in which I am licensed    Patient is aware  this is a billable service.     Subjective  Chela Anne is a 53 y.o. female is being seen via Video Visit today due to the COVID-19 pandemic.  Chief Complaint   Patient presents with   • Follow-up     4 week, wants to discuss fatigue, headache       Today's concerns are:    Cymbalta works well in the morning   Taking Lexapro at 4 pm feels panic in the afternoon   It is helping     Last few headaches in the morning   Eats 3 melas a day   Sleeping not so much         There were no vitals filed for this visit.  Wt Readings from Last 3 Encounters:   12/14/23 76.1 kg (167 lb 12.8 oz)   12/08/23 76.7 kg (169 lb)   08/14/23 75.3 kg (166 lb)     BP Readings from Last 3 Encounters:   12/14/23 128/70   12/08/23 122/80   07/24/23 124/82       PHQ-2/9 Depression Screening    Little interest or pleasure in doing things: 0 - not at all  Feeling down, depressed, or hopeless: 0 - not at all  PHQ-2 Score: 0  PHQ-2 Interpretation: Negative depression screen       ?   Past Medical History:   Diagnosis Date   • Abnormal Pap smear of cervix    • Anxiety July   • Arthritis     RA   • Carotid stenosis, asymptomatic    • Chemical exposure     [employer] - worked across the street   • Dizziness    • HPV in female    • IBS (irritable bowel syndrome)    • Myasthenia gravis (HCC)     dx 2014   • Myasthenia gravis (HCC)    • Neuropathy     left foot   • Polyneuropathy    • Raynaud disease     dx 2016   • Sjoegren syndrome     dx 2010   • Syncope    • Venereal wart 04/13/2015     Past Surgical History:   Procedure Laterality Date   • CHOLECYSTECTOMY  2016   • COLONOSCOPY     • LUMBAR LAMINECTOMY Bilateral 07/31/2023    [facility]   • THYMECTOMY  2016   • UPPER GASTROINTESTINAL ENDOSCOPY         Current Medications:  Current Outpatient Medications   Medication Sig Dispense Refill   • DULoxetine (CYMBALTA) 30 mg delayed release capsule Take 1 capsule (30 mg total) by mouth 2 (two) times a day 180 capsule 2   • ergocalciferol (VITAMIN D2)  50,000 units Take 1 capsule (50,000 Units total) by mouth once a week After 12 weeks get blood work to determine your next dose. 12 capsule 0   • ferrous sulfate 325 (65 Fe) mg tablet Take 1 tablet (325 mg total) by mouth daily Take a with meal & vitamin C. 90 tablet 1   • folic acid (FOLVITE) 1 mg tablet Take 1 mg by mouth daily       • gabapentin (NEURONTIN) 300 mg capsule One in afternoon and evening or before bed.     • lifitegrast (Xiidra) 5 % op solution Administer 1 drop to both eyes 2 (two) times a day     • methotrexate 2.5 mg tablet 10 mg once a week     • multivitamin-minerals (CENTRUM ADULTS) tablet Take 1 tablet by mouth daily     • Prasterone 6.5 MG INST Insert 1 suppository into the vagina in the morning 84 each 3   • pyridostigmine (MESTINON) 60 mg tablet Take 1 tablet (60 mg total) by mouth 4 (four) times a day 360 tablet 3   • traMADol (ULTRAM) 50 mg tablet Take 50 mg by mouth 2 (two) times a day as needed     • zolpidem (AMBIEN) 10 mg tablet TAKE 1 TABLET DAILY AT BEDTIME 90 tablet 0     No current facility-administered medications for this visit.        Allergies:  No Known Allergies    Review of Systems  all others negative - no chest pain, SOB, normal urine and bowels. no GERD. Not sleeping well. mood as above.       Physical Exam   Video Exam Pt not examined in person - seen over epic virtual video visit   Constitutional:  she appears well-developed and well-nourished.   HENT: Head: Normocephalic.   Right Ear: External ear normal.   Left Ear: External ear normal.   Nose: Nose normal.   Eyes: Pupils are equal, round, and reactive to light. Right eye exhibits no discharge. Left eye exhibits no discharge. No scleral icterus.   Neck: Normal range of motion.   Pulmonary/Chest: Effort normal. No respiratory distress.   Neurological: she is alert and oriented to person, place, and time.   Skin: Skin is warm and dry on face - no rashes. Not pale. Not diaphoretic.   Psychiatric: she  has a normal mood  and affect. she behavior is normal. Thought content normal.       As a result of this visit, I have not referred the patient for further respiratory evaluation.    VIRTUAL VISIT DISCLAIMER    Chela Anne acknowledges that she has consented to an online visit or consultation. She understands that the online visit is based solely on information provided by her, and that, in the absence of a face-to-face physical evaluation by the physician, the diagnosis she receives is both limited and provisional in terms of accuracy and completeness. This is not intended to replace a full medical face-to-face evaluation by the physician. Chela L Gerberalexander understands and accepts these terms.      Depression Screening and Follow-up Plan: Patient was screened for depression during today's encounter. They screened negative with a PHQ-2 score of 0.

## 2024-01-30 NOTE — PATIENT INSTRUCTIONS
Cymbalta 30 mg in the morning   In the afternoon alternate Lexapro 20 with Cymbalta 30 mg for about a week   Then take Cymbalta 30 mg twice a day       *Your mammogram shows you have dense breasts. This is not a problem, but may make it harder to see masses on a mammogram.      If you have dense breasts,  implants, or a higher risk for breast cancer (> 20%) due to your family history you may qualify for additional screening options, such as ABUS or breast MRI. Please check with your insurance to see if one these are covered. You would get one of these, but not both.      Breast MRI  This test would be done once a year, 6 months after your mammogram.   This non-invasive exam is used to gain information about breast abnormalities detected with mammography, physical exam, or other breast imaging. MRI also may be used for breast implant evaluation, staging breast cancer, evaluating treatment options, and follow-up after treatment is complete. MRI also may be an option for patients with dense breasts and with a strong family history of breast cancer. Please discuss with your physician if Breast MRI is appropriate for you.    Automated Breast Ultrasound (ABUS)  This test would be scheduled once a year when you schedule your mammogram.   Breast Ultrasound (ABUS) is available at [facility] in [place] as a supplemental breast cancer screening exam. Specifically-designed and FDA-approved for women with dense breast tissue, ABUS can provide a clearer, more accurate evaluation of dense breast tissue and can be used to complement screening mammography. ABUS uses sound waves, not radiation, to create state-of-the-art 3D images of the breast tissue.+  Try to get 7 - 8 hours of sleep   Try drinking more water   For headache and migraine prevention I would suggest a combination vitamin supplement which may include 1 or more of the following ingredients: Magnesium citrate 300 mg twice a day, Riboflavin  (vitamin B2) 400 - 600 mg,. Other ingredients that may be helpful are feverfew, coenzyme Q10 100 mg three times a day,  melatonin and katelyn.  Some example brands that combine some of these ingredients are Migravent or Dolovent.

## 2024-01-31 PROBLEM — R92.30 DENSE BREASTS: Status: ACTIVE | Noted: 2024-01-31

## 2024-01-31 PROBLEM — G47.33 OSA (OBSTRUCTIVE SLEEP APNEA): Status: ACTIVE | Noted: 2024-01-31

## 2024-01-31 PROBLEM — M48.062 LUMBAR STENOSIS WITH NEUROGENIC CLAUDICATION: Status: ACTIVE | Noted: 2024-01-31

## 2024-01-31 PROBLEM — R92.2 DENSE BREASTS: Status: ACTIVE | Noted: 2024-01-31

## 2024-01-31 PROBLEM — R79.0 LOW FERRITIN: Status: ACTIVE | Noted: 2024-01-31

## 2024-01-31 PROBLEM — E55.9 VITAMIN D DEFICIENCY: Status: ACTIVE | Noted: 2024-01-31

## 2024-01-31 NOTE — ASSESSMENT & PLAN NOTE
Improved with addition of cymbalta. She feels it wears off in afternoon. Have her stop lexapro and increase Cymabalta to 30 mg twice a day.

## 2024-01-31 NOTE — ASSESSMENT & PLAN NOTE
Finding some benefits with Cymbalta for pain also. Consider increase dose to try to help this more. (60 mg in am, 30 mg in pm). Will send [employer] message.

## 2024-01-31 NOTE — ASSESSMENT & PLAN NOTE
Due to family history and dense breasts. Recommend ABUS or MRI. Check on insurance coverage and let us know.

## 2024-02-01 PROBLEM — R06.83 SNORING: Status: ACTIVE | Noted: 2024-02-01

## 2024-02-01 PROCEDURE — 95806 SLEEP STUDY UNATT&RESP EFFT: CPT | Performed by: INTERNAL MEDICINE

## 2024-02-17 DIAGNOSIS — G70.00 MG (MYASTHENIA GRAVIS) (HCC): ICD-10-CM

## 2024-02-17 DIAGNOSIS — M54.16 LUMBAR RADICULOPATHY: ICD-10-CM

## 2024-02-17 DIAGNOSIS — M48.02 CERVICAL SPINAL STENOSIS: ICD-10-CM

## 2024-02-19 RX ORDER — GABAPENTIN 300 MG/1
CAPSULE ORAL
Qty: 180 CAPSULE | Refills: 1 | Status: SHIPPED | OUTPATIENT
Start: 2024-02-19

## 2024-02-22 RX ORDER — PYRIDOSTIGMINE BROMIDE 60 MG/1
60 TABLET ORAL 4 TIMES DAILY
Qty: 360 TABLET | Refills: 0 | Status: SHIPPED | OUTPATIENT
Start: 2024-02-22

## 2024-03-25 ENCOUNTER — APPOINTMENT (OUTPATIENT)
Dept: LAB | Facility: CLINIC | Age: 54
End: 2024-03-25
Payer: OTHER GOVERNMENT

## 2024-03-25 ENCOUNTER — HOSPITAL ENCOUNTER (OUTPATIENT)
Dept: RADIOLOGY | Facility: HOSPITAL | Age: 54
Discharge: HOME | End: 2024-03-25
Payer: OTHER GOVERNMENT

## 2024-03-25 ENCOUNTER — OFFICE VISIT (OUTPATIENT)
Dept: FAMILY MEDICINE CLINIC | Facility: CLINIC | Age: 54
End: 2024-03-25
Payer: OTHER GOVERNMENT

## 2024-03-25 VITALS
DIASTOLIC BLOOD PRESSURE: 88 MMHG | SYSTOLIC BLOOD PRESSURE: 136 MMHG | OXYGEN SATURATION: 97 % | HEART RATE: 70 BPM | RESPIRATION RATE: 16 BRPM | HEIGHT: 65 IN | WEIGHT: 172.2 LBS | BODY MASS INDEX: 28.69 KG/M2

## 2024-03-25 DIAGNOSIS — M79.7 FIBROMYALGIA: ICD-10-CM

## 2024-03-25 DIAGNOSIS — F41.9 ANXIETY: ICD-10-CM

## 2024-03-25 DIAGNOSIS — Z12.31 SCREENING MAMMOGRAM, ENCOUNTER FOR: ICD-10-CM

## 2024-03-25 DIAGNOSIS — Z79.899 ENCOUNTER FOR LONG-TERM CURRENT USE OF MEDICATION: ICD-10-CM

## 2024-03-25 DIAGNOSIS — M79.671 PAIN OF RIGHT FOOT: ICD-10-CM

## 2024-03-25 DIAGNOSIS — R53.83 OTHER FATIGUE: ICD-10-CM

## 2024-03-25 DIAGNOSIS — R74.01 TRANSAMINITIS: ICD-10-CM

## 2024-03-25 DIAGNOSIS — D64.9 ANEMIA, UNSPECIFIED TYPE: ICD-10-CM

## 2024-03-25 DIAGNOSIS — L85.3 DRY SKIN: ICD-10-CM

## 2024-03-25 DIAGNOSIS — M79.675 PAIN OF TOE OF LEFT FOOT: Primary | ICD-10-CM

## 2024-03-25 DIAGNOSIS — M79.675 PAIN OF TOE OF LEFT FOOT: ICD-10-CM

## 2024-03-25 LAB
25(OH)D3 SERPL-MCNC: 40.9 NG/ML (ref 30–100)
ALBUMIN SERPL BCP-MCNC: 4.4 G/DL (ref 3.5–5)
ALP SERPL-CCNC: 74 U/L (ref 34–104)
ALT SERPL W P-5'-P-CCNC: 53 U/L (ref 7–52)
ANION GAP SERPL CALCULATED.3IONS-SCNC: 4 MMOL/L (ref 4–13)
AST SERPL W P-5'-P-CCNC: 58 U/L (ref 13–39)
BILIRUB SERPL-MCNC: 0.51 MG/DL (ref 0.2–1)
BUN SERPL-MCNC: 12 MG/DL (ref 5–25)
CALCIUM SERPL-MCNC: 9.4 MG/DL (ref 8.4–10.2)
CHLORIDE SERPL-SCNC: 106 MMOL/L (ref 96–108)
CO2 SERPL-SCNC: 30 MMOL/L (ref 21–32)
CREAT SERPL-MCNC: 0.78 MG/DL (ref 0.6–1.3)
FERRITIN SERPL-MCNC: 25 NG/ML (ref 11–307)
GFR SERPL CREATININE-BSD FRML MDRD: 85 ML/MIN/1.73SQ M
GLUCOSE SERPL-MCNC: 89 MG/DL (ref 65–140)
IRON SATN MFR SERPL: 37 % (ref 15–50)
IRON SERPL-MCNC: 150 UG/DL (ref 50–212)
POTASSIUM SERPL-SCNC: 4.3 MMOL/L (ref 3.5–5.3)
PROT SERPL-MCNC: 7.3 G/DL (ref 6.4–8.4)
SODIUM SERPL-SCNC: 140 MMOL/L (ref 135–147)
TIBC SERPL-MCNC: 407 UG/DL (ref 250–450)
UIBC SERPL-MCNC: 257 UG/DL (ref 155–355)
URATE SERPL-MCNC: 4.2 MG/DL (ref 2–7.5)

## 2024-03-25 PROCEDURE — 80053 COMPREHEN METABOLIC PANEL: CPT

## 2024-03-25 PROCEDURE — 82728 ASSAY OF FERRITIN: CPT

## 2024-03-25 PROCEDURE — 99214 OFFICE O/P EST MOD 30 MIN: CPT | Performed by: FAMILY MEDICINE

## 2024-03-25 PROCEDURE — 36415 COLL VENOUS BLD VENIPUNCTURE: CPT

## 2024-03-25 PROCEDURE — 82306 VITAMIN D 25 HYDROXY: CPT

## 2024-03-25 PROCEDURE — 83540 ASSAY OF IRON: CPT

## 2024-03-25 PROCEDURE — 83550 IRON BINDING TEST: CPT

## 2024-03-25 PROCEDURE — 84550 ASSAY OF BLOOD/URIC ACID: CPT

## 2024-03-25 PROCEDURE — 73630 X-RAY EXAM OF FOOT: CPT

## 2024-03-25 RX ORDER — CHLORHEXIDINE GLUCONATE ORAL RINSE 1.2 MG/ML
SOLUTION DENTAL
COMMUNITY
Start: 2023-10-04

## 2024-03-25 RX ORDER — AMMONIUM LACTATE 12 G/100G
CREAM TOPICAL AS NEEDED
Qty: 385 G | Refills: 1 | Status: SHIPPED | OUTPATIENT
Start: 2024-03-25

## 2024-03-25 NOTE — PATIENT INSTRUCTIONS
Norvasc 2.5 mg daily for blood pressure and raynaud's   lachydrin or amilactin       *Your mammogram shows you have dense breasts. This is not a problem, but may make it harder to see masses on a mammogram.      If you have dense breasts,  implants, or a higher risk for breast cancer (> 20%) due to your family history you may qualify for additional screening options, such as ABUS or breast MRI. Please check with your insurance to see if one these are covered. You would get one of these, but not both.      [x] Breast MRI  This test would be done once a year, 6 months after your mammogram.   This non-invasive exam is used to gain information about breast abnormalities detected with mammography, physical exam, or other breast imaging. MRI also may be used for breast implant evaluation, staging breast cancer, evaluating treatment options, and follow-up after treatment is complete. MRI also may be an option for patients with dense breasts and with a strong family history of breast cancer. Please discuss with your physician if Breast MRI is appropriate for you.    Automated Breast Ultrasound (ABUS)  This test would be scheduled once a year when you schedule your mammogram.   Breast Ultrasound (ABUS) is available at [facility] in [place] as a supplemental breast cancer screening exam. Specifically-designed and FDA-approved for women with dense breast tissue, ABUS can provide a clearer, more accurate evaluation of dense breast tissue and can be used to complement screening mammography. ABUS uses sound waves, not radiation, to create state-of-the-art 3D images of the breast tissue.

## 2024-03-25 NOTE — PROGRESS NOTES
1. Pain of toe of left foot  Comments:  check labs, r/o gout   discoloration on skin of all toes indicative of raynauds. could consider norvasc for this.  Orders:  -     Uric acid; Future  -     Ambulatory Referral to Podiatry; Future    2. Pain of right foot  Comments:  check xray to r/o fracture. refer to podiatry for treatment.  Orders:  -     XR foot 3+ vw right; Future; Expected date: 03/25/2024  -     Ambulatory Referral to Podiatry; Future    3. Dry skin  Comments:  start lachydrin or amlactin to heels. moisturize.  Orders:  -     ammonium lactate (LAC-HYDRIN) 12 % cream; Apply topically as needed for dry skin    4. Transaminitis  Comments:  update labs  Orders:  -     Hepatic function panel; Future    5. Screening mammogram, encounter for  -     Mammo screening bilateral w 3d & cad; Future; Expected date: 06/07/2024    6. Anxiety  Comments:  doing well with switch from Lexapro to Cymbalta.    7. Fibromyalgia  Comments:  doing well with switch from Lexapro to Cymbalta.         Return in about 8 months (around 12/8/2024) for physical (cancel appt next month). nurse visit 4 weeks. .    Subjective:   Chela is a 53 y.o. female here today for a follow-up on her current medical conditions:    Patient Active Problem List   Diagnosis   • Asymptomatic stenosis of left carotid artery   • Family history of premature CAD   • MG (myasthenia gravis) (HCC)   • Chronic pain syndrome   • Greater trochanteric bursitis of both hips   • Lumbar radiculopathy   • Generalized joint pain   • Cervical spinal stenosis   • Abnormal cervical Papanicolaou smear   • Gastroesophageal reflux disease   • Irritable bowel syndrome with diarrhea   • Menopausal symptom   • Polyneuropathy   • Raynaud's disease   • Family history of breast cancer in sister   • Persistent insomnia   • Bilateral sacroiliitis (HCC)   • DDD (degenerative disc disease), lumbar   • Acquired compression deformity of vertebra   • Fibromyalgia   • Dyspareunia in  female   • Family history of colon cancer   • Anxiety   • Closed fracture of lower end of right radius with routine healing   • Extensor tenosynovitis of right wrist   • Primary arthrosis of multiple joints   • At increased risk of breast cancer per scoring system    • NAVA (obstructive sleep apnea)   • Dense breasts   • Low ferritin   • Vitamin D deficiency   • Lumbar stenosis with neurogenic claudication   • Snoring       Patient Care Team:  Vania Morris DO as PCP - General (Family Medicine)  MD Govind Burch MD Vinod S Kudagi, MD (Cardiology)  Sonam Olguin DO (Rheumatology)    Current Medications:  Current Outpatient Medications   Medication Sig Dispense Refill   • ammonium lactate (LAC-HYDRIN) 12 % cream Apply topically as needed for dry skin 385 g 1   • chlorhexidine (PERIDEX) 0.12 % solution      • DULoxetine (CYMBALTA) 30 mg delayed release capsule Take 1 capsule (30 mg total) by mouth 2 (two) times a day 180 capsule 2   • ergocalciferol (VITAMIN D2) 50,000 units Take 1 capsule (50,000 Units total) by mouth once a week After 12 weeks get blood work to determine your next dose. 12 capsule 0   • ferrous sulfate 325 (65 Fe) mg tablet Take 1 tablet (325 mg total) by mouth daily Take a with meal & vitamin C. 90 tablet 1   • folic acid (FOLVITE) 1 mg tablet Take 1 mg by mouth daily       • gabapentin (NEURONTIN) 300 mg capsule One in afternoon and evening or before bed. 180 capsule 1   • lifitegrast (Xiidra) 5 % op solution Administer 1 drop to both eyes 2 (two) times a day     • methotrexate 2.5 mg tablet 10 mg once a week     • multivitamin-minerals (CENTRUM ADULTS) tablet Take 1 tablet by mouth daily     • Prasterone 6.5 MG INST Insert 1 suppository into the vagina in the morning 84 each 3   • pyridostigmine (MESTINON) 60 mg tablet Take 1 tablet (60 mg total) by mouth 4 (four) times a day 360 tablet 0   • traMADol (ULTRAM) 50 mg tablet Take 50 mg by mouth 2 (two) times a  "day as needed     • zolpidem (AMBIEN) 10 mg tablet Take 1 tablet (10 mg total) by mouth daily at bedtime Do not start before March 7, 2024. 90 tablet 0     No current facility-administered medications for this visit.       HPI:  Chief Complaint   Patient presents with   • Nail Problem     L 4th toe is swollen and red. Started approx 1 mo ago. Denies injury.   • Foot Pain     R side, ongoing for a few years. Fell a few years ago on ice and has had pain since. On outside of foot.     -- Above per clinical staff and reviewed. --    PHQ-2/9 Depression Screening         ?     Jan 2024 labs in chart   Virtual medicine check. Switching to cymbalta for mood and pain.weaning off neurontin.  Jan fasting blood work not done.   PE due 12/8/24    breast mri recommended - family history and density     last visit Nov has lost her father. felt lexapro 20 mg was working well, along  Ambien 10 mg. mammo due 1/3/22. additional fast MRI or US.   chronic pain, FM - seeing chiropractor,painting. was not going to PT or seeing counseling. sees rheum. last labs May.     - follows with neuro, rheum, pain mgmt, referred to GI, PT/aqua therapy.     Today:    Pain in right lateral foot for years 6 - 7/10  Thinks when she hurt her wrist it happened   Left 4th toe pain 3/10 - higher at times   Red and painful for a month   Pain with pressure on it   Cannot wear tight shoes  Sock hurts   Not pain with sheets     Doing well on higher dose of cymbalta. Feels it helps mood and pain some. No side effects   Will get labs - results via [employer]     The following portions of the patient's history were reviewed and updated as appropriate: allergies, current medications, past family history, past medical history, past social history, past surgical history and problem list.    Objective:  Vitals:  /88   Pulse 70   Resp 16   Ht 5' 5\" (1.651 m)   Wt 78.1 kg (172 lb 3.2 oz)   LMP 01/01/2016 (Approximate)   SpO2 97%   BMI 28.66 kg/m²    Wt Readings " from Last 3 Encounters:   03/25/24 78.1 kg (172 lb 3.2 oz)   12/14/23 76.1 kg (167 lb 12.8 oz)   12/08/23 76.7 kg (169 lb)      BP Readings from Last 3 Encounters:   03/25/24 136/88   12/14/23 128/70   12/08/23 122/80        Review of Systems   She has no other concerns. No unexpected weight changes. No chest pain, SOB, or palpitations. No GERD. No changes in bowels or bladder. Sleeping well. No mood changes.       Physical Exam   Constitutional:  she appears well-developed and well-nourished.  HENT: Head: Normocephalic.   Neck: Neck supple.   Cardiovascular: Normal rate, regular rhythm and normal heart sounds.   Pulmonary/Chest: Effort normal and breath sounds normal. No wheezes, rales, or rhonchi.   Abdominal: Soft. Bowel sounds are normal. There is no tenderness. No hepatosplenomegaly.   Musculoskeletal: she exhibits no edema. Right ankle tender latarally below malleolus. Swlling over metatarsal same symmetrically but tender on the right 5th metatasrsal. No skin changes.   Lymphadenopathy: she has no cervical adenopathy.   Neurological: she is alert and oriented to person, place, and time.   Skin: Skin is warm and dry. Left 4th toe erythema, no increased warmth or swelling, all toes with bluish hue and prolonged cap refill. Cold. B/l heels skin dry.   Bilateral first toes discolored 3/4 way from distal end. 5th nail thickening and subungual debris.   Psychiatric: she has a normal mood and affect. her behavior is normal. Thought content normal.

## 2024-03-26 ENCOUNTER — PATIENT MESSAGE (OUTPATIENT)
Dept: FAMILY MEDICINE CLINIC | Facility: CLINIC | Age: 54
End: 2024-03-26

## 2024-03-28 NOTE — PATIENT COMMUNICATION
Clerical, she needs a  referral - thank you!   LIBORIO is Dr. Ki Rossi [address] [place] [address]. My appt. Is Monday, April 1st at 11:15.

## 2024-03-28 NOTE — PATIENT COMMUNICATION
Patient is requesting an insurance referral for the following specialty:      Test Name / Order Name: Podiatry Consult    DX Code: M79.675, M79.671    Date Of Service: 4/1/24    Location/Facility Name/Address/Phone #:  [facility] [address]    Location / Facility NPI: [id number]    Best Phone # To Reach The Patient:  [phone]

## 2024-04-01 NOTE — PATIENT COMMUNICATION
[facility] called in stating they received an ambulatory referral but they need an insurance referral for .

## 2024-04-01 NOTE — PATIENT COMMUNICATION
Received call from patient regarding referral. Per patient, [facility] received a referral from the office with the incorrect diagnosis listed. Referral corrected and faxed to [facility]. Patient has appointment today @ 11:15AM.

## 2024-04-10 DIAGNOSIS — R79.0 LOW FERRITIN: ICD-10-CM

## 2024-04-10 DIAGNOSIS — E55.9 VITAMIN D DEFICIENCY: ICD-10-CM

## 2024-04-10 RX ORDER — ERGOCALCIFEROL 1.25 MG/1
CAPSULE ORAL
Qty: 12 CAPSULE | Refills: 1 | Status: SHIPPED | OUTPATIENT
Start: 2024-04-10

## 2024-04-23 NOTE — TELEPHONE ENCOUNTER
8/31 PUNEET FOR PT TO OBTAIN  Enio Janice Consult received.  Chart reviewed.  Postop.  On inhalers which will be resumed as well as trazodone.  We will sign off no active medical problems identified..  If needed please call us for any further assistance  Pain Management as per the discretion of the surgeon   Breath Sounds equal & clear to percussion & auscultation, no accessory muscle use

## 2024-04-29 ENCOUNTER — CLINICAL SUPPORT (OUTPATIENT)
Dept: FAMILY MEDICINE CLINIC | Facility: CLINIC | Age: 54
End: 2024-04-29

## 2024-04-29 VITALS — SYSTOLIC BLOOD PRESSURE: 122 MMHG | DIASTOLIC BLOOD PRESSURE: 72 MMHG

## 2024-04-29 DIAGNOSIS — R03.0 ELEVATED BLOOD PRESSURE READING: Primary | ICD-10-CM

## 2024-04-29 NOTE — PROGRESS NOTES
Blood pressure reading taken on the L arm sitting with a large cuff. BP WNL. No sx of elevated BP.

## 2024-05-06 DIAGNOSIS — G70.00 MG (MYASTHENIA GRAVIS) (HCC): ICD-10-CM

## 2024-05-06 RX ORDER — PYRIDOSTIGMINE BROMIDE 60 MG/1
60 TABLET ORAL 4 TIMES DAILY
Qty: 360 TABLET | Refills: 3 | Status: SHIPPED | OUTPATIENT
Start: 2024-05-06

## 2024-06-03 ENCOUNTER — TELEPHONE (OUTPATIENT)
Age: 54
End: 2024-06-03

## 2024-06-03 DIAGNOSIS — M25.561 CHRONIC PAIN OF BOTH KNEES: Primary | ICD-10-CM

## 2024-06-03 DIAGNOSIS — G89.29 CHRONIC PAIN OF BOTH KNEES: Primary | ICD-10-CM

## 2024-06-03 DIAGNOSIS — M25.562 CHRONIC PAIN OF BOTH KNEES: Primary | ICD-10-CM

## 2024-06-04 NOTE — TELEPHONE ENCOUNTER
More information is needed for referral. Called patient, she states she is seeing Aly Adan PA-C on 05/12/2024 at [facility].    Order pended, please advise. Once signed, please send to clerical so insurance referral can be generated.

## 2024-06-12 ENCOUNTER — TELEPHONE (OUTPATIENT)
Dept: NEUROLOGY | Facility: CLINIC | Age: 54
End: 2024-06-12

## 2024-06-12 NOTE — TELEPHONE ENCOUNTER
LMOM to confirm pt's appt w/Dr. Vega in [facility] on 6/20/24 at 2:30. Advised pt to arrive 15 minutes prior to check in with the .

## 2024-06-14 ENCOUNTER — TELEPHONE (OUTPATIENT)
Age: 54
End: 2024-06-14

## 2024-06-14 DIAGNOSIS — M54.16 LUMBAR RADICULOPATHY: Primary | ICD-10-CM

## 2024-06-14 DIAGNOSIS — G70.00 MG (MYASTHENIA GRAVIS) (HCC): ICD-10-CM

## 2024-06-14 NOTE — TELEPHONE ENCOUNTER
Cindy with [facility] is requesting an insurance referral to be placed through  [employer]. Provider also needs to place a amb referral (as the one in her chart is closed)for [facility] PA Team to start insurance referral.    Message is being sent to both office clinical to place referral and to [facility] PA Team to initiate referral please do not resend to [facility] PA Team.    Date of appt 6/20 at 2:30  Dr. Soliz  [facility]  NPI# [id number]  Phone number is [phone]  Fax number is [phone] ATTN: Cindy  Dx code: G70.00  Address: [facility], [address] [place] [address],  [address], [place], [place], [address]     Please advise once referral is completed. Being sent as a HP because insurance requires 5 business days for referral to be completed otherwise her appt will need to be rescheduled. Thank you.

## 2024-06-14 NOTE — TELEPHONE ENCOUNTER
What is she being seen for? Does she need an insurance referral? She has been seeing neurology - why would I need to place a referral?

## 2024-06-18 ENCOUNTER — TELEPHONE (OUTPATIENT)
Age: 54
End: 2024-06-18

## 2024-06-18 NOTE — TELEPHONE ENCOUNTER
Patient is requesting an insurance referral for the following specialty:      Test Name / Order Name: Consult and treat    DX Code: spinal fusion follow up     Date Of Service: 11/19/2024    Location/Facility Name/Address/Phone #:    Kobe Whitmore MD  [address] [place] [id number]    Facility    --  NPI: --         Location / Facility NPI:     Best Phone # To Reach The Patient:  [phone]

## 2024-06-24 ENCOUNTER — HOSPITAL ENCOUNTER (OUTPATIENT)
Dept: MAMMOGRAPHY | Facility: CLINIC | Age: 54
Discharge: HOME | End: 2024-06-24
Payer: OTHER GOVERNMENT

## 2024-06-24 ENCOUNTER — HOSPITAL ENCOUNTER (OUTPATIENT)
Dept: ULTRASOUND IMAGING | Facility: CLINIC | Age: 54
Discharge: HOME | End: 2024-06-24
Payer: OTHER GOVERNMENT

## 2024-06-24 VITALS — HEIGHT: 65 IN | WEIGHT: 172 LBS | BODY MASS INDEX: 28.66 KG/M2

## 2024-06-24 DIAGNOSIS — Z12.31 SCREENING MAMMOGRAM, ENCOUNTER FOR: ICD-10-CM

## 2024-06-24 DIAGNOSIS — N64.4 BREAST PAIN: ICD-10-CM

## 2024-06-24 PROCEDURE — 76642 ULTRASOUND BREAST LIMITED: CPT

## 2024-06-24 PROCEDURE — G0279 TOMOSYNTHESIS, MAMMO: HCPCS

## 2024-06-24 PROCEDURE — 77066 DX MAMMO INCL CAD BI: CPT

## 2024-07-10 NOTE — PROGRESS NOTES
patient is a 53-year-old woman with past medical history of Sjogren's, fibromyalgia, follows with rheumatology who presents for follow-up for myasthenia gravis, with positive acetylcholine receptor antibodies diagnosed in 2014 with predominantly ocular symptoms, status post thymectomy in 2014.      Last office visit 12/2023 which no changes were made.    Interval history:      She is currently on Mestinon 60 mg 4 times daily.

## 2024-07-11 ENCOUNTER — OFFICE VISIT (OUTPATIENT)
Dept: NEUROLOGY | Facility: CLINIC | Age: 54
End: 2024-07-11
Payer: OTHER GOVERNMENT

## 2024-07-11 VITALS
HEIGHT: 65 IN | OXYGEN SATURATION: 98 % | DIASTOLIC BLOOD PRESSURE: 68 MMHG | HEART RATE: 76 BPM | SYSTOLIC BLOOD PRESSURE: 104 MMHG | WEIGHT: 168.5 LBS | BODY MASS INDEX: 28.07 KG/M2 | TEMPERATURE: 97.6 F

## 2024-07-11 DIAGNOSIS — G70.00 MG (MYASTHENIA GRAVIS) (HCC): Primary | ICD-10-CM

## 2024-07-11 DIAGNOSIS — M48.02 CERVICAL SPINAL STENOSIS: ICD-10-CM

## 2024-07-11 DIAGNOSIS — M54.16 LUMBAR RADICULOPATHY: ICD-10-CM

## 2024-07-11 PROCEDURE — 99214 OFFICE O/P EST MOD 30 MIN: CPT | Performed by: NURSE PRACTITIONER

## 2024-07-11 RX ORDER — GABAPENTIN 300 MG/1
CAPSULE ORAL
Qty: 270 CAPSULE | Refills: 2 | Status: SHIPPED | OUTPATIENT
Start: 2024-07-11

## 2024-07-11 NOTE — PATIENT INSTRUCTIONS
Increase gabapentin to 3x/day     Call to schedule epidural     [facility] [address]   [address]   [facility] [place] [id number] [phone]      Continue mestinon    If shoulder symptoms persist/worsen would contact rheum

## 2024-07-11 NOTE — ASSESSMENT & PLAN NOTE
Patient returns for follow-up for myasthenia gravis.  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  Today she denies any ocular symptoms, notes some very rare jaw fatigue with certain foods, no other bulbar symptoms.  She has noted some myalgias and pain related weakness in the proximal muscles, mainly in the upper extremities.  She does have generalized fatigue.    We did discuss her symptoms in her upper extremities are likely related to her cervical spine as well as underlying fibromyalgia/Sjogren's syndrome.  She does report increase neck pain and headaches over the past months.  Will plan to increase her gabapentin 300 mg 3 times daily, her Cymbalta was recently increased as well by her PCP.  She will contact physiatry at [facility] as she has had benefit with epidural injection in the past.  Would be hesitant to prescribe muscle relaxer given her myasthenia gravis.  If her myalgias persist she should discuss with her rheumatologist at upcoming appointment.    Given her Symptoms related to her myasthenia gravis are stable she will remain on Mestinon 60 mg 4 times daily.    Plan for follow-up in 6 months. To contact the office sooner with any concerns or worsening symptoms.

## 2024-07-11 NOTE — PROGRESS NOTES
Patient ID: Chela Anne is a 53 y.o. female.    Assessment/Plan:    MG (myasthenia gravis) ([facility])  Patient returns for follow-up for myasthenia gravis.  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  Today she denies any ocular symptoms, notes some very rare jaw fatigue with certain foods, no other bulbar symptoms.  She has noted some myalgias and pain related weakness in the proximal muscles, mainly in the upper extremities.  She does have generalized fatigue.    We did discuss her symptoms in her upper extremities are likely related to her cervical spine as well as underlying fibromyalgia/Sjogren's syndrome.  She does report increase neck pain and headaches over the past months.  Will plan to increase her gabapentin 300 mg 3 times daily, her Cymbalta was recently increased as well by her PCP.  She will contact physiatry at [facility] as she has had benefit with epidural injection in the past.  Would be hesitant to prescribe muscle relaxer given her myasthenia gravis.  If her myalgias persist she should discuss with her rheumatologist at upcoming appointment.    Given her Symptoms related to her myasthenia gravis are stable she will remain on Mestinon 60 mg 4 times daily.    Plan for follow-up in 6 months. To contact the office sooner with any concerns or worsening symptoms.       Diagnoses and all orders for this visit:    MG (myasthenia gravis) ([facility])    Lumbar radiculopathy  -     gabapentin (NEURONTIN) 300 mg capsule; Take 1 tab TID    Cervical spinal stenosis  -     gabapentin (NEURONTIN) 300 mg capsule; Take 1 tab TID           Subjective:    HPI  patient is a 53-year-old woman with past medical history of Sjogren's, fibromyalgia, follows with rheumatology who presents for follow-up for myasthenia gravis, with  "positive acetylcholine receptor antibodies diagnosed in 2014 with predominantly ocular symptoms, status post thymectomy in 2014.      Last office visit 12/2023 which no changes were made.    Interval history:  Myasthenia symptoms are stable, she denies any diplopia or ptosis, no issues swallowing. Some jaw fatigue with certain hard foods. No speech changes.  No SOB.  She does have generalized fatigue and muscle soreness in her proximal muscles which can make her feel somewhat weak and need to rest.     She has been dealing with neck pain and headaches recently.  She has had an epidural injection in the cervical spine in the fall which has helped with this symptom in the past.       She is currently on Mestinon 60 mg 4 times daily.     MG-ADL 3 due to fatigue/weakness      The following portions of the patient's history were reviewed and updated as appropriate: allergies, current medications, past family history, past medical history, past social history, past surgical history and problem list.       Objective:    Blood pressure 104/68, pulse 76, temperature 97.6 °F (36.4 °C), temperature source Temporal, height 5' 5\" (1.651 m), weight 76.4 kg (168 lb 8 oz), last menstrual period 01/01/2016, SpO2 98%, not currently breastfeeding.    Physical Exam  Constitutional:       General: She is awake.   HENT:      Right Ear: Hearing normal.      Left Ear: Hearing normal.   Eyes:      General: Lids are normal.      Extraocular Movements: Extraocular movements intact.      Pupils: Pupils are equal, round, and reactive to light.   Neurological:      Mental Status: She is alert.      Deep Tendon Reflexes:      Reflex Scores:       Bicep reflexes are 2+ on the right side and 2+ on the left side.       Brachioradialis reflexes are 2+ on the right side and 2+ on the left side.       Patellar reflexes are 1+ on the right side and 1+ on the left side.  Psychiatric:         Speech: Speech normal.         Neurological Exam  Mental " Status  Awake and alert. Oriented to person, place, time and situation. Speech is normal. Language is fluent with no aphasia.    Cranial Nerves  CN III, IV, VI: Extraocular movements intact bilaterally. Normal lids and orbits bilaterally. Pupils equal round and reactive to light bilaterally. No ptosis, no diplopia.  CN V:  Right: Facial sensation is normal.  Left: Facial sensation is normal on the left.  CN VII:  Right: There is no facial weakness.  Left: There is no facial weakness.  CN VIII:  Right: Hearing is normal.  Left: Hearing is normal.  CN IX, X: Palate elevates symmetrically  CN XI:  Right: Trapezius strength is normal.  Left: Trapezius strength is normal.  CN XII: Tongue midline without atrophy or fasciculations.  Able to puff out cheeks, close eyes, push tongue to the side of the mouth with resistance  .    Motor  Normal muscle bulk throughout.                                               Right                     Left  Neck flexion                           5                           Neck extension                      5                           Elbow flexion                         5                          5  Elbow extension                    5                          5  Wrist flexion                           5                          5  Wrist extension                      5                          5  Finger abduction                    5                          5  Hip flexion                              5                          5  Knee flexion                           5                          5  Knee extension                      5                          5  Ankle inversion                      5                          5  Ankle eversion                       5                          5  Plantarflexion                         5                          5  Dorsiflexion                            5                          5  Cervical muscle tenderness and  hypertrophy.    Sensory  Light touch is normal in upper and lower extremities. Temperature is normal in upper and lower extremities. Vibration is normal in upper and lower extremities.     Reflexes                                            Right                      Left  Brachioradialis                    2+                         2+  Biceps                                 2+                         2+  Patellar                                1+                         1+  Achilles                                Tr                         Tr    Coordination  Right: Finger-to-nose normal.Left: Finger-to-nose normal.    Gait  Casual gait is normal including stance, stride, and arm swing. Able to rise from chair without using arms.      I have personally reviewed the ROS performed by the MA.    ROS:    Review of Systems   Constitutional:  Negative for appetite change, fatigue and fever.   HENT: Negative.  Negative for hearing loss, tinnitus, trouble swallowing and voice change.    Eyes: Negative.  Negative for photophobia, pain and visual disturbance.   Respiratory: Negative.  Negative for shortness of breath.    Cardiovascular: Negative.  Negative for palpitations.   Gastrointestinal: Negative.  Negative for nausea and vomiting.   Endocrine: Negative.  Negative for cold intolerance.   Genitourinary: Negative.  Negative for dysuria, frequency and urgency.   Musculoskeletal:  Positive for neck pain (radiates to shoulders) and neck stiffness. Negative for back pain, gait problem and myalgias.   Skin: Negative.  Negative for rash.   Allergic/Immunologic: Negative.    Neurological:  Positive for headaches (daily). Negative for dizziness, tremors, seizures, syncope, facial asymmetry, speech difficulty, weakness, light-headedness and numbness.   Hematological: Negative.  Does not bruise/bleed easily.   Psychiatric/Behavioral: Negative.  Negative for confusion, hallucinations and sleep disturbance.    All other systems  reviewed and are negative.

## 2024-10-07 ENCOUNTER — HOSPITAL ENCOUNTER (EMERGENCY)
Facility: HOSPITAL | Age: 54
Discharge: HOME | End: 2024-10-07
Attending: EMERGENCY MEDICINE
Payer: OTHER GOVERNMENT

## 2024-10-07 ENCOUNTER — APPOINTMENT (EMERGENCY)
Dept: CT IMAGING | Facility: HOSPITAL | Age: 54
End: 2024-10-07
Payer: OTHER GOVERNMENT

## 2024-10-07 VITALS
DIASTOLIC BLOOD PRESSURE: 74 MMHG | OXYGEN SATURATION: 99 % | HEART RATE: 79 BPM | SYSTOLIC BLOOD PRESSURE: 129 MMHG | RESPIRATION RATE: 18 BRPM | TEMPERATURE: 98.4 F

## 2024-10-07 DIAGNOSIS — I72.8 SPLENIC ARTERY ANEURYSM (HCC): ICD-10-CM

## 2024-10-07 DIAGNOSIS — K57.92 DIVERTICULITIS: Primary | ICD-10-CM

## 2024-10-07 LAB
ALBUMIN SERPL BCG-MCNC: 4 G/DL (ref 3.5–5)
ALP SERPL-CCNC: 75 U/L (ref 34–104)
ALT SERPL W P-5'-P-CCNC: 15 U/L (ref 7–52)
ANION GAP SERPL CALCULATED.3IONS-SCNC: 9 MMOL/L (ref 4–13)
AST SERPL W P-5'-P-CCNC: 28 U/L (ref 13–39)
BASOPHILS # BLD AUTO: 0.03 THOUSANDS/ΜL (ref 0–0.1)
BASOPHILS NFR BLD AUTO: 1 % (ref 0–1)
BILIRUB SERPL-MCNC: 0.42 MG/DL (ref 0.2–1)
BUN SERPL-MCNC: 12 MG/DL (ref 5–25)
CALCIUM SERPL-MCNC: 8.9 MG/DL (ref 8.4–10.2)
CHLORIDE SERPL-SCNC: 106 MMOL/L (ref 96–108)
CO2 SERPL-SCNC: 20 MMOL/L (ref 21–32)
CREAT SERPL-MCNC: 0.83 MG/DL (ref 0.6–1.3)
EOSINOPHIL # BLD AUTO: 0.17 THOUSAND/ΜL (ref 0–0.61)
EOSINOPHIL NFR BLD AUTO: 4 % (ref 0–6)
ERYTHROCYTE [DISTWIDTH] IN BLOOD BY AUTOMATED COUNT: 16.4 % (ref 11.6–15.1)
GFR SERPL CREATININE-BSD FRML MDRD: 79 ML/MIN/1.73SQ M
GLUCOSE SERPL-MCNC: 83 MG/DL (ref 65–140)
HCT VFR BLD AUTO: 35.3 % (ref 34.8–46.1)
HGB BLD-MCNC: 11.3 G/DL (ref 11.5–15.4)
IMM GRANULOCYTES # BLD AUTO: 0.01 THOUSAND/UL (ref 0–0.2)
IMM GRANULOCYTES NFR BLD AUTO: 0 % (ref 0–2)
LIPASE SERPL-CCNC: 29 U/L (ref 11–82)
LYMPHOCYTES # BLD AUTO: 1.38 THOUSANDS/ΜL (ref 0.6–4.47)
LYMPHOCYTES NFR BLD AUTO: 32 % (ref 14–44)
MCH RBC QN AUTO: 27.4 PG (ref 26.8–34.3)
MCHC RBC AUTO-ENTMCNC: 32 G/DL (ref 31.4–37.4)
MCV RBC AUTO: 86 FL (ref 82–98)
MONOCYTES # BLD AUTO: 0.47 THOUSAND/ΜL (ref 0.17–1.22)
MONOCYTES NFR BLD AUTO: 11 % (ref 4–12)
NEUTROPHILS # BLD AUTO: 2.22 THOUSANDS/ΜL (ref 1.85–7.62)
NEUTS SEG NFR BLD AUTO: 52 % (ref 43–75)
NRBC BLD AUTO-RTO: 0 /100 WBCS
PLATELET # BLD AUTO: 236 THOUSANDS/UL (ref 149–390)
PMV BLD AUTO: 8.3 FL (ref 8.9–12.7)
POTASSIUM SERPL-SCNC: 4.7 MMOL/L (ref 3.5–5.3)
PROT SERPL-MCNC: 7.2 G/DL (ref 6.4–8.4)
RBC # BLD AUTO: 4.13 MILLION/UL (ref 3.81–5.12)
SODIUM SERPL-SCNC: 135 MMOL/L (ref 135–147)
WBC # BLD AUTO: 4.28 THOUSAND/UL (ref 4.31–10.16)

## 2024-10-07 PROCEDURE — 80053 COMPREHEN METABOLIC PANEL: CPT | Performed by: EMERGENCY MEDICINE

## 2024-10-07 PROCEDURE — 36415 COLL VENOUS BLD VENIPUNCTURE: CPT

## 2024-10-07 PROCEDURE — 99284 EMERGENCY DEPT VISIT MOD MDM: CPT

## 2024-10-07 PROCEDURE — 74177 CT ABD & PELVIS W/CONTRAST: CPT

## 2024-10-07 PROCEDURE — 96374 THER/PROPH/DIAG INJ IV PUSH: CPT

## 2024-10-07 PROCEDURE — 83690 ASSAY OF LIPASE: CPT | Performed by: EMERGENCY MEDICINE

## 2024-10-07 PROCEDURE — 85025 COMPLETE CBC W/AUTO DIFF WBC: CPT | Performed by: EMERGENCY MEDICINE

## 2024-10-07 PROCEDURE — 96361 HYDRATE IV INFUSION ADD-ON: CPT

## 2024-10-07 PROCEDURE — 99285 EMERGENCY DEPT VISIT HI MDM: CPT | Performed by: EMERGENCY MEDICINE

## 2024-10-07 RX ORDER — CEFPODOXIME PROXETIL 200 MG/1
200 TABLET, FILM COATED ORAL ONCE
Status: DISCONTINUED | OUTPATIENT
Start: 2024-10-07 | End: 2024-10-07 | Stop reason: HOSPADM

## 2024-10-07 RX ORDER — METRONIDAZOLE 500 MG/1
500 TABLET ORAL ONCE
Status: COMPLETED | OUTPATIENT
Start: 2024-10-07 | End: 2024-10-07

## 2024-10-07 RX ORDER — CEFPODOXIME PROXETIL 200 MG/1
200 TABLET, FILM COATED ORAL 2 TIMES DAILY
Qty: 13 TABLET | Refills: 0 | Status: SHIPPED | OUTPATIENT
Start: 2024-10-07 | End: 2024-10-15

## 2024-10-07 RX ORDER — KETOROLAC TROMETHAMINE 30 MG/ML
15 INJECTION, SOLUTION INTRAMUSCULAR; INTRAVENOUS ONCE
Status: COMPLETED | OUTPATIENT
Start: 2024-10-07 | End: 2024-10-07

## 2024-10-07 RX ORDER — CEFPODOXIME PROXETIL 200 MG/1
200 TABLET, FILM COATED ORAL 2 TIMES DAILY
Qty: 19 TABLET | Refills: 0 | Status: SHIPPED | OUTPATIENT
Start: 2024-10-07 | End: 2024-10-07

## 2024-10-07 RX ORDER — SULFAMETHOXAZOLE/TRIMETHOPRIM 800-160 MG
1 TABLET ORAL ONCE
Status: COMPLETED | OUTPATIENT
Start: 2024-10-07 | End: 2024-10-07

## 2024-10-07 RX ORDER — METRONIDAZOLE 500 MG/1
500 TABLET ORAL EVERY 12 HOURS SCHEDULED
Qty: 13 TABLET | Refills: 0 | Status: SHIPPED | OUTPATIENT
Start: 2024-10-07 | End: 2024-10-15

## 2024-10-07 RX ORDER — SULFAMETHOXAZOLE/TRIMETHOPRIM 800-160 MG
1 TABLET ORAL 2 TIMES DAILY
Qty: 13 TABLET | Refills: 0 | Status: SHIPPED | OUTPATIENT
Start: 2024-10-07 | End: 2024-10-07

## 2024-10-07 RX ADMIN — SODIUM CHLORIDE 500 ML: 0.9 INJECTION, SOLUTION INTRAVENOUS at 15:34

## 2024-10-07 RX ADMIN — KETOROLAC TROMETHAMINE 15 MG: 30 INJECTION, SOLUTION INTRAMUSCULAR; INTRAVENOUS at 15:55

## 2024-10-07 RX ADMIN — SULFAMETHOXAZOLE AND TRIMETHOPRIM 1 TABLET: 800; 160 TABLET ORAL at 18:13

## 2024-10-07 RX ADMIN — IOHEXOL 75 ML: 350 INJECTION, SOLUTION INTRAVENOUS at 15:40

## 2024-10-07 RX ADMIN — METRONIDAZOLE 500 MG: 500 TABLET ORAL at 18:13

## 2024-10-07 NOTE — DISCHARGE INSTRUCTIONS
Take antibiotic two times daily for 10 days total treatment. Referral has been sent to gastroenterology for follow up as needed in the future. If you experience worsening symptoms, return to the ER. This includes worsening abdominal pain despite treatment, fevers, nausea/vomiting such that you cannot eat or drink.   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

## 2024-10-08 ENCOUNTER — TELEPHONE (OUTPATIENT)
Age: 54
End: 2024-10-08

## 2024-10-08 DIAGNOSIS — M48.062 LUMBAR STENOSIS WITH NEUROGENIC CLAUDICATION: ICD-10-CM

## 2024-10-08 DIAGNOSIS — M79.7 FIBROMYALGIA: ICD-10-CM

## 2024-10-08 DIAGNOSIS — F41.9 ANXIETY: ICD-10-CM

## 2024-10-08 DIAGNOSIS — G89.4 CHRONIC PAIN SYNDROME: ICD-10-CM

## 2024-10-08 NOTE — TELEPHONE ENCOUNTER
Pt called to schedule an ED f/u. No OVL appts available in the next two weeks. Warm transfer to the office.    
DC instructions

## 2024-10-08 NOTE — TELEPHONE ENCOUNTER
Pt has an appointment on 10/15 with Nichol Bruner. She is a patient of Dr Aleman. We want to verify if she wants to transfer her care. I called and  left her a VM to contact the office

## 2024-10-09 RX ORDER — DULOXETIN HYDROCHLORIDE 30 MG/1
30 CAPSULE, DELAYED RELEASE ORAL 2 TIMES DAILY
Qty: 180 CAPSULE | Refills: 1 | Status: SHIPPED | OUTPATIENT
Start: 2024-10-09

## 2024-10-10 ENCOUNTER — OFFICE VISIT (OUTPATIENT)
Dept: FAMILY MEDICINE CLINIC | Facility: CLINIC | Age: 54
End: 2024-10-10
Payer: OTHER GOVERNMENT

## 2024-10-10 VITALS
WEIGHT: 165.6 LBS | HEART RATE: 81 BPM | DIASTOLIC BLOOD PRESSURE: 74 MMHG | SYSTOLIC BLOOD PRESSURE: 112 MMHG | BODY MASS INDEX: 26.61 KG/M2 | HEIGHT: 66 IN | TEMPERATURE: 98.9 F | OXYGEN SATURATION: 96 % | RESPIRATION RATE: 16 BRPM

## 2024-10-10 DIAGNOSIS — R10.13 EPIGASTRIC PAIN: Primary | ICD-10-CM

## 2024-10-10 DIAGNOSIS — Z23 NEED FOR VACCINATION: ICD-10-CM

## 2024-10-10 DIAGNOSIS — K57.32 SIGMOID DIVERTICULITIS: ICD-10-CM

## 2024-10-10 PROCEDURE — 90673 RIV3 VACCINE NO PRESERV IM: CPT | Performed by: FAMILY MEDICINE

## 2024-10-10 PROCEDURE — 90471 IMMUNIZATION ADMIN: CPT | Performed by: FAMILY MEDICINE

## 2024-10-10 PROCEDURE — 99214 OFFICE O/P EST MOD 30 MIN: CPT | Performed by: FAMILY MEDICINE

## 2024-10-10 NOTE — PATIENT INSTRUCTIONS
"  Probiotics:  For antibiotic associated diarrhea: Align, Bio-K, Culturelle, Feliberto Active, Florastor       Patient Education     Diverticulitis   The Basics   Written by the doctors and editors at [facility]   What is diverticulitis? -- Diverticulitis is a disorder that can cause belly pain, fever, and problems with bowel movements.  The food we eat travels from the stomach through a long tube called the intestine. The last part of that tube is the colon. The colon sometimes has small pouches in its walls. These pouches are called \"diverticula.\" Many people who have these pouches have no symptoms. Diverticulitis happens when these pouches develop a small tear known as a \"microperforation,\" which then becomes infected and causes symptoms (figure 1).  What are the symptoms of diverticulitis? -- The most common symptom of diverticulitis is pain. This usually affects the lower part of the belly.  Other symptoms can include:   Fever   Constipation   Diarrhea   Nausea and vomiting  Is there a test for diverticulitis? -- Yes. There are a few tests that your doctor can do to find out if you have diverticulitis. But tests are not always needed. If you do have a test, you might have a:   CT scan - A CT scan is a kind of imaging test. Imaging tests create pictures of the inside of your body.   Abdominal ultrasound - This test uses sound waves to create pictures of your intestines.  How is diverticulitis treated? -- If your disease is mild and you are otherwise healthy, you might be treated at home. This usually involves a liquid diet and medicine to relieve pain. In this case, follow up with your doctor or nurse to make sure that your symptoms improve.  If you have severe disease, you are not in good health, or your symptoms don't get better in 2 to 3 days, you might need to stay in the hospital. In the hospital, treatment usually includes fluids and antibiotics. These are given through a thin tube that goes into a vein, called an " "\"IV.\" That way, you can avoid eating and drinking until you get better.  In addition, if you have a serious infection, the doctor might put a tube into your belly to drain the infection. In very bad cases, people need surgery to remove the part of the colon that is affected.  A few months after your infection has been treated, your doctor might recommend that you have a procedure called a colonoscopy (figure 2). During a colonoscopy, the doctor can look directly inside your colon to check for signs of cancer.  Should I change my diet if I have had diverticulitis? -- If you have had diverticulitis, it's a good idea to eat a lot of fiber after your symptoms have gotten better. Good sources of fiber include fruits, oats, beans, peas, and green leafy vegetables. You do not need to avoid seeds, nuts, popcorn, or other similar foods.  Follow all of your doctor or nurse's instructions about diet while they are treating you for diverticulitis.  All topics are updated as new evidence becomes available and our peer review process is complete.  This topic retrieved from [employer] on: Feb 26, 2024.  Topic 08482 Version 14.0  Release: 32.2.4 - C32.56  © 2024 UpToDate, Inc. and/or its affiliates. All rights reserved.  figure 1: Diverticulosis and diverticulitis     The colon, or large intestine, sometimes has small pouches in its walls. These pouches are called \"diverticula.\" Many people who have these pouches have no symptoms. This is diverticulosis. Diverticulitis happens when these pouches develop a small tear, which then becomes infected and causes symptoms.  Graphic 460121 Version 2.0  figure 2: Colonoscopy     During a colonoscopy, you lie on your side and the doctor puts a thin tube with a camera into your anus (from behind). Then, the doctor advances the tube into the rectum and colon. The camera sends pictures from inside your colon to a television screen.  Graphic 88931 Version 8.0  Consumer Information Use and " Disclaimer   Disclaimer: This generalized information is a limited summary of diagnosis, treatment, and/or medication information. It is not meant to be comprehensive and should be used as a tool to help the user understand and/or assess potential diagnostic and treatment options. It does NOT include all information about conditions, treatments, medications, side effects, or risks that may apply to a specific patient. It is not intended to be medical advice or a substitute for the medical advice, diagnosis, or treatment of a health care provider based on the health care provider's examination and assessment of a patient's specific and unique circumstances. Patients must speak with a health care provider for complete information about their health, medical questions, and treatment options, including any risks or benefits regarding use of medications. This information does not endorse any treatments or medications as safe, effective, or approved for treating a specific patient. UpToDate, Inc. and its affiliates disclaim any warranty or liability relating to this information or the use thereof.The use of this information is governed by the Terms of Use, available at https://www.wolters422 Groupuwer.com/en/know/clinical-effectiveness-terms. 2024© UpToDate, Inc. and its affiliates and/or licensors. All rights reserved.  Copyright   © 2024 UpToDate, Inc. and/or its affiliates. All rights reserved.

## 2024-10-10 NOTE — ED ATTENDING ATTESTATION
10/7/2024  IAsif MD, saw and evaluated the patient. I have discussed the patient with the resident/non-physician practitioner and agree with the resident's/non-physician practitioner's findings, Plan of Care, and MDM as documented in the resident's/non-physician practitioner's note, except where noted. All available labs and Radiology studies were reviewed.  I was present for key portions of any procedure(s) performed by the resident/non-physician practitioner and I was immediately available to provide assistance.       At this point I agree with the current assessment done in the Emergency Department.  I have conducted an independent evaluation of this patient a history and physical is as follows:SEE H AND P ABOVE- AGREE WITH ER RESIDENT TX PLAN / DISPO     ED Course  ED Course as of 10/10/24 1137   Mon Oct 07, 2024   1607 ER MD NOTE- INITIAL ER WORKUP WAS FIRST NURSED   1639 ER MD NOTE- PT SEEN AND THOROUGHLY EVALUATED BY ER MD- CASE D/W ER RESIDENT -- CAN PHARMACIST- ALL STUDIES REVIEWED BY ER MD- 53 YR FEMALE WITH MG/SJOGRENS // WITH 2 DAYS OF LOWER ABVD PAIN WITH NO OTHER SYMPTOMS - NO FEVERS- NO V/D- NO /GYN COMPS- NO INJURY -- AVSS-- PULSE  % ON RA - INTERPRETATION IS NORMAL- NO INTERVENTION- WELL APPEARING IN NAD- ON PHONE IN ROOM- S1/S2-CTA-B/SOFT ABD- BLQ/SUPRAPUBIC TENDERNESS- NO PERITONEAL SIGNS    1642 ER MD NOTE- CASE /DW PHARMACY- CASE REPORT OF AMOXICILLIN  CAUSING WORSENING OF MG- WILL USE A ORAL 3RD GEN CEPH          Critical Care Time  Procedures

## 2024-10-10 NOTE — ED PROVIDER NOTES
Time reflects when diagnosis was documented in both MDM as applicable and the Disposition within this note       Time User Action Codes Description Comment    10/7/2024  4:27 PM Araceli Madsen [K57.92] Diverticulitis     10/7/2024  4:27 PM Araceli Madsen [I72.8] Splenic artery aneurysm (HCC)           ED Disposition       ED Disposition   Discharge    Condition   Stable    Date/Time   Mon Oct 7, 2024  4:27 PM    Comment   Chela Anne discharge to home/self care.                   Assessment & Plan       Medical Decision Making  52 yo F presenting with 3 days of lower abdominal pain. On arrival, she was with stable vital signs and had some mild lower abdominal tenderness on exam. Initial differential is broad and included diverticulitis, appendicitis, enteritis, less likely UTI given lack of urinary symptoms or gyn pathology given lack of gyn symptoms such as abnormal bleeding, discharge, or other similar symptoms. Labs drawn showed largely no abnormality, but given discomfort did want to obtain CT to rule out other acute abdominal pathology. CT done showed signs of uncomplicated diverticulitis and incidentally  noted splenic artery aneurysm. Results were discussed with the patient and she was started on oral antibiotic therapy with care to not interact with medications related to her myasthenia gravis. At time of discharge she was in stable condition and in agreement with plan.     Problems Addressed:  Diverticulitis: acute illness or injury  Splenic artery aneurysm (HCC): acute illness or injury    Amount and/or Complexity of Data Reviewed  Labs: ordered.  Radiology: ordered.    Risk  Prescription drug management.             Medications   ketorolac (TORADOL) injection 15 mg (15 mg Intravenous Given 10/7/24 1555)   sodium chloride 0.9 % bolus 500 mL (0 mL Intravenous Stopped 10/7/24 1634)   iohexol (OMNIPAQUE) 350 MG/ML injection (MULTI-DOSE) 75 mL (75 mL Intravenous Given 10/7/24 1540)    sulfamethoxazole-trimethoprim (BACTRIM DS) 800-160 mg per tablet 1 tablet (1 tablet Oral Given 10/7/24 1813)   metroNIDAZOLE (FLAGYL) tablet 500 mg (500 mg Oral Given 10/7/24 1813)       ED Risk Strat Scores                           SBIRT 22yo+      Flowsheet Row Most Recent Value   Initial Alcohol Screen: US AUDIT-C     1. How often do you have a drink containing alcohol? 0 Filed at: 10/07/2024 1651   2. How many drinks containing alcohol do you have on a typical day you are drinking?  0 Filed at: 10/07/2024 1651   3b. FEMALE Any Age, or MALE 65+: How often do you have 4 or more drinks on one occassion? 0 Filed at: 10/07/2024 1651   Audit-C Score 0 Filed at: 10/07/2024 1651   BLAKE: How many times in the past year have you...    Used an illegal drug or used a prescription medication for non-medical reasons? Never Filed at: 10/07/2024 1651                            History of Present Illness       Chief Complaint   Patient presents with    Abdominal Pain     Pt reports lower quadrant abdominal pain x3 days, no n/v/d or urinary issues. Pt reports pain was sharp, now is consistent.        Past Medical History:   Diagnosis Date    Abnormal Pap smear of cervix     Anxiety July    Arthritis     RA    Carotid stenosis, asymptomatic     Chemical exposure     [employer] - worked across the street    Dizziness     HPV in female     IBS (irritable bowel syndrome)     Myasthenia gravis (HCC)     dx 2014    Myasthenia gravis (HCC)     Neuropathy     left foot    Polyneuropathy     Raynaud disease     dx 2016    Sjoegren syndrome     dx 2010    Syncope     Venereal wart 04/13/2015      Past Surgical History:   Procedure Laterality Date    CHOLECYSTECTOMY  2016    COLONOSCOPY      LUMBAR LAMINECTOMY Bilateral 07/31/2023    [facility]    THYMECTOMY  2016    UPPER GASTROINTESTINAL ENDOSCOPY        Family History   Problem Relation Age of Onset    Arthritis Mother     Dementia Mother     Diabetes Father     Hypertension Father      Stroke Father     Heart disease Father     Hearing loss Father     Breast cancer Sister 69    Arthritis Sister     Heart disease Sister     Diabetes Sister     Cirrhosis Sister         non-alcoholic    Diverticulitis Sister     Coronary artery disease Sister     Other Sister         carotid artery disease    Colon cancer Sister     Breast cancer Sister         60's    Liver cancer Sister     Ross syndrome Sister     Thyroid disease Sister     Stroke Sister     Urolithiasis Sister     Dementia Maternal Grandmother     Heart attack Maternal Grandfather     Stroke Paternal Grandmother     Stroke Paternal Grandfather     Diverticulitis Brother     No Known Problems Brother     No Known Problems Brother     No Known Problems Brother     No Known Problems Brother     No Known Problems Maternal Aunt     No Known Problems Paternal Aunt     No Known Problems Paternal Aunt       Social History     Tobacco Use    Smoking status: Former     Current packs/day: 0.00     Average packs/day: 0.5 packs/day for 9.1 years (4.5 ttl pk-yrs)     Types: Cigarettes     Start date: 2001     Quit date: 10/10/2010     Years since quittin.0    Smokeless tobacco: Never   Vaping Use    Vaping status: Former    Start date: 10/10/2010    Quit date: 2011   Substance Use Topics    Alcohol use: Not Currently    Drug use: Never      E-Cigarette/Vaping    E-Cigarette Use Former User     Start Date 10/10/10     Quit Date 11       E-Cigarette/Vaping Substances    Nicotine No     THC No     CBD No     Flavoring No     Other No     Unknown No       I have reviewed and agree with the history as documented.     Patient is a 52 yo F presenting with lower abdominal pain for the last 3 days. Initially when pain started over the weekend, it was more mild and intermittent but has now progressed to be constant pain. She describes it as intermittently as a sharp cramping with baseline soreness. She has felt like she's had less of an appetite  but denies fevers, chills, vomiting, or diarrhea.         Review of Systems   Constitutional:  Positive for appetite change. Negative for chills and fever.   HENT: Negative.     Eyes: Negative.    Respiratory:  Negative for cough and shortness of breath.    Cardiovascular:  Negative for chest pain and palpitations.   Gastrointestinal:  Positive for abdominal pain. Negative for vomiting.   Genitourinary:  Negative for dysuria and hematuria.   Musculoskeletal:  Negative for arthralgias and back pain.   Skin:  Negative for color change and rash.   Neurological:  Negative for seizures and syncope.   All other systems reviewed and are negative.          Objective       ED Triage Vitals   Temperature Pulse Blood Pressure Respirations SpO2 Patient Position - Orthostatic VS   10/07/24 1453 10/07/24 1452 10/07/24 1452 10/07/24 1452 10/07/24 1452 10/07/24 1452   98.4 °F (36.9 °C) 92 114/79 18 100 % Sitting      Temp Source Heart Rate Source BP Location FiO2 (%) Pain Score    10/07/24 1453 10/07/24 1452 10/07/24 1452 -- 10/07/24 1555    Oral Monitor Right arm  6      Vitals      Date and Time Temp Pulse SpO2 Resp BP Pain Score FACES Pain Rating User   10/07/24 1800 -- 79 99 % 18 129/74 -- -- AG   10/07/24 1625 -- -- -- -- -- 3 -- AG   10/07/24 1600 -- 83 99 % 18 118/71 -- -- AG   10/07/24 1555 -- -- -- -- -- 6 -- AG   10/07/24 1453 98.4 °F (36.9 °C) -- -- -- -- -- -- EM   10/07/24 1452 -- 92 100 % 18 114/79 -- -- EM            Physical Exam  Vitals and nursing note reviewed.   Constitutional:       General: She is not in acute distress.     Appearance: She is well-developed.   HENT:      Head: Normocephalic and atraumatic.   Eyes:      Conjunctiva/sclera: Conjunctivae normal.   Cardiovascular:      Rate and Rhythm: Normal rate and regular rhythm.      Heart sounds: No murmur heard.  Pulmonary:      Effort: Pulmonary effort is normal. No respiratory distress.      Breath sounds: Normal breath sounds.   Abdominal:       Palpations: Abdomen is soft.      Tenderness: There is abdominal tenderness in the right lower quadrant and left lower quadrant.   Musculoskeletal:         General: No swelling.      Cervical back: Neck supple.   Skin:     General: Skin is warm and dry.   Neurological:      Mental Status: She is alert.   Psychiatric:         Mood and Affect: Mood normal.         Results Reviewed       Procedure Component Value Units Date/Time    CBC and differential [[id number]]  (Abnormal) Collected: 10/07/24 1534    Lab Status: Final result Specimen: Blood from Arm, Left Updated: 10/07/24 1541     WBC 4.28 Thousand/uL      RBC 4.13 Million/uL      Hemoglobin 11.3 g/dL      Hematocrit 35.3 %      MCV 86 fL      MCH 27.4 pg      MCHC 32.0 g/dL      RDW 16.4 %      MPV 8.3 fL      Platelets 236 Thousands/uL      nRBC 0 /100 WBCs      Segmented % 52 %      Immature Grans % 0 %      Lymphocytes % 32 %      Monocytes % 11 %      Eosinophils Relative 4 %      Basophils Relative 1 %      Absolute Neutrophils 2.22 Thousands/µL      Absolute Immature Grans 0.01 Thousand/uL      Absolute Lymphocytes 1.38 Thousands/µL      Absolute Monocytes 0.47 Thousand/µL      Eosinophils Absolute 0.17 Thousand/µL      Basophils Absolute 0.03 Thousands/µL     Comprehensive metabolic panel [[id number]]  (Abnormal) Collected: 10/07/24 1454    Lab Status: Final result Specimen: Blood from Arm, Left Updated: 10/07/24 1532     Sodium 135 mmol/L      Potassium 4.7 mmol/L      Chloride 106 mmol/L      CO2 20 mmol/L      ANION GAP 9 mmol/L      BUN 12 mg/dL      Creatinine 0.83 mg/dL      Glucose 83 mg/dL      Calcium 8.9 mg/dL      AST 28 U/L      ALT 15 U/L      Alkaline Phosphatase 75 U/L      Total Protein 7.2 g/dL      Albumin 4.0 g/dL      Total Bilirubin 0.42 mg/dL      eGFR 79 ml/min/1.73sq m     Narrative:      National Kidney Disease Foundation guidelines for Chronic Kidney Disease (CKD):     Stage 1 with normal or high GFR (GFR > 90 mL/min/1.73 square  meters)    Stage 2 Mild CKD (GFR = 60-89 mL/min/1.73 square meters)    Stage 3A Moderate CKD (GFR = 45-59 mL/min/1.73 square meters)    Stage 3B Moderate CKD (GFR = 30-44 mL/min/1.73 square meters)    Stage 4 Severe CKD (GFR = 15-29 mL/min/1.73 square meters)    Stage 5 End Stage CKD (GFR <15 mL/min/1.73 square meters)  Note: GFR calculation is accurate only with a steady state creatinine    Lipase [[id number]]  (Normal) Collected: 10/07/24 1458    Lab Status: Final result Specimen: Blood from Arm, Left Updated: 10/07/24 1536     Lipase 29 u/L             CT abdomen pelvis with contrast   Final Interpretation by Zaire Diego MD (10/07 1608)      No appendicitis, as clinically questioned.      Acute sigmoid diverticulitis. No free air or abscess.      Possible enteritis.      1.2 cm partially calcified aneurysm of distal splenic artery.      Additional chronic/incidental findings as detailed above.      The study was marked in EPIC for immediate notification.               Workstation performed: [id number]             Procedures    ED Medication and Procedure Management   Prior to Admission Medications   Prescriptions Last Dose Informant Patient Reported? Taking?   Prasterone 6.5 MG INST  Self No No   Sig: Insert 1 suppository into the vagina in the morning   ammonium lactate (LAC-HYDRIN) 12 % cream  Self No No   Sig: Apply topically as needed for dry skin   chlorhexidine (PERIDEX) 0.12 % solution  Self Yes No   ergocalciferol (VITAMIN D2) 50,000 units  Self No No   Sig: TAKE 1 CAPSULE ONCE A WEEK, AFTER 12 WEEKS GET BLOOD WORK TO DETERMINE YOUR NEXT DOSE   ferrous sulfate 325 (65 Fe) mg tablet  Self No No   Sig: Take 1 tablet (325 mg total) by mouth daily Take a with meal & vitamin C.   folic acid (FOLVITE) 1 mg tablet  Self Yes No   Sig: Take 1 mg by mouth daily     gabapentin (NEURONTIN) 300 mg capsule   No No   Sig: Take 1 tab TID   lifitegrast (Xiidra) 5 % op solution  Self Yes No   Sig: Administer 1  drop to both eyes 2 (two) times a day   methotrexate 2.5 mg tablet  Self Yes No   Sig: 10 mg once a week   multivitamin-minerals (CENTRUM ADULTS) tablet  Self Yes No   Sig: Take 1 tablet by mouth daily   pyridostigmine (MESTINON) 60 mg tablet  Self No No   Sig: TAKE 1 TABLET FOUR TIMES A DAY   traMADol (ULTRAM) 50 mg tablet  Self Yes No   Sig: Take 50 mg by mouth 2 (two) times a day as needed   zolpidem (AMBIEN) 10 mg tablet  Self No No   Sig: TAKE 1 TABLET DAILY AT BEDTIME . DO NOT START BEFORE MARCH 7. 2024      Facility-Administered Medications: None     Discharge Medication List as of 10/7/2024  6:47 PM        START taking these medications    Details   metroNIDAZOLE (FLAGYL) 500 mg tablet Take 1 tablet (500 mg total) by mouth every 12 (twelve) hours for 7 days, Starting Mon 10/7/2024, Until Mon 10/14/2024, Normal           CONTINUE these medications which have CHANGED    Details   cefpodoxime (VANTIN) 200 mg tablet Take 1 tablet (200 mg total) by mouth 2 (two) times a day for 7 days, Starting Mon 10/7/2024, Until Mon 10/14/2024, Normal           CONTINUE these medications which have NOT CHANGED    Details   ammonium lactate (LAC-HYDRIN) 12 % cream Apply topically as needed for dry skin, Starting Mon 3/25/2024, Normal      chlorhexidine (PERIDEX) 0.12 % solution Historical Med      ergocalciferol (VITAMIN D2) 50,000 units TAKE 1 CAPSULE ONCE A WEEK, AFTER 12 WEEKS GET BLOOD WORK TO DETERMINE YOUR NEXT DOSE, Normal      ferrous sulfate 325 (65 Fe) mg tablet Take 1 tablet (325 mg total) by mouth daily Take a with meal & vitamin C., Starting Tue 1/30/2024, Normal      folic acid (FOLVITE) 1 mg tablet Take 1 mg by mouth daily  , Starting Sun 4/8/2018, Historical Med      gabapentin (NEURONTIN) 300 mg capsule Take 1 tab TID, Normal      lifitegrast (Xiidra) 5 % op solution Administer 1 drop to both eyes 2 (two) times a day, Starting Wed 3/23/2022, Historical Med      methotrexate 2.5 mg tablet 10 mg once a week,  Starting Thu 12/8/2022, Historical Med      multivitamin-minerals (CENTRUM ADULTS) tablet Take 1 tablet by mouth daily, Starting Sun 7/25/2021, Historical Med      Prasterone 6.5 MG INST Insert 1 suppository into the vagina in the morning, Starting Wed 2/8/2023, Normal      pyridostigmine (MESTINON) 60 mg tablet TAKE 1 TABLET FOUR TIMES A DAY, Starting Mon 5/6/2024, Normal      traMADol (ULTRAM) 50 mg tablet Take 50 mg by mouth 2 (two) times a day as needed, Starting Thu 5/5/2022, Historical Med      zolpidem (AMBIEN) 10 mg tablet TAKE 1 TABLET DAILY AT BEDTIME . DO NOT START BEFORE MARCH 7. 2024, Normal      DULoxetine (CYMBALTA) 30 mg delayed release capsule Take 1 capsule (30 mg total) by mouth 2 (two) times a day, Starting Tue 1/30/2024, Normal             ED SEPSIS DOCUMENTATION   Time reflects when diagnosis was documented in both MDM as applicable and the Disposition within this note       Time User Action Codes Description Comment    10/7/2024  4:27 PM Araceli Madsen [K57.92] Diverticulitis     10/7/2024  4:27 PM Araceli Madsen [I72.8] Splenic artery aneurysm (HCC)                  Araceli Madsen DO  10/10/24 1035

## 2024-10-10 NOTE — PROGRESS NOTES
Assessment & Plan  Sigmoid diverticulitis  On antibiotics   Start probiotic while on antibiotics   Reviewed diet she should follow now and if flare, and high fiber diet when better  Scheduled for follow up with GI   Last colonoscopy 2/16/22       Epigastric pain  Symptoms sound like cholecystitis, but pt s/p cholecystectomy 2106  In reviewing chart she had this in the past and GI felt this to be due to IBS   Discuss with GI        Need for vaccination    Orders:    influenza vaccine, recombinant, PF, 0.5 mL IM (Flublok)         Return for Keep scheduled appointment.    Subjective:   Chela is a 53 y.o. female here today for a follow-up on her current medical conditions:    Patient Active Problem List   Diagnosis    Asymptomatic stenosis of left carotid artery    Family history of premature CAD    MG (myasthenia gravis) (HCC)    Chronic pain syndrome    Greater trochanteric bursitis of both hips    Lumbar radiculopathy    Generalized joint pain    Cervical spinal stenosis    Abnormal cervical Papanicolaou smear    Gastroesophageal reflux disease    Irritable bowel syndrome with diarrhea    Menopausal symptom    Polyneuropathy    Raynaud's disease    Family history of breast cancer in sister    Persistent insomnia    Bilateral sacroiliitis (HCC)    DDD (degenerative disc disease), lumbar    Acquired compression deformity of vertebra    Fibromyalgia    Dyspareunia in female    Family history of colon cancer    Anxiety    Closed fracture of lower end of right radius with routine healing    Extensor tenosynovitis of right wrist    Primary arthrosis of multiple joints    At increased risk of breast cancer per scoring system     NAVA (obstructive sleep apnea)    Dense breasts    Low ferritin    Vitamin D deficiency    Lumbar stenosis with neurogenic claudication    Snoring         Patient Care Team:  Vania Morris DO as PCP - General (Family Medicine)  MD Govind Burch MD Vinod S  MD Belen (Cardiology)  Sonam Olguin DO (Rheumatology)    Current Medications:  Current Outpatient Medications   Medication Sig Dispense Refill    ammonium lactate (LAC-HYDRIN) 12 % cream Apply topically as needed for dry skin 385 g 1    cefpodoxime (VANTIN) 200 mg tablet Take 1 tablet (200 mg total) by mouth 2 (two) times a day for 7 days 13 tablet 0    chlorhexidine (PERIDEX) 0.12 % solution       DULoxetine (CYMBALTA) 30 mg delayed release capsule TAKE 1 CAPSULE TWICE A  capsule 1    ergocalciferol (VITAMIN D2) 50,000 units TAKE 1 CAPSULE ONCE A WEEK, AFTER 12 WEEKS GET BLOOD WORK TO DETERMINE YOUR NEXT DOSE 12 capsule 1    ferrous sulfate 325 (65 Fe) mg tablet Take 1 tablet (325 mg total) by mouth daily Take a with meal & vitamin C. 90 tablet 1    folic acid (FOLVITE) 1 mg tablet Take 1 mg by mouth daily        gabapentin (NEURONTIN) 300 mg capsule Take 1 tab  capsule 2    lifitegrast (Xiidra) 5 % op solution Administer 1 drop to both eyes 2 (two) times a day      methotrexate 2.5 mg tablet 10 mg once a week      metroNIDAZOLE (FLAGYL) 500 mg tablet Take 1 tablet (500 mg total) by mouth every 12 (twelve) hours for 7 days 13 tablet 0    multivitamin-minerals (CENTRUM ADULTS) tablet Take 1 tablet by mouth daily      Prasterone 6.5 MG INST Insert 1 suppository into the vagina in the morning 84 each 3    pyridostigmine (MESTINON) 60 mg tablet TAKE 1 TABLET FOUR TIMES A  tablet 3    traMADol (ULTRAM) 50 mg tablet Take 50 mg by mouth 2 (two) times a day as needed      zolpidem (AMBIEN) 10 mg tablet TAKE 1 TABLET DAILY AT BEDTIME . DO NOT START BEFORE MARCH 7. 2024 90 tablet 1     No current facility-administered medications for this visit.       HPI:  Chief Complaint   Patient presents with    Follow-up     ER F/U - Diverticulitis 10/70/24. Declines having any ongoing S/S from hospital visit. Pt would like to discuss what her plan of care will be for her spleen aneurysm. Pt is F/U with  gastro on Tuesday within [facility].        Immunizations     Would like to discuss flu vaccine     -- Above per clinical staff and reviewed. 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 Switching to cymbalta for mood and pain.weaning off neurontin.  Jan fasting blood work not done.   PE due 12/8/24    breast mri recom  mended - family history and density     last visit Nov has lost her father. felt lexapro 20 mg was working well, along  Ambien 10 mg. mammo due 1/3/22. additional fast MRI or US.   chronic pain, FM - seeing chiropractor,painting. was not going to PT or s  eeing counseling. sees rheum. last labs May.     - follows with neuro, rheum, pain mgmt, referred to GI, PT/aqua therapy.         Today:  ER follow up   Sharp pain lower abdomen that came and went - then constant  Had to hold her stomach   7/10   No pain now   No blood or mucus   Diarrhea since being in the hospital   No fevers or chills   Treated cefpodoxine 200 mg twice a day x 7 days, flagyl 500 mg twice a day 7 days   Recommended fiber when feeling better    Gets stabbing pain in epigastric area   Lasts 1 -2 minutes   Severe pain, like she will pass out, sweating and cold. Lays down.    Occurred 2 weeks ago       10/7/24 CT scan   CT ABDOMEN AND PELVIS WITH IV CONTRAST     INDICATION: RLQ abdominal pain, malaise, r/o appendicitis. .     COMPARISON: MRI lumbar spine without contrast 4/27/2023..     TECHNIQUE: CT examination of the abdomen and pelvis was performed. Multiplanar 2D reformatted images were created from the source data.     This examination, like all CT scans performed in the [facility], was performed utilizing techniques to minimize radiation dose exposure, including the use of iterative reconstruction and automated exposure control. Radiation dose length   product (DLP) for this visit: 978  mGy-cm     IV Contrast: 75 mL of iohexol (OMNIPAQUE)  Enteric Contrast: Not administered.     FINDINGS:     ABDOMEN     LOWER CHEST: Subsegmental atelectasis in lingula and left lower lobe.     LIVER/BILIARY TREE: Unremarkable.     GALLBLADDER: Surgically absent.     SPLEEN: Unremarkable.     PANCREAS: Unremarkable.     ADRENAL GLANDS: Unremarkable.     KIDNEYS/URETERS: Symmetric renal enhancement. No solid renal mass. No hydronephrosis or urinary tract calculi. Subcentimeter hypoattenuating renal lesion(s), too small to characterize but statistically likely benign, which do not warrant follow-up   (Radiology June 2019).     STOMACH AND BOWEL: Normal CT appearance of the stomach.     Multiple fluid-filled loops of small bowel with mildly thickened small bowel folds, possibly due to enteritis. No small bowel obstruction.     Colonic diverticulosis with mild pericolonic fat stranding in sigmoid colon region, compatible with acute diverticulitis. No abscess formation.     APPENDIX: No findings to suggest appendicitis.     ABDOMINOPELVIC CAVITY: No ascites. No pneumoperitoneum. No lymphadenopathy.     VESSELS: Mild scattered calcified atherosclerotic disease. No abdominal aortic aneurysm. 1.2 cm partially calcified aneurysm in distal splenic artery (301:30). IVC is normal in caliber. Hepatic veins, portal vein, SMV, and splenic vein are patent.     PELVIS     REPRODUCTIVE ORGANS: Unremarkable for patient's age.     URINARY BLADDER: Unremarkable.     ABDOMINAL WALL/INGUINAL REGIONS: Unremarkable.     BONES: No acute fracture or suspicious osseous lesion. Minor grade 1 anterolisthesis L5-S1, unchanged.     L4-S1 posterior spinal fixation hardware with interbody spacers at L4-L5 and L5-S1 with decompressive laminectomy changes. Hardware is intact.  No surrounding hardware lucency to suggest infection or loosening.        IMPRESSION:     No appendicitis, as clinically questioned.     Acute sigmoid diverticulitis. No free  "air or abscess.     Possible enteritis.     1.2 cm partially calcified aneurysm of distal splenic artery.     Additional chronic/incidental findings as detailed above.     The study was marked in EPIC for immediate notification.              Workstation performed: [id number]     The following portions of the patient's history were reviewed and updated as appropriate: allergies, current medications, past family history, past medical history, past social history, past surgical history and problem list.    Objective:  Vitals:  /74 (BP Location: Left arm, Patient Position: Sitting, Cuff Size: Standard)   Pulse 81   Temp 98.9 °F (37.2 °C) (Temporal)   Resp 16   Ht 5' 5.5\" (1.664 m)   Wt 75.1 kg (165 lb 9.6 oz)   LMP 01/01/2016 (Approximate)   SpO2 96%   BMI 27.14 kg/m²    Wt Readings from Last 3 Encounters:   10/10/24 75.1 kg (165 lb 9.6 oz)   07/11/24 76.4 kg (168 lb 8 oz)   06/24/24 78 kg (172 lb)      BP Readings from Last 3 Encounters:   10/10/24 112/74   10/07/24 129/74   07/11/24 104/68        Review of Systems   She has no other concerns. No unexpected weight changes. No chest pain, SOB, or palpitations. No GERD. +changes in bowels, normal bladder. Sleeping well. No mood changes. No fever/chills.       Physical Exam   Constitutional:  she appears well-developed and well-nourished.  HENT: Head: Normocephalic.   Neck: Neck supple.   Cardiovascular: Normal rate, regular rhythm and normal heart sounds.   Pulmonary/Chest: Effort normal and breath sounds normal. No wheezes, rales, or rhonchi.   Abdominal: Soft. Bowel sounds are normal. There is no tenderness. No hepatosplenomegaly. No rebound, rigidity, guarding.   Musculoskeletal: she exhibits no edema.   Lymphadenopathy: she has no cervical adenopathy.   Neurological: she is alert and oriented to person, place, and time.   Skin: Skin is warm and dry.   Psychiatric: she has a normal mood and affect. her behavior is normal. Thought content normal.        "

## 2024-10-15 ENCOUNTER — OFFICE VISIT (OUTPATIENT)
Dept: GASTROENTEROLOGY | Facility: CLINIC | Age: 54
End: 2024-10-15
Payer: OTHER GOVERNMENT

## 2024-10-15 VITALS
HEIGHT: 65 IN | HEART RATE: 82 BPM | OXYGEN SATURATION: 98 % | BODY MASS INDEX: 27.66 KG/M2 | DIASTOLIC BLOOD PRESSURE: 82 MMHG | WEIGHT: 166 LBS | TEMPERATURE: 98.4 F | SYSTOLIC BLOOD PRESSURE: 108 MMHG

## 2024-10-15 DIAGNOSIS — K57.92 DIVERTICULITIS: Primary | ICD-10-CM

## 2024-10-15 DIAGNOSIS — G70.00 MG (MYASTHENIA GRAVIS) (HCC): ICD-10-CM

## 2024-10-15 DIAGNOSIS — R14.0 BLOATING: ICD-10-CM

## 2024-10-15 DIAGNOSIS — R68.81 EARLY SATIETY: ICD-10-CM

## 2024-10-15 DIAGNOSIS — I72.8 ANEURYSM, SPLENIC ARTERY (HCC): ICD-10-CM

## 2024-10-15 DIAGNOSIS — Z80.0 FAMILY HISTORY OF COLON CANCER: ICD-10-CM

## 2024-10-15 PROCEDURE — 99214 OFFICE O/P EST MOD 30 MIN: CPT | Performed by: PHYSICIAN ASSISTANT

## 2024-10-15 NOTE — PROGRESS NOTES
[facility]  Chela Anne 53 y.o. female MRN: [id number]       CC: Post emergency room follow-up    HPI: Erna is a pleasant 53 year old female with history of myasthenia gravis, Sjogren's disease, sleep apnea, Raynaud's, GERD, irritable bowel syndrome and spinal stenosis.  Patient presents to the office today by referral of her PCP and emergency room for first episode of diverticulitis.  Patient reports that she had 3 days of lower abdominal pain without change in bowel habits, has diarrhea at baseline.  When her symptoms were improving, she presented to the emergency room on October 7th.  CT revealed acute sigmoid diverticulitis without abscess or free air, enteritis and a 1.2 cm partially calcified aneurysm of the distal splenic artery.    Last EGD and colonoscopy were performed by Dr. Aleman in 2022.  Patient has a family history of colon cancer in her sister.  EGD was normal in appearance.  Biopsy of the small bowel negative for changes suggesting celiac disease.  Colonoscopy revealed diverticulosis and internal hemorrhoids according to report.  Random colon biopsy was negative for microscopic colitis.    Review of Systems:    CONSTITUTIONAL: Denies any fever, chills, or rigors. Good appetite, and no recent weight loss.  HEENT: No earache or tinnitus. Denies hearing loss or visual disturbances.  CARDIOVASCULAR: No chest pain or palpitations.   RESPIRATORY: Denies any cough, hemoptysis, shortness of breath or dyspnea on exertion.  GASTROINTESTINAL: As noted in the History of Present Illness.   GENITOURINARY: No problems with urination. Denies any hematuria or dysuria.  NEUROLOGIC: No dizziness or vertigo, denies headaches.   MUSCULOSKELETAL: Denies any muscle or joint pain.   SKIN: Denies skin rashes or itching.   ENDOCRINE: Denies excessive thirst. Denies intolerance to heat or cold.  PSYCHOSOCIAL: Denies depression or anxiety. Denies any recent memory loss.       Current Outpatient  Medications   Medication Sig Dispense Refill    ammonium lactate (LAC-HYDRIN) 12 % cream Apply topically as needed for dry skin 385 g 1    cefpodoxime (VANTIN) 200 mg tablet Take 1 tablet (200 mg total) by mouth 2 (two) times a day for 7 days 13 tablet 0    chlorhexidine (PERIDEX) 0.12 % solution       DULoxetine (CYMBALTA) 30 mg delayed release capsule TAKE 1 CAPSULE TWICE A  capsule 1    ergocalciferol (VITAMIN D2) 50,000 units TAKE 1 CAPSULE ONCE A WEEK, AFTER 12 WEEKS GET BLOOD WORK TO DETERMINE YOUR NEXT DOSE 12 capsule 1    ferrous sulfate 325 (65 Fe) mg tablet Take 1 tablet (325 mg total) by mouth daily Take a with meal & vitamin C. 90 tablet 1    folic acid (FOLVITE) 1 mg tablet Take 1 mg by mouth daily        gabapentin (NEURONTIN) 300 mg capsule Take 1 tab  capsule 2    lifitegrast (Xiidra) 5 % op solution Administer 1 drop to both eyes 2 (two) times a day      methotrexate 2.5 mg tablet 10 mg once a week      metroNIDAZOLE (FLAGYL) 500 mg tablet Take 1 tablet (500 mg total) by mouth every 12 (twelve) hours for 7 days 13 tablet 0    multivitamin-minerals (CENTRUM ADULTS) tablet Take 1 tablet by mouth daily      Prasterone 6.5 MG INST Insert 1 suppository into the vagina in the morning 84 each 3    pyridostigmine (MESTINON) 60 mg tablet TAKE 1 TABLET FOUR TIMES A  tablet 3    traMADol (ULTRAM) 50 mg tablet Take 50 mg by mouth 2 (two) times a day as needed      zolpidem (AMBIEN) 10 mg tablet TAKE 1 TABLET DAILY AT BEDTIME . DO NOT START BEFORE MARCH 7. 2024 90 tablet 1     No current facility-administered medications for this visit.     Past Medical History:   Diagnosis Date    Abnormal Pap smear of cervix     Anxiety July    Arthritis     RA    Carotid stenosis, asymptomatic     Chemical exposure     [email or web address] - worked across the street    Diverticulitis     10/7/2024    Dizziness     HPV in female     IBS (irritable bowel syndrome)     Myasthenia gravis (HCC)     dx 2014     Myasthenia gravis (HCC)     Neuropathy     left foot    Polyneuropathy     Raynaud disease     dx 2016    Sjoegren syndrome     dx 2010    Syncope     Venereal wart 2015     Past Surgical History:   Procedure Laterality Date    CHOLECYSTECTOMY  2016    COLONOSCOPY      LUMBAR LAMINECTOMY Bilateral 2023    [facility]    THYMECTOMY  2016    UPPER GASTROINTESTINAL ENDOSCOPY       Social History     Socioeconomic History    Marital status: /Civil Union     Spouse name: None    Number of children: 0    Years of education: None    Highest education level: None   Occupational History     Employer: [employer]   Tobacco Use    Smoking status: Former     Current packs/day: 0.00     Average packs/day: 0.5 packs/day for 9.1 years (4.5 ttl pk-yrs)     Types: Cigarettes     Start date: 2001     Quit date: 10/10/2010     Years since quittin.0    Smokeless tobacco: Never    Tobacco comments:     I was basically a closet smoker...going thru  and then divorce from 1st    Vaping Use    Vaping status: Former    Start date: 10/10/2010    Quit date: 2011   Substance and Sexual Activity    Alcohol use: Not Currently    Drug use: No    Sexual activity: Not Currently     Partners: Male     Birth control/protection: Abstinence, Post-menopausal     Comment: Having issues. Painful to have sex.   Other Topics Concern    None   Social History Narrative    None     Social Determinants of Health     Financial Resource Strain: Low Risk  (2023)    Received from [facility], [facility]    Overall Financial Resource Strain (CARDIA)     Difficulty of Paying Living Expenses: Not hard at all   Food Insecurity: No Food Insecurity (2023)    Received from [facility], [facility]    Hunger Vital Sign     Worried About Running Out of Food in the Last Year: Never true     Ran Out of Food in the Last Year: Never true   Transportation  Needs: No Transportation Needs (7/31/2023)    [facility] from [facility], [facility]    PRAPARE - Transportation     Lack of Transportation (Medical): No     Lack of Transportation (Non-Medical): No   Physical Activity: Not on file   Stress: Not on file   Social Connections: Unknown (6/18/2024)    Received from [facility]     How often do you feel lonely or isolated from those around you? (Adult - for ages 18 years and over): Not on file   Intimate Partner Violence: Not At Risk (7/31/2023)    Received from [facility], [facility]    Humiliation, Afraid, Rape, and Kick questionnaire     Fear of Current or Ex-Partner: No     Emotionally Abused: No     Physically Abused: No     Sexually Abused: No   Housing Stability: Low Risk  (7/31/2023)    Received from [facility], [facility]    Housing Stability Vital Sign     Unable to Pay for Housing in the Last Year: No     Number of Places Lived in the Last Year: 1     Unstable Housing in the Last Year: No     Family History   Problem Relation Age of Onset    Arthritis Mother     Dementia Mother     Mental illness Mother         Alzheimers    Diabetes Father     Hypertension Father     Stroke Father     Heart disease Father     Hearing loss Father     Breast cancer Sister 69    Arthritis Sister     Heart disease Sister     Diabetes Sister     Cirrhosis Sister         non-alcoholic    Diverticulitis Sister     Coronary artery disease Sister     Other Sister         carotid artery disease    Liver disease Sister         Fatty liver    Colon cancer Sister     Breast cancer Sister         60's    Liver cancer Sister     Ross syndrome Sister     Thyroid disease Sister     Hypothyroidism Sister     Stroke Sister     Urolithiasis Sister     Dementia Maternal Grandmother     Heart attack Maternal Grandfather     Stroke Paternal Grandmother     Stroke Paternal  "Grandfather     Diverticulitis Brother     No Known Problems Brother     No Known Problems Brother     No Known Problems Brother     No Known Problems Brother     No Known Problems Maternal Aunt     No Known Problems Paternal Aunt     No Known Problems Paternal Aunt             PHYSICAL EXAM:    Vitals:    10/15/24 0957   BP: 108/82   BP Location: Left arm   Patient Position: Sitting   Cuff Size: Standard   Pulse: 82   Temp: 98.4 °F (36.9 °C)   TempSrc: Tympanic   SpO2: 98%   Weight: 75.3 kg (166 lb)   Height: 5' 5\" (1.651 m)     General Appearance:   Alert and oriented x 3. Cooperative, and in no respiratory distress   HEENT:   Normocephalic, atraumatic, anicteric.     Neck:  Supple, symmetrical, trachea midline   Lungs:   Clear to auscultation bilaterally    Heart::   Regular rate and rhythm   Abdomen:   Soft, non-tender, non-distended; normal bowel sounds; no masses, no organomegaly    Genitalia:   Deferred    Rectal:   Deferred    Extremities:  No cyanosis, clubbing or edema    Pulses:  2+ and symmetric all extremities    Skin:  Skin color, texture, turgor normal, no rashes or lesions    Lymph nodes:  No palpable cervical or supraclavicular lymphadenopathy        Lab Results:   Results from last 6 Months   Lab Units 10/07/24  1534   WBC Thousand/uL 4.28*   HEMOGLOBIN g/dL 11.3*   HEMATOCRIT % 35.3   PLATELETS Thousands/uL 236   SEGS PCT % 52   LYMPHO PCT % 32   MONO PCT % 11   EOS PCT % 4     Results from last 6 Months   Lab Units 10/07/24  1454   POTASSIUM mmol/L 4.7   CHLORIDE mmol/L 106   CO2 mmol/L 20*   BUN mg/dL 12   CREATININE mg/dL 0.83   CALCIUM mg/dL 8.9   ALK PHOS U/L 75   ALT U/L 15   AST U/L 28         Results from last 6 Months   Lab Units 10/07/24  1454   LIPASE u/L 29       Imaging Studies:   CT abdomen pelvis with contrast    Result Date: 10/7/2024  Impression: No appendicitis, as clinically questioned. Acute sigmoid diverticulitis. No free air or abscess. Possible enteritis. 1.2 cm partially " "calcified aneurysm of distal splenic artery. Additional chronic/incidental findings as detailed above. The study was marked in EPIC for immediate notification. Workstation performed: [id number]       ASSESSMENT and PLAN:      1) First episode of uncomplicated sigmoid diverticulitis, family history of colon cancer- According to Up-to-Date guidelines, it is recommended that patients have colonoscopy after first episode of diverticulitis if their previous colonoscopy was not done within the previous year.  Other guidelines suggest up to 5 years.  Given her family history, may be advantageous to perform endoscopic evaluation sooner to ensure no polyp, other inflammation, or less likely malignancy caused her diverticulitis picture.  Patient also has chronic diarrhea at baseline.  No family history of IBD to her knowledge.  - Due to splenic artery aneurysm, referral to vascular to ensure this will not interfere with upcoming colonoscopy in 8 weeks  - We will schedule colonoscopy during her next follow-up  - Unfortunately cannot give antispasmodic due to history of myasthenia gravis  - Patient agrees with plan     2) Early satiety, chronic bloating and epigastric discomfort - Last EGD was 2 years ago, negative for celiac disease.  She may have underlying gastroparesis related to autoimmune disease or SIBO.  She is status post cholecystectomy.  - Check gastric emptying study  - Gastroparesis diet provided from the [facility] to help identify potential trigger foods      Follow up in       Portions of the record may have been created with voice recognition software.  Occasional wrong word or \"sound a like\" substitutions may have occurred due to the inherent limitations of voice recognition software.  Read the chart carefully and recognize, using context, where substitutions have occurred.  "

## 2024-10-23 ENCOUNTER — CONSULT (OUTPATIENT)
Dept: VASCULAR SURGERY | Facility: CLINIC | Age: 54
End: 2024-10-23
Payer: OTHER GOVERNMENT

## 2024-10-23 VITALS
BODY MASS INDEX: 27.92 KG/M2 | HEIGHT: 65 IN | SYSTOLIC BLOOD PRESSURE: 122 MMHG | OXYGEN SATURATION: 99 % | WEIGHT: 167.6 LBS | HEART RATE: 93 BPM | DIASTOLIC BLOOD PRESSURE: 80 MMHG

## 2024-10-23 DIAGNOSIS — I65.22 ASYMPTOMATIC STENOSIS OF LEFT CAROTID ARTERY: Primary | ICD-10-CM

## 2024-10-23 DIAGNOSIS — I72.8 ANEURYSM, SPLENIC ARTERY (HCC): ICD-10-CM

## 2024-10-23 PROCEDURE — 99205 OFFICE O/P NEW HI 60 MIN: CPT | Performed by: SURGERY

## 2024-10-23 NOTE — ASSESSMENT & PLAN NOTE
53-year-old female referred for splenic artery aneurysm.  She had a CAT scan a couple weeks ago for a episode of lower abdominal pain which showed an incidental finding of a 1.2 cm splenic artery aneurysm the aneurysm is distal it circumferentially calcified and there was no prior cross-sectional imaging to compare.  At this time she has no symptoms related to this.  I discussed her CAT scan findings with her and advised continued surveillance I will plan for a mesenteric duplex in 1 year and if this is stable in size then her duplexes could be changed to every 2 years.  She we will follow-up with our vascular WILMA in 1 year.    Orders:    Ambulatory referral to Vascular Surgery    VAS CELIAC AND/OR MESENTERIC DUPLEX; Future

## 2024-10-23 NOTE — PROGRESS NOTES
Ambulatory Visit  Name: Chela Anne      : 1970      MRN: [id number]  Encounter Provider: Rico Lea DO  Encounter Date: 10/23/2024   Encounter department: [facility] [place]    Assessment & Plan  Aneurysm, splenic artery (HCC)  53-year-old female referred for splenic artery aneurysm.  She had a CAT scan a couple weeks ago for a episode of lower abdominal pain which showed an incidental finding of a 1.2 cm splenic artery aneurysm the aneurysm is distal it circumferentially calcified and there was no prior cross-sectional imaging to compare.  At this time she has no symptoms related to this.  I discussed her CAT scan findings with her and advised continued surveillance I will plan for a mesenteric duplex in 1 year and if this is stable in size then her duplexes could be changed to every 2 years.  She we will follow-up with our vascular [facility] in 1 year.    Orders:    Ambulatory referral to Vascular Surgery    VAS CELIAC AND/OR MESENTERIC DUPLEX; Future    Asymptomatic stenosis of left carotid artery         Aneurysm, splenic artery (HCC)  53-year-old female referred for splenic artery aneurysm.  She had a CAT scan a couple weeks ago for a episode of lower abdominal pain which showed an incidental finding of a 1.2 cm splenic artery aneurysm the aneurysm is distal it circumferentially calcified and there was no prior cross-sectional imaging to compare.  At this time she has no symptoms related to this.  I discussed her CAT scan findings with her and advised continued surveillance I will plan for a mesenteric duplex in 1 year and if this is stable in size then her duplexes could be changed to every 2 years.  She we will follow-up with our vascular [facility] in 1 year.    History of Present Illness     Chela Anne is a 53 y.o. female     Pt is new to our practice and was ref by PCP. Pt had a CTA abd/pelvis 10/07/24. Pt still has sharp abd pain LLQ and Pt is eating less for the past few months. Pt  "denies unexpected weight loss or fear of eating.    History obtained from : patient  Review of Systems   Constitutional:  Positive for appetite change.   HENT: Negative.     Eyes: Negative.    Respiratory: Negative.     Cardiovascular:  Positive for chest pain.   Gastrointestinal:  Positive for abdominal pain.   Endocrine: Negative.    Genitourinary: Negative.    Musculoskeletal:  Positive for arthralgias, myalgias and neck pain.   Skin: Negative.    Allergic/Immunologic: Negative.    Neurological:  Positive for dizziness.        Near syncope   Hematological: Negative.    Psychiatric/Behavioral: Negative.       I have reviewed the ROS above and made changes as needed.          Objective     /80 (BP Location: Left arm, Patient Position: Sitting, Cuff Size: Standard)   Pulse 93   Ht 5' 5\" (1.651 m)   Wt 76 kg (167 lb 9.6 oz)   LMP 01/01/2016 (Approximate)   SpO2 99%   BMI 27.89 kg/m²     Physical Exam    General  Exam: alert, awake, oriented, no distress, consistent with stated age    Integumentary  Exam: warm, dry, no gross lesions, no bruises and normal color    Head and Neck  Exam: supple, no bruits, trachea midline, no JVD, no mass or palpable nodes      Chest and Lung  Exam: chest normal without deformity, bilaterally expansive, clear to auscultation    Cardiovascular  Exam: regular rate, regular rhythm, no murmurs, no rubs or gallops    Adbomen  Exam: soft, non-tender, non-distended, no pulsatile abdominal masses, no abdominal bruit    Peripheral Vascular  Exam: no clubbing of the digits of the upper extremity, no cyanosis, no edema, both feet are warm, radial pulses 2+ bilaterally, skin well perfused, without and no varicosities.      Upper Extremity:  Palpation: Radial pulse- Bilateral 2+    Lower Extremity:  Palpation: Femoral pulse- Bilateral 2+                Dorsalis Pedis - Bilateral 2+         Neurologic  Exam:alert, non-focal, oriented x 3, cranial nerves II-XII grossly " intact        Administrative Statements   I have spent a total time of 25 minutes in caring for this patient on the day of the visit/encounter including Diagnostic results, Prognosis, Instructions for management, Documenting in the medical record, and Reviewing / ordering tests, medicine, procedures  .

## 2024-10-30 ENCOUNTER — TELEPHONE (OUTPATIENT)
Age: 54
End: 2024-10-30

## 2024-10-30 NOTE — TELEPHONE ENCOUNTER
Patients GI provider:  KYLEE Bruner    Number to return call: [phone]     Reason for call: Pt calling stating Nichol referred pt to see vascular surgeon. Per pt Nichol wants to get clearance for pt to schedule a colonoscopy sometime in December. Pt saw [facility] on 10/23/24 see pt's chart. Please seek clearance. Dr. Lea may be reached at the above ph #.     Scheduled procedure/appointment date if applicable: Apt 12/31/24

## 2024-11-01 NOTE — TELEPHONE ENCOUNTER
----- Message -----  From: Mallory Elizabeth MA  Sent: 11/1/2024  11:00 AM EDT  To: [facility]    Patient is requesting to schedule her colonoscopy, however, we want to ensure she is cleared from a vascular perspective to proceed with this procedure. The patient was seen in your office on 10/23/24.    Please confirm.  Thanks

## 2024-11-04 ENCOUNTER — PREP FOR PROCEDURE (OUTPATIENT)
Dept: GASTROENTEROLOGY | Facility: CLINIC | Age: 54
End: 2024-11-04

## 2024-11-04 DIAGNOSIS — Z80.0 FAMILY HISTORY OF COLON CANCER: Primary | ICD-10-CM

## 2024-11-05 NOTE — TELEPHONE ENCOUNTER
Spoke with the patient and she scheduled her procedure. Prep instructions sent via [employer].    Scheduled date of colonoscopy (as of today):12/5/24  Physician performing colonoscopy:Aristides  Location of colonoscopy:[facility]  Bowel prep reviewed with patient:miralax/dulcolax  Instructions reviewed with patient by:Mallory SYED  Clearances: none

## 2024-11-25 ENCOUNTER — HOSPITAL ENCOUNTER (OUTPATIENT)
Dept: NUCLEAR MEDICINE | Facility: HOSPITAL | Age: 54
Discharge: HOME | End: 2024-11-25
Payer: MEDICARE

## 2024-11-25 ENCOUNTER — RESULTS FOLLOW-UP (OUTPATIENT)
Dept: OTHER | Facility: HOSPITAL | Age: 54
End: 2024-11-25

## 2024-11-25 DIAGNOSIS — G70.00 MG (MYASTHENIA GRAVIS) (HCC): ICD-10-CM

## 2024-11-25 DIAGNOSIS — R14.0 BLOATING: ICD-10-CM

## 2024-11-25 DIAGNOSIS — R68.81 EARLY SATIETY: ICD-10-CM

## 2024-11-25 PROCEDURE — A9541 TC99M SULFUR COLLOID: HCPCS

## 2024-11-25 PROCEDURE — 78264 GASTRIC EMPTYING IMG STUDY: CPT

## 2024-12-09 ENCOUNTER — OFFICE VISIT (OUTPATIENT)
Dept: FAMILY MEDICINE CLINIC | Facility: CLINIC | Age: 54
End: 2024-12-09
Payer: MEDICARE

## 2024-12-09 ENCOUNTER — TELEPHONE (OUTPATIENT)
Dept: GASTROENTEROLOGY | Facility: CLINIC | Age: 54
End: 2024-12-09

## 2024-12-09 VITALS
TEMPERATURE: 96.9 F | SYSTOLIC BLOOD PRESSURE: 118 MMHG | WEIGHT: 167.2 LBS | HEART RATE: 71 BPM | OXYGEN SATURATION: 98 % | BODY MASS INDEX: 27.86 KG/M2 | DIASTOLIC BLOOD PRESSURE: 78 MMHG | HEIGHT: 65 IN

## 2024-12-09 DIAGNOSIS — Z71.89 COUNSELING REGARDING ADVANCED DIRECTIVES: ICD-10-CM

## 2024-12-09 DIAGNOSIS — M79.7 FIBROMYALGIA: ICD-10-CM

## 2024-12-09 DIAGNOSIS — Z00.00 ENCOUNTER FOR MEDICARE ANNUAL WELLNESS EXAM: Primary | ICD-10-CM

## 2024-12-09 DIAGNOSIS — Z80.3 FAMILY HISTORY OF BREAST CANCER IN SISTER: ICD-10-CM

## 2024-12-09 DIAGNOSIS — G70.00 MG (MYASTHENIA GRAVIS) (HCC): ICD-10-CM

## 2024-12-09 DIAGNOSIS — M46.1 SACROILIITIS (HCC): ICD-10-CM

## 2024-12-09 DIAGNOSIS — R92.30 DENSE BREASTS: ICD-10-CM

## 2024-12-09 PROCEDURE — G0402 INITIAL PREVENTIVE EXAM: HCPCS | Performed by: FAMILY MEDICINE

## 2024-12-09 PROCEDURE — 99214 OFFICE O/P EST MOD 30 MIN: CPT | Performed by: FAMILY MEDICINE

## 2024-12-09 RX ORDER — GABAPENTIN 600 MG/1
1 TABLET ORAL 2 TIMES DAILY
COMMUNITY
Start: 2024-12-04

## 2024-12-09 NOTE — PATIENT INSTRUCTIONS
[facility] [phone]  [place] location [address], [address]  [place] [address]  [place] [address]  [place] [address] [place]    [facility] [phone]  [place] [address]   [address]       Medicare Preventive Visit Patient Instructions  Thank you for completing your Welcome to Medicare Visit or Medicare Annual Wellness Visit today. Your next wellness visit will be due in one year (12/10/2025).  The screening/preventive services that you may require over the next 5-10 years are detailed below. Some tests may not apply to you based off risk factors and/or age. Screening tests ordered at today's visit but not completed yet may show as past due. Also, please note that scanned in results may not display below.  Preventive Screenings:  Service Recommendations Previous Testing/Comments   Colorectal Cancer Screening  * Colonoscopy    * Fecal Occult Blood Test (FOBT)/Fecal Immunochemical Test (FIT)  * Fecal DNA/Cologuard Test  * Flexible Sigmoidoscopy Age: 45-75 years old   Colonoscopy: every 10 years (may be performed more frequently if at higher risk)  OR  FOBT/FIT: every 1 year  OR  Cologuard: every 3 years  OR  Sigmoidoscopy: every 5 years  Screening may be recommended earlier than age 45 if at higher risk for colorectal cancer. Also, an individualized decision between you and your healthcare provider will decide whether screening between the ages of 76-85 would be appropriate. Colonoscopy: 02/16/2022  FOBT/FIT: Not on file  Cologuard: Not on file  Sigmoidoscopy: Not on file    Screening Current     Breast Cancer Screening Age: 40+ years old  Frequency: every 1-2 years  Not required if history of left and right mastectomy Mammogram: 06/24/2024    Screening Current   Cervical Cancer Screening Between the ages of 21-29, pap smear recommended once every 3 years.   Between the ages of 30-65, can perform pap  smear with HPV co-testing every 5 years.   Recommendations may differ for women with a history of total hysterectomy, cervical cancer, or abnormal pap smears in past. Pap Smear: 11/29/2021        Hepatitis C Screening Once for adults born between 1945 and 1965  More frequently in patients at high risk for Hepatitis C Hep C Antibody: 03/23/2023    Screening Current   Diabetes Screening 1-2 times per year if you're at risk for diabetes or have pre-diabetes Fasting glucose: 94 mg/dL (7/6/2023)  A1C: 5.5 % (10/13/2021)  Screening Current   Cholesterol Screening Once every 5 years if you don't have a lipid disorder. May order more often based on risk factors. Lipid panel: 01/27/2024    Screening Current     Other Preventive Screenings Covered by Medicare:  Abdominal Aortic Aneurysm (AAA) Screening: covered once if your at risk. You're considered to be at risk if you have a family history of AAA.  Lung Cancer Screening: covers low dose CT scan once per year if you meet all of the following conditions: (1) Age 55-77; (2) No signs or symptoms of lung cancer; (3) Current smoker or have quit smoking within the last 15 years; (4) You have a tobacco smoking history of at least 20 pack years (packs per day multiplied by number of years you smoked); (5) You get a written order from a healthcare provider.  Glaucoma Screening: covered annually if you're considered high risk: (1) You have diabetes OR (2) Family history of glaucoma OR (3)  aged 50 and older OR (4)  American aged 65 and older  Osteoporosis Screening: covered every 2 years if you meet one of the following conditions: (1) You're estrogen deficient and at risk for osteoporosis based off medical history and other findings; (2) Have a vertebral abnormality; (3) On glucocorticoid therapy for more than 3 months; (4) Have primary hyperparathyroidism; (5) On osteoporosis medications and need to assess response to drug therapy.   Last bone density test  (DXA Scan): 05/19/2022.  HIV Screening: covered annually if you're between the age of 15-65. Also covered annually if you are younger than 15 and older than 65 with risk factors for HIV infection. For pregnant patients, it is covered up to 3 times per pregnancy.    Immunizations:  Immunization Recommendations   Influenza Vaccine Annual influenza vaccination during flu season is recommended for all persons aged >= 6 months who do not have contraindications   Pneumococcal Vaccine   * Pneumococcal conjugate vaccine = PCV13 (Prevnar 13), PCV15 (Vaxneuvance), PCV20 (Prevnar 20)  * Pneumococcal polysaccharide vaccine = PPSV23 (Pneumovax) Adults 19-63 yo with certain risk factors or if 65+ yo  If never received any pneumonia vaccine: recommend Prevnar 20 (PCV20)  Give PCV20 if previously received 1 dose of PCV13 or PPSV23   Hepatitis B Vaccine 3 dose series if at intermediate or high risk (ex: diabetes, end stage renal disease, liver disease)   Respiratory syncytial virus (RSV) Vaccine - COVERED BY MEDICARE PART D  * RSVPreF3 (Arexvy) CDC recommends that adults 60 years of age and older may receive a single dose of RSV vaccine using shared clinical decision-making (SCDM)   Tetanus (Td) Vaccine - COST NOT COVERED BY MEDICARE PART B Following completion of primary series, a booster dose should be given every 10 years to maintain immunity against tetanus. Td may also be given as tetanus wound prophylaxis.   Tdap Vaccine - COST NOT COVERED BY MEDICARE PART B Recommended at least once for all adults. For pregnant patients, recommended with each pregnancy.   Shingles Vaccine (Shingrix) - COST NOT COVERED BY MEDICARE PART B  2 shot series recommended in those 19 years and older who have or will have weakened immune systems or those 50 years and older     Health Maintenance Due:      Topic Date Due    Breast Cancer Screening: Mammogram  06/24/2025    Cervical Cancer Screening  11/29/2026    Colorectal Cancer Screening  02/15/2027     HIV Screening  Completed    Hepatitis C Screening  Completed     Immunizations Due:      Topic Date Due    COVID-19 Vaccine (4 - 2024-25 season) 09/01/2024     Advance Directives   What are advance directives?  Advance directives are legal documents that state your wishes and plans for medical care. These plans are made ahead of time in case you lose your ability to make decisions for yourself. Advance directives can apply to any medical decision, such as the treatments you want, and if you want to donate organs.   What are the types of advance directives?  There are many types of advance directives, and each state has rules about how to use them. You may choose a combination of any of the following:  Living will:  This is a written record of the treatment you want. You can also choose which treatments you do not want, which to limit, and which to stop at a certain time. This includes surgery, medicine, IV fluid, and tube feedings.   Durable power of  for healthcare (DPAHC):  This is a written record that states who you want to make healthcare choices for you when you are unable to make them for yourself. This person, called a proxy, is usually a family member or a friend. You may choose more than 1 proxy.  Do not resuscitate (DNR) order:  A DNR order is used in case your heart stops beating or you stop breathing. It is a request not to have certain forms of treatment, such as CPR. A DNR order may be included in other types of advance directives.  Medical directive:  This covers the care that you want if you are in a coma, near death, or unable to make decisions for yourself. You can list the treatments you want for each condition. Treatment may include pain medicine, surgery, blood transfusions, dialysis, IV or tube feedings, and a ventilator (breathing machine).  Values history:  This document has questions about your views, beliefs, and how you feel and think about life. This information can help  others choose the care that you would choose.  Why are advance directives important?  An advance directive helps you control your care. Although spoken wishes may be used, it is better to have your wishes written down. Spoken wishes can be misunderstood, or not followed. Treatments may be given even if you do not want them. An advance directive may make it easier for your family to make difficult choices about your care.   Weight Management   Why it is important to manage your weight:  Being overweight increases your risk of health conditions such as heart disease, high blood pressure, type 2 diabetes, and certain types of cancer. It can also increase your risk for osteoarthritis, sleep apnea, and other respiratory problems. Aim for a slow, steady weight loss. Even a small amount of weight loss can lower your risk of health problems.  How to lose weight safely:  A safe and healthy way to lose weight is to eat fewer calories and get regular exercise. You can lose up about 1 pound a week by decreasing the number of calories you eat by 500 calories each day.   Healthy meal plan for weight management:  A healthy meal plan includes a variety of foods, contains fewer calories, and helps you stay healthy. A healthy meal plan includes the following:  Eat whole-grain foods more often.  A healthy meal plan should contain fiber. Fiber is the part of grains, fruits, and vegetables that is not broken down by your body. Whole-grain foods are healthy and provide extra fiber in your diet. Some examples of whole-grain foods are whole-wheat breads and pastas, oatmeal, brown rice, and bulgur.  Eat a variety of vegetables every day.  Include dark, leafy greens such as spinach, kale, keiko greens, and mustard greens. Eat yellow and orange vegetables such as carrots, sweet potatoes, and winter squash.   Eat a variety of fruits every day.  Choose fresh or canned fruit (canned in its own juice or light syrup) instead of juice. Fruit  juice has very little or no fiber.  Eat low-fat dairy foods.  Drink fat-free (skim) milk or 1% milk. Eat fat-free yogurt and low-fat cottage cheese. Try low-fat cheeses such as mozzarella and other reduced-fat cheeses.  Choose meat and other protein foods that are low in fat.  Choose beans or other legumes such as split peas or lentils. Choose fish, skinless poultry (chicken or turkey), or lean cuts of red meat (beef or pork). Before you cook meat or poultry, cut off any visible fat.   Use less fat and oil.  Try baking foods instead of frying them. Add less fat, such as margarine, sour cream, regular salad dressing and mayonnaise to foods. Eat fewer high-fat foods. Some examples of high-fat foods include french fries, doughnuts, ice cream, and cakes.  Eat fewer sweets.  Limit foods and drinks that are high in sugar. This includes candy, cookies, regular soda, and sweetened drinks.  Exercise:  Exercise at least 30 minutes per day on most days of the week. Some examples of exercise include walking, biking, dancing, and swimming. You can also fit in more physical activity by taking the stairs instead of the elevator or parking farther away from stores. Ask your healthcare provider about the best exercise plan for you.      © Copyright [employer] 2018 Information is for End User's use only and may not be sold, redistributed or otherwise used for commercial purposes. All illustrations and images included in CareNotes® are the copyrighted property of [employer]M., Inc[employer] or [employer] ([employer])

## 2024-12-09 NOTE — TELEPHONE ENCOUNTER
Order created and EGD added to colon on 1/15/25.  I called and spoke to pt making aware of this and advised to use the colonoscopy instructions and would then also be ready for the EGD.  Pt understood.

## 2024-12-09 NOTE — PROGRESS NOTES
Name: Chela Anne      : 1970      MRN: [id number]  Encounter Provider: Vania Morris DO  Encounter Date: 2024   Encounter department: [facility]    Assessment & Plan  Encounter for Medicare annual wellness exam         [facility] (myasthenia gravis) ([facility])         Family history of breast cancer in sister         Dense breasts         Counseling regarding advanced directives              Preventive health issues were discussed with patient, and age appropriate screening tests were ordered as noted in patient's After Visit Summary. Personalized health advice and appropriate referrals for health education or preventive services given if needed, as noted in patient's After Visit Summary.    History of Present Illness     HPI   Patient Care Team:  Vania Morris DO as PCP - General (Family Medicine)  MD Govind Burch MD Vinod S Kudagi, MD (Cardiology)  Sonam Olguin DO (Rheumatology)    Review of Systems  Medical History Reviewed by provider this encounter:  Tobacco  Allergies  Meds  Problems  Med Hx  Surg Hx  Fam Hx       Annual Wellness Visit Questionnaire   Annual Wellness Visit  Social Drivers of Health     Financial Resource Strain: Low Risk  (2024)    Received from [facility]    Overall Financial Resource Strain (CARDIA)     Difficulty of Paying Living Expenses: Not hard at all   Food Insecurity: No Food Insecurity (2024)    Hunger Vital Sign     Worried About Running Out of Food in the Last Year: Never true     Ran Out of Food in the Last Year: Never true   Transportation Needs: No Transportation Needs (2024)    PRAPARE - Transportation     Lack of Transportation (Medical): No     Lack of Transportation (Non-Medical): No   Housing Stability: Low Risk  (2024)    Housing Stability Vital Sign     Unable to Pay for Housing in the Last Year: No     Number of Times Moved in the Last Year: 0      "Homeless in the Last Year: No   Utilities: Not At Risk (12/8/2024)    [facility] Utilities     Threatened with loss of utilities: No     No results found.    Objective   /78 (Patient Position: Sitting, Cuff Size: Standard)   Pulse 71   Temp (!) 96.9 °F (36.1 °C) (Temporal)   Ht 5' 5\" (1.651 m)   Wt 75.8 kg (167 lb 3.2 oz)   LMP 01/01/2016 (Approximate)   SpO2 98%   BMI 27.82 kg/m²     Physical Exam    "

## 2024-12-09 NOTE — PROGRESS NOTES
Assessment & Plan  Encounter for Medicare annual wellness exam  Reviewed advanced directives. Patient wishes to be resuscitated if there is hope. If there is no hope they would not want to remain alive on machines.  is medical POA if needed.        MG (myasthenia gravis) (HCC)  Following with neuroTyrell Page. Now on disability       Family history of breast cancer in sister  Will send message to pt regarding breast MRI        Dense breasts  Will send message to pt regarding breast MRI        Counseling regarding advanced directives  Reviewed advanced directives. Patient wishes to be resuscitated if there is hope. If there is no hope they would not want to remain alive on machines.  is medical POA if needed.       Fibromyalgia  Flare today. Recommend PT/aqua therapy. Following with specialists - rheum, physiatry. .   Orders:    Ambulatory Referral to Physical Therapy; Future    Sacroiliitis (HCC)  See above.             Return in about 6 months (around 6/9/2025) for CC.    Subjective:   Chela is a 54 y.o. female here today for a follow-up on her current medical conditions:    Patient Active Problem List   Diagnosis    Asymptomatic stenosis of left carotid artery    Family history of premature CAD    MG (myasthenia gravis) (HCC)    Chronic pain syndrome    Greater trochanteric bursitis of both hips    Lumbar radiculopathy    Generalized joint pain    Cervical spinal stenosis    Abnormal cervical Papanicolaou smear    Gastroesophageal reflux disease    Irritable bowel syndrome with diarrhea    Menopausal symptom    Polyneuropathy    Raynaud's disease    Family history of breast cancer in sister    Persistent insomnia    Bilateral sacroiliitis (HCC)    DDD (degenerative disc disease), lumbar    Acquired compression deformity of vertebra    Fibromyalgia    Dyspareunia in female    Family history of colon cancer    Anxiety    Closed fracture of lower end of right radius with routine healing    Extensor  tenosynovitis of right wrist    Primary arthrosis of multiple joints    At increased risk of breast cancer per scoring system     NAVA (obstructive sleep apnea)    Dense breasts    Low ferritin    Vitamin D deficiency    Lumbar stenosis with neurogenic claudication    Snoring    Aneurysm, splenic artery (HCC)         Patient Care Team:  Vania Morris DO as PCP - General (Family Medicine)  MD Govind Burch MD Vinod S Kudagi, MD (Cardiology)  Sonam Olguin DO (Rheumatology)    Current Medications:  Current Outpatient Medications   Medication Sig Dispense Refill    ammonium lactate (LAC-HYDRIN) 12 % cream Apply topically as needed for dry skin 385 g 1    chlorhexidine (PERIDEX) 0.12 % solution       DULoxetine (CYMBALTA) 30 mg delayed release capsule TAKE 1 CAPSULE TWICE A  capsule 1    ergocalciferol (VITAMIN D2) 50,000 units TAKE 1 CAPSULE ONCE A WEEK, AFTER 12 WEEKS GET BLOOD WORK TO DETERMINE YOUR NEXT DOSE 12 capsule 1    ferrous sulfate 325 (65 Fe) mg tablet Take 1 tablet (325 mg total) by mouth daily Take a with meal & vitamin C. 90 tablet 1    folic acid (FOLVITE) 1 mg tablet Take 1 mg by mouth daily        gabapentin (NEURONTIN) 600 MG tablet Take 1 tablet by mouth 2 (two) times a day      lifitegrast (Xiidra) 5 % op solution Administer 1 drop to both eyes 2 (two) times a day      methotrexate 2.5 mg tablet 10 mg once a week      multivitamin-minerals (CENTRUM ADULTS) tablet Take 1 tablet by mouth daily      Prasterone 6.5 MG INST Insert 1 suppository into the vagina in the morning 84 each 3    pyridostigmine (MESTINON) 60 mg tablet TAKE 1 TABLET FOUR TIMES A  tablet 3    traMADol (ULTRAM) 50 mg tablet Take 50 mg by mouth 2 (two) times a day as needed      zolpidem (AMBIEN) 10 mg tablet TAKE 1 TABLET DAILY AT BEDTIME . DO NOT START BEFORE MARCH 7. 2024 90 tablet 1     No current facility-administered medications for this visit.       HPI:  Chief Complaint    Patient presents with    Medicare Wellness Visit     Woke up this morning full body aches.     -- Above per clinical staff and reviewed. --    PHQ-2/9 Depression Screening    Little interest or pleasure in doing things: 0 - not at all  Feeling down, depressed, or hopeless: 0 - not at all  PHQ-2 Score: 0  PHQ-2 Interpretation: Negative depression screen            Welcome to medicare. Vision needed   10/7/24 hgb dropped. Repeat.   Due for Cbc Ferritin. Lipids   Colonoscopy scheduled.   Osteopenia - dexa due 6/26  Vascular duplex scheduled splenic artery   Extremely dense breasts, family history breast cancer   10/  7/24 acute sigmoid diverticulitis, 1.2 cm splenic artery aneurrysm   Jan 2024 labs in chart   Virtual medicine check. Switching to cymbalta for mood and pain.weaning off neurontin.  Jan fasting blood work not done.   PE due 12/8/24  Blood work 10/7/24     breast mri recommended - family history and density   - f/u menseteric duplex Q 1 -2 years     last visit Nov has lost her father. felt lexapro 20 mg was working well, along  Ambien 10 mg. mammo due 1/3/22. additional fast MRI or US.   chronic pain, FM -   seeing chiropractor,painting. was not going to PT or seeing counseling. sees rheum. last labs May.   Takes cymbalta 30 mg twice a day     - follows with neuro, rheum, pain mgmt, referred to GI, PT/aqua therapy.     Today:  Patient wishes to be resuscitated if there is hope. If there is no hope they would not want to remain alive on machines.   is MARGARITO Anne     Fibromylagia is flaring today   Pain mgmt changed neurontin to 600 mg twice a day for a week or so   Mood okay, sleeping better 5 hours or so   Eating well   Exercise is up and down the steps, housework   Last PT   Has never done PT just for PT   Aqua therapy long ago   Pain in lower esophagus every few months now   Feels bloating   Following with GI   Sometimes she feels acid   Dr. Aleman colonoscopy in January         The  "following portions of the patient's history were reviewed and updated as appropriate: allergies, current medications, past family history, past medical history, past social history, past surgical history and problem list.    Objective:  Vitals:  /78 (Patient Position: Sitting, Cuff Size: Standard)   Pulse 71   Temp (!) 96.9 °F (36.1 °C) (Temporal)   Ht 5' 5\" (1.651 m)   Wt 75.8 kg (167 lb 3.2 oz)   LMP 01/01/2016 (Approximate)   SpO2 98%   BMI 27.82 kg/m²    Wt Readings from Last 3 Encounters:   12/09/24 75.8 kg (167 lb 3.2 oz)   10/23/24 76 kg (167 lb 9.6 oz)   10/15/24 75.3 kg (166 lb)      BP Readings from Last 3 Encounters:   12/09/24 118/78   10/23/24 122/80   10/15/24 108/82        Review of Systems   She has no other concerns. No unexpected weight changes. No chest pain, SOB, or palpitations. No GERD. No changes in bowels or bladder. Sleeping well. No mood changes.     Physical Exam   Constitutional:  she appears in pain today.   HENT: Head: Normocephalic.   Neck: Neck supple.   Cardiovascular: Normal rate, regular rhythm and normal heart sounds.   Pulmonary/Chest: Effort normal and breath sounds normal. No wheezes, rales, or rhonchi.   Abdominal: Soft. Bowel sounds are normal. There is no tenderness. No hepatosplenomegaly.   Musculoskeletal: she exhibits no edema.   Lymphadenopathy: she has no cervical adenopathy.   Neurological: she is alert and oriented to person, place, and time.   Skin: Skin is warm and dry.   Psychiatric: she has a normal mood and affect. her behavior is normal. Thought content normal.        "

## 2024-12-09 NOTE — TELEPHONE ENCOUNTER
----- Message from Kajal Aleman MD sent at 12/9/2024  9:09 AM EST -----  Regarding: RE: coordination of care - EGD with colonoscopy?  Thanks Vania, you can definitely add EGD for GERD and atypical chest pain    Garima-please add EGD also to the colonoscopy in January  ----- Message -----  From: Vania Morris DO  Sent: 12/9/2024   9:06 AM EST  To: Kajal Aleman MD  Subject: coordination of care - EGD with colonoscopy?     Hello Erna Mcnulty tells me she is scheduled for a colonoscopy with you in January (she only wants to see you for this). She is having lower chest pain, esophagus area, and is wondering about having an EGD at the same time. I told her I would reach out to you.   Thank you,   Vania

## 2024-12-09 NOTE — ASSESSMENT & PLAN NOTE
Will send message to pt regarding breast MRI         Isotretinoin Counseling: Patient should get monthly blood tests, not donate blood, not drive at night if vision affected, not share medication, and not undergo elective surgery for 6 months after tx completed. Side effects reviewed, pt to contact office should one occur.

## 2024-12-09 NOTE — PROGRESS NOTES
Name: Chela Anne      : 1970      MRN: [id number]  Encounter Provider: Vania Morris DO  Encounter Date: 2024   Encounter department: [facility]      No orders of the defined types were placed in this encounter.       Preventive health issues were discussed with patient, and age appropriate screening tests were ordered as noted in patient's After Visit Summary. Personalized health advice and appropriate referrals for health education or preventive services given if needed, as noted in patient's After Visit Summary.    History of Present Illness         Patient Care Team:  Vania Morris DO as PCP - General (Family Medicine)  MD Govind Burch MD Vinod S Kudagi, MD (Cardiology)  Sonam Olguin DO (Rheumatology)    Review of Systems  Annual Wellness Visit Questionnaire   Chela is here for her Initial Wellness visit.     Health Risk Assessment:   Patient rates overall health as fair. Patient feels that their physical health rating is slightly worse. Patient is dissatisfied with their life. Eyesight was rated as same. Hearing was rated as same. Patient feels that their emotional and mental health rating is same. Patients states they are never, rarely angry. Patient states they are sometimes unusually tired/fatigued. Pain experienced in the last 7 days has been some. Patient's pain rating has been 6/10. Patient states that she has experienced no weight loss or gain in last 6 months. Knee pain, bloating    Depression Screening:   PHQ-2 Score: 0      Fall Risk Screening:   In the past year, patient has experienced: no history of falling in past year      Urinary Incontinence Screening:   Patient has not leaked urine accidently in the last six months.     Home Safety:  Patient has trouble with stairs inside or outside of their home. Patient has working smoke alarms and has no working carbon monoxide detector. Home safety hazards include:  medications that cause fatigue.     Nutrition:   Current diet is Limited junk food.     Medications:   Patient is currently taking over-the-counter supplements. OTC medications include: see medication list. Patient is able to manage medications.     Activities of Daily Living (ADLs)/Instrumental Activities of Daily Living (IADLs):   Walk and transfer into and out of bed and chair?: Yes  Dress and groom yourself?: Yes    Bathe or shower yourself?: Yes    Feed yourself? Yes  Do your laundry/housekeeping?: Yes  Manage your money, pay your bills and track your expenses?: Yes  Make your own meals?: Yes    Do your own shopping?: Yes    Durable Medical Equipment Suppliers  Spine stimulator belt    Previous Hospitalizations:   Any hospitalizations or ED visits within the last 12 months?: Yes    How many hospitalizations have you had in the last year?: 1-2    Advance Care Planning:   Living will: Yes    Durable POA for healthcare: Yes    Advanced directive: Yes      PREVENTIVE SCREENINGS      Cardiovascular Screening:    General: Screening Current      Diabetes Screening:     General: Screening Current      Colorectal Cancer Screening:     General: Screening Current      Breast Cancer Screening:     General: Screening Current      Lung Cancer Screening:     General: Screening Not Indicated      Hepatitis C Screening:    General: Screening Current    Screening, Brief Intervention, and Referral to Treatment (SBIRT)    Screening  Typical number of drinks in a day: 0  Typical number of drinks in a week: 0  Interpretation: Low risk drinking behavior.    AUDIT-C Screenin) How often did you have a drink containing alcohol in the past year? never  2) How many drinks did you have on a typical day when you were drinking in the past year? 0  3) How often did you have 6 or more drinks on one occasion in the past year? never    AUDIT-C Score: 0  Interpretation: Score 0-2 (female): Negative screen for alcohol misuse    Single Item  Drug Screening:  How often have you used an illegal drug (including marijuana) or a prescription medication for non-medical reasons in the past year? never    Single Item Drug Screen Score: 0  Interpretation: Negative screen for possible drug use disorder    Social Drivers of Health     Financial Resource Strain: Low Risk  (11/14/2024)    [facility] from [facility]    Overall Financial Resource Strain (CARDIA)     Difficulty of Paying Living Expenses: Not hard at all   Food Insecurity: No Food Insecurity (12/8/2024)    Hunger Vital Sign     Worried About Running Out of Food in the Last Year: Never true     Ran Out of Food in the Last Year: Never true   Transportation Needs: No Transportation Needs (12/8/2024)    PRAPARE - Transportation     Lack of Transportation (Medical): No     Lack of Transportation (Non-Medical): No   Housing Stability: Low Risk  (12/8/2024)    Housing Stability Vital Sign     Unable to Pay for Housing in the Last Year: No     Number of Times Moved in the Last Year: 0     Homeless in the Last Year: No   Utilities: Not At Risk (12/8/2024)    [facility] Utilities     Threatened with loss of utilities: No     No results found.    Objective   LMP 01/01/2016 (Approximate)       Physical Exam

## 2024-12-09 NOTE — ASSESSMENT & PLAN NOTE
Flare today. Recommend PT/aqua therapy. Following with specialists - rheum, physiatry. .   Orders:    Ambulatory Referral to Physical Therapy; Future

## 2024-12-10 DIAGNOSIS — G47.00 PERSISTENT INSOMNIA: ICD-10-CM

## 2024-12-11 RX ORDER — ZOLPIDEM TARTRATE 10 MG/1
10 TABLET ORAL
Qty: 90 TABLET | Refills: 1 | Status: SHIPPED | OUTPATIENT
Start: 2024-12-11

## 2024-12-31 ENCOUNTER — OFFICE VISIT (OUTPATIENT)
Dept: GASTROENTEROLOGY | Facility: CLINIC | Age: 54
End: 2024-12-31
Payer: MEDICARE

## 2024-12-31 VITALS
HEIGHT: 65 IN | SYSTOLIC BLOOD PRESSURE: 112 MMHG | HEART RATE: 78 BPM | DIASTOLIC BLOOD PRESSURE: 78 MMHG | OXYGEN SATURATION: 98 % | TEMPERATURE: 97.9 F | WEIGHT: 166 LBS | BODY MASS INDEX: 27.66 KG/M2

## 2024-12-31 DIAGNOSIS — R10.13 EPIGASTRIC PAIN: ICD-10-CM

## 2024-12-31 DIAGNOSIS — K57.90 DIVERTICULAR DISEASE: Primary | ICD-10-CM

## 2024-12-31 PROCEDURE — 99213 OFFICE O/P EST LOW 20 MIN: CPT | Performed by: PHYSICIAN ASSISTANT

## 2024-12-31 PROCEDURE — G2211 COMPLEX E/M VISIT ADD ON: HCPCS | Performed by: PHYSICIAN ASSISTANT

## 2024-12-31 NOTE — H&P (VIEW-ONLY)
Name: Chela Anne      : 1970      MRN: [id number]  Encounter Provider: Nichol Bruner PA-C  Encounter Date: 2024   Encounter department: [facility] [place]  :  Assessment & Plan  Diverticular disease  Diagnosed with diverticulitis for the first time in 2024.  Patient is scheduled for follow-up colonoscopy, but would prefer to follow-up with her previous proceduralist, Dr. Aleman.  Will send a message to their office for her to continue follow-up with them as well.  Fortunately, doing well from the standpoint.  Advised the patient to start a fiber supplement to help prevent further diverticuli from forming.  Epigastric pain  Surprisingly, her gastric emptying study was normal.  She contacted Dr. Aleman's office she also scheduled for EGD.  Patient reports that she has discomfort that extends into her chest as well, and reports that she had this worked up previously since she has history of thymectomy.  She had thought at the time that it was secondary to her surgery, and even followed with cardiology several years ago and was told that it was not cardiac related.  Further recommendations after EGD.      History of Present Illness   HPI  Chela Anne is a 54 y.o. female with history of myasthenia gravis, Sjogren's disease, sleep apnea, Raynaud's, GERD, spinal stenosis, thymoma status post removal in , and irritable bowel syndrome.  Patient presents today for follow-up after I initially saw her in October after she was diagnosed with her first episode of diverticulitis and was also found to have enteritis on imaging.  Fortunately is doing well from the standpoint, and was initially scheduled with Dr. Irving for colonoscopy.  She would prefer to schedule her procedures with her previous proceduralist, Dr. Aleman at [facility].      Last EGD and colonoscopy were performed by Dr. Aleman in . Patient has a family history of colon cancer in her  "sister. EGD was normal in appearance. Biopsy of the small bowel negative for changes suggesting celiac disease. Colonoscopy revealed diverticulosis and internal hemorrhoids according to report. Random colon biopsy was negative for microscopic colitis.         Review of Systems   Constitutional:  Negative for appetite change, chills, diaphoresis, fatigue and unexpected weight change.   HENT:  Negative for sore throat and trouble swallowing.    Eyes:  Negative for discharge and redness.   Respiratory:  Negative for cough, shortness of breath and wheezing.    Gastrointestinal:  Negative for abdominal pain, anal bleeding, blood in stool, constipation, diarrhea, nausea, rectal pain and vomiting.   Endocrine: Negative for cold intolerance and heat intolerance.   Musculoskeletal:  Negative for joint swelling and myalgias.   Skin:  Negative for pallor and rash.   Neurological:  Negative for dizziness, tremors, weakness, light-headedness, numbness and headaches.   Hematological:  Negative for adenopathy. Does not bruise/bleed easily.   Psychiatric/Behavioral:  Negative for behavioral problems, confusion, dysphoric mood and sleep disturbance. The patient is not nervous/anxious.           Objective   /78 (BP Location: Right arm, Patient Position: Sitting, Cuff Size: Large)   Pulse 78   Temp 97.9 °F (36.6 °C) (Tympanic)   Ht 5' 5\" (1.651 m)   Wt 75.3 kg (166 lb)   LMP 01/01/2016 (Approximate)   SpO2 98%   BMI 27.62 kg/m²     "

## 2024-12-31 NOTE — PROGRESS NOTES
Name: Chela Anne      : 1970      MRN: [id number]  Encounter Provider: Nichol Bruner PA-C  Encounter Date: 2024   Encounter department: [facility] [place]  :  Assessment & Plan  Diverticular disease  Diagnosed with diverticulitis for the first time in 2024.  Patient is scheduled for follow-up colonoscopy, but would prefer to follow-up with her previous proceduralist, Dr. Aleman.  Will send a message to their office for her to continue follow-up with them as well.  Fortunately, doing well from the standpoint.  Advised the patient to start a fiber supplement to help prevent further diverticuli from forming.  Epigastric pain  Surprisingly, her gastric emptying study was normal.  She contacted Dr. Aleman's office she also scheduled for EGD.  Patient reports that she has discomfort that extends into her chest as well, and reports that she had this worked up previously since she has history of thymectomy.  She had thought at the time that it was secondary to her surgery, and even followed with cardiology several years ago and was told that it was not cardiac related.  Further recommendations after EGD.      History of Present Illness   HPI  Chela Anne is a 54 y.o. female with history of myasthenia gravis, Sjogren's disease, sleep apnea, Raynaud's, GERD, spinal stenosis, thymoma status post removal in , and irritable bowel syndrome.  Patient presents today for follow-up after I initially saw her in October after she was diagnosed with her first episode of diverticulitis and was also found to have enteritis on imaging.  Fortunately is doing well from the standpoint, and was initially scheduled with Dr. Irving for colonoscopy.  She would prefer to schedule her procedures with her previous proceduralist, Dr. Aleman at [facility].      Last EGD and colonoscopy were performed by Dr. Aleman in . Patient has a family history of colon cancer in her  "sister. EGD was normal in appearance. Biopsy of the small bowel negative for changes suggesting celiac disease. Colonoscopy revealed diverticulosis and internal hemorrhoids according to report. Random colon biopsy was negative for microscopic colitis.         Review of Systems   Constitutional:  Negative for appetite change, chills, diaphoresis, fatigue and unexpected weight change.   HENT:  Negative for sore throat and trouble swallowing.    Eyes:  Negative for discharge and redness.   Respiratory:  Negative for cough, shortness of breath and wheezing.    Gastrointestinal:  Negative for abdominal pain, anal bleeding, blood in stool, constipation, diarrhea, nausea, rectal pain and vomiting.   Endocrine: Negative for cold intolerance and heat intolerance.   Musculoskeletal:  Negative for joint swelling and myalgias.   Skin:  Negative for pallor and rash.   Neurological:  Negative for dizziness, tremors, weakness, light-headedness, numbness and headaches.   Hematological:  Negative for adenopathy. Does not bruise/bleed easily.   Psychiatric/Behavioral:  Negative for behavioral problems, confusion, dysphoric mood and sleep disturbance. The patient is not nervous/anxious.           Objective   /78 (BP Location: Right arm, Patient Position: Sitting, Cuff Size: Large)   Pulse 78   Temp 97.9 °F (36.6 °C) (Tympanic)   Ht 5' 5\" (1.651 m)   Wt 75.3 kg (166 lb)   LMP 01/01/2016 (Approximate)   SpO2 98%   BMI 27.62 kg/m²     "

## 2025-01-07 ENCOUNTER — TELEPHONE (OUTPATIENT)
Age: 55
End: 2025-01-07

## 2025-01-07 NOTE — LETTER
Medicine Instructions for Adults with Diabetes who Need a Bowel Prep       Follow these instructions when a BOWEL PREP is required for your procedure or surgery!    NOTE:   GLP-1 Agonists taken weekly: do not take in the 7 days before your procedure   SGLT-2 Inhibitors: do not take in the 4 days before your procedure     On the Day Before Surgery/Procedure  If you are having a procedure (e.g. Colonoscopy) or surgery that requires a bowel prep and you may have at least a clear liquid diet, follow the directions below based on the type of medicine you take for your diabetes.     Type of Medicine You Take Examples What to do   Pre-Mixed Insulin - Intermediate Acting Humalog® 75/25, Humulin® 70/30, Novolog® 70/30, Regular Insulin Take ½ your regular dose the evening before your procedure   Rapid/Fast Acting Insulin Humalog® U200, NovoLog®, Apidra®, Fiasp® Take ½ your regular dose the evening before your procedure.   Long-Acting Insulin Lantus®, Levemir®, Tresiba®, Toujeo®, Basaglar® Take your FULL regular dose the day before procedure   Oral Sulfonylurea Glipizide/Glimepiride/Glucotrol® Take ½ your regular dose the evening before your procedure   Other Oral Diabetes Medicines Metformin®, Glucophage®, Glucophage XR®, Riomet®, Glumetza®), Actose®, Avandia®, Glyset®, Prandin® Take your regular dose with dinner in the evening before your procedure   GLP-1 Agonists AdlyxinÒ, ByettaÒ, BydureonÒ, OzempicÒ, SoliquaÒ, TanzeumÒ, TrulicityÒ, VictozaÒ, Saxenda®, Rybelsus® If taken daily, take as normal    If taken weekly, do not take this medicine for 7 days before your procedure including the day of the procedure (resume taking after the procedure)   SGLT-2 Inhibitors Jardiance®, Invokana®, Farxiga®,   Steglatro®, Brenzavvy®, Qtern®, Segluromet®, Glyxambi®, Synjardy®, Synjardy XR®, Invokamet®, Invokamet XR®, Trijary XR®, Xigduo XR®, Steglujan® Do not take for 4 days before your procedure including the day of the  procedure (resume taking after the procedure)                More information continued on back                    Medicine Instructions for Adults with Diabetes who Need a Bowel Prep  Page 2      On the Day of Surgery/Procedure  Follow the directions below based on the type of medicine you take for your diabetes.     Type of Medicine You Take Examples What to do   Long-Acting Insulin Lantus®, Levemir®, Tresiba®, Toujeo®, Basaglar®, Semglee®   If you usually take your Long-Acting Insulin in the morning, take the full dose as scheduled.   GLP-1 Agonists AdlyxinÒ, ByettaÒ, BydureonÒ, OzempicÒ, SoliquaÒ, TanzeumÒ, TrulicityÒ, VictozaÒ, Saxenda®, Rybelsus® Do NOT take this medicine on the day of your procedure (resume taking after the procedure)       On the Day of Surgery/Procedure (continued)  Except for the morning Long-Acting Insulin, DO NOT take ANY diabetic medicine on the day of your procedure unless you were instructed by the doctor who manages your diabetes medicines.    Continue to check your blood sugars.  If you have an insulin pump, ask your endocrinologist for instructions at least 3 days before your procedure. NOTE: If you are not able to ask your endocrinologist in advance, on the day of the procedure set your insulin pump to your basal rate only. Bring your insulin pump supplies to the hospital.     If you have any questions about taking your diabetes medicines prior to your procedure, please contact the doctor who manages your diabetes medicines.

## 2025-01-10 ENCOUNTER — TELEPHONE (OUTPATIENT)
Dept: GASTROENTEROLOGY | Facility: CLINIC | Age: 55
End: 2025-01-10

## 2025-01-10 NOTE — TELEPHONE ENCOUNTER
lmom confirming pt's colonoscopy/egd scheduled on 1/15/25 at [facility] with Dr Aleman.  Informed [facility] would be calling the with the arrival time.  Informed of clear liquid diet day prior as well as the bowel cleansing preparation.  Informed would need a  the day of the procedure due to being under sedation. Informed pt that instructions were sent in my chart for review. I asked pt to please call back has any questions.

## 2025-01-15 ENCOUNTER — ANESTHESIA (OUTPATIENT)
Dept: GASTROENTEROLOGY | Facility: HOSPITAL | Age: 55
End: 2025-01-15
Payer: MEDICARE

## 2025-01-15 ENCOUNTER — HOSPITAL ENCOUNTER (OUTPATIENT)
Dept: GASTROENTEROLOGY | Facility: HOSPITAL | Age: 55
Setting detail: OUTPATIENT SURGERY
Discharge: HOME | End: 2025-01-15
Attending: INTERNAL MEDICINE
Payer: MEDICARE

## 2025-01-15 ENCOUNTER — ANESTHESIA EVENT (OUTPATIENT)
Dept: GASTROENTEROLOGY | Facility: HOSPITAL | Age: 55
End: 2025-01-15
Payer: MEDICARE

## 2025-01-15 VITALS
BODY MASS INDEX: 28.32 KG/M2 | OXYGEN SATURATION: 99 % | TEMPERATURE: 97.2 F | WEIGHT: 170 LBS | SYSTOLIC BLOOD PRESSURE: 114 MMHG | HEART RATE: 87 BPM | RESPIRATION RATE: 18 BRPM | HEIGHT: 65 IN | DIASTOLIC BLOOD PRESSURE: 66 MMHG

## 2025-01-15 DIAGNOSIS — Z80.0 FAMILY HISTORY OF COLON CANCER: ICD-10-CM

## 2025-01-15 DIAGNOSIS — R07.89 ATYPICAL CHEST PAIN: ICD-10-CM

## 2025-01-15 DIAGNOSIS — K21.9 CHRONIC GERD: ICD-10-CM

## 2025-01-15 PROCEDURE — G0121 COLON CA SCRN NOT HI RSK IND: HCPCS | Performed by: INTERNAL MEDICINE

## 2025-01-15 PROCEDURE — 88305 TISSUE EXAM BY PATHOLOGIST: CPT | Performed by: PATHOLOGY

## 2025-01-15 PROCEDURE — 43239 EGD BIOPSY SINGLE/MULTIPLE: CPT | Performed by: INTERNAL MEDICINE

## 2025-01-15 RX ORDER — PROPOFOL 10 MG/ML
INJECTION, EMULSION INTRAVENOUS CONTINUOUS PRN
Status: DISCONTINUED | OUTPATIENT
Start: 2025-01-15 | End: 2025-01-15

## 2025-01-15 RX ORDER — PROPOFOL 10 MG/ML
INJECTION, EMULSION INTRAVENOUS AS NEEDED
Status: DISCONTINUED | OUTPATIENT
Start: 2025-01-15 | End: 2025-01-15

## 2025-01-15 RX ORDER — PANTOPRAZOLE SODIUM 40 MG/1
40 TABLET, DELAYED RELEASE ORAL DAILY
Qty: 30 TABLET | Refills: 2 | Status: SHIPPED | OUTPATIENT
Start: 2025-01-15

## 2025-01-15 RX ORDER — SODIUM CHLORIDE, SODIUM LACTATE, POTASSIUM CHLORIDE, CALCIUM CHLORIDE 600; 310; 30; 20 MG/100ML; MG/100ML; MG/100ML; MG/100ML
INJECTION, SOLUTION INTRAVENOUS CONTINUOUS PRN
Status: DISCONTINUED | OUTPATIENT
Start: 2025-01-15 | End: 2025-01-15

## 2025-01-15 RX ORDER — LIDOCAINE HYDROCHLORIDE 10 MG/ML
INJECTION, SOLUTION EPIDURAL; INFILTRATION; INTRACAUDAL; PERINEURAL AS NEEDED
Status: DISCONTINUED | OUTPATIENT
Start: 2025-01-15 | End: 2025-01-15

## 2025-01-15 RX ADMIN — LIDOCAINE HYDROCHLORIDE 50 MG: 10 INJECTION, SOLUTION EPIDURAL; INFILTRATION; INTRACAUDAL at 08:16

## 2025-01-15 RX ADMIN — PROPOFOL 100 MCG/KG/MIN: 10 INJECTION, EMULSION INTRAVENOUS at 08:18

## 2025-01-15 RX ADMIN — PROPOFOL 30 MG: 10 INJECTION, EMULSION INTRAVENOUS at 08:22

## 2025-01-15 RX ADMIN — SODIUM CHLORIDE, SODIUM LACTATE, POTASSIUM CHLORIDE, AND CALCIUM CHLORIDE: .6; .31; .03; .02 INJECTION, SOLUTION INTRAVENOUS at 07:29

## 2025-01-15 RX ADMIN — PROPOFOL 30 MG: 10 INJECTION, EMULSION INTRAVENOUS at 08:28

## 2025-01-15 RX ADMIN — PROPOFOL 120 MG: 10 INJECTION, EMULSION INTRAVENOUS at 08:18

## 2025-01-15 NOTE — ANESTHESIA POSTPROCEDURE EVALUATION
Post-Op Assessment Note            No anethesia notable event occurred.            Last Filed PACU Vitals:  Vitals Value Taken Time   Temp 97.2 °F (36.2 °C) 01/15/25 0840   Pulse 88 01/15/25 0840   /63 01/15/25 0840   Resp 16 01/15/25 0840   SpO2 92 % 01/15/25 0840

## 2025-01-15 NOTE — INTERVAL H&P NOTE
H&P reviewed. After examining the patient I find no changes in the patients condition since the H&P had been written.    Vitals:    01/15/25 0709   BP: 131/77   Pulse: 80   Resp: 16   Temp: (!) 97.3 °F (36.3 °C)   SpO2: 100%

## 2025-01-15 NOTE — ANESTHESIA PREPROCEDURE EVALUATION
Procedure:  COLONOSCOPY  EGD    Relevant Problems   ANESTHESIA (within normal limits)      CARDIO   (+) Aneurysm, splenic artery (HCC)   (+) Asymptomatic stenosis of left carotid artery   (+) Raynaud's disease   (-) HTN (hypertension)   (-) Hyperlipidemia      ENDO   (-) Diabetes mellitus, type 2 (HCC)   (-) Hyperthyroidism   (-) Hypothyroidism      GI/HEPATIC   (+) Gastroesophageal reflux disease      /RENAL (within normal limits)      HEMATOLOGY (within normal limits)      MUSCULOSKELETAL   (+) DDD (degenerative disc disease), lumbar   (+) Fibromyalgia   (+) MG (myasthenia gravis) (HCC)      NEURO/PSYCH   (+) Anxiety   (+) Chronic pain syndrome   (+) Fibromyalgia      PULMONARY   (+) NAVA (obstructive sleep apnea)   (-) Asthma        Physical Exam    Airway    Mallampati score: III  TM Distance: >3 FB  Neck ROM: full     Dental   No notable dental hx     Cardiovascular  Rhythm: regular, Rate: normal, No murmur    Pulmonary   Breath sounds clear to auscultation    Other Findings  post-pubertal.      Anesthesia Plan  ASA Score- 3     Anesthesia Type- IV sedation with anesthesia with ASA Monitors.         Additional Monitors:     Airway Plan:            Plan Factors-Exercise tolerance (METS): >4 METS.    Chart reviewed.    Patient summary reviewed.    Patient is not a current smoker.              Induction- intravenous.    Postoperative Plan-         Informed Consent- Anesthetic plan and risks discussed with patient.  I personally reviewed this patient with the CRNA. Discussed and agreed on the Anesthesia Plan with the CRNA..

## 2025-01-16 ENCOUNTER — OFFICE VISIT (OUTPATIENT)
Dept: NEUROLOGY | Facility: CLINIC | Age: 55
End: 2025-01-16
Payer: MEDICARE

## 2025-01-16 VITALS
WEIGHT: 166 LBS | DIASTOLIC BLOOD PRESSURE: 78 MMHG | HEART RATE: 75 BPM | OXYGEN SATURATION: 99 % | HEIGHT: 65 IN | SYSTOLIC BLOOD PRESSURE: 122 MMHG | BODY MASS INDEX: 27.66 KG/M2 | TEMPERATURE: 97.3 F

## 2025-01-16 DIAGNOSIS — G44.229 CHRONIC TENSION TYPE HEADACHE: ICD-10-CM

## 2025-01-16 DIAGNOSIS — G70.00 MG (MYASTHENIA GRAVIS) (HCC): Primary | ICD-10-CM

## 2025-01-16 PROCEDURE — 99214 OFFICE O/P EST MOD 30 MIN: CPT | Performed by: PSYCHIATRY & NEUROLOGY

## 2025-01-16 PROCEDURE — 88305 TISSUE EXAM BY PATHOLOGIST: CPT | Performed by: PATHOLOGY

## 2025-01-16 RX ORDER — DULOXETIN HYDROCHLORIDE 30 MG/1
CAPSULE, DELAYED RELEASE ORAL
Qty: 270 CAPSULE | Refills: 1 | Status: SHIPPED | OUTPATIENT
Start: 2025-01-16

## 2025-01-16 NOTE — ASSESSMENT & PLAN NOTE
Orders:    DULoxetine (CYMBALTA) 30 mg delayed release capsule; Take 1 tab in am, and 2 at night    
Overall, patient continues to do very well in regards to symptoms of myasthenia gravis, which is well maintained on pyridostigmine 60 mg 4 times a day.  Does not have any ocular symptoms which was her primary complaint at the time of initial presentation.  She does report generalized fatigability, and fatigability in her arms, which are mostly related to chronic myofascial pain, fibromyalgia and overall longstanding issues with sleep.  At this time, I do not think we need to add any additional medications for myasthenia gravis, she was agreeable.  Exam was normal today.  She will continue pyridostigmine 60 mg 4 times a day.  Follow-up in 6 months.       
200.6

## 2025-01-16 NOTE — PROGRESS NOTES
Name: Chela Anne      : 1970      MRN: [id number]  Encounter Provider: Deangelo Vega MD  Encounter Date: 2025   Encounter department: NEUROLOGY [facility]  :  Assessment & Plan  MG (myasthenia gravis) ([facility])  Overall, patient continues to do very well in regards to symptoms of myasthenia gravis, which is well maintained on pyridostigmine 60 mg 4 times a day.  Does not have any ocular symptoms which was her primary complaint at the time of initial presentation.  She does report generalized fatigability, and fatigability in her arms, which are mostly related to chronic myofascial pain, fibromyalgia and overall longstanding issues with sleep.  At this time, I do not think we need to add any additional medications for myasthenia gravis, she was agreeable.  Exam was normal today.  She will continue pyridostigmine 60 mg 4 times a day.  Follow-up in 6 months.       Chronic tension type headache  Reports new complaint of headaches, which have been ongoing for a while, usually wakes up with a tension headache which she describes as a pressure sensation behind her temples and her eyes.  Sleep as noted below has been a longstanding issue, does not have any migrainous features.  Could add additional medication such as amitriptyline, but hesitant to add medications, patient already on Cymbalta and tramadol for chronic pain.  Recommended we increase the dose of Cymbalta to 30 mg in the morning, and 60 mg in the evening.  She will continue following with chiropractor as well as continue regular massages, did discuss physical therapy, this can be considered in the future if needed.      Orders:    DULoxetine (CYMBALTA) 30 mg delayed release capsule; Take 1 tab in am, and 2 at night          History of Present Illness   HPI  I had the pleasure of seeing your patient in clinic for neuromuscular follow-up.  As you know, patient is a 54-year-old woman, with myasthenia gravis, diagnosed in  with  predominantly ocular symptoms and positive acetylcholine receptor antibodies, she is status post thymectomy in 2014. She was last evaluated in our clinic in July, and now returns for follow-up.    Since last being seen, patient overall is doing well from myasthenia standpoint, denies any blurry or double vision, no droopy eyes, no speech or swallowing issues.  Does report ongoing fatigue, specifically arms get tired when she is trying to brush her hair.  She does get shortness of breath and palpitations with a flight of stairs, does not have any issues with her other ADLs, can get around the mall without any issues, does have knee pain, which limits her walking.      No other muscle weakness anywhere.      She does have Sjogren's, follows closely with rheumatology, on methotrexate, also has fibromyalgia, with chronic pain, has chronic neck pain and tension, sees a chiropractor and gets regular massages.      She does report new daily headaches, which have been ongoing for a few months, usually wakes up with a headache in the morning, almost every day at this point, pain is mainly in temples and behind the eyes, describes it as a pressure.  It may last for an hour or 2, usually takes Aleve which helps, does not have any other migrainous features, specifically no nausea, vomiting, no photophobia or phonophobia.  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    From myasthenia, she is currently on pyridostigmine 60 mg 4 times a day, on Cymbalta 30 mg twice daily and gabapentin 600 mg twice daily for her chronic pain.  Also has tramadol to be used as needed.    Previous history from initial visit in July 2023:  She was diagnosed with Myasthenic gravis, was diagnosed in 2014. Her initial symptoms were diplopia and droopy eyes (in the left eye).   She was started on mestinon and prednisone 60 mg.  She was on high dose prednisone for some  time, has been gradually lowering the dose, was on low-dose prednisone 2.5 mg for at least a few years which was eventually discontinued a few years ago.  Continues to take Mestinon 60 mg 4 times daily.  At the time of her initial diagnosis, she did not have any bulbar symptoms, denies having shortness of breath, fatigue, muscle weakness in upper or lower extremities.     Over the last few years, she reports she has had constant neck pain, lower back pain.  Lower back pain does radiate to both lower extremities, making it hard for her to walk any considerable distance.  She has followed with our pain management colleagues, has had epidural steroid injections as well as neck injections.  Reports numbness in the left foot, not much in the way of any paresthesias in the right foot.  No clear muscle weakness in either upper or lower extremity, however fatigability and endurance has been an issue.   Does not get any double vision anymore, some days may feel left eye is droopy, but this is not a consistent symptom.  Swallowing has not been affected, does report jaw feeling tired if she is eating certain foods, when she is eating solid foods, also has TMJ issues and dry mouth. Breathing is ok.   Feels symptoms feel worse at the end of the day.      Patient does have lower back pain, follows with our pain management colleagues. Being planned for laminectomy in near future.  Has numbness in the left foot.  Besides this, has Sjogren's, reports dry mouth, dry eyes as a result of her disease, with polyarthralgias, on methotrexate.  Follows with Dr. Olguin.     Labs: positive SSA antibodies checked in 2017 as well as 2022 (2.3, 3.8 normal less than 0.9), SSB was normal.  2022 sed rate, B12, CRP have been normal.  TSH has been normal, HIV negative     MRI C spine April 2023: Diffuse degenerative disease, Mild spinal stenopsis reported at C4-5, C 5-6 and C6-7. Neuroforaminal narrowing reported bilaterally at all the levels,  worst at C4-5 and C5-6.      MRI lumbar spine April 2023 degenerative changes mostly at L4-5, L5-S1, with mild to moderate bilateral neuroforaminal narrowing bilaterally, mild mass effect on the thecal sac.     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     Does have history of smoking, quit more than 10 years ago.  Denies any significant alcohol use.  Parents moved with her in 2018, father passed away last year at age 93.   Mother has dementia.    Review of Systems   Constitutional:  Positive for fatigue. Negative for appetite change and fever.   HENT: Negative.  Negative for hearing loss, tinnitus, trouble swallowing and voice change.    Eyes: Negative.  Negative for photophobia, pain and visual disturbance.   Respiratory: Negative.  Negative for shortness of breath.    Cardiovascular: Negative.  Negative for palpitations.   Gastrointestinal: Negative.  Negative for nausea and vomiting.   Endocrine: Negative.  Negative for cold intolerance.   Genitourinary: Negative.  Negative for dysuria, frequency and urgency.   Musculoskeletal:  Negative for back pain, gait problem, myalgias, neck pain and neck stiffness.   Skin: Negative.  Negative for rash.   Allergic/Immunologic: Negative.    Neurological:  Positive for headaches (daily/ temple/ eye pain). Negative for dizziness, tremors, seizures, syncope, facial asymmetry, speech difficulty, weakness, light-headedness and numbness.   Hematological: Negative.  Does not bruise/bleed easily.   Psychiatric/Behavioral: Negative.  Negative for confusion, hallucinations and sleep disturbance.    All other systems reviewed and are negative.   I have personally reviewed the MA's review of systems and made changes as necessary.         Objective   /78 (BP Location: Left arm, Patient Position: Sitting, Cuff Size: Adult)   Pulse 75   Temp (!) 97.3 °F  "(36.3 °C) (Temporal)   Ht 5' 5\" (1.651 m)   Wt 75.3 kg (166 lb)   LMP 01/01/2016 (Approximate)   SpO2 99%   BMI 27.62 kg/m²     Physical Exam  On general examination, patient was not in any acute distress.  HEENT was unremarkable.  Extremities did not reveal any edema.    Neurological Exam  Neurologically, patient was awake and alert, speech was fluent without any dysarthria or aphasia.  Cranial examination did not reveal any ptosis at baseline or with sustained upward gaze., normal extraocular movements, normal strength of eye closure muscles, no facial asymmetry or weakness.  Strength testing reveals normal strength in neck flexors, extensors, and all muscle groups in both upper and lower extremities, proximally and distally.  Reflexes were symmetric throughout.  Sensation was grossly normal.  Gait was casual and normal.    MG-ADL score 4/24, MG-quality of life 4/30.          "

## 2025-01-20 ENCOUNTER — RESULTS FOLLOW-UP (OUTPATIENT)
Dept: GASTROENTEROLOGY | Facility: AMBULARY SURGERY CENTER | Age: 55
End: 2025-01-20

## 2025-03-18 ENCOUNTER — TELEPHONE (OUTPATIENT)
Dept: FAMILY MEDICINE CLINIC | Facility: CLINIC | Age: 55
End: 2025-03-18

## 2025-03-18 NOTE — TELEPHONE ENCOUNTER
Name of procedure: left total knee arthroplasty  Diagnosis: m17.12 osteoarthritis of the left knee joint  Date of procedure (within 30 days): 4/28/25  Surgeon: Dr. Mitchel Brunson  Location of procedure (hospital or out patient facility name): [facility]  PATs date/location/ type (Which labs? EKG? CXR?): CXR, cbc, cmp, ekg  Records (on epic or requested): N/A  Type of anaesthesia:  spinal  Paperwork to complete for Pre-op (patient bringing vs. calling office to obtain prior to appointment): fax received 3/18/25  Pre-op appointment scheduled (date/time): 4/2/25 at 11am

## 2025-03-29 LAB
ALBUMIN SERPL-MCNC: 4.3 G/DL (ref 3.5–5.7)
ALP SERPL-CCNC: 63 U/L (ref 35–120)
ALT SERPL-CCNC: 17 U/L
ANION GAP SERPL CALCULATED.3IONS-SCNC: 8 MMOL/L (ref 3–11)
AST SERPL-CCNC: 22 U/L
BASOPHILS # BLD AUTO: 0 THOU/CMM (ref 0–0.1)
BASOPHILS NFR BLD AUTO: 1 %
BILIRUB SERPL-MCNC: 0.5 MG/DL (ref 0.2–1)
BUN SERPL-MCNC: 11 MG/DL (ref 7–25)
CALCIUM SERPL-MCNC: 9 MG/DL (ref 8.5–10.5)
CHLORIDE SERPL-SCNC: 105 MMOL/L (ref 100–109)
CO2 SERPL-SCNC: 27 MMOL/L (ref 21–31)
CREAT SERPL-MCNC: 0.84 MG/DL (ref 0.4–1.1)
CRP SERPL-MCNC: 1.3 MG/L
CYTOLOGY CMNT CVX/VAG CYTO-IMP: NORMAL
DIFFERENTIAL METHOD BLD: ABNORMAL
EOSINOPHIL # BLD AUTO: 0.1 THOU/CMM (ref 0–0.5)
EOSINOPHIL NFR BLD AUTO: 4 %
ERYTHROCYTE [DISTWIDTH] IN BLOOD BY AUTOMATED COUNT: 16.3 % (ref 12–16)
ERYTHROCYTE [SEDIMENTATION RATE] IN BLOOD BY WESTERGREN METHOD: 6 MM/HR (ref 0–30)
GFR/BSA.PRED SERPLBLD CYS-BASED-ARV: 81 ML/MIN/{1.73_M2}
GLUCOSE SERPL-MCNC: 86 MG/DL (ref 65–99)
GLUCOSE UR QL STRIP: NEGATIVE MG/DL
HCT VFR BLD AUTO: 38.7 % (ref 35–43)
HGB BLD-MCNC: 12.9 G/DL (ref 11.5–14.5)
HGB UR QL STRIP: NEGATIVE MG/DL
KETONES UR QL STRIP: NEGATIVE MG/DL
LEUKOCYTE ESTERASE UR QL STRIP: 25 /UL
LYMPHOCYTES # BLD AUTO: 1.3 THOU/CMM (ref 1–3)
LYMPHOCYTES NFR BLD AUTO: 34 %
MCH RBC QN AUTO: 28.4 PG (ref 26–34)
MCHC RBC AUTO-ENTMCNC: 33.5 G/DL (ref 32–37)
MCV RBC AUTO: 85 FL (ref 80–100)
MONOCYTES # BLD AUTO: 0.3 THOU/CMM (ref 0.3–1)
MONOCYTES NFR BLD AUTO: 9 %
MUCOUS THREADS URNS QL MICRO: ABNORMAL
NEUTROPHILS # BLD AUTO: 1.9 THOU/CMM (ref 1.8–7.8)
NEUTROPHILS NFR BLD AUTO: 52 %
NITRITE UR QL STRIP: NEGATIVE
PH UR: 6 [PH] (ref 4.5–8)
PLATELET # BLD AUTO: 246 THOU/CMM (ref 140–350)
PMV BLD REES-ECKER: 6.7 FL (ref 7.5–11.3)
POTASSIUM SERPL-SCNC: 4.9 MMOL/L (ref 3.5–5.2)
PROT 24H UR-MRATE: NEGATIVE MG/DL
PROT SERPL-MCNC: 6.8 G/DL (ref 6.3–8.3)
RBC # BLD AUTO: 4.56 MILL/CMM (ref 3.7–4.7)
RBC #/AREA URNS HPF: ABNORMAL /HPF (ref 0–2)
SL AMB POCT URINE COMMENT: ABNORMAL
SODIUM SERPL-SCNC: 140 MMOL/L (ref 135–145)
SP GR UR: 1.02 (ref 1–1.03)
SQUAMOUS #/AREA URNS HPF: >10 /LPF (ref 0–5)
TRANS CELLS #/AREA URNS HPF: ABNORMAL /LPF (ref 0–1)
WBC # BLD AUTO: 3.7 THOU/CMM (ref 4–10)
WBC #/AREA URNS HPF: ABNORMAL /HPF (ref 0–5)

## 2025-04-02 ENCOUNTER — CONSULT (OUTPATIENT)
Dept: FAMILY MEDICINE CLINIC | Facility: CLINIC | Age: 55
End: 2025-04-02
Payer: MEDICARE

## 2025-04-02 VITALS
SYSTOLIC BLOOD PRESSURE: 116 MMHG | HEIGHT: 65 IN | HEART RATE: 77 BPM | TEMPERATURE: 97.4 F | DIASTOLIC BLOOD PRESSURE: 80 MMHG | WEIGHT: 165.6 LBS | OXYGEN SATURATION: 98 % | BODY MASS INDEX: 27.59 KG/M2

## 2025-04-02 DIAGNOSIS — M17.12 PRIMARY OSTEOARTHRITIS OF LEFT KNEE: ICD-10-CM

## 2025-04-02 DIAGNOSIS — Z01.818 PREOPERATIVE CLEARANCE: Primary | ICD-10-CM

## 2025-04-02 DIAGNOSIS — G70.00 MG (MYASTHENIA GRAVIS) (HCC): ICD-10-CM

## 2025-04-02 PROCEDURE — 99214 OFFICE O/P EST MOD 30 MIN: CPT | Performed by: FAMILY MEDICINE

## 2025-04-02 PROCEDURE — G2211 COMPLEX E/M VISIT ADD ON: HCPCS | Performed by: FAMILY MEDICINE

## 2025-04-02 RX ORDER — MUPIROCIN 20 MG/G
OINTMENT TOPICAL 2 TIMES DAILY
COMMUNITY
Start: 2025-03-13

## 2025-04-02 RX ORDER — CELECOXIB 200 MG/1
200 CAPSULE ORAL DAILY
COMMUNITY
Start: 2025-03-31

## 2025-04-02 NOTE — PROGRESS NOTES
Name: Chela Anne      : 1970      MRN: [id number]  Encounter Provider: Evelina Goldberg DO  Encounter Date: 2025   Encounter department: [facility]  :  Assessment & Plan  Preoperative clearance  No contraindication for surgery  Revised cardiac risk index shows 9.4%/very low risk         Primary osteoarthritis of left knee  Mgmt per surgery  Hold celebrex perior to surgery per ortho instructions       MG (myasthenia gravis) (HCC)  Mgmt/methotrexate per rheum             Depression Screening and Follow-up Plan: Patient was screened for depression during today's encounter. They screened negative with a PHQ-2 score of 0.        History of Present Illness   HPI  Pt presents for pre-operative eval for upcoming L TKR with dr menard at the [facility] surgery center on 25 under spinal/sedation. Pt has had pain in knee for years and has failed injections and PT.  Pt and her ortho feel it is time for replacement.  She has completed some prehab and pre-operative labs which I have reviewed.  EKG today shows NSR.  Nml axis.  Nml EKG.  She has no hx of difficulty with anesthesia and recently had a spinal fusion without a problem.  She denies any chest pain, shortness of breath.  She can walk up 2 flights with groceries without chest pain or shortness of breath.  She can walk on a flat surface without being limited by chest pain, shortness of breath.      She has hx of myasthenia gravis and sjogren's.  She is under the care of rheum and has been told how to manage her methotrexate prior to surgery      Review of Systems   Constitutional:  Negative for chills, fatigue, fever and unexpected weight change.   HENT:  Negative for congestion, ear pain, hearing loss, postnasal drip, rhinorrhea, sinus pressure, sinus pain, sore throat, trouble swallowing and voice change.    Respiratory:  Negative for cough and shortness of breath.    Cardiovascular:  Negative for chest pain, palpitations and leg  "swelling.   Gastrointestinal:  Negative for abdominal pain, constipation, diarrhea and nausea.   Endocrine: Negative for cold intolerance, heat intolerance, polydipsia and polyuria.   Genitourinary:  Negative for dysuria, frequency and urgency.   Musculoskeletal:  Positive for arthralgias. Negative for joint swelling and myalgias.   Skin:  Negative for rash.        No suspicious lesions   Neurological:  Negative for weakness, numbness and headaches.   Hematological:  Negative for adenopathy.       Objective   /80 (Patient Position: Sitting, Cuff Size: Standard)   Pulse 77   Temp (!) 97.4 °F (36.3 °C) (Temporal)   Ht 5' 5\" (1.651 m)   Wt 75.1 kg (165 lb 9.6 oz)   LMP 01/01/2016 (Approximate)   SpO2 98%   BMI 27.56 kg/m²      Physical Exam  Constitutional:       Appearance: Normal appearance.   HENT:      Head: Normocephalic and atraumatic.      Right Ear: Tympanic membrane, ear canal and external ear normal.      Left Ear: Tympanic membrane, ear canal and external ear normal.      Nose: Nose normal. No congestion.      Mouth/Throat:      Mouth: Mucous membranes are moist.      Pharynx: No oropharyngeal exudate or posterior oropharyngeal erythema.   Eyes:      Extraocular Movements: Extraocular movements intact.      Conjunctiva/sclera: Conjunctivae normal.      Pupils: Pupils are equal, round, and reactive to light.   Neck:      Vascular: No carotid bruit.   Cardiovascular:      Rate and Rhythm: Normal rate and regular rhythm.      Heart sounds: No murmur heard.     No friction rub. No gallop.   Pulmonary:      Effort: Pulmonary effort is normal.      Breath sounds: No wheezing, rhonchi or rales.   Abdominal:      General: Abdomen is flat. There is no distension.      Palpations: Abdomen is soft.      Tenderness: There is no abdominal tenderness.   Musculoskeletal:      Cervical back: Neck supple.   Lymphadenopathy:      Cervical: No cervical adenopathy.   Neurological:      General: No focal deficit " present.      Mental Status: She is alert and oriented to person, place, and time.      Cranial Nerves: No cranial nerve deficit.      Motor: No weakness.      Deep Tendon Reflexes: Reflexes normal.

## 2025-05-01 DIAGNOSIS — G70.00 MG (MYASTHENIA GRAVIS) (HCC): ICD-10-CM

## 2025-05-02 RX ORDER — PYRIDOSTIGMINE BROMIDE 60 MG/1
60 TABLET ORAL 4 TIMES DAILY
Qty: 360 TABLET | Refills: 3 | Status: SHIPPED | OUTPATIENT
Start: 2025-05-02

## 2025-06-06 ENCOUNTER — RA CDI HCC (OUTPATIENT)
Dept: OTHER | Facility: HOSPITAL | Age: 55
End: 2025-06-06

## 2025-06-08 DIAGNOSIS — G47.00 PERSISTENT INSOMNIA: ICD-10-CM

## 2025-06-09 ENCOUNTER — OFFICE VISIT (OUTPATIENT)
Dept: FAMILY MEDICINE CLINIC | Facility: CLINIC | Age: 55
End: 2025-06-09
Payer: MEDICARE

## 2025-06-09 VITALS
BODY MASS INDEX: 27.39 KG/M2 | WEIGHT: 164.4 LBS | TEMPERATURE: 98.6 F | OXYGEN SATURATION: 98 % | DIASTOLIC BLOOD PRESSURE: 82 MMHG | HEIGHT: 65 IN | HEART RATE: 78 BPM | SYSTOLIC BLOOD PRESSURE: 116 MMHG

## 2025-06-09 DIAGNOSIS — R92.343 EXTREMELY DENSE TISSUE OF BOTH BREASTS ON MAMMOGRAPHY: ICD-10-CM

## 2025-06-09 DIAGNOSIS — Z80.3 FAMILY HISTORY OF BREAST CANCER: ICD-10-CM

## 2025-06-09 DIAGNOSIS — G47.00 PERSISTENT INSOMNIA: ICD-10-CM

## 2025-06-09 DIAGNOSIS — M79.7 FIBROMYALGIA: ICD-10-CM

## 2025-06-09 DIAGNOSIS — Z12.31 ENCOUNTER FOR SCREENING MAMMOGRAM FOR BREAST CANCER: Primary | ICD-10-CM

## 2025-06-09 PROBLEM — F11.20 OPIOID DEPENDENCE, UNCOMPLICATED (HCC): Status: ACTIVE | Noted: 2025-06-09

## 2025-06-09 PROCEDURE — 99214 OFFICE O/P EST MOD 30 MIN: CPT | Performed by: FAMILY MEDICINE

## 2025-06-09 PROCEDURE — G2211 COMPLEX E/M VISIT ADD ON: HCPCS | Performed by: FAMILY MEDICINE

## 2025-06-09 RX ORDER — METHOCARBAMOL 500 MG/1
500 TABLET, FILM COATED ORAL AS NEEDED
COMMUNITY

## 2025-06-09 RX ORDER — CEPHALEXIN 500 MG/1
500 CAPSULE ORAL AS NEEDED
COMMUNITY

## 2025-06-09 RX ORDER — ZOLPIDEM TARTRATE 10 MG/1
TABLET ORAL
Qty: 90 TABLET | Refills: 1 | OUTPATIENT
Start: 2025-06-09

## 2025-06-09 RX ORDER — ZOLPIDEM TARTRATE 10 MG/1
10 TABLET ORAL
Qty: 90 TABLET | Refills: 2 | Status: SHIPPED | OUTPATIENT
Start: 2025-06-09 | End: 2025-06-11 | Stop reason: SDUPTHER

## 2025-06-09 NOTE — PROGRESS NOTES
Follow up visit   Name: Chela nAne      : 1970      MRN: [id number]  Encounter Provider: Vania Morris DO  Encounter Date: 2025   Encounter department: [facility]    :  Assessment & Plan  Persistent insomnia  On long term ambien for insomonia  Understands risks of medication   Tolerating well   Controlled Substance Review    [place] or [place]  reviewed: No red flags were identified; safe to proceed with prescription..    Orders:    zolpidem (AMBIEN) 10 mg tablet; Take 1 tablet (10 mg total) by mouth daily at bedtime    Fibromyalgia  Stable  Following with rheum       Encounter for screening mammogram for breast cancer  Due to dense breasts and family history patient needs mammo once a year, alternating with breast MRI 6 months later and then yearly.   Orders:    Mammo screening bilateral w 3d and cad; Future    Extremely dense tissue of both breasts on mammography  Due to dense breasts and family history patient needs mammo once a year, alternating with breast MRI 6 months later and then yearly.  Orders:    MRI breast bilateral w and wo contrast w cad; Future    Family history of breast cancer  Due to dense breasts and family history patient needs mammo once a year, alternating with breast MRI 6 months later and then yearly.  Orders:    MRI breast bilateral w and wo contrast w cad; Future      Assessment & Plan          Follow up:   Return in about 6 months (around 2025) for Medicare Wellness Visit.        Chela is a 54 y.o. female who presents today for follow up on chronic conditions.     Chief Complaint   Patient presents with    Follow-up     History of Present Illness     Concerns today:  Stopped vitamins pre surgery   Has not restarted   Surgery went well   Ended early PT  Left knee walking well, just sore at incision           Review of Systems  ROS:  all others negative - no chest pain, SOB, normal urine and bowels. no GERD. sleeping well. mood good.  "      PHQ-2/9 Depression Screening                Objective   /82 (Patient Position: Sitting, Cuff Size: Standard)   Pulse 78   Temp 98.6 °F (37 °C) (Temporal)   Ht 5' 5\" (1.651 m)   Wt 74.6 kg (164 lb 6.4 oz)   LMP 01/01/2016 (Approximate)   SpO2 98%   BMI 27.36 kg/m²     /82 (Patient Position: Sitting, Cuff Size: Standard)   Pulse 78   Temp 98.6 °F (37 °C) (Temporal)   Ht 5' 5\" (1.651 m)   Wt 74.6 kg (164 lb 6.4 oz)   LMP 01/01/2016 (Approximate)   SpO2 98%   BMI 27.36 kg/m²     BP Readings from Last 3 Encounters:   06/09/25 116/82   04/02/25 116/80   01/16/25 122/78     Wt Readings from Last 3 Encounters:   06/09/25 74.6 kg (164 lb 6.4 oz)   04/02/25 75.1 kg (165 lb 9.6 oz)   01/16/25 75.3 kg (166 lb)       Physical Exam  Constitutional: she appears well-developed and well-nourished.   HENT: Head: Normocephalic.   Right Ear: External ear normal. Tympanic membrane normal.   Left Ear: External ear normal. Tympanic membrane normal.   Nose: Nose normal. No mucosal edema, No rhinorrhea.   Right sinus exhibits no maxillary sinus tenderness.   Left sinus exhibits no maxillary sinus tenderness.   Mouth/Throat: Oropharynx is clear and moist.   Eyes: Normal conjunctiva.  No erythema. No discharge.  Neck: No pain on exam. Neck supple.   Cardiovascular: Normal rate, regular rhythm and normal heart sounds.    Pulmonary/Chest: Effort normal and breath sounds normal. No wheezes. No rales. No rhonchi.   Abdominal: Soft. Bowel sounds are normal. There is no tenderness.   Musculoskeletal: she exhibits no edema.   Lymphadenopathy: she has no cervical adenopathy.   Neurological: she  is alert and oriented to person, place, and time.   Skin: Skin is warm and dry. No rashes.  Psychiatric: she  has a normal mood and affect. her behavior is normal. Thought content normal.   Vitals reviewed.      Current Medications:  Current Medications[1]         [1]   Current Outpatient Medications   Medication Sig Dispense " Refill    ammonium lactate (LAC-HYDRIN) 12 % cream Apply topically as needed for dry skin 385 g 1    cephalexin (KEFLEX) 500 mg capsule Take 500 mg by mouth as needed   TAKE 4 CAPSULE S) BY MOUTH ONE HOUR PRIOR TO DENTAL APPOINTMENT.      chlorhexidine (PERIDEX) 0.12 % solution       Cholecalciferol (VITAMIN D3) 1,000 units tablet Take 2,000 Units by mouth daily at bedtime      DULoxetine (CYMBALTA) 30 mg delayed release capsule Take 1 tab in am, and 2 at night 270 capsule 1    gabapentin (NEURONTIN) 600 MG tablet Take 1 tablet by mouth in the morning and 1 tablet before bedtime.      lifitegrast (Xiidra) 5 % op solution Administer 1 drop to both eyes in the morning and 1 drop in the evening.      methocarbamol (ROBAXIN) 500 mg tablet Take 500 mg by mouth as needed      methotrexate 2.5 mg tablet 10 mg once a week      pantoprazole (PROTONIX) 40 mg tablet Take 1 tablet (40 mg total) by mouth daily 30 tablet 2    Prasterone 6.5 MG INST Insert 1 suppository into the vagina in the morning 84 each 3    pyridostigmine (MESTINON) 60 mg tablet TAKE 1 TABLET FOUR TIMES A  tablet 3    traMADol (ULTRAM) 50 mg tablet Take 50 mg by mouth as needed in the morning and 50 mg as needed in the evening.      zolpidem (AMBIEN) 10 mg tablet Take 1 tablet (10 mg total) by mouth daily at bedtime 90 tablet 2     No current facility-administered medications for this visit.

## 2025-06-09 NOTE — ASSESSMENT & PLAN NOTE
On long term ambien for insomonia  Understands risks of medication   Tolerating well   Controlled Sub[place] Review    [place]  reviewed: No red flags were identified; safe to proceed with prescription..    Orders:    zolpidem (AMBIEN) 10 mg tablet; Take 1 tablet (10 mg total) by mouth daily at bedtime

## 2025-06-11 ENCOUNTER — TELEPHONE (OUTPATIENT)
Dept: FAMILY MEDICINE CLINIC | Facility: CLINIC | Age: 55
End: 2025-06-11

## 2025-06-11 DIAGNOSIS — G47.00 PERSISTENT INSOMNIA: ICD-10-CM

## 2025-06-11 NOTE — TELEPHONE ENCOUNTER
Patient called requesting refill for zolpidem (AMBIEN) 10 mg tablet . Patient made aware medication was refilled on 6/9/25 for 90 with 2 refills to [facility] pharmacy. Patient instructed to contact the pharmacy and speak with someone directly to obtain refills of medication. Patient advised to call back for refill if their pharmacy is unable to assist them. Patient verbalized understanding.

## 2025-06-12 RX ORDER — ZOLPIDEM TARTRATE 10 MG/1
10 TABLET ORAL
Qty: 90 TABLET | Refills: 0 | Status: SHIPPED | OUTPATIENT
Start: 2025-06-12

## 2025-06-27 DIAGNOSIS — G44.229 CHRONIC TENSION TYPE HEADACHE: ICD-10-CM

## 2025-06-27 RX ORDER — DULOXETIN HYDROCHLORIDE 30 MG/1
CAPSULE, DELAYED RELEASE ORAL
Qty: 270 CAPSULE | Refills: 0 | Status: SHIPPED | OUTPATIENT
Start: 2025-06-27

## 2025-07-09 ENCOUNTER — OFFICE VISIT (OUTPATIENT)
Dept: NEUROLOGY | Facility: CLINIC | Age: 55
End: 2025-07-09
Payer: MEDICARE

## 2025-07-09 VITALS
OXYGEN SATURATION: 99 % | HEIGHT: 65 IN | WEIGHT: 159 LBS | TEMPERATURE: 97.8 F | BODY MASS INDEX: 26.49 KG/M2 | SYSTOLIC BLOOD PRESSURE: 106 MMHG | HEART RATE: 71 BPM | DIASTOLIC BLOOD PRESSURE: 68 MMHG

## 2025-07-09 DIAGNOSIS — G44.229 CHRONIC TENSION-TYPE HEADACHE, NOT INTRACTABLE: ICD-10-CM

## 2025-07-09 DIAGNOSIS — G70.00 MG (MYASTHENIA GRAVIS) (HCC): Primary | ICD-10-CM

## 2025-07-09 PROCEDURE — 99214 OFFICE O/P EST MOD 30 MIN: CPT | Performed by: NURSE PRACTITIONER

## 2025-07-09 RX ORDER — DEXAMETHASONE 1 MG
TABLET ORAL
Qty: 7 TABLET | Refills: 0 | Status: SHIPPED | OUTPATIENT
Start: 2025-07-09

## 2025-07-09 RX ORDER — FOLIC ACID 1 MG/1
TABLET ORAL
COMMUNITY
Start: 2025-06-10

## 2025-07-09 NOTE — PROGRESS NOTES
Review of Systems   Constitutional:  Negative for appetite change, fatigue and fever.   HENT: Negative.  Negative for hearing loss, tinnitus, trouble swallowing and voice change.    Eyes: Negative.  Negative for photophobia, pain and visual disturbance.   Respiratory: Negative.  Negative for shortness of breath.    Cardiovascular: Negative.  Negative for palpitations.   Gastrointestinal: Negative.  Negative for nausea and vomiting.   Endocrine: Negative.  Negative for cold intolerance.   Genitourinary: Negative.  Negative for dysuria, frequency and urgency.   Musculoskeletal:  Negative for back pain, gait problem, myalgias, neck pain and neck stiffness.   Skin: Negative.  Negative for rash.   Allergic/Immunologic: Negative.    Neurological:  Positive for headaches (first thing in the morning- same since LV- tyelnol helps- starts at the back of head/ neck). Negative for dizziness, tremors, seizures, syncope, facial asymmetry, speech difficulty, weakness, light-headedness and numbness.   Hematological: Negative.  Does not bruise/bleed easily.   Psychiatric/Behavioral: Negative.  Negative for confusion, hallucinations and sleep disturbance.    All other systems reviewed and are negative.

## 2025-07-09 NOTE — PROGRESS NOTES
Name: Chela Anne      : 1970      MRN: [id number]  Encounter Provider: MINDA Dicskon  Encounter Date: 2025   Encounter department: NEUROLOGY [facility]  :  Assessment & Plan  MG (myasthenia gravis) ([facility])  Patient symptoms of myasthenia gravis stable since last visit.  She denies any ocular symptoms, no difficulty with swallowing or shortness of breath.  She does continue to report generalized fatigability which can contribute to some feelings of weakness, this is stable.  This symptom is more so likely related to her fibromyalgia and Sjogren's and not her myasthenia.  She will continue her current dose of Mestinon 60 mg 4 times daily.       Chronic tension-type headache, not intractable  Notes ongoing headaches mainly at the base of the skull, these have been daily, do resolve with Tylenol.  Likely tension type headaches along with medication overuse headaches.  Increase in Cymbalta.  Hesitant to add on amitriptyline given use of Cymbalta and tramadol on a regular basis.    Advised to limit use of Tylenol to less than 2-3 times medication overuse headaches.  Start 5-day course of Decadron to break headache  Start tizanidine 4 mg half tab at bedtime if no improvement in 1 week can increase to 1 tab at bedtime.    Should call if no improvement in several weeks or side effects.  Should also call if she notes any worsening of her MG symptoms with steroid and muscle relaxer.  Orders:    dexamethasone (DECADRON) 1 mg tablet; Take 2 tabs daily for 2 days then 1 tab daily for 3 days    tiZANidine (ZANAFLEX) 4 mg tablet; Take 1/2 tab at bedtime for 1 week if no improvement can increase to 1 tab at bedtime          History of Present Illness   HPI   patient is a 54-year-old woman with past medical history of Sjogren's, fibromyalgia, follows with rheumatology who presents for follow-up for myasthenia gravis, with positive acetylcholine receptor antibodies diagnosed in  with  predominantly ocular symptoms, status post thymectomy in 2014.       Last office visit 1/2025 in which Cymbalta was increased due to tension type headaches, MG was stable.      Interval history:  Myasthenia symptoms are stable, she denies any diplopia or ptosis, no issues swallowing. Transient jaw fatigue with certain foods.   No speech changes.  No SOB.  She does have generalized fatigue and muscle soreness can make her feel somewhat weak  at times is unchanged.     She continues with daily headaches. Base of the skull, she feels a little lump on the left based of neck that is sore. Can get shooting pain up up the skull on the left at times.  She has chronic neck pain and tension, does massage. Hasn't done PT.   Taking tylenol almost every day for this. Resolves in 30-60 mins.   Did get new glasses which didn't help headaches.         She is currently on Mestinon 60 mg 4 times daily.    She does have Sjogren's, follows closely with rheumatology, on methotrexate, also has fibromyalgia, with chronic pain, has chronic neck pain and tension, sees a chiropractor and gets regular massages.   She is in cymbalta 30 mg in the AM and 60 mg at bedtime  and gabapentin 600 mg BID tramadol as needed for chronic pain.      MG-ADL 1    Previous history from initial visit in July 2023:  She was diagnosed with Myasthenic gravis, was diagnosed in 2014. Her initial symptoms were diplopia and droopy eyes (in the left eye).   She was started on mestinon and prednisone 60 mg.  She was on high dose prednisone for some time, has been gradually lowering the dose, was on low-dose prednisone 2.5 mg for at least a few years which was eventually discontinued a few years ago.  Continues to take Mestinon 60 mg 4 times daily.  At the time of her initial diagnosis, she did not have any bulbar symptoms, denies having shortness of breath, fatigue, muscle weakness in upper or lower extremities.     Over the last few years, she reports she has had  constant neck pain, lower back pain.  Lower back pain does radiate to both lower extremities, making it hard for her to walk any considerable distance.  She has followed with our pain management colleagues, has had epidural steroid injections as well as neck injections.  Reports numbness in the left foot, not much in the way of any paresthesias in the right foot.  No clear muscle weakness in either upper or lower extremity, however fatigability and endurance has been an issue.   Does not get any double vision anymore, some days may feel left eye is droopy, but this is not a consistent symptom.  Swallowing has not been affected, does report jaw feeling tired if she is eating certain foods, when she is eating solid foods, also has TMJ issues and dry mouth. Breathing is ok.   Feels symptoms feel worse at the end of the day.      Patient does have lower back pain, follows with our pain management colleagues. Being planned for laminectomy in near future.  Has numbness in the left foot.  Besides this, has Sjogren's, reports dry mouth, dry eyes as a result of her disease, with polyarthralgias, on methotrexate.  Follows with Dr. Olguin.     Labs: positive SSA antibodies checked in 2017 as well as 2022 (2.3, 3.8 normal less than 0.9), SSB was normal.  2022 sed rate, B12, CRP have been normal.  TSH has been normal, HIV negative     MRI C spine April 2023: Diffuse degenerative disease, Mild spinal stenopsis reported at C4-5, C 5-6 and C6-7. Neuroforaminal narrowing reported bilaterally at all the levels, worst at C4-5 and C5-6.      MRI lumbar spine April 2023 degenerative changes mostly at L4-5, L5-S1, with mild to moderate bilateral neuroforaminal narrowing bilaterally, mild mass effect on the thecal sac.     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     Review of Systems   Review of Systems    Constitutional:  Negative for appetite change, fatigue and fever.   HENT: Negative.  Negative for hearing loss, tinnitus, trouble swallowing and voice change.    Eyes: Negative.  Negative for photophobia, pain and visual disturbance.   Respiratory: Negative.  Negative for shortness of breath.    Cardiovascular: Negative.  Negative for palpitations.   Gastrointestinal: Negative.  Negative for nausea and vomiting.   Endocrine: Negative.  Negative for cold intolerance.   Genitourinary: Negative.  Negative for dysuria, frequency and urgency.   Musculoskeletal:  Negative for back pain, gait problem, myalgias, neck pain and neck stiffness.   Skin: Negative.  Negative for rash.   Allergic/Immunologic: Negative.    Neurological:  Positive for headaches (first thing in the morning- same since LV- tyelnol helps- starts at the back of head/ neck). Negative for dizziness, tremors, seizures, syncope, facial asymmetry, speech difficulty, weakness, light-headedness and numbness.   Hematological: Negative.  Does not bruise/bleed easily.   Psychiatric/Behavioral: Negative.  Negative for confusion, hallucinations and sleep disturbance.    All other systems reviewed and are negative.     I have personally reviewed the MA's review of systems and made changes as necessary.         Objective   LMP 01/01/2016 (Approximate)     Physical Exam  Constitutional:       General: She is awake.   HENT:      Right Ear: Hearing normal.      Left Ear: Hearing normal.     Eyes:      General: Lids are normal.      Extraocular Movements: Extraocular movements intact.      Pupils: Pupils are equal, round, and reactive to light.       Neurological:      Mental Status: She is alert.      Deep Tendon Reflexes:      Reflex Scores:       Bicep reflexes are 2+ on the right side and 2+ on the left side.       Brachioradialis reflexes are 2+ on the right side and 2+ on the left side.       Patellar reflexes are 1+ on the right side and 1+ on the left  side.    Psychiatric:         Speech: Speech normal.       Neurological Exam  Mental Status  Awake and alert. Oriented to person, place, time and situation. Speech is normal. Language is fluent with no aphasia.    Cranial Nerves  CN III, IV, VI: Extraocular movements intact bilaterally. Normal lids and orbits bilaterally. Pupils equal round and reactive to light bilaterally. No ptosis, no diplopia.  CN V:  Right: Facial sensation is normal.  Left: Facial sensation is normal on the left.  CN VII:  Right: There is no facial weakness.  Left: There is no facial weakness.  CN VIII:  Right: Hearing is normal.  Left: Hearing is normal.  CN IX, X: Palate elevates symmetrically  CN XI:  Right: Trapezius strength is normal.  Left: Trapezius strength is normal.  CN XII: Tongue midline without atrophy or fasciculations.  Able to puff out cheeks, close eyes, push tongue to the side of the mouth with resistance  .    Motor  Normal muscle bulk throughout.                                               Right                     Left  Neck flexion                           5                           Neck extension                      5                           Elbow flexion                         5                          5  Elbow extension                    5                          5  Wrist flexion                           5                          5  Wrist extension                      5                          5  Finger abduction                    5                          5  Hip flexion                              5                          5  Knee flexion                           5                          5  Knee extension                      5                          5  Ankle inversion                      5                          5  Ankle eversion                       5                          5  Plantarflexion                         5                          5  Dorsiflexion                             5                          5  Cervical muscle tenderness and hypertrophy.    Sensory  Light touch is normal in upper and lower extremities. Temperature is normal in upper and lower extremities. Vibration is normal in upper and lower extremities.     Reflexes                                            Right                      Left  Brachioradialis                    2+                         2+  Biceps                                 2+                         2+  Patellar                                1+                         1+  Achilles                                Tr                         Tr    Coordination  Right: Finger-to-nose normal.Left: Finger-to-nose normal.    Gait  Casual gait is normal including stance, stride, and arm swing. Able to rise from chair without using arms.

## 2025-07-09 NOTE — PATIENT INSTRUCTIONS
limit tylenol use to <2-3 times per week  Take decadron for 5 days to break headache  Start tizanidine 1/2 tab at bedtime, if no improvement in 1 week increase to 1 tab at bedtime  Call for any worsening MG symptoms.

## 2025-07-09 NOTE — ASSESSMENT & PLAN NOTE
Patient symptoms of myasthenia gravis stable since last visit.  She denies any ocular symptoms, no difficulty with swallowing or shortness of breath.  She does continue to report generalized fatigability which can contribute to some feelings of weakness, this is stable.  This symptom is more so likely related to her fibromyalgia and Sjogren's and not her myasthenia.  She will continue her current dose of Mestinon 60 mg 4 times daily.

## 2025-07-29 ENCOUNTER — OFFICE VISIT (OUTPATIENT)
Dept: FAMILY MEDICINE CLINIC | Facility: CLINIC | Age: 55
End: 2025-07-29
Payer: MEDICARE

## 2025-07-29 ENCOUNTER — APPOINTMENT (OUTPATIENT)
Dept: LAB | Facility: CLINIC | Age: 55
End: 2025-07-29
Attending: FAMILY MEDICINE
Payer: MEDICARE

## 2025-07-29 ENCOUNTER — PATIENT MESSAGE (OUTPATIENT)
Dept: FAMILY MEDICINE CLINIC | Facility: CLINIC | Age: 55
End: 2025-07-29

## 2025-07-29 VITALS
HEIGHT: 65 IN | TEMPERATURE: 97.7 F | SYSTOLIC BLOOD PRESSURE: 128 MMHG | DIASTOLIC BLOOD PRESSURE: 82 MMHG | RESPIRATION RATE: 16 BRPM | OXYGEN SATURATION: 99 % | BODY MASS INDEX: 27.16 KG/M2 | WEIGHT: 163 LBS

## 2025-07-29 DIAGNOSIS — M79.89 PAIN AND SWELLING OF TOE OF LEFT FOOT: Primary | ICD-10-CM

## 2025-07-29 DIAGNOSIS — M25.572 ARTHRALGIA OF LEFT FOOT: ICD-10-CM

## 2025-07-29 DIAGNOSIS — M79.675 PAIN AND SWELLING OF TOE OF LEFT FOOT: Primary | ICD-10-CM

## 2025-07-29 LAB — URATE SERPL-MCNC: 3.9 MG/DL (ref 2–7.5)

## 2025-07-29 PROCEDURE — G2211 COMPLEX E/M VISIT ADD ON: HCPCS | Performed by: FAMILY MEDICINE

## 2025-07-29 PROCEDURE — 36415 COLL VENOUS BLD VENIPUNCTURE: CPT

## 2025-07-29 PROCEDURE — 99214 OFFICE O/P EST MOD 30 MIN: CPT | Performed by: FAMILY MEDICINE

## 2025-07-29 PROCEDURE — 84550 ASSAY OF BLOOD/URIC ACID: CPT

## 2025-07-29 RX ORDER — MUPIROCIN 2 %
OINTMENT (GRAM) TOPICAL 2 TIMES DAILY
COMMUNITY

## 2025-08-05 ENCOUNTER — HOSPITAL ENCOUNTER (OUTPATIENT)
Dept: MRI IMAGING | Facility: HOSPITAL | Age: 55
Discharge: HOME | End: 2025-08-05
Attending: FAMILY MEDICINE
Payer: MEDICARE

## 2025-08-05 DIAGNOSIS — R92.343 EXTREMELY DENSE TISSUE OF BOTH BREASTS ON MAMMOGRAPHY: ICD-10-CM

## 2025-08-05 DIAGNOSIS — Z80.3 FAMILY HISTORY OF BREAST CANCER: ICD-10-CM

## 2025-08-05 PROCEDURE — C8937 CAD BREAST MRI: HCPCS

## 2025-08-05 PROCEDURE — A9585 GADOBUTROL INJECTION: HCPCS | Performed by: FAMILY MEDICINE

## 2025-08-05 PROCEDURE — C8908 MRI W/O FOL W/CONT, BREAST,: HCPCS

## 2025-08-05 RX ORDER — GADOBUTROL 604.72 MG/ML
7 INJECTION INTRAVENOUS
Status: COMPLETED | OUTPATIENT
Start: 2025-08-05 | End: 2025-08-05

## 2025-08-05 RX ADMIN — GADOBUTROL 7 ML: 604.72 INJECTION INTRAVENOUS at 12:07
